# Patient Record
Sex: MALE | Race: WHITE | NOT HISPANIC OR LATINO | Employment: OTHER | URBAN - METROPOLITAN AREA
[De-identification: names, ages, dates, MRNs, and addresses within clinical notes are randomized per-mention and may not be internally consistent; named-entity substitution may affect disease eponyms.]

---

## 2017-07-12 ENCOUNTER — TELEPHONE (OUTPATIENT)
Dept: FAMILY MEDICINE CLINIC | Facility: CLINIC | Age: 67
End: 2017-07-12

## 2017-09-05 ENCOUNTER — OFFICE VISIT (OUTPATIENT)
Dept: FAMILY MEDICINE CLINIC | Facility: CLINIC | Age: 67
End: 2017-09-05

## 2017-09-05 VITALS
OXYGEN SATURATION: 96 % | HEART RATE: 54 BPM | WEIGHT: 189.8 LBS | TEMPERATURE: 97.2 F | DIASTOLIC BLOOD PRESSURE: 80 MMHG | SYSTOLIC BLOOD PRESSURE: 122 MMHG | BODY MASS INDEX: 28.11 KG/M2 | HEIGHT: 69 IN

## 2017-09-05 DIAGNOSIS — H40.9 GLAUCOMA, UNSPECIFIED GLAUCOMA, UNSPECIFIED LATERALITY: ICD-10-CM

## 2017-09-05 DIAGNOSIS — I25.10 CORONARY ARTERY DISEASE INVOLVING NATIVE HEART WITHOUT ANGINA PECTORIS, UNSPECIFIED VESSEL OR LESION TYPE: ICD-10-CM

## 2017-09-05 DIAGNOSIS — M15.9 OSTEOARTHRITIS OF MULTIPLE JOINTS, UNSPECIFIED OSTEOARTHRITIS TYPE: ICD-10-CM

## 2017-09-05 DIAGNOSIS — E78.5 HYPERLIPIDEMIA, UNSPECIFIED HYPERLIPIDEMIA TYPE: ICD-10-CM

## 2017-09-05 DIAGNOSIS — Z85.46 HISTORY OF PROSTATE CANCER: ICD-10-CM

## 2017-09-05 DIAGNOSIS — N52.9 ERECTILE DYSFUNCTION, UNSPECIFIED ERECTILE DYSFUNCTION TYPE: ICD-10-CM

## 2017-09-05 DIAGNOSIS — E11.9 TYPE 2 DIABETES MELLITUS WITHOUT COMPLICATION, WITHOUT LONG-TERM CURRENT USE OF INSULIN (HCC): Primary | ICD-10-CM

## 2017-09-05 DIAGNOSIS — K63.5 COLON POLYPS: ICD-10-CM

## 2017-09-05 DIAGNOSIS — I10 ESSENTIAL HYPERTENSION: ICD-10-CM

## 2017-09-05 LAB — HBA1C MFR BLD: 5.8 %

## 2017-09-05 PROCEDURE — 99213 OFFICE O/P EST LOW 20 MIN: CPT | Performed by: NURSE PRACTITIONER

## 2017-09-05 PROCEDURE — 83036 HEMOGLOBIN GLYCOSYLATED A1C: CPT | Performed by: NURSE PRACTITIONER

## 2017-09-05 NOTE — PATIENT INSTRUCTIONS
Diabetes and Exercise  Exercising regularly is important. It is not just about losing weight. It has many health benefits, such as:  · Improving your overall fitness, flexibility, and endurance.  · Increasing your bone density.  · Helping with weight control.  · Decreasing your body fat.  · Increasing your muscle strength.  · Reducing stress and tension.  · Improving your overall health.  People with diabetes who exercise gain additional benefits because exercise:  · Reduces appetite.  · Improves the body's use of blood sugar (glucose).  · Helps lower or control blood glucose.  · Decreases blood pressure.  · Helps control blood lipids (such as cholesterol and triglycerides).  · Improves the body's use of the hormone insulin by:    Increasing the body's insulin sensitivity.    Reducing the body's insulin needs.  · Decreases the risk for heart disease because exercising:    Lowers cholesterol and triglycerides levels.    Increases the levels of good cholesterol (such as high-density lipoproteins [HDL]) in the body.    Lowers blood glucose levels.  YOUR ACTIVITY PLAN   Choose an activity that you enjoy, and set realistic goals. To exercise safely, you should begin practicing any new physical activity slowly, and gradually increase the intensity of the exercise over time. Your health care provider or diabetes educator can help create an activity plan that works for you. General recommendations include:  · Encouraging children to engage in at least 60 minutes of physical activity each day.  · Stretching and performing strength training exercises, such as yoga or weight lifting, at least 2 times per week.  · Performing a total of at least 150 minutes of moderate-intensity exercise each week, such as brisk walking or water aerobics.  · Exercising at least 3 days per week, making sure you allow no more than 2 consecutive days to pass without exercising.  · Avoiding long periods of inactivity (90 minutes or more). When you  have to spend an extended period of time sitting down, take frequent breaks to walk or stretch.  RECOMMENDATIONS FOR EXERCISING WITH TYPE 1 OR TYPE 2 DIABETES   · Check your blood glucose before exercising. If blood glucose levels are greater than 240 mg/dL, check for urine ketones. Do not exercise if ketones are present.  · Avoid injecting insulin into areas of the body that are going to be exercised. For example, avoid injecting insulin into:    The arms when playing tennis.    The legs when jogging.  · Keep a record of:    Food intake before and after you exercise.    Expected peak times of insulin action.    Blood glucose levels before and after you exercise.    The type and amount of exercise you have done.  · Review your records with your health care provider. Your health care provider will help you to develop guidelines for adjusting food intake and insulin amounts before and after exercising.  · If you take insulin or oral hypoglycemic agents, watch for signs and symptoms of hypoglycemia. They include:    Dizziness.    Shaking.    Sweating.    Chills.    Confusion.  · Drink plenty of water while you exercise to prevent dehydration or heat stroke. Body water is lost during exercise and must be replaced.  · Talk to your health care provider before starting an exercise program to make sure it is safe for you. Remember, almost any type of activity is better than none.     This information is not intended to replace advice given to you by your health care provider. Make sure you discuss any questions you have with your health care provider.     Document Released: 03/09/2005 Document Revised: 04/10/2017 Document Reviewed: 05/27/2014  [employer] Patient Education ©2017 [employer]

## 2017-09-05 NOTE — PROGRESS NOTES
"Chief Complaint   Patient presents with   • Diabetes     A1C check       Subjective   Luis Elliott is a 67 y.o. male    Diabetes   He presents for his follow-up diabetic visit. He has type 2 diabetes mellitus. No MedicAlert identification noted. His disease course has been stable. There are no hypoglycemic associated symptoms. Pertinent negatives for hypoglycemia include no dizziness or nervousness/anxiousness. There are no diabetic associated symptoms. Pertinent negatives for diabetes include no chest pain, no fatigue, no polydipsia, no polyphagia, no polyuria and no weakness. There are no hypoglycemic complications. Symptoms are stable. There are no diabetic complications. Risk factors for coronary artery disease include diabetes mellitus, male sex and dyslipidemia. Current diabetic treatment includes diet. He is compliant with treatment all of the time. His weight is stable. He is following a diabetic diet. Meal planning includes avoidance of concentrated sweets. He has not had a previous visit with a dietitian. He participates in exercise daily (active). There is no change in his home blood glucose trend. An ACE inhibitor/angiotensin II receptor blocker is being taken. He does not see a podiatrist.Eye exam is current (every few months).         Allergies   Allergen Reactions   • Xarelto [Rivaroxaban] Other (See Comments)     MADE ME FEEL LIKE \" I WAS HAVING A HEART ATTACK.\"     Past Medical History:   Diagnosis Date   • Cancer     PROSTATE   • Coronary artery disease    • DDD (degenerative disc disease), cervical    • Depression    • Diabetes mellitus    • Erectile dysfunction    • Glaucoma    • Hyperlipidemia    • Hypertension    • Impingement syndrome of left shoulder    • Osteoarthritis    • Varicose veins of lower limb     RIGHT      Past Surgical History:   Procedure Laterality Date   • CARDIAC CATHETERIZATION N/A 12/20/2016    Procedure: Left Heart Cath;  Surgeon: Kan Blackwell MD;  Location: [facility]" [facility] CATH INVASIVE LOCATION;  Service:    • COLONOSCOPY W/ POLYPECTOMY     • CORONARY ANGIOPLASTY     • EYE SURGERY      BILATERAL CATARACTS REMOVED.   • HERNIA REPAIR      RIGHT   • KNEE ARTHROSCOPY      LEFT   • KNEE SURGERY      LEFT TOTAL KNEE REPLACEMENT 12/2012   • LUMBAR EPIDURAL INJECTION     • NV RT/LT HEART CATHETERS N/A 12/27/2016    Procedure: Percutaneous Coronary Intervention;  Surgeon: Kan Blackwell MD;  Location:  [facility] CATH INVASIVE LOCATION;  Service: Cardiovascular   • PROSTATECTOMY      2003   • TENDON TRANSFER ELBOW      TENDON REPAIR   • TONSILLECTOMY     • TRABECULECTOMY      FOR GLAUCOMA     Social History     Social History   • Marital status:      Spouse name: N/A   • Number of children: N/A   • Years of education: N/A     Occupational History   • Not on file.     Social History Main Topics   • Smoking status: Former Smoker     Types: Cigarettes     Quit date: 4/12/2011   • Smokeless tobacco: Former User      Comment: QUIT 5 YEARS AGO   • Alcohol use 1.2 oz/week     2 Cans of beer per week      Comment: A COUPLE OF BEERS PER DAY   • Drug use: No   • Sexual activity: Defer     Other Topics Concern   • Not on file     Social History Narrative        Current Outpatient Prescriptions:   •  aspirin 81 MG EC tablet, Take 81 mg by mouth Every Night., Disp: , Rfl:   •  atorvastatin (LIPITOR) 40 MG tablet, Take 40 mg by mouth Every Night., Disp: , Rfl:   •  clopidogrel (PLAVIX) 75 MG tablet, Take 75 mg by mouth Every Night., Disp: , Rfl:   •  losartan (COZAAR) 25 MG tablet, Take 25 mg by mouth Every Night., Disp: , Rfl:   •  nitroglycerin (NITROSTAT) 0.4 MG SL tablet, Place 0.4 mg under the tongue Every 5 (Five) Minutes As Needed for chest pain. Take no more than 3 doses in 15 minutes., Disp: , Rfl:   •  sertraline (ZOLOFT) 50 MG tablet, Take 50 mg by mouth Every Night., Disp: , Rfl:   •  sertraline (ZOLOFT) 50 MG tablet, Take 1 tablet by mouth Daily., Disp: 30 tablet, Rfl: 3     Review  of Systems   Constitutional: Negative for appetite change, diaphoresis, fatigue and unexpected weight change.   Eyes: Negative for visual disturbance.   Respiratory: Negative for chest tightness and shortness of breath.    Cardiovascular: Negative for chest pain, palpitations and leg swelling.   Gastrointestinal: Negative for diarrhea, nausea and vomiting.   Endocrine: Negative for polydipsia, polyphagia and polyuria.   Genitourinary: Negative for difficulty urinating and dysuria.   Skin: Negative for color change.   Neurological: Negative for dizziness, weakness, light-headedness and numbness.   Psychiatric/Behavioral: Negative for sleep disturbance. The patient is not nervous/anxious.        Objective     Vitals:    09/05/17 0825   BP: 122/80   Pulse: 54   Temp: 97.2 °F (36.2 °C)   SpO2: 96%       Results for orders placed or performed in visit on 09/05/17   POC Glycosylated Hemoglobin (Hb A1C)   Result Value Ref Range    Hemoglobin A1C 5.8 %     Health Maintenance Summary      PNEUMOCOCCAL VACCINES (65+ LOW/MEDIUM RISK)  Patient had both vaccinations Overdue 2/19/2016      MEDICARE ANNUAL WELLNESS Overdue 4/13/2017      INFLUENZA VACCINE Overdue 9/1/2017      HEMOGLOBIN A1C Next Due 3/5/2018      DIABETIC FOOT EXAM Next Due 6/9/2018      LIPID PANEL Next Due 6/9/2018      URINE MICROALBUMIN Next Due 6/9/2018      DIABETIC EYE EXAM Next Due 8/7/2018      TDAP/TD VACCINES Next Due 1/1/2023      COLONOSCOPY Next Due 1/1/2026        Physical Exam   Constitutional: He appears well-developed and well-nourished. No distress.   HENT:   Head: Normocephalic and atraumatic.   Eyes: Conjunctivae are normal.   Neck: No JVD present.   Cardiovascular: Normal rate, regular rhythm, normal heart sounds and intact distal pulses.    No murmur heard.  Pulses:       Dorsalis pedis pulses are 2+ on the right side, and 2+ on the left side.        Posterior tibial pulses are 2+ on the right side, and 2+ on the left side.    Pulmonary/Chest: Effort normal and breath sounds normal. No respiratory distress.   Abdominal: Soft. He exhibits no distension. There is no tenderness.   Musculoskeletal: He exhibits no edema.   Neurological: Coordination normal.   Skin: Skin is warm and dry. No rash noted. He is not diaphoretic. No erythema. No pallor.   Psychiatric: He has a normal mood and affect. His behavior is normal.   Nursing note and vitals reviewed.      Assessment/Plan   Problem List Items Addressed This Visit        Cardiovascular and Mediastinum    Hyperlipidemia    Essential hypertension    Coronary artery disease involving native heart without angina pectoris       Digestive    Colon polyps       Endocrine    Type 2 diabetes mellitus without complication - Primary    Relevant Orders    POC Glycosylated Hemoglobin (Hb A1C) (Completed)       Musculoskeletal and Integument    Osteoarthritis of multiple joints       Genitourinary    Erectile dysfunction       Other    History of prostate cancer    Glaucoma      [id number] follow up in 6 months with A1c.    Lipids-Cont current meds    HTN-cont Losartan    Prostate-Cont to follow with urology    CAD-cont to see Dr Blackwell for this.     Colon-Next colonoscopy needed in 2021    Counseling provided on diabetes.

## 2018-02-12 ENCOUNTER — TELEPHONE (OUTPATIENT)
Dept: FAMILY MEDICINE CLINIC | Facility: CLINIC | Age: 68
End: 2018-02-12

## 2018-02-12 NOTE — TELEPHONE ENCOUNTER
PT NEEDS SERTALINE HCL 50MG 1PO QD 90 DAY SUPPLY PATIENT AS PHYSICAL ON MARCH 2, 2018 DR MARCH.SEND TO [facility]

## 2018-03-12 ENCOUNTER — OFFICE VISIT (OUTPATIENT)
Dept: FAMILY MEDICINE CLINIC | Facility: CLINIC | Age: 68
End: 2018-03-12

## 2018-03-12 VITALS
WEIGHT: 198 LBS | SYSTOLIC BLOOD PRESSURE: 126 MMHG | DIASTOLIC BLOOD PRESSURE: 78 MMHG | HEIGHT: 68 IN | BODY MASS INDEX: 30.01 KG/M2

## 2018-03-12 DIAGNOSIS — I25.10 CORONARY ARTERY DISEASE INVOLVING NATIVE HEART WITHOUT ANGINA PECTORIS, UNSPECIFIED VESSEL OR LESION TYPE: ICD-10-CM

## 2018-03-12 DIAGNOSIS — E11.9 TYPE 2 DIABETES MELLITUS WITHOUT COMPLICATION, WITHOUT LONG-TERM CURRENT USE OF INSULIN (HCC): Primary | ICD-10-CM

## 2018-03-12 DIAGNOSIS — M54.5 MIDLINE LOW BACK PAIN, UNSPECIFIED CHRONICITY, WITH SCIATICA PRESENCE UNSPECIFIED: ICD-10-CM

## 2018-03-12 DIAGNOSIS — E78.5 HYPERLIPIDEMIA, UNSPECIFIED HYPERLIPIDEMIA TYPE: ICD-10-CM

## 2018-03-12 DIAGNOSIS — I10 ESSENTIAL HYPERTENSION: ICD-10-CM

## 2018-03-12 DIAGNOSIS — Z85.46 HISTORY OF PROSTATE CANCER: ICD-10-CM

## 2018-03-12 PROBLEM — M54.50 LOW BACK PAIN: Status: ACTIVE | Noted: 2018-03-12

## 2018-03-12 LAB — HBA1C MFR BLD: 6.3 %

## 2018-03-12 PROCEDURE — 83036 HEMOGLOBIN GLYCOSYLATED A1C: CPT | Performed by: INTERNAL MEDICINE

## 2018-03-12 PROCEDURE — 99214 OFFICE O/P EST MOD 30 MIN: CPT | Performed by: INTERNAL MEDICINE

## 2018-03-12 RX ORDER — MELATONIN
1000 DAILY
COMMUNITY

## 2018-03-12 RX ORDER — AMOXICILLIN 500 MG
1200 CAPSULE ORAL DAILY
COMMUNITY

## 2018-03-12 NOTE — PROGRESS NOTES
68M here to est care and f/u on chronic issues:    Current concerns: none    CAD s/p angioplasty in 1994, s/p stent x 3 in 12/16 - Dr. Blackwell.  Compliant with medications: asa, atorva, plavix, losartan  No sob, dizziness, cp, pnd, orthopnea, edema, claudication.  States having nuclear imaging stress test in April with Dr. Blackwell    DM2 - not sure when dx - never on med mgmt - was able to reverse it with lifestyle mgmt.    Denies polydipsia, polyphagia, polyuria.  bp well controlled  On statin, asa  Lab Results   Component Value Date    HGBA1C 6.3 03/12/2018       OA, lumbar spine degenerative disc disease - est with Dr. Hawthorne - recent ALEYDA with good effect on back pain and leg numbness    H/o prostate cancer s/p prostatectomy years ago / h/o ED - injection therapy - sees Dr. Uribe in [place].  No urinary urgency, incontinence.    Patient Active Problem List   Diagnosis   • Type 2 diabetes mellitus without complication   • Hyperlipidemia   • Essential hypertension   • Erectile dysfunction   • History of prostate cancer   • Osteoarthritis of multiple joints   • Coronary artery disease involving native heart without angina pectoris   • Colon polyps   • Glaucoma   • Low back pain     Past Surgical History:   Procedure Laterality Date   • CARDIAC CATHETERIZATION N/A 12/20/2016    Procedure: Left Heart Cath;  Surgeon: Kan Blackwell MD;  Location:  [facility] CATH INVASIVE LOCATION;  Service:    • COLONOSCOPY W/ POLYPECTOMY     • CORONARY ANGIOPLASTY     • EYE SURGERY      BILATERAL CATARACTS REMOVED.   • HERNIA REPAIR      RIGHT   • KNEE ARTHROSCOPY      LEFT   • KNEE SURGERY      LEFT TOTAL KNEE REPLACEMENT 12/2012   • LUMBAR EPIDURAL INJECTION     • [place] RT/LT HEART CATHETERS N/A 12/27/2016    Procedure: Percutaneous Coronary Intervention;  Surgeon: Kan Blackwell MD;  Location: [facility] INVASIVE LOCATION;  Service: Cardiovascular   • PROSTATECTOMY      2003   • TENDON TRANSFER ELBOW      TENDON REPAIR   •  "TONSILLECTOMY     • TRABECULECTOMY      FOR GLAUCOMA     Current Outpatient Prescriptions   Medication Sig Dispense Refill   • aspirin 81 MG EC tablet Take 81 mg by mouth Every Night.     • atorvastatin (LIPITOR) 40 MG tablet Take 40 mg by mouth Every Night.     • calcium carbonate-cholecalciferol 500-400 MG-UNIT tablet tablet Take 1 tablet by mouth Daily.     • cholecalciferol (VITAMIN D3) 1000 units tablet Take 1,000 Units by mouth Daily.     • clopidogrel (PLAVIX) 75 MG tablet Take 75 mg by mouth Every Night.     • losartan (COZAAR) 25 MG tablet Take 25 mg by mouth Every Night.     • nitroglycerin (NITROSTAT) 0.4 MG SL tablet Place 0.4 mg under the tongue Every 5 (Five) Minutes As Needed for chest pain. Take no more than 3 doses in 15 minutes.     • Omega-3 Fatty Acids (FISH OIL) 1200 MG capsule capsule Take 1,200 mg by mouth Daily.     • sertraline (ZOLOFT) 50 MG tablet Take 1 tablet by mouth Daily. 90 tablet 0     No current facility-administered medications for this visit.      Allergies   Allergen Reactions   • Xarelto [Rivaroxaban] Other (See Comments)     MADE ME FEEL LIKE \" I WAS HAVING A HEART ATTACK.\"     Social History     Social History   • Marital status:      Social History Main Topics   • Smoking status: Former Smoker     Types: Cigarettes     Quit date: 4/12/2011   • Smokeless tobacco: Former User      Comment: QUIT 5 YEARS AGO   • Alcohol use 1.2 oz/week     2 Cans of beer per week      Comment: A COUPLE OF BEERS PER DAY   • Drug use: No   • Sexual activity: Defer     Other Topics Concern   • Not on file     Family History   Problem Relation Age of Onset   • No Known Problems Mother    • Alcohol abuse Father    • Heart attack Father    • No Known Problems Sister    • No Known Problems Brother      Ros: complete review negative.    /78   Ht 172.7 cm (68\")   Wt 89.8 kg (198 lb)   BMI 30.11 kg/m²   Gen: well appearing in nad, no resp effort  Eyes: conjunctiva clear, perrl, eomi  ENT: " mmm, no thyromegaly, no lymphadenopathy  CV: s1, s2 reg 1/6 tj, no /r/g, no bruits, no jvd  No peripheral edema, pedal pulses intact  Resp:  clear b/l no w/r/r  GI:  soft nt/nd  Skin: no clubbing or cyanosis  Neuro: no focal deficits.      Results for orders placed or performed in visit on 03/12/18   POC Glycosylated Hemoglobin (Hb A1C)   Result Value Ref Range    Hemoglobin A1C 6.3 %       A/P      1. Type 2 diabetes mellitus without complication, without long-term current use of insulin   -pt is prediabetes for some time now - continiuing mgmt with lifestyle measures   2. Essential hypertension   -bp welll controlled, on losartan   3. Coronary artery disease involving native heart without angina pectoris, unspecified vessel or lesion type   -no anginal sx, cont on current med mgmt   4. Hyperlipidemia, unspecified hyperlipidemia type   No myalgias, cont on statin   5. Midline low back pain, unspecified chronicity, with sciatica presence unspecified   -improved with ALEYDA, will cont to follow with Dr. Hawthorne   6.       Prostate cancer hx - s/p resection - psa in 6/17 0.05, will rpt annually    Reviewed prior labs in epic.  Pt will f/u in 3mo for medicare wellness and updated labs

## 2018-05-09 ENCOUNTER — TELEPHONE (OUTPATIENT)
Dept: FAMILY MEDICINE CLINIC | Facility: CLINIC | Age: 68
End: 2018-05-09

## 2018-05-09 RX ORDER — LOSARTAN POTASSIUM 25 MG/1
25 TABLET ORAL NIGHTLY
Qty: 90 TABLET | Refills: 1 | Status: SHIPPED | OUTPATIENT
Start: 2018-05-09 | End: 2018-05-09 | Stop reason: SDUPTHER

## 2018-05-09 RX ORDER — LOSARTAN POTASSIUM 25 MG/1
25 TABLET ORAL NIGHTLY
Qty: 90 TABLET | Refills: 1 | Status: SHIPPED | OUTPATIENT
Start: 2018-05-09 | End: 2018-12-19

## 2018-05-09 NOTE — TELEPHONE ENCOUNTER
PT'S WIFE MARIO CALLED IN TO GET REFILLS ON PT'S LOSARTAN 50 MG 1/2 PER DAY 3 MTH SUPPLY; SERTRALINE HCL 50 MG 1/DAY 90 DAY SUPPLY  PLEASE SEND [facility] Ph# [phone]

## 2018-05-23 ENCOUNTER — TELEPHONE (OUTPATIENT)
Dept: FAMILY MEDICINE CLINIC | Facility: CLINIC | Age: 68
End: 2018-05-23

## 2018-05-23 NOTE — TELEPHONE ENCOUNTER
PATIENT CALLED IN THIS REQUEST 10 DAYS AGO WITH ANOTHER MED. THIS ONE WAS NOT CALLED IN.    SERTRALINE HCL 50 MG 1/DAY 90 DAY SUPPLY WITH REFILL    COMPLETELY OUT OF MED     [employer] [place]

## 2018-08-13 ENCOUNTER — TELEPHONE (OUTPATIENT)
Dept: FAMILY MEDICINE CLINIC | Facility: CLINIC | Age: 68
End: 2018-08-13

## 2018-09-12 ENCOUNTER — OFFICE VISIT (OUTPATIENT)
Dept: FAMILY MEDICINE CLINIC | Facility: CLINIC | Age: 68
End: 2018-09-12

## 2018-09-12 VITALS
HEART RATE: 78 BPM | BODY MASS INDEX: 28.79 KG/M2 | DIASTOLIC BLOOD PRESSURE: 82 MMHG | WEIGHT: 190 LBS | SYSTOLIC BLOOD PRESSURE: 138 MMHG | HEIGHT: 68 IN | OXYGEN SATURATION: 98 %

## 2018-09-12 DIAGNOSIS — Z87.891 PERSONAL HISTORY OF TOBACCO USE, PRESENTING HAZARDS TO HEALTH: ICD-10-CM

## 2018-09-12 DIAGNOSIS — E78.5 HYPERLIPIDEMIA, UNSPECIFIED HYPERLIPIDEMIA TYPE: ICD-10-CM

## 2018-09-12 DIAGNOSIS — Z85.46 HISTORY OF PROSTATE CANCER: ICD-10-CM

## 2018-09-12 DIAGNOSIS — I25.10 CORONARY ARTERY DISEASE INVOLVING NATIVE HEART WITHOUT ANGINA PECTORIS, UNSPECIFIED VESSEL OR LESION TYPE: ICD-10-CM

## 2018-09-12 DIAGNOSIS — Z12.5 SCREENING FOR MALIGNANT NEOPLASM OF PROSTATE: ICD-10-CM

## 2018-09-12 DIAGNOSIS — Z12.2 ENCOUNTER FOR SCREENING FOR LUNG CANCER: ICD-10-CM

## 2018-09-12 DIAGNOSIS — M54.5 MIDLINE LOW BACK PAIN, UNSPECIFIED CHRONICITY, WITH SCIATICA PRESENCE UNSPECIFIED: ICD-10-CM

## 2018-09-12 DIAGNOSIS — M75.81 ROTATOR CUFF TENDONITIS, RIGHT: ICD-10-CM

## 2018-09-12 DIAGNOSIS — Z00.00 MEDICARE ANNUAL WELLNESS VISIT, SUBSEQUENT: Primary | ICD-10-CM

## 2018-09-12 DIAGNOSIS — Z13.6 ENCOUNTER FOR ABDOMINAL AORTIC ANEURYSM (AAA) SCREENING: ICD-10-CM

## 2018-09-12 DIAGNOSIS — I10 ESSENTIAL HYPERTENSION: ICD-10-CM

## 2018-09-12 DIAGNOSIS — Z87.891 HISTORY OF NICOTINE DEPENDENCE: ICD-10-CM

## 2018-09-12 DIAGNOSIS — E11.9 TYPE 2 DIABETES MELLITUS WITHOUT COMPLICATION, WITHOUT LONG-TERM CURRENT USE OF INSULIN (HCC): ICD-10-CM

## 2018-09-12 LAB
ALBUMIN SERPL-MCNC: 5.07 G/DL (ref 3.2–4.8)
ALBUMIN/GLOB SERPL: 2.6 G/DL (ref 1.5–2.5)
ALP SERPL-CCNC: 78 U/L (ref 25–100)
ALT SERPL W P-5'-P-CCNC: 57 U/L (ref 7–40)
ANION GAP SERPL CALCULATED.3IONS-SCNC: 4 MMOL/L (ref 3–11)
ARTICHOKE IGE QN: 110 MG/DL (ref 0–130)
AST SERPL-CCNC: 33 U/L (ref 0–33)
BILIRUB SERPL-MCNC: 1.1 MG/DL (ref 0.3–1.2)
BUN BLD-MCNC: 22 MG/DL (ref 9–23)
BUN/CREAT SERPL: 20 (ref 7–25)
CALCIUM SPEC-SCNC: 9.6 MG/DL (ref 8.7–10.4)
CHLORIDE SERPL-SCNC: 108 MMOL/L (ref 99–109)
CHOLEST SERPL-MCNC: 176 MG/DL (ref 0–200)
CO2 SERPL-SCNC: 28 MMOL/L (ref 20–31)
CREAT BLD-MCNC: 1.1 MG/DL (ref 0.6–1.3)
GFR SERPL CREATININE-BSD FRML MDRD: 67 ML/MIN/1.73
GLOBULIN UR ELPH-MCNC: 1.9 GM/DL
GLUCOSE BLD-MCNC: 132 MG/DL (ref 70–100)
HBA1C MFR BLD: 6.3 % (ref 4.8–5.6)
HDLC SERPL-MCNC: 47 MG/DL (ref 40–60)
POTASSIUM BLD-SCNC: 4.3 MMOL/L (ref 3.5–5.5)
PROT SERPL-MCNC: 7 G/DL (ref 5.7–8.2)
PSA SERPL-MCNC: 0.03 NG/ML (ref 0–4)
SODIUM BLD-SCNC: 140 MMOL/L (ref 132–146)
TRIGL SERPL-MCNC: 190 MG/DL (ref 0–150)

## 2018-09-12 PROCEDURE — 80053 COMPREHEN METABOLIC PANEL: CPT | Performed by: INTERNAL MEDICINE

## 2018-09-12 PROCEDURE — G0103 PSA SCREENING: HCPCS | Performed by: INTERNAL MEDICINE

## 2018-09-12 PROCEDURE — 90662 IIV NO PRSV INCREASED AG IM: CPT | Performed by: INTERNAL MEDICINE

## 2018-09-12 PROCEDURE — 80061 LIPID PANEL: CPT | Performed by: INTERNAL MEDICINE

## 2018-09-12 PROCEDURE — G0439 PPPS, SUBSEQ VISIT: HCPCS | Performed by: INTERNAL MEDICINE

## 2018-09-12 PROCEDURE — G0008 ADMIN INFLUENZA VIRUS VAC: HCPCS | Performed by: INTERNAL MEDICINE

## 2018-09-12 PROCEDURE — 83036 HEMOGLOBIN GLYCOSYLATED A1C: CPT | Performed by: INTERNAL MEDICINE

## 2018-09-12 NOTE — PATIENT INSTRUCTIONS
Labs today  Abdominal ultrasound for AAA screening  CT chest for lung cancer screening  Physical therapy for your shoulder        Medicare Wellness  Personal Prevention Plan of Service     Date of Office Visit:  2018  Encounter Provider:  Gauri Chand DO  Place of Service:  [facility]  Patient Name: Luis Elliott  :  1950    As part of the Medicare Wellness portion of your visit today, we are providing you with this personalized preventive plan of services (PPPS). This plan is based upon recommendations of the [place] Preventive Services Task Force (USPSTF) and the Advisory Committee on Immunization Practices (ACIP).    This lists the preventive care services that should be considered, and provides dates of when you are due. Items listed as completed are up-to-date and do not require any further intervention.    Health Maintenance   Topic Date Due   • LUNG CANCER SCREENING  2006   • ZOSTER VACCINE (2 of 2) 2013   • PNEUMOCOCCAL VACCINES (65+ LOW/MEDIUM RISK) (2 of 2 - PPSV23) 2016   • AAA SCREEN (ONE-TIME)  2018   • DIABETIC FOOT EXAM  2018   • LIPID PANEL  2018   • URINE MICROALBUMIN  2018   • INFLUENZA VACCINE  2018   • HEMOGLOBIN A1C  2018   • DIABETIC EYE EXAM  2019   • MEDICARE ANNUAL WELLNESS  2019   • COLONOSCOPY  10/20/2021   • TDAP/TD VACCINES (2 - Td) 2023   • HEPATITIS C SCREENING  Completed       Orders Placed This Encounter   Procedures   • CT chest low dose wo     Standing Status:   Future     Standing Expiration Date:   2019     Order Specific Question:   The patient is age 55-77:     Answer:   68     Order Specific Question:   The patient is a current smoker?     Answer:   No     Order Specific Question:   The patient is a former smoker who has quit within the last 15 years?     Answer:   Yes     Order Specific Question:   The number of years since quitting smoking.      Answer:   11     Order Specific Question:   The patient has a smoking history of 30 pack-years or greater:     Answer:   Yes     Order Specific Question:   Actual pack - year smoking history (number):     Answer:   35     Order Specific Question:   Has the Patient had a Chest CT scan within the past 12 months?     Answer:   No     Order Specific Question:   Does the patient have any clinical signs/symptoms of lung cancer?     Answer:   No     Order Specific Question:   The patient was engaged in shared decision-making for this test:     Answer:   Yes   • PSA Screen     Standing Status:   Future     Standing Expiration Date:   9/12/2019   • Lipid panel     Standing Status:   Future     Standing Expiration Date:   9/12/2019   • Comprehensive metabolic panel     Standing Status:   Future     Standing Expiration Date:   9/12/2019   • Hemoglobin A1c     Standing Status:   Future     Standing Expiration Date:   9/12/2019   • Ambulatory Referral to Physical Therapy Evaluate and treat     Referral Priority:   Routine     Referral Type:   Therapy     Referral Reason:   Specialty Services Required     Requested Specialty:   Physical Therapy     Number of Visits Requested:   1       Return in about 6 months (around 3/12/2019) for follow up chronic issues.                Please review the decision aid used during our discussion regarding the Low dose lung cancer screening visit today.

## 2018-09-12 NOTE — PROGRESS NOTES
QUICK REFERENCE INFORMATION:  The ABCs of the Annual Wellness Visit    Subsequent Medicare Wellness Visit    HEALTH RISK ASSESSMENT    1950    Recent Hospitalizations:  No hospitalization(s) within the last year..        Current Medical Providers:  Patient Care Team:  Gauri Chand DO as PCP - General (Internal Medicine)  Kan Blackwell MD as PCP - Claims Attributed  Kan Blackwell MD as Consulting Physician (Cardiology)  Mark Camacho MD as Consulting Physician (Ophthalmology)  Asim Hawthorne Jr., MD as Consulting Physician (Orthopedic Surgery)        Smoking Status:  History   Smoking Status   • Former Smoker   • Packs/day: 1.50   • Years: 36.00   • Types: Cigarettes   • Quit date: 4/12/2011   Smokeless Tobacco   • Former User       Alcohol Consumption:  History   Alcohol Use   • 1.2 oz/week   • 2 Cans of beer per week     Comment: A COUPLE OF BEERS PER DAY       Depression Screen:   PHQ-2/PHQ-9 Depression Screening 9/12/2018   Little interest or pleasure in doing things 0   Feeling down, depressed, or hopeless 0   Total Score 0       Health Habits and Functional and Cognitive Screening:  Functional & Cognitive Status 9/12/2018   Do you have difficulty preparing food and eating? No   Do you have difficulty bathing yourself, getting dressed or grooming yourself? No   Do you have difficulty using the toilet? No   Do you have difficulty moving around from place to place? No   Do you have trouble with steps or getting out of a bed or a chair? No   In the past year have you fallen or experienced a near fall? No   Current Diet Well Balanced Diet   Dental Exam Up to date   Eye Exam Up to date   Exercise (times per week) 2 times per week   Current Exercise Activities Include Yard Work   Do you need help using the phone?  No   Are you deaf or do you have serious difficulty hearing?  Yes   Do you need help with transportation? No   Do you need help shopping? No   Do you need help preparing meals?  No    Do you need help with housework?  No   Do you need help with laundry? No   Do you need help taking your medications? No   Do you need help managing money? No   Do you ever drive or ride in a car without wearing a seat belt? No   Have you felt unusual stress, anger or loneliness in the last month? Yes   Who do you live with? Spouse   If you need help, do you have trouble finding someone available to you? No   Have you been bothered in the last four weeks by sexual problems? No   Do you have difficulty concentrating, remembering or making decisions? No           Does the patient have evidence of cognitive impairment? No    Aspirin use counseling: Taking ASA appropriately as indicated      Recent Lab Results:  CMP:  Lab Results   Component Value Date    BUN 19 12/27/2016    CREATININE 1.00 12/27/2016    EGFRIFNONA 75 12/27/2016    BCR 19.0 12/27/2016     12/27/2016    K 3.8 12/27/2016    CO2 27.0 12/27/2016    CALCIUM 10.2 12/27/2016     Lab Results   Component Value Date    HGBA1C 6.3 03/12/2018       Visual Acuity:  No exam data present   Eye exam utd    Age-appropriate Screening Schedule:  Refer to the list below for future screening recommendations based on patient's age, sex and/or medical conditions. Orders for these recommended tests are listed in the plan section. The patient has been provided with a written plan.    Health Maintenance   Topic Date Due   • ZOSTER VACCINE (2 of 2) 02/26/2013   • PNEUMOCOCCAL VACCINES (65+ LOW/MEDIUM RISK) (2 of 2 - PPSV23) 06/03/2016   • DIABETIC FOOT EXAM  06/09/2018   • LIPID PANEL  06/09/2018   • URINE MICROALBUMIN  06/09/2018   • INFLUENZA VACCINE  08/01/2018   • HEMOGLOBIN A1C  09/12/2018   • DIABETIC EYE EXAM  01/08/2019   • COLONOSCOPY  10/20/2021   • TDAP/TD VACCINES (2 - Td) 01/01/2023        Subjective   History of Present Illness    Luis Elliott is a 68 y.o. male who presents for an Subsequent Wellness Visit.    The following portions of the patient's  history were reviewed and updated as appropriate: allergies, current medications, past family history, past medical history, past social history, past surgical history and problem list.    -right shoulder progression of pain, some weakness - over a year or more    Outpatient Medications Prior to Visit   Medication Sig Dispense Refill   • aspirin 81 MG EC tablet Take 81 mg by mouth Every Night.     • atorvastatin (LIPITOR) 40 MG tablet Take 40 mg by mouth Every Night.     • calcium carbonate-cholecalciferol 500-400 MG-UNIT tablet tablet Take 1 tablet by mouth Daily.     • cholecalciferol (VITAMIN D3) 1000 units tablet Take 1,000 Units by mouth Daily.     • losartan (COZAAR) 25 MG tablet Take 1 tablet by mouth Every Night. 90 tablet 1   • nitroglycerin (NITROSTAT) 0.4 MG SL tablet Place 0.4 mg under the tongue Every 5 (Five) Minutes As Needed for chest pain. Take no more than 3 doses in 15 minutes.     • Omega-3 Fatty Acids (FISH OIL) 1200 MG capsule capsule Take 1,200 mg by mouth Daily.     • sertraline (ZOLOFT) 50 MG tablet Take 1 tablet by mouth Daily. 90 tablet 1   • clopidogrel (PLAVIX) 75 MG tablet Take 75 mg by mouth Every Night.       No facility-administered medications prior to visit.        Patient Active Problem List   Diagnosis   • Type 2 diabetes mellitus without complication (CMS/HCC)   • Hyperlipidemia   • Essential hypertension   • Erectile dysfunction   • History of prostate cancer   • Osteoarthritis of multiple joints   • Coronary artery disease involving native heart without angina pectoris   • Colon polyps   • Glaucoma   • Low back pain       Advance Care Planning:  has an advance directive - a copy HAS NOT been provided. Have asked the patient to send this to us to add to record.    Identification of Risk Factors:  Risk factors include: weight , cardiovascular risk and chronic pain.    Review of Systems     Review of Systems   General: no fatigue, fever/chills, unintentional wt loss, malaise,  "night sweats  Skin: no rash, no hives, no lesions,   Eyes: no visual disturbance   Heme: no brusing, no bleeding  ENT: no hearing loss, no dizziness, no nosebleed, no hoarseness  Endocrine: , no polyuria, polyphagia, polydipsia, no heat or cold intolerance  GI: no nausea, no vomiting, no diarrhea, no constipation, no bleeding, no pain  : no dysuria, no urinary frequency,, no hematuria, or incontinence  Extremities: no edema, , no claudication  Cardiac: no chest pain, no palpitations, no orthopnea, no PND  Respiratory: no cough, no sputum, no wheezing, no sob , no hemoptysis  Neuro: no headache, no seizure,, no paresthesias or weakness  Psych: no anxiety, no depression        Compared to one year ago, the patient feels his physical health is the same.  Compared to one year ago, the patient feels his mental health is the same.    Objective     Physical Exam    Finger Rub Hearing{Test (right ear):failed  Finger Rub Hearing{Test (left ear):failed        Vitals:    09/12/18 0825   BP: 138/82   Pulse: 78   SpO2: 98%   Weight: 86.2 kg (190 lb)   Height: 172.7 cm (68\")       Patient's Body mass index is 28.89 kg/m². BMI is above normal parameters. Recommendations include: nutrition counseling.    Gen: well appearing in nad, no resp effort  Eyes: conjunctiva clear, perrl, eomi  ENT: mmm, no thyromegaly, no lymphadenopathy  CV: s1, s2 reg no m/r/g, no bruits, no jvd  No peripheral edema, pedal pulses intact  Resp:  clear b/l no w/r/r  GI:  soft nt/nd  Rectal: prostate not palpable, no mass  Skin: no clubbing or cyanosis  Neuro: no focal deficits.      Assessment/Plan   Patient Self-Management and Personalized Health Advice  The patient has been provided with information about: diet, exercise, weight management and prevention of cardiac or vascular disease and preventive services including:   · Advance directive, Diabetes screening, see lab orders, Exercise counseling provided, Fall Risk assessment done, Influenza vaccine, " Prostate cancer screening discussed, Screening for AAA, referral for ultrasound placed, screen chest ct in previous smoker.    Visit Diagnoses:    ICD-10-CM ICD-9-CM   1. Medicare annual wellness visit, subsequent Z00.00 V70.0   2. Coronary artery disease involving native heart without angina pectoris, unspecified vessel or lesion type I25.10 414.01   3. Essential hypertension I10 401.9   4. Hyperlipidemia, unspecified hyperlipidemia type E78.5 272.4   5. History of prostate cancer Z85.46 V10.46   6. Type 2 diabetes mellitus without complication, without long-term current use of insulin (CMS/[facility]) E11.9 250.00   7. Midline low back pain, unspecified chronicity, with sciatica presence unspecified M54.5 724.2   8. Screening for malignant neoplasm of prostate Z12.5 V76.44   9. Encounter for screening for lung cancer Z12.2 V76.0   10. History of nicotine dependence Z87.891 V15.82   11. Encounter for abdominal aortic aneurysm (AAA) screening Z13.6 V81.2     Rotator cuff tendonitis on right , strength intact - PT referral, if not improving, consider orthopedics referral  PNeumonia vaccines have been given in recent past, entered to epic  Flu shot given      Orders Placed This Encounter   Procedures   • CT chest low dose wo     Standing Status:   Future     Standing Expiration Date:   9/12/2019     Order Specific Question:   The patient is age 55-77:     Answer:   68     Order Specific Question:   The patient is a current smoker?     Answer:   No     Order Specific Question:   The patient is a former smoker who has quit within the last 15 years?     Answer:   Yes     Order Specific Question:   The number of years since quitting smoking.     Answer:   11     Order Specific Question:   The patient has a smoking history of 30 pack-years or greater:     Answer:   Yes     Order Specific Question:   Actual pack - year smoking history (number):     Answer:   35     Order Specific Question:   Has the Patient had a Chest CT scan  within the past 12 months?     Answer:   No     Order Specific Question:   Does the patient have any clinical signs/symptoms of lung cancer?     Answer:   No     Order Specific Question:   The patient was engaged in shared decision-making for this test:     Answer:   Yes   • PSA Screen     Standing Status:   Future     Standing Expiration Date:   9/12/2019   • Lipid panel     Standing Status:   Future     Standing Expiration Date:   9/12/2019   • Comprehensive metabolic panel     Standing Status:   Future     Standing Expiration Date:   9/12/2019   • Hemoglobin A1c     Standing Status:   Future     Standing Expiration Date:   9/12/2019       Outpatient Encounter Prescriptions as of 9/12/2018   Medication Sig Dispense Refill   • aspirin 81 MG EC tablet Take 81 mg by mouth Every Night.     • atorvastatin (LIPITOR) 40 MG tablet Take 40 mg by mouth Every Night.     • calcium carbonate-cholecalciferol 500-400 MG-UNIT tablet tablet Take 1 tablet by mouth Daily.     • cholecalciferol (VITAMIN D3) 1000 units tablet Take 1,000 Units by mouth Daily.     • losartan (COZAAR) 25 MG tablet Take 1 tablet by mouth Every Night. 90 tablet 1   • nitroglycerin (NITROSTAT) 0.4 MG SL tablet Place 0.4 mg under the tongue Every 5 (Five) Minutes As Needed for chest pain. Take no more than 3 doses in 15 minutes.     • Omega-3 Fatty Acids (FISH OIL) 1200 MG capsule capsule Take 1,200 mg by mouth Daily.     • sertraline (ZOLOFT) 50 MG tablet Take 1 tablet by mouth Daily. 90 tablet 1   • [DISCONTINUED] clopidogrel (PLAVIX) 75 MG tablet Take 75 mg by mouth Every Night.       No facility-administered encounter medications on file as of 9/12/2018.        Reviewed use of high risk medication in the elderly: not applicable  Reviewed for potential of harmful drug interactions in the elderly: not applicable    Follow Up:  No Follow-up on file.     An After Visit Summary and PPPS with all of these plans were given to the patient.

## 2018-09-13 DIAGNOSIS — Z12.5 SCREENING FOR MALIGNANT NEOPLASM OF PROSTATE: Primary | ICD-10-CM

## 2018-09-17 ENCOUNTER — TELEPHONE (OUTPATIENT)
Dept: FAMILY MEDICINE CLINIC | Facility: CLINIC | Age: 68
End: 2018-09-17

## 2018-09-17 RX ORDER — METFORMIN HYDROCHLORIDE 500 MG/1
500 TABLET, EXTENDED RELEASE ORAL
Qty: 30 TABLET | Refills: 5 | Status: SHIPPED | OUTPATIENT
Start: 2018-09-17 | End: 2018-12-13

## 2018-09-17 NOTE — TELEPHONE ENCOUNTER
PT READ MY CHART WAS SUGGESTED IF WOULD TAKE METFORMIN PLEASE CALL OFFICE, PT SAID WOULD TAKE METFORMIN AND TO CALL PRESCRIPTION  TO    [facility]

## 2018-09-17 NOTE — TELEPHONE ENCOUNTER
A fasting glucose consistient with diabetes with an A1C of 6.3 which is at goal for a diabetic.  I recommend a medication, glucophage (metformin) to prevent progression of this disease.  Please call my office and let me know if you are willing to initiate this medication.     Will send metformin to pharmacy to initiate.

## 2018-09-20 ENCOUNTER — HOSPITAL ENCOUNTER (OUTPATIENT)
Dept: CT IMAGING | Facility: HOSPITAL | Age: 68
Discharge: HOME | End: 2018-09-20
Attending: INTERNAL MEDICINE | Admitting: INTERNAL MEDICINE

## 2018-09-20 DIAGNOSIS — Z12.2 ENCOUNTER FOR SCREENING FOR LUNG CANCER: ICD-10-CM

## 2018-09-20 DIAGNOSIS — Z87.891 HISTORY OF NICOTINE DEPENDENCE: ICD-10-CM

## 2018-09-20 PROCEDURE — G0297 LDCT FOR LUNG CA SCREEN: HCPCS

## 2018-10-02 ENCOUNTER — HOSPITAL ENCOUNTER (OUTPATIENT)
Dept: PHYSICAL THERAPY | Facility: HOSPITAL | Age: 68
Setting detail: THERAPIES SERIES
Discharge: HOME | End: 2018-10-02

## 2018-10-02 DIAGNOSIS — M75.81 RIGHT ROTATOR CUFF TENDINITIS: Primary | ICD-10-CM

## 2018-10-02 PROCEDURE — G8984 CARRY CURRENT STATUS: HCPCS | Performed by: PHYSICAL THERAPIST

## 2018-10-02 PROCEDURE — 97110 THERAPEUTIC EXERCISES: CPT | Performed by: PHYSICAL THERAPIST

## 2018-10-02 PROCEDURE — G8985 CARRY GOAL STATUS: HCPCS | Performed by: PHYSICAL THERAPIST

## 2018-10-02 PROCEDURE — 97161 PT EVAL LOW COMPLEX 20 MIN: CPT | Performed by: PHYSICAL THERAPIST

## 2018-10-02 NOTE — THERAPY EVALUATION
Outpatient Physical Therapy Ortho Initial Evaluation   Dyan     Patient Name: Luis Elliott  : 1950  MRN: [id number]  Today's Date: 10/2/2018      Visit Date: 10/02/2018    Patient Active Problem List   Diagnosis   • Type 2 diabetes mellitus without complication (CMS/HCC)   • Hyperlipidemia   • Essential hypertension   • Erectile dysfunction   • History of prostate cancer   • Osteoarthritis of multiple joints   • Coronary artery disease involving native heart without angina pectoris   • Colon polyps   • Glaucoma   • Low back pain        Past Medical History:   Diagnosis Date   • Cancer (CMS/HCC)     PROSTATE   • Coronary artery disease    • DDD (degenerative disc disease), cervical    • Depression    • Diabetes mellitus (CMS/HCC)    • Erectile dysfunction    • Glaucoma    • Hyperlipidemia    • Hypertension    • Impingement syndrome of left shoulder    • Osteoarthritis    • Varicose veins of lower limb     RIGHT        Past Surgical History:   Procedure Laterality Date   • CARDIAC CATHETERIZATION N/A 2016    Procedure: Left Heart Cath;  Surgeon: Kan Blackwell MD;  Location:  [facility] CATH INVASIVE LOCATION;  Service:    • COLONOSCOPY W/ POLYPECTOMY     • CORONARY ANGIOPLASTY     • EYE SURGERY      BILATERAL CATARACTS REMOVED.   • HERNIA REPAIR      RIGHT   • KNEE ARTHROSCOPY      LEFT   • KNEE SURGERY      LEFT TOTAL KNEE REPLACEMENT 2012   • LUMBAR EPIDURAL INJECTION     • [place] RT/LT HEART CATHETERS N/A 2016    Procedure: Percutaneous Coronary Intervention;  Surgeon: Kan Blackwell MD;  Location:  [facility] CATH INVASIVE LOCATION;  Service: Cardiovascular   • PROSTATECTOMY         • TENDON TRANSFER ELBOW      TENDON REPAIR   • TONSILLECTOMY     • TRABECULECTOMY      FOR GLAUCOMA       Visit Dx:     ICD-10-CM ICD-9-CM   1. Right rotator cuff tendinitis M75.81 726.10             Patient History     Row Name 10/02/18 1300             History    Chief Complaint Pain  -CP       Type of Pain Shoulder pain  -CP      Date Current Problem(s) Began 10/02/11  -CP      Brief Description of Current Complaint Pt reports insidious onset of right shoulder pain about 7+ years ago. He denies any falls or previous injuries to right shoulder. Pt states pain is intermittent, bothers him most when trying to lift UE overhead or out to side, lift any weight functionally, or when right sidelying position.   -CP      Patient/Caregiver Goals Relieve pain;Improve strength  -CP      Current Tobacco Use no  -CP      Smoking Status no  -CP      Patient's Rating of General Health Good  -CP      Hand Dominance right-handed  -CP      Occupation/sports/leisure activities Pt is retired, . 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   -CP      How has patient tried to help current problem? NSAIDs  -CP      History of Previous Related Injuries denies h/o shoulder injury.   -CP         Pain     Pain Location Shoulder  -CP      Pain at Present 0  -CP      Pain at Best 0  -CP      Pain at Worst 6  -CP      Pain Frequency Intermittent  -CP      Pain Description Dull;Aching  -CP      Is your sleep disturbed? Yes  -CP      Is medication used to assist with sleep? No  -CP      What position do you sleep in? Left sidelying  -CP      Difficulties at work? limited with OH reaching, lifting with home and outdoor tasks.   -CP         Fall Risk Assessment    Any falls in the past year: No  -CP         Daily Activities    Primary Language English  -CP      Pt Participated in POC and Goals Yes  -CP         Safety    Are you being hurt, hit, or frightened by anyone at home or in your life? No  -CP        User Key  (r) = Recorded By, (t) = Taken By, (c) = Cosigned By    Initials Name Provider Type    CP Perkins, Corinne E, PT Physical Therapist                PT Ortho     Row Name 10/02/18 1300       Subjective Comments    Subjective Comments Pt presents with c/o right  shoulder pain.   -CP       Subjective Pain    Able to rate subjective pain? yes  -CP    Pre-Treatment Pain Level 0  -CP    Post-Treatment Pain Level 0  -CP       Posture/Observations    Posture/Observations Comments Increased rounded shoulders, forward head, poor seated postural awareness.   -CP       Special Tests/Palpation    Special Tests/Palpation Shoulder  -CP       Shoulder Impingement/Rotator Cuff Special Tests    Macedo-Hadley Test (RC Lesion vs. Bursitis) Right:;Positive  -CP    Neer Impingement Test (RC Lesion vs. Bursitis) Right:;Positive  -CP    Full Can Test (RC Lesion) Right:;Positive  -CP    Empty Can Test (RC Lesion) Right:;Negative  -CP    Drop Arm Test (Full Thickness RC Lesion) Negative  -CP    Lift-Off Test (Subscapularis Lesion) Positive;Right:  -CP       Biceps/Labral Special Tests    Speed's Test (Biceps Tendinopathy vs. Labral Tear) Negative  -CP       General ROM    RT Upper Ext Rt Shoulder ABduction;Rt Shoulder Flexion;Rt Shoulder Extension;Rt Shoulder External Rotation;Rt Shoulder Internal Rotation  -CP    GENERAL ROM COMMENTS LEEE WFL IR, T5  -CP       Right Upper Ext    Rt Shoulder Abduction AROM 170   pain on lowering  -CP    Rt Shoulder Extension AROM 75  -CP    Rt Shoulder Flexion AROM 139  -CP    Rt Shoulder External Rotation AROM 88  -CP    Rt Shoulder Internal Rotation AROM T10  -CP       MMT (Manual Muscle Testing)    Rt Upper Ext Rt Shoulder Flexion;Rt Shoulder Extension;Rt Shoulder ABduction;Rt Shoulder Internal Rotation;Rt Shoulder External Rotation  -CP    Lt Upper Ext Lt Shoulder Flexion;Lt Shoulder ABduction;Lt Shoulder Extension;Lt Shoulder Internal Rotation;Lt Shoulder External Rotation  -CP       MMT Right Upper Ext    Rt Shoulder Flexion MMT, Gross Movement (3-/5) fair minus  -CP    Rt Shoulder Extension MMT, Gross Movement (4+/5) good plus  -CP    Rt Shoulder ABduction MMT, Gross Movement (4-/5) good minus  -CP    Rt Shoulder Internal Rotation MMT, Gross Movement  (4-/5) good minus   pain  -CP    Rt Shoulder External Rotation MMT, Gross Movement (4/5) good  -CP       MMT Left Upper Ext    Lt Shoulder Flexion MMT, Gross Movement (5/5) normal  -CP    Lt Shoulder Extension MMT, Gross Movement (5/5) normal  -CP    Lt Shoulder ABduction MMT, Gross Movement (5/5) normal  -CP    Lt Shoulder Internal Rotation MMT, Gross Movement (5/5) normal  -CP    Lt Shoulder External Rotation MMT, Gross Movement (5/5) normal  -CP       Hand  Strength     Strength Affected Side --   WFL  -CP      User Key  (r) = Recorded By, (t) = Taken By, (c) = Cosigned By    Initials Name Provider Type    CP Perkins, Corinne E, PT Physical Therapist                      Therapy Education  Education Details: Pt educated on initial HEP and POC. Written HEP provided supine AAROM flexion, abduction, ER/IR iso holds standing, scapular squeezes.   Given: HEP, Posture/body mechanics  Program: New  How Provided: Demonstration, Written, Verbal  Provided to: Patient  Level of Understanding: Verbalized, Demonstrated           PT OP Goals     Row Name 10/02/18 1345          PT Short Term Goals    STG Date to Achieve 10/16/18  -CP     STG 1 Patient will be independent and compliant with initial home exercise program.  -CP     STG 1 Progress New  -CP     STG 2 Pt will report resting pain in RUE 0-1/10, 2-3/10 with end range PROM.  -CP     STG 2 Progress New  -CP     STG 3 Pt will demonstrate 10-30° improvements in PFE and PIR.  -CP     STG 3 Progress New  -CP        Long Term Goals    LTG Date to Achieve 11/01/18  -CP     LTG 1 Pt. will be independent and compliant with advanced home exercise program to facilitate self-management of symptoms.  -CP     LTG 1 Progress New  -CP     LTG 2 Patient will improve Quick Dash score by 8 points to demonstrate improved function of daily tasks.  -CP     LTG 2 Progress New  -CP     LTG 3 Pt will perform sustained activity with shoulder above 90° for 90-second durations without  substitutions, no complaints of pain.  -CP     LTG 3 Progress New  -CP        Time Calculation    PT Goal Re-Cert Due Date 12/31/18  -CP       User Key  (r) = Recorded By, (t) = Taken By, (c) = Cosigned By    Initials Name Provider Type    CP Perkins, Corinne E, PT Physical Therapist                PT Assessment/Plan     Row Name 10/02/18 8665          PT Assessment    Functional Limitations Limitation in home management;Limitations in community activities;Performance in leisure activities  -CP     Impairments Range of motion;Posture;Poor body mechanics;Pain;Muscle strength;Joint mobility  -CP     Assessment Comments Pt is a 67 yo male referred to PT for right rotator cuff tenonitis who demonstrated signs and symptoms consistent with diagnosis. He is most limited with end range flexion, IR, and lowering abduction d/t pain isolated to supraspinatus. Pt would benefit from skilled PT to improve his overall generalized strength of RC muscles, decrease pain, and avoid mechanical impingement symptoms.   -CP     Please refer to paper survey for additional self-reported information Yes  -CP     Rehab Potential Good  -CP     Patient/caregiver participated in establishment of treatment plan and goals Yes  -CP     Patient would benefit from skilled therapy intervention Yes  -CP        PT Plan    PT Frequency 2x/week  -CP     Predicted Duration of Therapy Intervention (Therapy Eval) 8 visits  -CP     Planned CPT's? PT EVAL LOW COMPLEXITY: 83442;PT RE-EVAL: 08299;PT THER PROC EA 15 MIN: 20887;PT MANUAL THERAPY EA 15 MIN: 32851;PT ELECTRICAL STIM UNATTEND: ;PT ULTRASOUND EA 15 MIN: 48181;PT HOT OR COLD PACK TREAT MCARE;PT IONTOPHORESIS EA 15 MIN: 47435  -CP     PT Plan Comments Assess compliance with initial HEP. Progress with RC strengthening and scapular stabilization as tolerated. Manual and modalities as indicated.   -CP       User Key  (r) = Recorded By, (t) = Taken By, (c) = Cosigned By    Initials Name Provider Type     CP Perkins, Corinne E, PT Physical Therapist                  Exercises     Row Name 10/02/18 1300             Subjective Comments    Subjective Comments Pt presents with c/o right shoulder pain.   -CP         Subjective Pain    Able to rate subjective pain? yes  -CP      Pre-Treatment Pain Level 0  -CP      Post-Treatment Pain Level 0  -CP         Total Minutes    13245 - PT Therapeutic Exercise Minutes 10  -CP         Exercise 2    Exercise Name 2 Supine AAROM flexion, scaption  -CP      Cueing 2 Verbal;Demo  -CP      Reps 2 3x each direction  -CP         Exercise 3    Exercise Name 3 seated scap squeezes  -CP      Cueing 3 Verbal;Tactile;Demo  -CP      Reps 3 10x  -CP         Exercise 4    Exercise Name 4 IR/ER isometrics standing  -CP      Cueing 4 Verbal;Tactile;Demo  -CP      Reps 4 5x each  -CP        User Key  (r) = Recorded By, (t) = Taken By, (c) = Cosigned By    Initials Name Provider Type    CP Perkins, Corinne E, PT Physical Therapist                        Outcome Measure Options: Quick DASH  Quick DASH  Open a tight or new jar.: No Difficulty  Do heavy household chores (e.g., wash walls, wash floors): No Difficulty  Carry a shopping bag or briefcase: No Difficulty  Wash your back: No Difficulty  Use a knife to cut food: No Difficulty  Recreational activities in which you take some force or impact through your arm, should or hand (e.g. golf, hammering, tennis, etc.): Moderate Difficulty  During the past week, to what extent has your arm, shoulder, or hand problem interfered with your normal social activites with family, friends, neighbors or groups?: Not at all  During the past week, were you limited in your work or other regular daily activities as a result of your arm, shoulder or hand problem?: Not limited at all  Arm, Shoulder, or hand pain: Mild  Tingling (pins and needles) in your arm, shoulder, or hand: None  During the past week, how much difficulty have you had sleeping because of the pain  in your arm, shoulder or hand?: Moderate Difficiculty  Number of Questions Answered: 11  Quick DASH Score: 11.36         Time Calculation:     Therapy Suggested Charges     Code   Minutes Charges    98690 (CPT®) Hc Pt Neuromusc Re Education Ea 15 Min      77617 (CPT®) Hc Pt Ther Proc Ea 15 Min 10 1    56791 (CPT®) Hc Gait Training Ea 15 Min      80044 (CPT®) Hc Pt Therapeutic Act Ea 15 Min      54677 (CPT®) Hc Pt Manual Therapy Ea 15 Min      54844 (CPT®) Hc Pt Ther Massage- Per 15 Min      34319 (CPT®) Hc Pt Iontophoresis Ea 15 Min      01530 (CPT®) Hc Pt Elec Stim Ea-Per 15 Min      71842 (CPT®) Hc Pt Ultrasound Ea 15 Min      23419 (CPT®) Hc Pt Self Care/Mgmt/Train Ea 15 Min      63000 (CPT®) Hc Pt Prosthetic (S) Train Initial Encounter, Each 15 Min      38390 (CPT®) Hc Orthotic(S) Mgmt/Train Initial Encounter, Each 15min      59185 (CPT®) Hc Pt Aquatic Therapy Ea 15 Min      29751 (CPT®) Hc Pt Orthotic(S)/Prosthetic(S) Encounter, Each 15 Min       (CPT®) Hc Pt Electrical Stim Unattended      Total  10 1          Start Time: 1335  Total Timed Code Minutes- PT: 10 minute(s)     Therapy Charges for Today     Code Description Service Date Service Provider Modifiers Qty    67765466483 HC PT CARRY MOV HAND OBJ CURRENT 10/2/2018 Perkins, Corinne E, PT GP, CI 1    10883231755 HC PT CARRY MOV HAND OBJ PROJECTED 10/2/2018 Perkins, Corinne E, PT GP,  1    33183415647 HC PT THER PROC EA 15 MIN 10/2/2018 Perkins, Corinne E, PT GP 1    03614952098 HC PT EVAL LOW COMPLEXITY 3 10/2/2018 Perkins, Corinne E, PT GP 1          PT G-Codes  PT Professional Judgement Used?: Yes  Outcome Measure Options: Quick DASH  Quick DASH Score: 11.36  Functional Limitation: Carrying, moving and handling objects  Carrying, Moving and Handling Objects Current Status (): At least 1 percent but less than 20 percent impaired, limited or restricted  Carrying, Moving and Handling Objects Goal Status (): 0 percent impaired, limited or  restricted         Corinne E. Perkins, PT  10/2/2018

## 2018-10-08 ENCOUNTER — HOSPITAL ENCOUNTER (OUTPATIENT)
Dept: PHYSICAL THERAPY | Facility: HOSPITAL | Age: 68
Setting detail: THERAPIES SERIES
Discharge: HOME | End: 2018-10-08

## 2018-10-08 DIAGNOSIS — M75.81 RIGHT ROTATOR CUFF TENDINITIS: Primary | ICD-10-CM

## 2018-10-08 PROCEDURE — 97110 THERAPEUTIC EXERCISES: CPT | Performed by: PHYSICAL THERAPIST

## 2018-10-08 NOTE — THERAPY TREATMENT NOTE
Outpatient Physical Therapy Ortho Treatment Note   Dyan     Patient Name: Luis Elliott  : 1950  MRN: [id number]  Today's Date: 10/8/2018      Visit Date: 10/08/2018    Visit Dx:    ICD-10-CM ICD-9-CM   1. Right rotator cuff tendinitis M75.81 726.10       Patient Active Problem List   Diagnosis   • Type 2 diabetes mellitus without complication (CMS/HCC)   • Hyperlipidemia   • Essential hypertension   • Erectile dysfunction   • History of prostate cancer   • Osteoarthritis of multiple joints   • Coronary artery disease involving native heart without angina pectoris   • Colon polyps   • Glaucoma   • Low back pain        Past Medical History:   Diagnosis Date   • Cancer (CMS/HCC)     PROSTATE   • Coronary artery disease    • DDD (degenerative disc disease), cervical    • Depression    • Diabetes mellitus (CMS/HCC)    • Erectile dysfunction    • Glaucoma    • Hyperlipidemia    • Hypertension    • Impingement syndrome of left shoulder    • Osteoarthritis    • Varicose veins of lower limb     RIGHT        Past Surgical History:   Procedure Laterality Date   • CARDIAC CATHETERIZATION N/A 2016    Procedure: Left Heart Cath;  Surgeon: Kan Blackwell MD;  Location:  [facility] INVASIVE LOCATION;  Service:    • COLONOSCOPY W/ POLYPECTOMY     • CORONARY ANGIOPLASTY     • EYE SURGERY      BILATERAL CATARACTS REMOVED.   • HERNIA REPAIR      RIGHT   • KNEE ARTHROSCOPY      LEFT   • KNEE SURGERY      LEFT TOTAL KNEE REPLACEMENT 2012   • LUMBAR EPIDURAL INJECTION     • CT RT/LT HEART CATHETERS N/A 2016    Procedure: Percutaneous Coronary Intervention;  Surgeon: Kan Blackwell MD;  Location:  [facility] INVASIVE LOCATION;  Service: Cardiovascular   • PROSTATECTOMY         • TENDON TRANSFER ELBOW      TENDON REPAIR   • TONSILLECTOMY     • TRABECULECTOMY      FOR GLAUCOMA                             PT Assessment/Plan     Row Name 10/08/18 1300          PT Assessment    Assessment  Comments Pt demonstrated improved right shoulder flexion and IR without pain. He tolerated progression of ther exercises in clinic without reproduction of shoulder pain. Pt educated on progressed HEP to include resisted ther exercises for RC and scapular strengthening.   -CP        PT Plan    PT Plan Comments Assess response from progressed HEP and compliance. Progress to include diagonals next visit.   -CP       User Key  (r) = Recorded By, (t) = Taken By, (c) = Cosigned By    Initials Name Provider Type    CP Perkins, Corinne E, PT Physical Therapist                    Exercises     Row Name 10/08/18 1300             Subjective Comments    Subjective Comments Pt states he has been doing his stretches and exercises, reports he has noticed improved motion and less pain at night.   -CP         Subjective Pain    Able to rate subjective pain? yes  -CP      Pre-Treatment Pain Level 0  -CP      Post-Treatment Pain Level 0  -CP         Total Minutes    82760 - PT Therapeutic Exercise Minutes 34  -CP         Exercise 1    Exercise Name 1 Pulley flexion, abduction each AAROm  -CP      Cueing 1 Verbal;Demo  -CP      Time 1 1 min each  -CP         Exercise 2    Exercise Name 2 Mid rows  -CP      Cueing 2 Verbal;Tactile;Demo  -CP      Reps 2 15x  -CP      Additional Comments GTB  -CP         Exercise 3    Exercise Name 3 low rows  -CP      Cueing 3 Verbal;Tactile;Demo  -CP      Reps 3 12  -CP      Additional Comments GTB  -CP         Exercise 4    Exercise Name 4 IR/ER ,neutral position  -CP      Cueing 4 Verbal;Tactile;Demo  -CP      Reps 4 12x each  -CP      Additional Comments ER with GTB, IR with RTB  -CP         Exercise 5    Exercise Name 5 towel stretch  -CP      Cueing 5 Verbal;Demo  -CP      Reps 5 4  -CP         Exercise 6    Exercise Name 6 doorway stretch  -CP      Cueing 6 Verbal;Demo  -CP      Reps 6 3  -CP         Exercise 7    Exercise Name 7 wall push up plus  -CP      Cueing 7 Demo  -CP      Reps 7 15x  -CP          Exercise 8    Exercise Name 8 Sidelying ER weighted with 2lb DB  -CP      Cueing 8 Verbal  -CP      Reps 8 12  -CP      Additional Comments 2lb DB  -CP         Exercise 9    Exercise Name 9 Supine serratus punches  -CP      Cueing 9 Verbal;Demo  -CP      Reps 9 12  -CP      Additional Comments using 6lb DB each;   -CP         Exercise 10    Exercise Name 10 Weighted IR sidelying  -CP      Cueing 10 Verbal;Tactile  -CP      Reps 10 10  -CP      Additional Comments 2lb DB  -CP        User Key  (r) = Recorded By, (t) = Taken By, (c) = Cosigned By    Initials Name Provider Type    CP Perkins, Corinne E, PT Physical Therapist                             Therapy Education  Education Details: HEP progressed to include mid rows, low rows, ER with GTB, IR with RTB, wall push up plus, and weighted serratus punch.   Given: HEP, Posture/body mechanics  Program: Progressed  How Provided: Verbal, Demonstration, Written  Provided to: Patient  Level of Understanding: Verbalized, Demonstrated              Time Calculation:   Start Time: 1300  Total Timed Code Minutes- PT: 34 minute(s)  Therapy Suggested Charges     Code   Minutes Charges    03566 (CPT®) Hc Pt Neuromusc Re Education Ea 15 Min      04651 (CPT®) Hc Pt Ther Proc Ea 15 Min 34 2    04242 (CPT®) Hc Gait Training Ea 15 Min      44561 (CPT®) Hc Pt Therapeutic Act Ea 15 Min      01766 (CPT®) Hc Pt Manual Therapy Ea 15 Min      09363 (CPT®) Hc Pt Ther Massage- Per 15 Min      51239 (CPT®) Hc Pt Iontophoresis Ea 15 Min      34922 (CPT®) Hc Pt Elec Stim Ea-Per 15 Min      99380 (CPT®) Hc Pt Ultrasound Ea 15 Min      11870 (CPT®) Hc Pt Self Care/Mgmt/Train Ea 15 Min      98311 (CPT®) Hc Pt Prosthetic (S) Train Initial Encounter, Each 15 Min      27779 (CPT®) Hc Orthotic(S) Mgmt/Train Initial Encounter, Each 15min      65256 (CPT®) Hc Pt Aquatic Therapy Ea 15 Min      49470 (CPT®) Hc Pt Orthotic(S)/Prosthetic(S) Encounter, Each 15 Min       (CPT®) Hc Pt Electrical Stim  Unattended      Total  34 2        Therapy Charges for Today     Code Description Service Date Service Provider Modifiers Qty    16061690027 HC PT THER PROC EA 15 MIN 10/8/2018 Perkins, Corinne E, PT GP 2                    Corinne E. Perkins, PT  10/8/2018

## 2018-10-10 ENCOUNTER — HOSPITAL ENCOUNTER (OUTPATIENT)
Dept: PHYSICAL THERAPY | Facility: HOSPITAL | Age: 68
Setting detail: THERAPIES SERIES
Discharge: HOME | End: 2018-10-10

## 2018-10-10 DIAGNOSIS — M75.81 RIGHT ROTATOR CUFF TENDINITIS: Primary | ICD-10-CM

## 2018-10-10 PROCEDURE — 97110 THERAPEUTIC EXERCISES: CPT | Performed by: PHYSICAL THERAPIST

## 2018-10-10 NOTE — THERAPY TREATMENT NOTE
Outpatient Physical Therapy Ortho Treatment Note   Dyan     Patient Name: Luis Elliott  : 1950  MRN: [id number]  Today's Date: 10/10/2018      Visit Date: 10/10/2018    Visit Dx:    ICD-10-CM ICD-9-CM   1. Right rotator cuff tendinitis M75.81 726.10       Patient Active Problem List   Diagnosis   • Type 2 diabetes mellitus without complication (CMS/HCC)   • Hyperlipidemia   • Essential hypertension   • Erectile dysfunction   • History of prostate cancer   • Osteoarthritis of multiple joints   • Coronary artery disease involving native heart without angina pectoris   • Colon polyps   • Glaucoma   • Low back pain        Past Medical History:   Diagnosis Date   • Cancer (CMS/HCC)     PROSTATE   • Coronary artery disease    • DDD (degenerative disc disease), cervical    • Depression    • Diabetes mellitus (CMS/HCC)    • Erectile dysfunction    • Glaucoma    • Hyperlipidemia    • Hypertension    • Impingement syndrome of left shoulder    • Osteoarthritis    • Varicose veins of lower limb     RIGHT        Past Surgical History:   Procedure Laterality Date   • CARDIAC CATHETERIZATION N/A 2016    Procedure: Left Heart Cath;  Surgeon: Kan Blackwell MD;  Location:  [facility];  Service:    • COLONOSCOPY W/ POLYPECTOMY     • CORONARY ANGIOPLASTY     • EYE SURGERY      BILATERAL CATARACTS REMOVED.   • HERNIA REPAIR      RIGHT   • KNEE ARTHROSCOPY      LEFT   • KNEE SURGERY      LEFT TOTAL KNEE REPLACEMENT 2012   • LUMBAR EPIDURAL INJECTION     • NV RT/LT HEART CATHETERS N/A 2016    Procedure: Percutaneous Coronary Intervention;  Surgeon: Kan Blackwell MD;  Location:  [facility];  Service: Cardiovascular   • PROSTATECTOMY         • TENDON TRANSFER ELBOW      TENDON REPAIR   • TONSILLECTOMY     • TRABECULECTOMY      FOR GLAUCOMA                             PT Assessment/Plan     Row Name 10/10/18 0900          PT Assessment    Assessment  Comments Pt tolerated increased resistance with UE ther exercises. Improved scapular retraction noted with ther ex, less cues for decreased UT compensation. Pt denies pain during session.   -CP        PT Plan    PT Plan Comments Continue to progress HEP as tolerated to include BTB next visit along with diagonals.   -CP       User Key  (r) = Recorded By, (t) = Taken By, (c) = Cosigned By    Initials Name Provider Type    CP Perkins, Corinne E, PT Physical Therapist                    Exercises     Row Name 10/10/18 0800             Subjective Comments    Subjective Comments Pt reports he has been doing his exercises, denies pain just reports stiffness on arrival this morning.   -CP         Subjective Pain    Able to rate subjective pain? yes  -CP      Pre-Treatment Pain Level 0  -CP      Post-Treatment Pain Level 0  -CP         Total Minutes    35058 - PT Therapeutic Exercise Minutes 32  -CP         Exercise 1    Exercise Name 1 SciFit UBE Level 2.5  -CP      Time 1 4 minutes  -CP      Additional Comments 2 min forward, 2 min backward  -CP         Exercise 2    Exercise Name 2 Mid rows  -CP      Cueing 2 Verbal  -CP      Sets 2 2  -CP      Reps 2 15  -CP      Additional Comments GTB, BTB  -CP         Exercise 3    Exercise Name 3 low rows  -CP      Sets 3 2  -CP      Reps 3 15  -CP      Additional Comments GTB, BTB  -CP         Exercise 4    Exercise Name 4 IR/ER ,neutral position  -CP      Sets 4 2  -CP      Reps 4 12  -CP      Additional Comments GTB  -CP         Exercise 5    Exercise Name 5 towel stretch  -CP      Reps 5 4  -CP      Additional Comments Increased AROM noted after stretch  -CP         Exercise 6    Exercise Name 6 doorway stretch  -CP      Reps 6 3  -CP         Exercise 7    Exercise Name 7 wall push up plus  -CP      Reps 7 15x  -CP         Exercise 8    Exercise Name 8 Sidelying ER weighted with 2lb DB  -CP      Sets 8 2  -CP      Reps 8 15  -CP         Exercise 9    Exercise Name 9 Supine  serratus punches  -CP      Cueing 9 Verbal;Demo  -CP      Sets 9 2  -CP      Reps 9 12  -CP      Additional Comments 8lb DB  -CP         Exercise 10    Exercise Name 10 SL arm for scap retraction  -CP      Cueing 10 Verbal;Demo  -CP      Sets 10 2  -CP      Reps 10 10  -CP      Additional Comments 10lb each UE  -CP        User Key  (r) = Recorded By, (t) = Taken By, (c) = Cosigned By    Initials Name Provider Type    CP Perkins, Corinne E, PT Physical Therapist                                            Time Calculation:   Start Time: 0839  Total Timed Code Minutes- PT: 32 minute(s)  Therapy Suggested Charges     Code   Minutes Charges    65665 (CPT®) Hc Pt Neuromusc Re Education Ea 15 Min      54167 (CPT®) Hc Pt Ther Proc Ea 15 Min 32 2    47080 (CPT®) Hc Gait Training Ea 15 Min      26315 (CPT®) Hc Pt Therapeutic Act Ea 15 Min      17675 (CPT®) Hc Pt Manual Therapy Ea 15 Min      76783 (CPT®) Hc Pt Ther Massage- Per 15 Min      85962 (CPT®) Hc Pt Iontophoresis Ea 15 Min      45242 (CPT®) Hc Pt Elec Stim Ea-Per 15 Min      56227 (CPT®) Hc Pt Ultrasound Ea 15 Min      85081 (CPT®) Hc Pt Self Care/Mgmt/Train Ea 15 Min      91068 (CPT®) Hc Pt Prosthetic (S) Train Initial Encounter, Each 15 Min      43146 (CPT®) Hc Orthotic(S) Mgmt/Train Initial Encounter, Each 15min      59334 (CPT®) Hc Pt Aquatic Therapy Ea 15 Min      20528 (CPT®) Hc Pt Orthotic(S)/Prosthetic(S) Encounter, Each 15 Min       (CPT®) Hc Pt Electrical Stim Unattended      Total  32 2        Therapy Charges for Today     Code Description Service Date Service Provider Modifiers Qty    80359893501 HC PT THER PROC EA 15 MIN 10/10/2018 Perkins, Corinne E, PT GP 2                    Corinne E. Perkins, PT  10/10/2018

## 2018-10-16 ENCOUNTER — HOSPITAL ENCOUNTER (OUTPATIENT)
Dept: PHYSICAL THERAPY | Facility: HOSPITAL | Age: 68
Setting detail: THERAPIES SERIES
Discharge: HOME | End: 2018-10-16

## 2018-10-16 DIAGNOSIS — M75.81 RIGHT ROTATOR CUFF TENDINITIS: Primary | ICD-10-CM

## 2018-10-16 PROCEDURE — 97110 THERAPEUTIC EXERCISES: CPT | Performed by: PHYSICAL THERAPIST

## 2018-10-16 NOTE — THERAPY TREATMENT NOTE
Outpatient Physical Therapy Ortho Treatment Note   [facility]yan     Patient Name: Luis Elliott  : 1950  MRN: [id number]  Today's Date: 10/16/2018      Visit Date: 10/16/2018    Visit Dx:    ICD-10-CM ICD-9-CM   1. Right rotator cuff tendinitis M75.81 726.10       Patient Active Problem List   Diagnosis   • Type 2 diabetes mellitus without complication (CMS/HCC)   • Hyperlipidemia   • Essential hypertension   • Erectile dysfunction   • History of prostate cancer   • Osteoarthritis of multiple joints   • Coronary artery disease involving native heart without angina pectoris   • Colon polyps   • Glaucoma   • Low back pain        Past Medical History:   Diagnosis Date   • Cancer (CMS/HCC)     PROSTATE   • Coronary artery disease    • DDD (degenerative disc disease), cervical    • Depression    • Diabetes mellitus (CMS/HCC)    • Erectile dysfunction    • Glaucoma    • Hyperlipidemia    • Hypertension    • Impingement syndrome of left shoulder    • Osteoarthritis    • Varicose veins of lower limb     RIGHT        Past Surgical History:   Procedure Laterality Date   • CARDIAC CATHETERIZATION N/A 2016    Procedure: Left Heart Cath;  Surgeon: Kan Blackwell MD;  Location:  [facility];  Service:    • COLONOSCOPY W/ POLYPECTOMY     • CORONARY ANGIOPLASTY     • EYE SURGERY      BILATERAL CATARACTS REMOVED.   • HERNIA REPAIR      RIGHT   • KNEE ARTHROSCOPY      LEFT   • KNEE SURGERY      LEFT TOTAL KNEE REPLACEMENT 2012   • LUMBAR EPIDURAL INJECTION     • IN RT/LT HEART CATHETERS N/A 2016    Procedure: Percutaneous Coronary Intervention;  Surgeon: Kan Blackwell MD;  Location:  [facility];  Service: Cardiovascular   • PROSTATECTOMY         • TENDON TRANSFER ELBOW      TENDON REPAIR   • TONSILLECTOMY     • TRABECULECTOMY      FOR GLAUCOMA                             PT Assessment/Plan     Row Name 10/16/18 0800          PT Assessment    Assessment  Comments Pt demonstrated full functional AROM supine without pain or compensations. He is progressing with improved strengthening, tolerated increased resistance and reps of ther ex without pain.   -CP        PT Plan    PT Plan Comments Assess compliance with additions to HEP. Anticipate decrease freq to1x/week next visit, pending symptoms.   -CP       User Key  (r) = Recorded By, (t) = Taken By, (c) = Cosigned By    Initials Name Provider Type    CP Perkins, Corinne E, PT Physical Therapist                    Exercises     Row Name 10/16/18 0700             Subjective Comments    Subjective Comments Pt states his shoulder is doing well, states he has been doing his HEP.   -CP         Subjective Pain    Able to rate subjective pain? yes  -CP      Pre-Treatment Pain Level 0  -CP      Post-Treatment Pain Level 0  -CP         Total Minutes    30612 - PT Therapeutic Exercise Minutes 33  -CP         Exercise 1    Exercise Name 1 SciFit UBE Level 3.0  -CP      Time 1 4 minutes  -CP      Additional Comments 2 min forward, 2 min backward  -CP         Exercise 2    Exercise Name 2 Mid rows  -CP      Sets 2 3  -CP      Reps 2 15  -CP      Additional Comments BTB first set, 2 sets Cybex with 6 plates  -CP         Exercise 3    Exercise Name 3 low rows  -CP      Sets 3 2  -CP      Reps 3 15  -CP      Additional Comments BTB  -CP         Exercise 4    Exercise Name 4 IR/ER ,neutral position  -CP      Sets 4 2  -CP      Reps 4 15  -CP      Additional Comments BTB  -CP         Exercise 5    Exercise Name 5 towel stretch  -CP      Reps 5 5x  -CP         Exercise 6    Exercise Name 6 Single arm row standing with 10lb DB  -CP      Reps 6 15x each  -CP      Additional Comments 10 lb  -CP         Exercise 7    Exercise Name 7 wall push up plus  -CP      Sets 7 2  -CP      Reps 7 15x  -CP      Additional Comments at counter  -CP         Exercise 8    Exercise Name 8 Scap stabilization at wall C/CC with blue therapy ball  -CP      Sets 8  2  -CP      Reps 8 --  -CP      Time 8 30 seconds each  -CP         Exercise 9    Exercise Name 9 Supine serratus punches  -CP      Sets 9 2  -CP      Reps 9 12  -CP      Additional Comments 10 lb DB  -CP         Exercise 10    Exercise Name 10 bicep curl into OH press with 10lb DB  -CP      Sets 10 2  -CP      Reps 10 10  -CP      Additional Comments 10 lb DB  -CP         Exercise 11    Exercise Name 11 D1, D2 at Cybex with 2 plates  -CP      Sets 11 2  -CP      Reps 11 12x each  -CP      Additional Comments 2 plates for first set, BTB supine for second set.   -CP        User Key  (r) = Recorded By, (t) = Taken By, (c) = Cosigned By    Initials Name Provider Type    CP Perkins, Corinne E, PT Physical Therapist                             Therapy Education  Education Details: Progressed HEP to include BTB with scap retraction and IR/ER ther ex.   Given: HEP  Program: Progressed  How Provided: Verbal, Demonstration  Provided to: Patient  Level of Understanding: Verbalized, Demonstrated              Time Calculation:   Start Time: 0755  Total Timed Code Minutes- PT: 33 minute(s)  Therapy Suggested Charges     Code   Minutes Charges    72849 (CPT®) Hc Pt Neuromusc Re Education Ea 15 Min      20116 (CPT®) Hc Pt Ther Proc Ea 15 Min 33 2    85335 (CPT®) Hc Gait Training Ea 15 Min      97881 (CPT®) Hc Pt Therapeutic Act Ea 15 Min      29824 (CPT®) Hc Pt Manual Therapy Ea 15 Min      22903 (CPT®) Hc Pt Ther Massage- Per 15 Min      36131 (CPT®) Hc Pt Iontophoresis Ea 15 Min      08768 (CPT®) Hc Pt Elec Stim Ea-Per 15 Min      45622 (CPT®) Hc Pt Ultrasound Ea 15 Min      60161 (CPT®) Hc Pt Self Care/Mgmt/Train Ea 15 Min      17917 (CPT®) Hc Pt Prosthetic (S) Train Initial Encounter, Each 15 Min      13226 (CPT®) Hc Orthotic(S) Mgmt/Train Initial Encounter, Each 15min      58461 (CPT®) Hc Pt Aquatic Therapy Ea 15 Min      14112 (CPT®) Hc Pt Orthotic(S)/Prosthetic(S) Encounter, Each 15 Min       (CPT®) Hc Pt Electrical  Stim Unattended      Total  33 2        Therapy Charges for Today     Code Description Service Date Service Provider Modifiers Qty    55065062836 HC PT THER PROC EA 15 MIN 10/16/2018 Perkins, Corinne E, PT GP 2                    Corinne E. Perkins, PT  10/16/2018

## 2018-10-19 ENCOUNTER — HOSPITAL ENCOUNTER (OUTPATIENT)
Dept: PHYSICAL THERAPY | Facility: HOSPITAL | Age: 68
Setting detail: THERAPIES SERIES
Discharge: HOME | End: 2018-10-19

## 2018-10-19 DIAGNOSIS — M75.81 RIGHT ROTATOR CUFF TENDINITIS: Primary | ICD-10-CM

## 2018-10-19 PROCEDURE — 97110 THERAPEUTIC EXERCISES: CPT | Performed by: PHYSICAL THERAPIST

## 2018-10-19 NOTE — THERAPY TREATMENT NOTE
Outpatient Physical Therapy Ortho Treatment Note   Dyan     Patient Name: Luis Elliott  : 1950  MRN: [id number]  Today's Date: 10/19/2018      Visit Date: 10/19/2018    Visit Dx:    ICD-10-CM ICD-9-CM   1. Right rotator cuff tendinitis M75.81 726.10       Patient Active Problem List   Diagnosis   • Type 2 diabetes mellitus without complication (CMS/HCC)   • Hyperlipidemia   • Essential hypertension   • Erectile dysfunction   • History of prostate cancer   • Osteoarthritis of multiple joints   • Coronary artery disease involving native heart without angina pectoris   • Colon polyps   • Glaucoma   • Low back pain        Past Medical History:   Diagnosis Date   • Cancer (CMS/HCC)     PROSTATE   • Coronary artery disease    • DDD (degenerative disc disease), cervical    • Depression    • Diabetes mellitus (CMS/HCC)    • Erectile dysfunction    • Glaucoma    • Hyperlipidemia    • Hypertension    • Impingement syndrome of left shoulder    • Osteoarthritis    • Varicose veins of lower limb     RIGHT        Past Surgical History:   Procedure Laterality Date   • CARDIAC CATHETERIZATION N/A 2016    Procedure: Left Heart Cath;  Surgeon: Kan Blackwell MD;  Location:  [facility];  Service:    • COLONOSCOPY W/ POLYPECTOMY     • CORONARY ANGIOPLASTY     • EYE SURGERY      BILATERAL CATARACTS REMOVED.   • HERNIA REPAIR      RIGHT   • KNEE ARTHROSCOPY      LEFT   • KNEE SURGERY      LEFT TOTAL KNEE REPLACEMENT 2012   • LUMBAR EPIDURAL INJECTION     • ME RT/LT HEART CATHETERS N/A 2016    Procedure: Percutaneous Coronary Intervention;  Surgeon: Kan Blackwell MD;  Location:  [facility];  Service: Cardiovascular   • PROSTATECTOMY         • TENDON TRANSFER ELBOW      TENDON REPAIR   • TONSILLECTOMY     • TRABECULECTOMY      FOR GLAUCOMA                             PT Assessment/Plan     Row Name 10/19/18 0800          PT Assessment    Assessment  "Comments Pt verbalizes compliance with HEP, able to progress to increased resistance with RC strengthening and scapular stabilization. Few verbal cues required for slow, controlled motion with ER/IR abducted to 90 deg. Pt verbalized desire to increase emphasis on HEP.   -CP        PT Plan    PT Plan Comments Decreased PT freq, increased emphasis on indep with HEP. Will f/u with pt Oct 30, anticipate d/c to home with advanced HEP that date.   -CP       User Key  (r) = Recorded By, (t) = Taken By, (c) = Cosigned By    Initials Name Provider Type    CP Perkins, Corinne E, PT Physical Therapist                    Exercises     Row Name 10/19/18 0800             Subjective Comments    Subjective Comments Pt states \"I think I have this shoulder thing whooped.\"   -CP         Subjective Pain    Able to rate subjective pain? yes  -CP      Pre-Treatment Pain Level 0  -CP      Post-Treatment Pain Level 0  -CP         Total Minutes    43716 - PT Therapeutic Exercise Minutes 23  -CP         Exercise 1    Exercise Name 1 SciFit UBE Level 4.0  -CP      Time 1 4 minutes  -CP      Additional Comments 2 min forward, 2 min backward  -CP         Exercise 2    Exercise Name 2 Mid rows  -CP      Sets 2 2  -CP      Reps 2 15  -CP      Additional Comments Cybex 7 plates  -CP         Exercise 3    Exercise Name 3 low rows  -CP      Sets 3 2  -CP      Reps 3 15  -CP      Additional Comments Black  -CP         Exercise 4    Exercise Name 4 IR/ER ,neutral position  -CP      Sets 4 2  -CP      Reps 4 15  -CP      Additional Comments black  -CP         Exercise 5    Exercise Name 5 towel stretch  -CP      Reps 5 10x  -CP         Exercise 6    Exercise Name 6 Single arm row standing with 10lb DB  -CP      Reps 6 20x  -CP      Additional Comments 15lb  -CP         Exercise 7    Exercise Name 7 mat push up  -CP      Cueing 7 Verbal;Demo  -CP      Reps 7 15  -CP      Additional Comments low mat  -CP         Exercise 9    Exercise Name 9 IR/ER with " GTB, arm abd to 90 deg  -CP      Cueing 9 Verbal  -CP      Sets 9 2  -CP      Reps 9 15  -CP      Additional Comments GTB  -CP         Exercise 10    Exercise Name 10 Facepulls scap retraction  -CP      Cueing 10 Verbal;Demo  -CP      Sets 10 2  -CP      Reps 10 15  -CP      Additional Comments 4 plates  -CP         Exercise 11    Exercise Name 11 D1, D2 with RTB  -CP      Reps 11 15x each  -CP      Additional Comments RTB  -CP        User Key  (r) = Recorded By, (t) = Taken By, (c) = Cosigned By    Initials Name Provider Type    CP Perkins, Corinne E, PT Physical Therapist                                            Time Calculation:   Start Time: 0758  Total Timed Code Minutes- PT: 23 minute(s)  Therapy Suggested Charges     Code   Minutes Charges    25746 (CPT®) Hc Pt Neuromusc Re Education Ea 15 Min      68098 (CPT®) Hc Pt Ther Proc Ea 15 Min 23 2    06569 (CPT®) Hc Gait Training Ea 15 Min      94548 (CPT®) Hc Pt Therapeutic Act Ea 15 Min      29250 (CPT®) Hc Pt Manual Therapy Ea 15 Min      77530 (CPT®) Hc Pt Ther Massage- Per 15 Min      48248 (CPT®) Hc Pt Iontophoresis Ea 15 Min      98654 (CPT®) Hc Pt Elec Stim Ea-Per 15 Min      91769 (CPT®) Hc Pt Ultrasound Ea 15 Min      86984 (CPT®) Hc Pt Self Care/Mgmt/Train Ea 15 Min      00460 (CPT®) Hc Pt Prosthetic (S) Train Initial Encounter, Each 15 Min      77525 (CPT®) Hc Orthotic(S) Mgmt/Train Initial Encounter, Each 15min      40588 (CPT®) Hc Pt Aquatic Therapy Ea 15 Min      69140 (CPT®) Hc Pt Orthotic(S)/Prosthetic(S) Encounter, Each 15 Min       (CPT®) Hc Pt Electrical Stim Unattended      Total  23 2        Therapy Charges for Today     Code Description Service Date Service Provider Modifiers Qty    01092325532 HC PT THER PROC EA 15 MIN 10/19/2018 Perkins, Corinne E, PT GP 2                    Corinne E. Perkins, PT  10/19/2018

## 2018-10-30 ENCOUNTER — HOSPITAL ENCOUNTER (OUTPATIENT)
Dept: PHYSICAL THERAPY | Facility: HOSPITAL | Age: 68
Setting detail: THERAPIES SERIES
Discharge: HOME | End: 2018-10-30

## 2018-10-30 DIAGNOSIS — M75.81 RIGHT ROTATOR CUFF TENDINITIS: Primary | ICD-10-CM

## 2018-10-30 PROCEDURE — 97110 THERAPEUTIC EXERCISES: CPT | Performed by: PHYSICAL THERAPIST

## 2018-10-30 PROCEDURE — G8986 CARRY D/C STATUS: HCPCS | Performed by: PHYSICAL THERAPIST

## 2018-10-30 PROCEDURE — G8985 CARRY GOAL STATUS: HCPCS | Performed by: PHYSICAL THERAPIST

## 2018-10-30 NOTE — THERAPY DISCHARGE NOTE
Outpatient Physical Therapy Ortho Progress Note/Discharge Summary   [place]     Patient Name: Luis Elliott  : 1950  MRN: [id number]  Today's Date: 10/30/2018      Visit Date: 10/30/2018    Visit Dx:    ICD-10-CM ICD-9-CM   1. Right rotator cuff tendinitis M75.81 726.10       Patient Active Problem List   Diagnosis   • Type 2 diabetes mellitus without complication (CMS/HCC)   • Hyperlipidemia   • Essential hypertension   • Erectile dysfunction   • History of prostate cancer   • Osteoarthritis of multiple joints   • Coronary artery disease involving native heart without angina pectoris   • Colon polyps   • Glaucoma   • Low back pain        Past Medical History:   Diagnosis Date   • Cancer (CMS/HCC)     PROSTATE   • Coronary artery disease    • DDD (degenerative disc disease), cervical    • Depression    • Diabetes mellitus (CMS/HCC)    • Erectile dysfunction    • Glaucoma    • Hyperlipidemia    • Hypertension    • Impingement syndrome of left shoulder    • Osteoarthritis    • Varicose veins of lower limb     RIGHT        Past Surgical History:   Procedure Laterality Date   • CARDIAC CATHETERIZATION N/A 2016    Procedure: Left Heart Cath;  Surgeon: Kan Blackwell MD;  Location:  [facility]AS[facility];  Service:    • COLONOSCOPY W/ POLYPECTOMY     • CORONARY ANGIOPLASTY     • EYE SURGERY      BILATERAL CATARACTS REMOVED.   • HERNIA REPAIR      RIGHT   • KNEE ARTHROSCOPY      LEFT   • KNEE SURGERY      LEFT TOTAL KNEE REPLACEMENT 2012   • LUMBAR EPIDURAL INJECTION     • NH RT/LT HEART CATHETERS N/A 2016    Procedure: Percutaneous Coronary Intervention;  Surgeon: Kan Blackwell MD;  Location:  [facility]AS[facility] LOCATION;  Service: Cardiovascular   • PROSTATECTOMY         • TENDON TRANSFER ELBOW      TENDON REPAIR   • TONSILLECTOMY     • TRABECULECTOMY      FOR GLAUCOMA             PT Ortho     Row Name 10/30/18 0800       Shoulder Impingement/Rotator Cuff Special  Tests    Macedo-Hadley Test (RC Lesion vs. Bursitis) Negative  -CP    Neer Impingement Test (RC Lesion vs. Bursitis) Negative  -CP    Full Can Test (RC Lesion) Negative  -CP    Empty Can Test (RC Lesion) Negative  -CP    Drop Arm Test (Full Thickness RC Lesion) Negative  -CP    Lift-Off Test (Subscapularis Lesion) Negative  -CP       Biceps/Labral Special Tests    Speed's Test (Biceps Tendinopathy vs. Labral Tear) Negative  -CP       Right Upper Ext    Rt Shoulder Abduction AROM 180  -CP    Rt Shoulder Extension AROM 75  -CP    Rt Shoulder Flexion AROM 180  -CP    Rt Shoulder External Rotation AROM 89  -CP    Rt Shoulder Internal Rotation AROM T8  -CP       MMT Right Upper Ext    Rt Shoulder Flexion MMT, Gross Movement (5/5) normal  -CP    Rt Shoulder Extension MMT, Gross Movement (5/5) normal  -CP    Rt Shoulder ABduction MMT, Gross Movement (5/5) normal  -CP    Rt Shoulder Internal Rotation MMT, Gross Movement (4+/5) good plus  -CP    Rt Shoulder External Rotation MMT, Gross Movement (4+/5) good plus  -CP       MMT Left Upper Ext    Lt Shoulder Flexion MMT, Gross Movement (5/5) normal  -CP    Lt Shoulder Extension MMT, Gross Movement (5/5) normal  -CP    Lt Shoulder ABduction MMT, Gross Movement (5/5) normal  -CP    Lt Shoulder Internal Rotation MMT, Gross Movement (5/5) normal  -CP    Lt Shoulder External Rotation MMT, Gross Movement (5/5) normal  -CP       Sensation    Sensation WNL? WNL  -CP    Light Touch No apparent deficits  -CP      User Key  (r) = Recorded By, (t) = Taken By, (c) = Cosigned By    Initials Name Provider Type    CP Perkins, Corinne E, PT Physical Therapist                            PT Assessment/Plan     Row Name 10/30/18 0800          PT Assessment    Assessment Comments Pt has met his functional goals, denies right shoulder pain, reports compliance with progressed HEP and demonstrated functional R shoulder AROM and strength. Pt indep with progression of care, appropriate to d/c to home  with HEP. Emphasis of HEP on strengthening ER, IR and continued scapular stabilization work. Pt verbalized understanding and self discharge.   -CP        PT Plan    PT Plan Comments Pt will be discharged to home with HEP, met functional goals.   -CP       User Key  (r) = Recorded By, (t) = Taken By, (c) = Cosigned By    Initials Name Provider Type    CP Perkins, Corinne E, PT Physical Therapist                    Exercises     Row Name 10/30/18 0800             Subjective Comments    Subjective Comments Pt reports he is doing well, consistent with HEP daily, denies pain.   -CP         Subjective Pain    Able to rate subjective pain? yes  -CP      Pre-Treatment Pain Level 0  -CP      Post-Treatment Pain Level 0  -CP         Total Minutes    28528 - PT Therapeutic Exercise Minutes 23  -CP         Exercise 1    Exercise Name 1 Reassessment completed this date in clinic.   -CP        User Key  (r) = Recorded By, (t) = Taken By, (c) = Cosigned By    Initials Name Provider Type    CP Perkins, Corinne E, PT Physical Therapist                               PT OP Goals     Row Name 10/30/18 0800          PT Short Term Goals    STG Date to Achieve 10/16/18  -CP     STG 1 Patient will be independent and compliant with initial home exercise program.  -CP     STG 1 Progress Met  -CP     STG 2 Pt will report resting pain in RUE 0-1/10, 2-3/10 with end range PROM.  -CP     STG 2 Progress Met  -CP     STG 3 Pt will demonstrate 10-30° improvements in PFE and PIR.  -CP     STG 3 Progress Met  -CP        Long Term Goals    LTG Date to Achieve 11/01/18  -CP     LTG 1 Pt. will be independent and compliant with advanced home exercise program to facilitate self-management of symptoms.  -CP     LTG 1 Progress Met  -CP     LTG 2 Patient will improve Quick Dash score by 8 points to demonstrate improved function of daily tasks.  -CP     LTG 2 Progress Met  -CP     LTG 3 Pt will perform sustained activity with shoulder above 90° for 90-second  durations without substitutions, no complaints of pain.  -CP     LTG 3 Progress Met  -CP        Time Calculation    PT Goal Re-Cert Due Date 12/31/18  -CP       User Key  (r) = Recorded By, (t) = Taken By, (c) = Cosigned By    Initials Name Provider Type    CP Perkins, Corinne E, PT Physical Therapist               Outcome Measure Options: Quick DASH  Quick DASH  Open a tight or new jar.: No Difficulty  Do heavy household chores (e.g., wash walls, wash floors): No Difficulty  Carry a shopping bag or briefcase: No Difficulty  Wash your back: No Difficulty  Use a knife to cut food: No Difficulty  Recreational activities in which you take some force or impact through your arm, should or hand (e.g. golf, hammering, tennis, etc.): No Difficulty  During the past week, to what extent has your arm, shoulder, or hand problem interfered with your normal social activites with family, friends, neighbors or groups?: Not at all  During the past week, were you limited in your work or other regular daily activities as a result of your arm, shoulder or hand problem?: Not limited at all  Arm, Shoulder, or hand pain: Mild  Tingling (pins and needles) in your arm, shoulder, or hand: None  During the past week, how much difficulty have you had sleeping because of the pain in your arm, shoulder or hand?: No difficulty  Number of Questions Answered: 11  Quick DASH Score: 2.27         Time Calculation:   Start Time: 0800  Total Timed Code Minutes- PT: 23 minute(s)  Therapy Suggested Charges     Code   Minutes Charges    44316 (CPT®) Hc Pt Neuromusc Re Education Ea 15 Min      74234 (CPT®) Hc Pt Ther Proc Ea 15 Min 23 2    88863 (CPT®) Hc Gait Training Ea 15 Min      79994 (CPT®) Hc Pt Therapeutic Act Ea 15 Min      71796 (CPT®) Hc Pt Manual Therapy Ea 15 Min      58436 (CPT®) Hc Pt Ther Massage- Per 15 Min      55393 (CPT®) Hc Pt Iontophoresis Ea 15 Min      45622 (CPT®) Hc Pt Elec Stim Ea-Per 15 Min      47194 (CPT®) Hc Pt Ultrasound Ea  15 Min      68162 (CPT®) Hc Pt Self Care/Mgmt/Train Ea 15 Min      52562 (CPT®) Hc Pt Prosthetic (S) Train Initial Encounter, Each 15 Min      85071 (CPT®) Hc Orthotic(S) Mgmt/Train Initial Encounter, Each 15min      04880 (CPT®) Hc Pt Aquatic Therapy Ea 15 Min      08180 (CPT®) Hc Pt Orthotic(S)/Prosthetic(S) Encounter, Each 15 Min       (CPT®) Hc Pt Electrical Stim Unattended      Total  23 2        Therapy Charges for Today     Code Description Service Date Service Provider Modifiers Qty    39457668322 HC PT CARRY MOV HAND OBJ PROJECTED 10/30/2018 Perkins, Corinne E, PT GP, CH 1    53577157188 HC PT CARRY MOV HAND OBJ DISCHARGE 10/30/2018 Perkins, Corinne E, PT GP,  1    13915912149 HC PT THER PROC EA 15 MIN 10/30/2018 Perkins, Corinne E, PT GP 2          PT G-Codes  PT Professional Judgement Used?: Yes  Outcome Measure Options: Quick DASH  Quick DASH Score: 2.27  Functional Limitation: Carrying, moving and handling objects  Carrying, Moving and Handling Objects Goal Status (): 0 percent impaired, limited or restricted  Carrying, Moving and Handling Objects Discharge Status (): 0 percent impaired, limited or restricted     OP PT Discharge Summary  Date of Discharge: 10/30/18  Reason for Discharge: All goals achieved  Outcomes Achieved: Able to achieve all goals within established timeline  Discharge Destination: Home with home program  Discharge Instructions/Additional Comments: Pt was seen for PT IE and 5 treatments, has met all goals, appropriate to d/c to home with HEP for continued strengthening and stabilization of R shoulder.       Corinne E. Perkins, PT  10/30/2018

## 2018-11-05 ENCOUNTER — TELEPHONE (OUTPATIENT)
Dept: FAMILY MEDICINE CLINIC | Facility: CLINIC | Age: 68
End: 2018-11-05

## 2018-11-05 NOTE — TELEPHONE ENCOUNTER
Called and informed pt of not needing order for vaccines. Pt verbalized understanding and had no further questions.

## 2018-11-05 NOTE — TELEPHONE ENCOUNTER
Correct, no script or order needed.  He can go to [facility] and inquire about both of these.  Have him ask [facility] to send/ fax receipt of this vaccine to us for record keeping.    Gauri Piper

## 2018-11-05 NOTE — TELEPHONE ENCOUNTER
PATIENT WANTS A SHINGLE SHOT ORDERED FOR HIM. WILL GO TO [facility] IN [place]. PLEASE CALL HIM WHEN THIS IS ORDERED. [phone]    ALSO WANTS A HEP A SHOT AS WELL AND CAN GET IT AT THE SAME PHARM.

## 2018-11-15 ENCOUNTER — TELEPHONE (OUTPATIENT)
Dept: FAMILY MEDICINE CLINIC | Facility: CLINIC | Age: 68
End: 2018-11-15

## 2018-11-15 NOTE — TELEPHONE ENCOUNTER
Called pt and explained that Dr. Chand has advised that he stop the metformin ER and monitor for sx improvement. Had no questions, explained to him that if he had any further questions he can call back. Gave Office #.

## 2018-11-15 NOTE — TELEPHONE ENCOUNTER
PT CALLED AND SAID THAT SINCE HE STARTED TAKING METFORMIN ER HE HAS BEEN ACHING ALL OVER AND FEELING BAD ALL THE TIME. PT WOULD LIKE TO KNOW IF HE CAN STOP TAKING IT    PLEASE CALL BACK -[phone]

## 2018-11-30 ENCOUNTER — HOSPITAL ENCOUNTER (OUTPATIENT)
Dept: ULTRASOUND IMAGING | Facility: HOSPITAL | Age: 68
Discharge: HOME | End: 2018-11-30
Attending: INTERNAL MEDICINE | Admitting: INTERNAL MEDICINE

## 2018-11-30 DIAGNOSIS — Z87.891 PERSONAL HISTORY OF TOBACCO USE, PRESENTING HAZARDS TO HEALTH: ICD-10-CM

## 2018-11-30 DIAGNOSIS — Z00.00 MEDICARE ANNUAL WELLNESS VISIT, SUBSEQUENT: ICD-10-CM

## 2018-11-30 PROCEDURE — 76706 US ABDL AORTA SCREEN AAA: CPT

## 2018-12-13 ENCOUNTER — HOSPITAL ENCOUNTER (OUTPATIENT)
Dept: GENERAL RADIOLOGY | Facility: HOSPITAL | Age: 68
Discharge: HOME | End: 2018-12-13
Attending: INTERNAL MEDICINE | Admitting: INTERNAL MEDICINE

## 2018-12-13 ENCOUNTER — OFFICE VISIT (OUTPATIENT)
Dept: FAMILY MEDICINE CLINIC | Facility: CLINIC | Age: 68
End: 2018-12-13

## 2018-12-13 VITALS
HEIGHT: 68 IN | SYSTOLIC BLOOD PRESSURE: 116 MMHG | OXYGEN SATURATION: 98 % | DIASTOLIC BLOOD PRESSURE: 66 MMHG | WEIGHT: 184 LBS | BODY MASS INDEX: 27.89 KG/M2 | HEART RATE: 69 BPM

## 2018-12-13 DIAGNOSIS — Z12.5 SCREENING FOR PROSTATE CANCER: ICD-10-CM

## 2018-12-13 DIAGNOSIS — Z85.46 HISTORY OF PROSTATE CANCER: ICD-10-CM

## 2018-12-13 DIAGNOSIS — R42 DIZZINESS: ICD-10-CM

## 2018-12-13 DIAGNOSIS — R61 NIGHT SWEATS: Primary | ICD-10-CM

## 2018-12-13 DIAGNOSIS — E11.9 TYPE 2 DIABETES MELLITUS WITHOUT COMPLICATION, WITHOUT LONG-TERM CURRENT USE OF INSULIN (HCC): ICD-10-CM

## 2018-12-13 DIAGNOSIS — R51.9 NONINTRACTABLE HEADACHE, UNSPECIFIED CHRONICITY PATTERN, UNSPECIFIED HEADACHE TYPE: ICD-10-CM

## 2018-12-13 DIAGNOSIS — R61 NIGHT SWEATS: ICD-10-CM

## 2018-12-13 LAB
ALBUMIN SERPL-MCNC: 4.36 G/DL (ref 3.2–4.8)
ALBUMIN/GLOB SERPL: 1.5 G/DL (ref 1.5–2.5)
ALP SERPL-CCNC: 117 U/L (ref 25–100)
ALT SERPL W P-5'-P-CCNC: 38 U/L (ref 7–40)
ANION GAP SERPL CALCULATED.3IONS-SCNC: 8 MMOL/L (ref 3–11)
AST SERPL-CCNC: 19 U/L (ref 0–33)
BILIRUB SERPL-MCNC: 0.7 MG/DL (ref 0.3–1.2)
BUN BLD-MCNC: 15 MG/DL (ref 9–23)
BUN/CREAT SERPL: 12.9 (ref 7–25)
CALCIUM SPEC-SCNC: 9.4 MG/DL (ref 8.7–10.4)
CHLORIDE SERPL-SCNC: 105 MMOL/L (ref 99–109)
CO2 SERPL-SCNC: 26 MMOL/L (ref 20–31)
CREAT BLD-MCNC: 1.16 MG/DL (ref 0.6–1.3)
GFR SERPL CREATININE-BSD FRML MDRD: 63 ML/MIN/1.73
GLOBULIN UR ELPH-MCNC: 2.8 GM/DL
GLUCOSE BLD-MCNC: 185 MG/DL (ref 70–100)
HBA1C MFR BLD: 6.2 % (ref 4.8–5.6)
LDH SERPL-CCNC: 207 U/L (ref 120–246)
POTASSIUM BLD-SCNC: 4.8 MMOL/L (ref 3.5–5.5)
PROT SERPL-MCNC: 7.2 G/DL (ref 5.7–8.2)
PSA SERPL-MCNC: 0.04 NG/ML (ref 0–4)
SODIUM BLD-SCNC: 139 MMOL/L (ref 132–146)
TSH SERPL DL<=0.05 MIU/L-ACNC: 1.21 MIU/ML (ref 0.35–5.35)

## 2018-12-13 PROCEDURE — 83615 LACTATE (LD) (LDH) ENZYME: CPT | Performed by: INTERNAL MEDICINE

## 2018-12-13 PROCEDURE — G0103 PSA SCREENING: HCPCS | Performed by: INTERNAL MEDICINE

## 2018-12-13 PROCEDURE — 99214 OFFICE O/P EST MOD 30 MIN: CPT | Performed by: INTERNAL MEDICINE

## 2018-12-13 PROCEDURE — 83036 HEMOGLOBIN GLYCOSYLATED A1C: CPT | Performed by: INTERNAL MEDICINE

## 2018-12-13 PROCEDURE — 36415 COLL VENOUS BLD VENIPUNCTURE: CPT | Performed by: INTERNAL MEDICINE

## 2018-12-13 PROCEDURE — 71046 X-RAY EXAM CHEST 2 VIEWS: CPT

## 2018-12-13 PROCEDURE — 86480 TB TEST CELL IMMUN MEASURE: CPT | Performed by: INTERNAL MEDICINE

## 2018-12-13 PROCEDURE — 93000 ELECTROCARDIOGRAM COMPLETE: CPT | Performed by: INTERNAL MEDICINE

## 2018-12-13 PROCEDURE — 84443 ASSAY THYROID STIM HORMONE: CPT | Performed by: INTERNAL MEDICINE

## 2018-12-13 PROCEDURE — 80053 COMPREHEN METABOLIC PANEL: CPT | Performed by: INTERNAL MEDICINE

## 2018-12-13 NOTE — PROGRESS NOTES
68M w/ h/o CAD, prostate cancer here for headaches and dizziness along with night sweats waking up drenched -   Sweats past 3-4wk, h/a, dizziness x 2wks.    Had trialed metformin back in October and has felt unwell since then - stopped med 4wks ago, muscle aches improved.      Chills daytime, no temp taken.  H/a is left sided lasting hours daily, throbs, worse with cough 5-6/10 but worse with cough.  Throbbing.  Not waking him, some relief with ibuprofen.  Dizziness - lightheadedness coming and going x 2wks.  No paresthesias or weakness.  No vision changes.  +nocturia.  No sleeping well at night, sweats waking him, so tired daytimes.    +wt loss, no appetite, a lot of things do not taste good to him.    States there was a time in past when his blood pressure was very low and 2 meds were stopped by Dr. Bush due to this.  Had similar dizziness at that time.  No glucose readings checked during time of sweats.  No known lows.  This am 193.    Ros: denies malaise, +fatigue  Neuro: as above  Denies cp, sob, cough, rash, joint pain, n/v/abdominal pain or diarrhea, no urinary sx.  No sinus pain, nasal congestion.  No polyuria, polyphagia, polydypsia.      Patient Active Problem List   Diagnosis   • Type 2 diabetes mellitus without complication (CMS/HCC)   • Hyperlipidemia   • Essential hypertension   • Erectile dysfunction   • History of prostate cancer   • Osteoarthritis of multiple joints   • Coronary artery disease involving native heart without angina pectoris   • Colon polyps   • Glaucoma   • Low back pain      Past Surgical History:   Procedure Laterality Date   • COLONOSCOPY W/ POLYPECTOMY     • CORONARY ANGIOPLASTY     • EYE SURGERY      BILATERAL CATARACTS REMOVED.   • HERNIA REPAIR      RIGHT   • KNEE ARTHROSCOPY      LEFT   • KNEE SURGERY      LEFT TOTAL KNEE REPLACEMENT 12/2012   • LUMBAR EPIDURAL INJECTION     • PROSTATECTOMY      2003   • TENDON TRANSFER ELBOW      TENDON REPAIR   • TONSILLECTOMY     •  "TRABECULECTOMY      FOR GLAUCOMA        Current Outpatient Medications:   •  aspirin 81 MG EC tablet, Take 81 mg by mouth Every Night., Disp: , Rfl:   •  atorvastatin (LIPITOR) 40 MG tablet, Take 40 mg by mouth Every Night., Disp: , Rfl:   •  calcium carbonate-cholecalciferol 500-400 MG-UNIT tablet tablet, Take 1 tablet by mouth Daily., Disp: , Rfl:   •  cholecalciferol (VITAMIN D3) 1000 units tablet, Take 1,000 Units by mouth Daily., Disp: , Rfl:   •  losartan (COZAAR) 25 MG tablet, Take 1 tablet by mouth Every Night., Disp: 90 tablet, Rfl: 1  •  nitroglycerin (NITROSTAT) 0.4 MG SL tablet, Place 0.4 mg under the tongue Every 5 (Five) Minutes As Needed for chest pain. Take no more than 3 doses in 15 minutes., Disp: , Rfl:   •  Omega-3 Fatty Acids (FISH OIL) 1200 MG capsule capsule, Take 1,200 mg by mouth Daily., Disp: , Rfl:   •  sertraline (ZOLOFT) 50 MG tablet, Take 1 tablet by mouth Daily., Disp: 90 tablet, Rfl: 1   Allergies   Allergen Reactions   • Xarelto [Rivaroxaban] Other (See Comments)     MADE ME FEEL LIKE \" I WAS HAVING A HEART ATTACK.\"     Social History     Socioeconomic History   • Marital status:      Spouse name: Sabina   • Number of children: 2   • Years of education: Not on file   • Highest education level: Not on file   Social Needs   • Financial resource strain: Not on file   • Food insecurity - worry: Not on file   • Food insecurity - inability: Not on file   • Transportation needs - medical: Not on file   • Transportation needs - non-medical: Not on file   Occupational History   • Occupation: Retired   Tobacco Use   • Smoking status: Former Smoker     Packs/day: 1.50     Years: 36.00     Pack years: 54.00     Types: Cigarettes     Last attempt to quit: 2011     Years since quittin.6   • Smokeless tobacco: Former User   Substance and Sexual Activity   • Alcohol use: Yes     Alcohol/week: 1.2 oz     Types: 2 Cans of beer per week     Comment: A COUPLE OF BEERS PER DAY   • Drug " "use: No   • Sexual activity: Defer   Other Topics Concern   • Not on file   Social History Narrative    9/18:     to Sabina x 35yrs    2 children from previous marriage    2 gk.    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    Dental: utd    Eye: utd      Family History   Problem Relation Age of Onset   • No Known Problems Mother    • Alcohol abuse Father    • Heart attack Father    • No Known Problems Sister    • No Known Problems Brother       /[birth year]   Pulse 69   Ht 172.7 cm (68\")   Wt 83.5 kg (184 lb)   SpO2 98%   BMI 27.98 kg/m²    +orthostaiss with drop in systolic bp from 118 lying to 82 standing - pt dizzy  Gen: well appearing in nad, no resp effort, nontoxic  Eyes: conjunctiva clear, perrl, eomi, fundi hard to visualize b/l  ENT: mmm, no thyromegaly, no lymphadenopathy  CV: s1, s2 reg no m/r/g, no bruits, no jvd  No peripheral edema, pedal pulses intact  Resp:  clear b/l no w/r/r  GI:  soft nt/nd  Skin: no clubbing or cyanosis  Neuro: no focal deficits.  Alert ox3  Cn 2-12 intact  Motor and sensory intact  Cerebellar exam intact finger to nose and rapid alt movements        ECG 12 Lead  Date/Time: 12/13/2018 3:04 PM  Performed by: Gauri Chand DO  Authorized by: Gauri Chand DO   Rhythm: sinus rhythm and sinus bradycardia  Rate: bradycardic  QRS axis: normal  Q waves: III and aVF  Comments: No changes when compared with 2016 ekg.              A/P     Diagnosis Plan   1. Night sweats  CBC w AUTO Differential    Comprehensive metabolic panel    TSH    QuantiFERON TB Gold    Lactate Dehydrogenase    PSA Screen    XR Chest 2 View    Comprehensive metabolic panel    TSH    QuantiFERON TB Gold    Lactate Dehydrogenase    PSA Screen   2. Dizziness  XR Chest 2 View   3. Nonintractable headache, unspecified chronicity pattern, unspecified headache type  XR Chest 2 View   4. History of prostate cancer  PSA Screen    XR Chest 2 View    PSA Screen   5. Screening for " prostate cancer  PSA Screen    XR Chest 2 View    PSA Screen   6. Type 2 diabetes mellitus without complication, without long-term current use of insulin (CMS/[facility])  Hemoglobin A1c    Hemoglobin A1c     Sweats, dizziness, and headache along with wt loss x 2-8wks.  Pt ortostatic on exam - stop losartan and increase water intake to 8 glasses/day  Will initiate lab workup and cxr to check for underlying infection, inflammation, malignancy all in differential  Will f/u in 1wk to reassess, review labs and consider further workup if needed

## 2018-12-18 LAB
QUANTIFERON CRITERIA: NORMAL
QUANTIFERON MITOGEN VALUE: 7.7 IU/ML
QUANTIFERON NIL VALUE: 0.04 IU/ML
QUANTIFERON TB1 AG VALUE: 0.04 IU/ML
QUANTIFERON TB2 AG VALUE: 0.04 IU/ML
QUANTIFERON-TB GOLD PLUS: NEGATIVE

## 2018-12-19 ENCOUNTER — OFFICE VISIT (OUTPATIENT)
Dept: FAMILY MEDICINE CLINIC | Facility: CLINIC | Age: 68
End: 2018-12-19

## 2018-12-19 VITALS
BODY MASS INDEX: 26.98 KG/M2 | SYSTOLIC BLOOD PRESSURE: 120 MMHG | OXYGEN SATURATION: 98 % | DIASTOLIC BLOOD PRESSURE: 68 MMHG | WEIGHT: 178 LBS | HEIGHT: 68 IN

## 2018-12-19 DIAGNOSIS — Z87.891 FORMER TOBACCO USE: ICD-10-CM

## 2018-12-19 DIAGNOSIS — Z85.46 HISTORY OF PROSTATE CANCER: ICD-10-CM

## 2018-12-19 DIAGNOSIS — Z12.5 SCREENING FOR MALIGNANT NEOPLASM OF PROSTATE: ICD-10-CM

## 2018-12-19 DIAGNOSIS — R61 NIGHT SWEATS: ICD-10-CM

## 2018-12-19 DIAGNOSIS — R35.0 URINARY FREQUENCY: ICD-10-CM

## 2018-12-19 DIAGNOSIS — R42 DIZZINESS: Primary | ICD-10-CM

## 2018-12-19 DIAGNOSIS — R51.9 NONINTRACTABLE EPISODIC HEADACHE, UNSPECIFIED HEADACHE TYPE: ICD-10-CM

## 2018-12-19 DIAGNOSIS — R63.4 WEIGHT LOSS: ICD-10-CM

## 2018-12-19 LAB
BACTERIA UR QL AUTO: NORMAL /HPF
BASOPHILS # BLD AUTO: 0.02 10*3/MM3 (ref 0–0.2)
BASOPHILS NFR BLD AUTO: 0.2 % (ref 0–1)
BILIRUB UR QL STRIP: NEGATIVE
CLARITY UR: CLEAR
COLOR UR: YELLOW
CRP SERPL-MCNC: 4.84 MG/DL (ref 0–1)
DEPRECATED RDW RBC AUTO: 45.4 FL (ref 37–54)
EOSINOPHIL # BLD AUTO: 0.02 10*3/MM3 (ref 0–0.3)
EOSINOPHIL NFR BLD AUTO: 0.2 % (ref 0–3)
ERYTHROCYTE [DISTWIDTH] IN BLOOD BY AUTOMATED COUNT: 13.7 % (ref 11.3–14.5)
ERYTHROCYTE [SEDIMENTATION RATE] IN BLOOD: 42 MM/HR (ref 0–20)
GLUCOSE UR STRIP-MCNC: NEGATIVE MG/DL
HCT VFR BLD AUTO: 43.1 % (ref 38.9–50.9)
HGB BLD-MCNC: 13.9 G/DL (ref 13.1–17.5)
HGB UR QL STRIP.AUTO: ABNORMAL
HYALINE CASTS UR QL AUTO: NORMAL /LPF
IMM GRANULOCYTES # BLD: 0.04 10*3/MM3 (ref 0–0.03)
IMM GRANULOCYTES NFR BLD: 0.3 % (ref 0–0.6)
KETONES UR QL STRIP: ABNORMAL
LEUKOCYTE ESTERASE UR QL STRIP.AUTO: NEGATIVE
LYMPHOCYTES # BLD AUTO: 0.93 10*3/MM3 (ref 0.6–4.8)
LYMPHOCYTES NFR BLD AUTO: 7.7 % (ref 24–44)
MCH RBC QN AUTO: 29.3 PG (ref 27–31)
MCHC RBC AUTO-ENTMCNC: 32.3 G/DL (ref 32–36)
MCV RBC AUTO: 90.7 FL (ref 80–99)
MONOCYTES # BLD AUTO: 0.53 10*3/MM3 (ref 0–1)
MONOCYTES NFR BLD AUTO: 4.4 % (ref 0–12)
NEUTROPHILS # BLD AUTO: 10.49 10*3/MM3 (ref 1.5–8.3)
NEUTROPHILS NFR BLD AUTO: 87.2 % (ref 41–71)
NITRITE UR QL STRIP: NEGATIVE
PH UR STRIP.AUTO: <=5 [PH] (ref 5–8)
PLATELET # BLD AUTO: 249 10*3/MM3 (ref 150–450)
PMV BLD AUTO: 10.7 FL (ref 6–12)
PROT UR QL STRIP: ABNORMAL
PSA SERPL-MCNC: 0.05 NG/ML (ref 0–4)
RBC # BLD AUTO: 4.75 10*6/MM3 (ref 4.2–5.76)
RBC # UR: NORMAL /HPF
REF LAB TEST METHOD: NORMAL
SP GR UR STRIP: 1.02 (ref 1–1.03)
SQUAMOUS #/AREA URNS HPF: NORMAL /HPF
UROBILINOGEN UR QL STRIP: ABNORMAL
WBC NRBC COR # BLD: 12.03 10*3/MM3 (ref 3.5–10.8)
WBC UR QL AUTO: NORMAL /HPF

## 2018-12-19 PROCEDURE — G0103 PSA SCREENING: HCPCS | Performed by: INTERNAL MEDICINE

## 2018-12-19 PROCEDURE — 36415 COLL VENOUS BLD VENIPUNCTURE: CPT | Performed by: PHYSICIAN ASSISTANT

## 2018-12-19 PROCEDURE — 87150 DNA/RNA AMPLIFIED PROBE: CPT | Performed by: PHYSICIAN ASSISTANT

## 2018-12-19 PROCEDURE — 81001 URINALYSIS AUTO W/SCOPE: CPT | Performed by: PHYSICIAN ASSISTANT

## 2018-12-19 PROCEDURE — 99214 OFFICE O/P EST MOD 30 MIN: CPT | Performed by: PHYSICIAN ASSISTANT

## 2018-12-19 PROCEDURE — 86140 C-REACTIVE PROTEIN: CPT | Performed by: PHYSICIAN ASSISTANT

## 2018-12-19 PROCEDURE — 87077 CULTURE AEROBIC IDENTIFY: CPT

## 2018-12-19 PROCEDURE — 87040 BLOOD CULTURE FOR BACTERIA: CPT | Performed by: PHYSICIAN ASSISTANT

## 2018-12-19 PROCEDURE — 85652 RBC SED RATE AUTOMATED: CPT | Performed by: PHYSICIAN ASSISTANT

## 2018-12-19 PROCEDURE — 87086 URINE CULTURE/COLONY COUNT: CPT | Performed by: PHYSICIAN ASSISTANT

## 2018-12-19 PROCEDURE — 87186 SC STD MICRODIL/AGAR DIL: CPT | Performed by: PHYSICIAN ASSISTANT

## 2018-12-19 PROCEDURE — 85025 COMPLETE CBC W/AUTO DIFF WBC: CPT | Performed by: PHYSICIAN ASSISTANT

## 2018-12-19 NOTE — PROGRESS NOTES
"Chief Complaint   Patient presents with   • Follow-up   • Night Sweats   • Dizziness       HPI     Luis Elliott is a pleasant 68 y.o. male with PMH DM II, CAD, former tobacco use, and prostate cancer who is here for 1-week follow-up of \"chief complaint.\" He was evaluated here by Dr. Chand on 12/13 when labs and a chest x-ray were performed. Patient reports he had drenching sweats again last night. Does not have thermometer to check temp. No significant improvement in dizziness holding losartan and increasing fluid intake (at least 48 oz water daily). Describes dizziness as feeling off-balance and a little lightheaded, worse with standing. Left parietal headaches have lessened but not resolved. Felt symptoms started after beginning metformin 3 months ago but stopped med last month without improvement. Glucose log he brings in shows no hypoglycemia. New nocturia x2 and slight urinary frequency last 2 months. He denies pain, GI complaints, shortness of breath, or chronic cough.      Had similar symptoms a few years ago when atenolol and benicar were stopped with improvement. He got down to 160 pounds at that time.     Past Medical History:   Diagnosis Date   • Cancer (CMS/HCC)     PROSTATE   • Coronary artery disease    • DDD (degenerative disc disease), cervical    • Depression    • Diabetes mellitus (CMS/HCC)    • Erectile dysfunction    • Glaucoma    • Hyperlipidemia    • Hypertension    • Impingement syndrome of left shoulder    • Osteoarthritis    • Varicose veins of lower limb     RIGHT       Past Surgical History:   Procedure Laterality Date   • CARDIAC CATHETERIZATION N/A 12/20/2016    Procedure: Left Heart Cath;  Surgeon: Kan Blackwell MD;  Location: [facility] INVASIVE LOCATION;  Service:    • COLONOSCOPY W/ POLYPECTOMY     • CORONARY ANGIOPLASTY     • EYE SURGERY      BILATERAL CATARACTS REMOVED.   • HERNIA REPAIR      RIGHT   • KNEE ARTHROSCOPY      LEFT   • KNEE SURGERY      LEFT TOTAL KNEE " "REPLACEMENT 2012   • LUMBAR EPIDURAL INJECTION     • WA RT/LT HEART CATHETERS N/A 2016    Procedure: Percutaneous Coronary Intervention;  Surgeon: Kan Blackwell MD;  Location: [facility] INVASIVE LOCATION;  Service: Cardiovascular   • PROSTATECTOMY         • TENDON TRANSFER ELBOW      TENDON REPAIR   • TONSILLECTOMY     • TRABECULECTOMY      FOR GLAUCOMA       Family History   Problem Relation Age of Onset   • No Known Problems Mother    • Alcohol abuse Father    • Heart attack Father    • No Known Problems Sister    • No Known Problems Brother        Social History     Socioeconomic History   • Marital status:      Spouse name: Sabina   • Number of children: 2   • Years of education: Not on file   • Highest education level: Not on file   Social Needs   • Financial resource strain: Not on file   • Food insecurity - worry: Not on file   • Food insecurity - inability: Not on file   • Transportation needs - medical: Not on file   • Transportation needs - non-medical: Not on file   Occupational History   • Occupation: Retired   Tobacco Use   • Smoking status: Former Smoker     Packs/day: 1.50     Years: 36.00     Pack years: 54.00     Types: Cigarettes     Last attempt to quit: 2011     Years since quittin.6   • Smokeless tobacco: Former User   Substance and Sexual Activity   • Alcohol use: Yes     Alcohol/week: 1.2 oz     Types: 2 Cans of beer per week     Comment: A COUPLE OF BEERS PER DAY   • Drug use: No   • Sexual activity: Defer   Other Topics Concern   • Not on file   Social History Narrative    :     to Sabina x 35yrs    2 children from previous marriage    2 gk.    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    Dental: utd    Eye: utd       Allergies   Allergen Reactions   • Xarelto [Rivaroxaban] Other (See Comments)     MADE ME FEEL LIKE \" I WAS HAVING A HEART ATTACK.\"       ROS    Review of Systems   Constitutional: Positive " for chills, diaphoresis, fatigue, fever and unexpected weight loss.   Respiratory: Negative for cough, shortness of breath and wheezing.    Cardiovascular: Negative for chest pain.   Genitourinary: Positive for frequency and nocturia (x2). Negative for dysuria and hematuria.   Musculoskeletal: Negative for arthralgias.   Skin: Negative for rash.   Neurological: Positive for dizziness.   Hematological: Negative for adenopathy.       Vitals:    12/19/18 0920   BP: 120/68   SpO2: 98%     /72 sitting; 104/70 standing; oral temp 98.5 F (MDB)       Current Outpatient Medications:   •  aspirin 81 MG EC tablet, Take 81 mg by mouth Every Night., Disp: , Rfl:   •  atorvastatin (LIPITOR) 40 MG tablet, Take 40 mg by mouth Every Night., Disp: , Rfl:   •  calcium carbonate-cholecalciferol 500-400 MG-UNIT tablet tablet, Take 1 tablet by mouth Daily., Disp: , Rfl:   •  cholecalciferol (VITAMIN D3) 1000 units tablet, Take 1,000 Units by mouth Daily., Disp: , Rfl:   •  nitroglycerin (NITROSTAT) 0.4 MG SL tablet, Place 0.4 mg under the tongue Every 5 (Five) Minutes As Needed for chest pain. Take no more than 3 doses in 15 minutes., Disp: , Rfl:   •  Omega-3 Fatty Acids (FISH OIL) 1200 MG capsule capsule, Take 1,200 mg by mouth Daily., Disp: , Rfl:   •  sertraline (ZOLOFT) 50 MG tablet, Take 1 tablet by mouth Daily., Disp: 90 tablet, Rfl: 1    PE    Physical Exam   Constitutional: He appears well-developed and well-nourished. No distress.   Mild facial pallor   HENT:   Head: Normocephalic.   Right Ear: Tympanic membrane normal.   Left Ear: Tympanic membrane normal.   Mouth/Throat: No posterior oropharyngeal erythema.   Eyes: Conjunctivae and EOM are normal. Pupils are equal, round, and reactive to light.   Neck: Neck supple. No neck rigidity. Normal range of motion present.   Cardiovascular: Normal rate, regular rhythm and normal heart sounds.   No murmur heard.  Pulmonary/Chest: Effort normal and breath sounds normal. He has no  wheezes. He has no rales.   Abdominal: Soft. Bowel sounds are normal. He exhibits no distension and no ascites. There is no hepatosplenomegaly. There is no tenderness.   Lymphadenopathy:     He has no cervical adenopathy.     He has no axillary adenopathy.        Right: No inguinal adenopathy present.        Left: No inguinal adenopathy present.   Neurological: He is alert.   CN II-XII grossly intact/symmetric   Psychiatric: He has a normal mood and affect. His behavior is normal.   Vitals reviewed.      Results    A/P    Problem List Items Addressed This Visit        Other    History of prostate cancer    Overview     2003 - prostatectomy Dr. Wei, now Dr. Camargo       Relevant Orders    CT Abdomen Pelvis With & Without Contrast    CT Chest With Contrast      Other Visit Diagnoses     Dizziness    -  Primary  -Still somewhat orthostatic. Continue to hold losartan and push fluids.     Relevant Orders    MRI Brain With & Without Contrast    Night sweats      -Labs and chest x-ray dated 12/13 unremarkable aside from mild ALP elevation  -Associated 12 lb weight loss over 3 months. He is down 20 lbs since March.   -Check UA, CBC, inflammatory markers, blood cultures, and imaging to eval for infectious source vs malignancy. Discussed pt's case with Dr. Chand who agreed with plan.     Relevant Orders    CBC w AUTO Differential (Completed)    Blood Culture - Blood,    Blood Culture - Blood,    C-reactive Protein (Completed)    Sedimentation Rate (Completed)    CT Abdomen Pelvis With & Without Contrast    CT Chest With Contrast      Nonintractable episodic headache, unspecified headache type      -Intermittent. Left parietal h/a    Relevant Orders    MRI Brain With & Without Contrast    Weight loss        Relevant Orders    MRI Brain With & Without Contrast    CT Abdomen Pelvis With & Without Contrast    CT Chest With Contrast    Urinary frequency        Relevant Orders    Urinalysis With Microscopic If Indicated (No  Culture) - Urine, Clean Catch (Completed)    Urine Culture - Urine, Urine, Clean Catch    Former tobacco use        Screening for malignant neoplasm of prostate              Plan of care reviewed with patient at the conclusion of today's visit. Education was provided regarding diagnosis, management and any prescribed or recommended OTC medications.  Patient verbalizes understanding of and agreement with management plan.        TERESA Cornejo

## 2018-12-20 ENCOUNTER — HOSPITAL ENCOUNTER (INPATIENT)
Facility: HOSPITAL | Age: 68
LOS: 4 days | Discharge: HOME | End: 2018-12-24
Attending: EMERGENCY MEDICINE | Admitting: INTERNAL MEDICINE

## 2018-12-20 ENCOUNTER — TELEPHONE (OUTPATIENT)
Dept: FAMILY MEDICINE CLINIC | Facility: CLINIC | Age: 68
End: 2018-12-20

## 2018-12-20 DIAGNOSIS — B95.5 STREPTOCOCCAL BACTEREMIA: Primary | ICD-10-CM

## 2018-12-20 DIAGNOSIS — R78.81 STREPTOCOCCAL BACTEREMIA: Primary | ICD-10-CM

## 2018-12-20 LAB
ALBUMIN SERPL-MCNC: 4.12 G/DL (ref 3.2–4.8)
ALBUMIN/GLOB SERPL: 1.4 G/DL (ref 1.5–2.5)
ALP SERPL-CCNC: 98 U/L (ref 25–100)
ALT SERPL W P-5'-P-CCNC: 37 U/L (ref 7–40)
ANION GAP SERPL CALCULATED.3IONS-SCNC: 8 MMOL/L (ref 3–11)
AST SERPL-CCNC: 22 U/L (ref 0–33)
BACTERIA BLD CULT: ABNORMAL
BACTERIA UR QL AUTO: ABNORMAL /HPF
BASOPHILS # BLD AUTO: 0.02 10*3/MM3 (ref 0–0.2)
BASOPHILS NFR BLD AUTO: 0.2 % (ref 0–1)
BILIRUB SERPL-MCNC: 0.6 MG/DL (ref 0.3–1.2)
BILIRUB UR QL STRIP: NEGATIVE
BUN BLD-MCNC: 18 MG/DL (ref 9–23)
BUN/CREAT SERPL: 18.6 (ref 7–25)
CALCIUM SPEC-SCNC: 8.7 MG/DL (ref 8.7–10.4)
CHLORIDE SERPL-SCNC: 103 MMOL/L (ref 99–109)
CLARITY UR: CLEAR
CO2 SERPL-SCNC: 23 MMOL/L (ref 20–31)
COLOR UR: YELLOW
CREAT BLD-MCNC: 0.97 MG/DL (ref 0.6–1.3)
D-LACTATE SERPL-SCNC: 0.7 MMOL/L (ref 0.5–2)
DEPRECATED RDW RBC AUTO: 45.6 FL (ref 37–54)
EOSINOPHIL # BLD AUTO: 0.03 10*3/MM3 (ref 0–0.3)
EOSINOPHIL NFR BLD AUTO: 0.3 % (ref 0–3)
ERYTHROCYTE [DISTWIDTH] IN BLOOD BY AUTOMATED COUNT: 13.7 % (ref 11.3–14.5)
GFR SERPL CREATININE-BSD FRML MDRD: 77 ML/MIN/1.73
GLOBULIN UR ELPH-MCNC: 2.9 GM/DL
GLUCOSE BLD-MCNC: 148 MG/DL (ref 70–100)
GLUCOSE UR STRIP-MCNC: NEGATIVE MG/DL
HCT VFR BLD AUTO: 35.5 % (ref 38.9–50.9)
HGB BLD-MCNC: 11.5 G/DL (ref 13.1–17.5)
HGB UR QL STRIP.AUTO: ABNORMAL
HYALINE CASTS UR QL AUTO: ABNORMAL /LPF
IMM GRANULOCYTES # BLD AUTO: 0.05 10*3/MM3 (ref 0–0.03)
IMM GRANULOCYTES NFR BLD AUTO: 0.5 % (ref 0–0.6)
KETONES UR QL STRIP: NEGATIVE
LEUKOCYTE ESTERASE UR QL STRIP.AUTO: NEGATIVE
LYMPHOCYTES # BLD AUTO: 1.21 10*3/MM3 (ref 0.6–4.8)
LYMPHOCYTES NFR BLD AUTO: 11.4 % (ref 24–44)
MCH RBC QN AUTO: 29.3 PG (ref 27–31)
MCHC RBC AUTO-ENTMCNC: 32.4 G/DL (ref 32–36)
MCV RBC AUTO: 90.6 FL (ref 80–99)
MONOCYTES # BLD AUTO: 0.84 10*3/MM3 (ref 0–1)
MONOCYTES NFR BLD AUTO: 7.9 % (ref 0–12)
NEUTROPHILS # BLD AUTO: 8.53 10*3/MM3 (ref 1.5–8.3)
NEUTROPHILS NFR BLD AUTO: 80.2 % (ref 41–71)
NITRITE UR QL STRIP: NEGATIVE
PH UR STRIP.AUTO: <=5 [PH] (ref 5–8)
PLATELET # BLD AUTO: 235 10*3/MM3 (ref 150–450)
PMV BLD AUTO: 10.6 FL (ref 6–12)
POTASSIUM BLD-SCNC: 3.9 MMOL/L (ref 3.5–5.5)
PROCALCITONIN SERPL-MCNC: 0.09 NG/ML
PROT SERPL-MCNC: 7 G/DL (ref 5.7–8.2)
PROT UR QL STRIP: NEGATIVE
RBC # BLD AUTO: 3.92 10*6/MM3 (ref 4.2–5.76)
RBC # UR: ABNORMAL /HPF
REF LAB TEST METHOD: ABNORMAL
SODIUM BLD-SCNC: 134 MMOL/L (ref 132–146)
SP GR UR STRIP: 1.02 (ref 1–1.03)
SQUAMOUS #/AREA URNS HPF: ABNORMAL /HPF
UROBILINOGEN UR QL STRIP: ABNORMAL
WBC NRBC COR # BLD: 10.63 10*3/MM3 (ref 3.5–10.8)
WBC UR QL AUTO: ABNORMAL /HPF

## 2018-12-20 PROCEDURE — 80053 COMPREHEN METABOLIC PANEL: CPT | Performed by: EMERGENCY MEDICINE

## 2018-12-20 PROCEDURE — 87040 BLOOD CULTURE FOR BACTERIA: CPT | Performed by: EMERGENCY MEDICINE

## 2018-12-20 PROCEDURE — 83605 ASSAY OF LACTIC ACID: CPT | Performed by: EMERGENCY MEDICINE

## 2018-12-20 PROCEDURE — 99285 EMERGENCY DEPT VISIT HI MDM: CPT

## 2018-12-20 PROCEDURE — 81001 URINALYSIS AUTO W/SCOPE: CPT | Performed by: EMERGENCY MEDICINE

## 2018-12-20 PROCEDURE — 85025 COMPLETE CBC W/AUTO DIFF WBC: CPT | Performed by: EMERGENCY MEDICINE

## 2018-12-20 PROCEDURE — 84145 PROCALCITONIN (PCT): CPT | Performed by: EMERGENCY MEDICINE

## 2018-12-20 PROCEDURE — 25010000002 PIPERACILLIN SOD-TAZOBACTAM PER 1 G: Performed by: EMERGENCY MEDICINE

## 2018-12-20 RX ORDER — SODIUM CHLORIDE 0.9 % (FLUSH) 0.9 %
10 SYRINGE (ML) INJECTION AS NEEDED
Status: DISCONTINUED | OUTPATIENT
Start: 2018-12-20 | End: 2018-12-24 | Stop reason: HOSPADM

## 2018-12-20 RX ADMIN — TAZOBACTAM SODIUM AND PIPERACILLIN SODIUM 4.5 G: 500; 4 INJECTION, SOLUTION INTRAVENOUS at 22:59

## 2018-12-20 NOTE — PROGRESS NOTES
I have reviewed the notes, assessments, and/or procedures performed by TERESA Medellin, I concur with her/his documentation of Luis Elliott.

## 2018-12-21 ENCOUNTER — APPOINTMENT (OUTPATIENT)
Dept: CT IMAGING | Facility: HOSPITAL | Age: 68
End: 2018-12-21

## 2018-12-21 LAB
ANION GAP SERPL CALCULATED.3IONS-SCNC: 6 MMOL/L (ref 3–11)
BACTERIA SPEC AEROBE CULT: NORMAL
BASOPHILS # BLD AUTO: 0.02 10*3/MM3 (ref 0–0.2)
BASOPHILS NFR BLD AUTO: 0.2 % (ref 0–1)
BUN BLD-MCNC: 13 MG/DL (ref 9–23)
BUN/CREAT SERPL: 14.1 (ref 7–25)
CALCIUM SPEC-SCNC: 8.6 MG/DL (ref 8.7–10.4)
CHLORIDE SERPL-SCNC: 105 MMOL/L (ref 99–109)
CO2 SERPL-SCNC: 25 MMOL/L (ref 20–31)
CREAT BLD-MCNC: 0.92 MG/DL (ref 0.6–1.3)
DEPRECATED RDW RBC AUTO: 45 FL (ref 37–54)
EOSINOPHIL # BLD AUTO: 0.05 10*3/MM3 (ref 0–0.3)
EOSINOPHIL NFR BLD AUTO: 0.5 % (ref 0–3)
ERYTHROCYTE [DISTWIDTH] IN BLOOD BY AUTOMATED COUNT: 13.6 % (ref 11.3–14.5)
GFR SERPL CREATININE-BSD FRML MDRD: 82 ML/MIN/1.73
GLUCOSE BLD-MCNC: 160 MG/DL (ref 70–100)
GLUCOSE BLDC GLUCOMTR-MCNC: 141 MG/DL (ref 70–130)
GLUCOSE BLDC GLUCOMTR-MCNC: 189 MG/DL (ref 70–130)
GLUCOSE BLDC GLUCOMTR-MCNC: 247 MG/DL (ref 70–130)
GLUCOSE BLDC GLUCOMTR-MCNC: 305 MG/DL (ref 70–130)
HCT VFR BLD AUTO: 35.6 % (ref 38.9–50.9)
HGB BLD-MCNC: 11.6 G/DL (ref 13.1–17.5)
IMM GRANULOCYTES # BLD AUTO: 0.04 10*3/MM3 (ref 0–0.03)
IMM GRANULOCYTES NFR BLD AUTO: 0.4 % (ref 0–0.6)
LYMPHOCYTES # BLD AUTO: 1.55 10*3/MM3 (ref 0.6–4.8)
LYMPHOCYTES NFR BLD AUTO: 15 % (ref 24–44)
MCH RBC QN AUTO: 29.2 PG (ref 27–31)
MCHC RBC AUTO-ENTMCNC: 32.6 G/DL (ref 32–36)
MCV RBC AUTO: 89.7 FL (ref 80–99)
MONOCYTES # BLD AUTO: 0.68 10*3/MM3 (ref 0–1)
MONOCYTES NFR BLD AUTO: 6.6 % (ref 0–12)
NEUTROPHILS # BLD AUTO: 7.98 10*3/MM3 (ref 1.5–8.3)
NEUTROPHILS NFR BLD AUTO: 77.3 % (ref 41–71)
PLATELET # BLD AUTO: 212 10*3/MM3 (ref 150–450)
PMV BLD AUTO: 10.2 FL (ref 6–12)
POTASSIUM BLD-SCNC: 4 MMOL/L (ref 3.5–5.5)
RBC # BLD AUTO: 3.97 10*6/MM3 (ref 4.2–5.76)
SODIUM BLD-SCNC: 136 MMOL/L (ref 132–146)
WBC NRBC COR # BLD: 10.32 10*3/MM3 (ref 3.5–10.8)

## 2018-12-21 PROCEDURE — 25010000003 CEFTRIAXONE PER 250 MG: Performed by: INTERNAL MEDICINE

## 2018-12-21 PROCEDURE — 25010000002 ENOXAPARIN PER 10 MG: Performed by: NURSE PRACTITIONER

## 2018-12-21 PROCEDURE — 80048 BASIC METABOLIC PNL TOTAL CA: CPT | Performed by: NURSE PRACTITIONER

## 2018-12-21 PROCEDURE — 25010000002 PIPERACILLIN SOD-TAZOBACTAM PER 1 G: Performed by: INTERNAL MEDICINE

## 2018-12-21 PROCEDURE — 63710000001 INSULIN LISPRO (HUMAN) PER 5 UNITS: Performed by: NURSE PRACTITIONER

## 2018-12-21 PROCEDURE — 82962 GLUCOSE BLOOD TEST: CPT

## 2018-12-21 PROCEDURE — 99223 1ST HOSP IP/OBS HIGH 75: CPT | Performed by: INTERNAL MEDICINE

## 2018-12-21 PROCEDURE — 85025 COMPLETE CBC W/AUTO DIFF WBC: CPT | Performed by: NURSE PRACTITIONER

## 2018-12-21 PROCEDURE — 25010000002 VANCOMYCIN 10 G RECONSTITUTED SOLUTION

## 2018-12-21 PROCEDURE — 70450 CT HEAD/BRAIN W/O DYE: CPT

## 2018-12-21 RX ORDER — SODIUM CHLORIDE 0.9 % (FLUSH) 0.9 %
3-10 SYRINGE (ML) INJECTION AS NEEDED
Status: DISCONTINUED | OUTPATIENT
Start: 2018-12-21 | End: 2018-12-24 | Stop reason: HOSPADM

## 2018-12-21 RX ORDER — ATORVASTATIN CALCIUM 40 MG/1
40 TABLET, FILM COATED ORAL NIGHTLY
Status: DISCONTINUED | OUTPATIENT
Start: 2018-12-21 | End: 2018-12-24 | Stop reason: HOSPADM

## 2018-12-21 RX ORDER — SODIUM CHLORIDE 0.9 % (FLUSH) 0.9 %
3 SYRINGE (ML) INJECTION EVERY 12 HOURS SCHEDULED
Status: DISCONTINUED | OUTPATIENT
Start: 2018-12-21 | End: 2018-12-24 | Stop reason: HOSPADM

## 2018-12-21 RX ORDER — CEFTRIAXONE SODIUM 2 G/50ML
2 INJECTION, SOLUTION INTRAVENOUS EVERY 12 HOURS
Status: DISCONTINUED | OUTPATIENT
Start: 2018-12-21 | End: 2018-12-23

## 2018-12-21 RX ORDER — ACETAMINOPHEN 325 MG/1
650 TABLET ORAL EVERY 4 HOURS PRN
Status: DISCONTINUED | OUTPATIENT
Start: 2018-12-21 | End: 2018-12-24 | Stop reason: HOSPADM

## 2018-12-21 RX ORDER — ASPIRIN 81 MG/1
81 TABLET ORAL NIGHTLY
Status: DISCONTINUED | OUTPATIENT
Start: 2018-12-21 | End: 2018-12-24 | Stop reason: HOSPADM

## 2018-12-21 RX ORDER — DEXTROSE MONOHYDRATE 25 G/50ML
25 INJECTION, SOLUTION INTRAVENOUS
Status: DISCONTINUED | OUTPATIENT
Start: 2018-12-21 | End: 2018-12-24 | Stop reason: HOSPADM

## 2018-12-21 RX ORDER — HYDROCODONE BITARTRATE AND ACETAMINOPHEN 5; 325 MG/1; MG/1
1 TABLET ORAL EVERY 6 HOURS PRN
Status: DISCONTINUED | OUTPATIENT
Start: 2018-12-21 | End: 2018-12-24 | Stop reason: HOSPADM

## 2018-12-21 RX ORDER — NICOTINE POLACRILEX 4 MG
15 LOZENGE BUCCAL
Status: DISCONTINUED | OUTPATIENT
Start: 2018-12-21 | End: 2018-12-24 | Stop reason: HOSPADM

## 2018-12-21 RX ORDER — MELATONIN
1000 DAILY
Status: DISCONTINUED | OUTPATIENT
Start: 2018-12-21 | End: 2018-12-24 | Stop reason: HOSPADM

## 2018-12-21 RX ADMIN — Medication 1 TABLET: at 08:56

## 2018-12-21 RX ADMIN — ASPIRIN 81 MG: 81 TABLET, COATED ORAL at 19:56

## 2018-12-21 RX ADMIN — CEFTRIAXONE SODIUM 2 G: 2 INJECTION, SOLUTION INTRAVENOUS at 13:48

## 2018-12-21 RX ADMIN — ENOXAPARIN SODIUM 40 MG: 40 INJECTION SUBCUTANEOUS at 08:55

## 2018-12-21 RX ADMIN — VANCOMYCIN HYDROCHLORIDE 1750 MG: 10 INJECTION, POWDER, LYOPHILIZED, FOR SOLUTION INTRAVENOUS at 11:32

## 2018-12-21 RX ADMIN — VITAMIN D, TAB 1000IU (100/BT) 1000 UNITS: 25 TAB at 08:56

## 2018-12-21 RX ADMIN — SERTRALINE HYDROCHLORIDE 50 MG: 50 TABLET ORAL at 08:56

## 2018-12-21 RX ADMIN — ATORVASTATIN CALCIUM 40 MG: 40 TABLET, FILM COATED ORAL at 19:56

## 2018-12-21 RX ADMIN — INSULIN LISPRO 2 UNITS: 100 INJECTION, SOLUTION INTRAVENOUS; SUBCUTANEOUS at 08:55

## 2018-12-21 RX ADMIN — INSULIN LISPRO 5 UNITS: 100 INJECTION, SOLUTION INTRAVENOUS; SUBCUTANEOUS at 11:32

## 2018-12-21 RX ADMIN — TAZOBACTAM SODIUM AND PIPERACILLIN SODIUM 3.38 G: 375; 3 INJECTION, SOLUTION INTRAVENOUS at 08:54

## 2018-12-21 RX ADMIN — HYDROCODONE BITARTRATE AND ACETAMINOPHEN 1 TABLET: 5; 325 TABLET ORAL at 19:56

## 2018-12-21 RX ADMIN — INSULIN LISPRO 2 UNITS: 100 INJECTION, SOLUTION INTRAVENOUS; SUBCUTANEOUS at 22:17

## 2018-12-21 NOTE — TELEPHONE ENCOUNTER
Spoke with patient re: positive blood culture results - 2/3 bottles + strep.  His crp and esr also elevated.    He continues with sweats, chills, malaise, h/a, dizziness, and has had wt loss.    Will send to ED for admission and workup, echocardiogram,  IV abx , ID consult.    Pt agrees with plan, wife will take him to [facility] within the hour.

## 2018-12-21 NOTE — TELEPHONE ENCOUNTER
Received call from [facility].    Blood cultures 2 out of 3 bottles with Gm pos cocci in pairs, STREP. Per lab.    I called Dr Chand and informed her of results. She stated she would take care of it.     Srinath Carl, APRN

## 2018-12-22 ENCOUNTER — APPOINTMENT (OUTPATIENT)
Dept: CT IMAGING | Facility: HOSPITAL | Age: 68
End: 2018-12-22

## 2018-12-22 ENCOUNTER — APPOINTMENT (OUTPATIENT)
Dept: GENERAL RADIOLOGY | Facility: HOSPITAL | Age: 68
End: 2018-12-22

## 2018-12-22 ENCOUNTER — APPOINTMENT (OUTPATIENT)
Dept: CARDIOLOGY | Facility: HOSPITAL | Age: 68
End: 2018-12-22
Attending: INTERNAL MEDICINE

## 2018-12-22 LAB
ANION GAP SERPL CALCULATED.3IONS-SCNC: 7 MMOL/L (ref 3–11)
BASOPHILS # BLD AUTO: 0.03 10*3/MM3 (ref 0–0.2)
BASOPHILS NFR BLD AUTO: 0.3 % (ref 0–1)
BH CV ECHO MEAS - AO MAX PG (FULL): 3.4 MMHG
BH CV ECHO MEAS - AO MAX PG: 6.9 MMHG
BH CV ECHO MEAS - AO MEAN PG (FULL): 2 MMHG
BH CV ECHO MEAS - AO MEAN PG: 3.7 MMHG
BH CV ECHO MEAS - AO ROOT AREA (BSA CORRECTED): 1.8
BH CV ECHO MEAS - AO ROOT AREA: 8.9 CM^2
BH CV ECHO MEAS - AO ROOT DIAM: 3.4 CM
BH CV ECHO MEAS - AO V2 MAX: 131.4 CM/SEC
BH CV ECHO MEAS - AO V2 MEAN: 88.3 CM/SEC
BH CV ECHO MEAS - AO V2 VTI: 25.8 CM
BH CV ECHO MEAS - ASC AORTA: 2.8 CM
BH CV ECHO MEAS - AVA(I,A): 2.2 CM^2
BH CV ECHO MEAS - AVA(I,D): 2.2 CM^2
BH CV ECHO MEAS - AVA(V,A): 2.2 CM^2
BH CV ECHO MEAS - AVA(V,D): 2.2 CM^2
BH CV ECHO MEAS - BSA(HAYCOCK): 2 M^2
BH CV ECHO MEAS - BSA: 1.9 M^2
BH CV ECHO MEAS - BZI_BMI: 27.4 KILOGRAMS/M^2
BH CV ECHO MEAS - BZI_METRIC_HEIGHT: 170.2 CM
BH CV ECHO MEAS - BZI_METRIC_WEIGHT: 79.4 KG
BH CV ECHO MEAS - EDV(CUBED): 205.4 ML
BH CV ECHO MEAS - EDV(MOD-SP2): 87 ML
BH CV ECHO MEAS - EDV(MOD-SP4): 120 ML
BH CV ECHO MEAS - EDV(TEICH): 173.2 ML
BH CV ECHO MEAS - EF(CUBED): 64.8 %
BH CV ECHO MEAS - EF(MOD-BP): 63 %
BH CV ECHO MEAS - EF(MOD-SP2): 59.8 %
BH CV ECHO MEAS - EF(MOD-SP4): 65.8 %
BH CV ECHO MEAS - EF(TEICH): 55.5 %
BH CV ECHO MEAS - ESV(CUBED): 72.2 ML
BH CV ECHO MEAS - ESV(MOD-SP2): 35 ML
BH CV ECHO MEAS - ESV(MOD-SP4): 41 ML
BH CV ECHO MEAS - ESV(TEICH): 77 ML
BH CV ECHO MEAS - FS: 29.4 %
BH CV ECHO MEAS - IVS/LVPW: 1
BH CV ECHO MEAS - IVSD: 1 CM
BH CV ECHO MEAS - LAD MAJOR: 5.8 CM
BH CV ECHO MEAS - LAT PEAK E' VEL: 9.5 CM/SEC
BH CV ECHO MEAS - LATERAL E/E' RATIO: 7.5
BH CV ECHO MEAS - LV DIASTOLIC VOL/BSA (35-75): 62.8 ML/M^2
BH CV ECHO MEAS - LV MASS(C)D: 242.2 GRAMS
BH CV ECHO MEAS - LV MASS(C)DI: 126.8 GRAMS/M^2
BH CV ECHO MEAS - LV MAX PG: 3.6 MMHG
BH CV ECHO MEAS - LV MEAN PG: 1.7 MMHG
BH CV ECHO MEAS - LV SYSTOLIC VOL/BSA (12-30): 21.5 ML/M^2
BH CV ECHO MEAS - LV V1 MAX: 94.3 CM/SEC
BH CV ECHO MEAS - LV V1 MEAN: 59.2 CM/SEC
BH CV ECHO MEAS - LV V1 VTI: 18.8 CM
BH CV ECHO MEAS - LVIDD: 5.9 CM
BH CV ECHO MEAS - LVIDS: 4.2 CM
BH CV ECHO MEAS - LVLD AP2: 7.6 CM
BH CV ECHO MEAS - LVLD AP4: 8.1 CM
BH CV ECHO MEAS - LVLS AP2: 6 CM
BH CV ECHO MEAS - LVLS AP4: 6.2 CM
BH CV ECHO MEAS - LVOT AREA (M): 3.1 CM^2
BH CV ECHO MEAS - LVOT AREA: 3 CM^2
BH CV ECHO MEAS - LVOT DIAM: 2 CM
BH CV ECHO MEAS - LVPWD: 1 CM
BH CV ECHO MEAS - MED PEAK E' VEL: 6 CM/SEC
BH CV ECHO MEAS - MEDIAL E/E' RATIO: 11.8
BH CV ECHO MEAS - MV A MAX VEL: 83.4 CM/SEC
BH CV ECHO MEAS - MV DEC TIME: 0.19 SEC
BH CV ECHO MEAS - MV E MAX VEL: 73.1 CM/SEC
BH CV ECHO MEAS - MV E/A: 0.88
BH CV ECHO MEAS - MV MAX PG: 2.6 MMHG
BH CV ECHO MEAS - MV MEAN PG: 1 MMHG
BH CV ECHO MEAS - MV V2 MAX: 80 CM/SEC
BH CV ECHO MEAS - MV V2 MEAN: 46.6 CM/SEC
BH CV ECHO MEAS - MV V2 VTI: 24.1 CM
BH CV ECHO MEAS - MVA(VTI): 2.4 CM^2
BH CV ECHO MEAS - PA ACC SLOPE: 673.8 CM/SEC^2
BH CV ECHO MEAS - PA ACC TIME: 0.11 SEC
BH CV ECHO MEAS - PA MAX PG: 6.5 MMHG
BH CV ECHO MEAS - PA PR(ACCEL): 29.9 MMHG
BH CV ECHO MEAS - PA V2 MAX: 127.6 CM/SEC
BH CV ECHO MEAS - SI(AO): 119.6 ML/M^2
BH CV ECHO MEAS - SI(CUBED): 69.7 ML/M^2
BH CV ECHO MEAS - SI(LVOT): 29.8 ML/M^2
BH CV ECHO MEAS - SI(MOD-SP2): 27.2 ML/M^2
BH CV ECHO MEAS - SI(MOD-SP4): 41.3 ML/M^2
BH CV ECHO MEAS - SI(TEICH): 50.4 ML/M^2
BH CV ECHO MEAS - SV(AO): 228.6 ML
BH CV ECHO MEAS - SV(CUBED): 133.2 ML
BH CV ECHO MEAS - SV(LVOT): 56.9 ML
BH CV ECHO MEAS - SV(MOD-SP2): 52 ML
BH CV ECHO MEAS - SV(MOD-SP4): 79 ML
BH CV ECHO MEAS - SV(TEICH): 96.2 ML
BH CV ECHO MEAS - TAPSE (>1.6): 2.17 CM2
BH CV ECHO MEASUREMENTS AVERAGE E/E' RATIO: 9.43
BH CV XLRA - RV BASE: 4.3 CM
BH CV XLRA - RV LENGTH: 6.6 CM
BH CV XLRA - RV MID: 3.2 CM
BH CV XLRA - TDI S': 21.7 CM/SEC
BUN BLD-MCNC: 14 MG/DL (ref 9–23)
BUN/CREAT SERPL: 15.7 (ref 7–25)
CALCIUM SPEC-SCNC: 9.3 MG/DL (ref 8.7–10.4)
CHLORIDE SERPL-SCNC: 108 MMOL/L (ref 99–109)
CO2 SERPL-SCNC: 26 MMOL/L (ref 20–31)
CREAT BLD-MCNC: 0.89 MG/DL (ref 0.6–1.3)
DEPRECATED RDW RBC AUTO: 44.9 FL (ref 37–54)
EOSINOPHIL # BLD AUTO: 0.09 10*3/MM3 (ref 0–0.3)
EOSINOPHIL NFR BLD AUTO: 1 % (ref 0–3)
ERYTHROCYTE [DISTWIDTH] IN BLOOD BY AUTOMATED COUNT: 13.6 % (ref 11.3–14.5)
GFR SERPL CREATININE-BSD FRML MDRD: 85 ML/MIN/1.73
GLUCOSE BLD-MCNC: 166 MG/DL (ref 70–100)
GLUCOSE BLDC GLUCOMTR-MCNC: 166 MG/DL (ref 70–130)
GLUCOSE BLDC GLUCOMTR-MCNC: 179 MG/DL (ref 70–130)
GLUCOSE BLDC GLUCOMTR-MCNC: 263 MG/DL (ref 70–130)
GLUCOSE BLDC GLUCOMTR-MCNC: 298 MG/DL (ref 70–130)
HCT VFR BLD AUTO: 37.7 % (ref 38.9–50.9)
HGB BLD-MCNC: 12.4 G/DL (ref 13.1–17.5)
IMM GRANULOCYTES # BLD AUTO: 0.06 10*3/MM3 (ref 0–0.03)
IMM GRANULOCYTES NFR BLD AUTO: 0.7 % (ref 0–0.6)
LEFT ATRIUM VOLUME INDEX: 35.1 ML/M^2
LEFT ATRIUM VOLUME: 67 ML
LYMPHOCYTES # BLD AUTO: 1.45 10*3/MM3 (ref 0.6–4.8)
LYMPHOCYTES NFR BLD AUTO: 15.9 % (ref 24–44)
MAXIMAL PREDICTED HEART RATE: 153 BPM
MCH RBC QN AUTO: 29.6 PG (ref 27–31)
MCHC RBC AUTO-ENTMCNC: 32.9 G/DL (ref 32–36)
MCV RBC AUTO: 90 FL (ref 80–99)
MONOCYTES # BLD AUTO: 0.67 10*3/MM3 (ref 0–1)
MONOCYTES NFR BLD AUTO: 7.3 % (ref 0–12)
NEUTROPHILS # BLD AUTO: 6.82 10*3/MM3 (ref 1.5–8.3)
NEUTROPHILS NFR BLD AUTO: 74.8 % (ref 41–71)
PLATELET # BLD AUTO: 224 10*3/MM3 (ref 150–450)
PMV BLD AUTO: 10.2 FL (ref 6–12)
POTASSIUM BLD-SCNC: 4.2 MMOL/L (ref 3.5–5.5)
RBC # BLD AUTO: 4.19 10*6/MM3 (ref 4.2–5.76)
SODIUM BLD-SCNC: 141 MMOL/L (ref 132–146)
STRESS TARGET HR: 130 BPM
WBC NRBC COR # BLD: 9.12 10*3/MM3 (ref 3.5–10.8)

## 2018-12-22 PROCEDURE — 25010000002 ENOXAPARIN PER 10 MG: Performed by: NURSE PRACTITIONER

## 2018-12-22 PROCEDURE — 25010000003 CEFTRIAXONE PER 250 MG: Performed by: INTERNAL MEDICINE

## 2018-12-22 PROCEDURE — 63710000001 INSULIN LISPRO (HUMAN) PER 5 UNITS: Performed by: NURSE PRACTITIONER

## 2018-12-22 PROCEDURE — 73706 CT ANGIO LWR EXTR W/O&W/DYE: CPT

## 2018-12-22 PROCEDURE — 73560 X-RAY EXAM OF KNEE 1 OR 2: CPT

## 2018-12-22 PROCEDURE — 93306 TTE W/DOPPLER COMPLETE: CPT | Performed by: INTERNAL MEDICINE

## 2018-12-22 PROCEDURE — 93010 ELECTROCARDIOGRAM REPORT: CPT | Performed by: INTERNAL MEDICINE

## 2018-12-22 PROCEDURE — 85025 COMPLETE CBC W/AUTO DIFF WBC: CPT | Performed by: INTERNAL MEDICINE

## 2018-12-22 PROCEDURE — 99233 SBSQ HOSP IP/OBS HIGH 50: CPT | Performed by: INTERNAL MEDICINE

## 2018-12-22 PROCEDURE — 0 IOPAMIDOL PER 1 ML: Performed by: INTERNAL MEDICINE

## 2018-12-22 PROCEDURE — 82962 GLUCOSE BLOOD TEST: CPT

## 2018-12-22 PROCEDURE — 93306 TTE W/DOPPLER COMPLETE: CPT

## 2018-12-22 PROCEDURE — 80048 BASIC METABOLIC PNL TOTAL CA: CPT | Performed by: INTERNAL MEDICINE

## 2018-12-22 PROCEDURE — 93005 ELECTROCARDIOGRAM TRACING: CPT | Performed by: INTERNAL MEDICINE

## 2018-12-22 PROCEDURE — 87040 BLOOD CULTURE FOR BACTERIA: CPT | Performed by: INTERNAL MEDICINE

## 2018-12-22 RX ADMIN — INSULIN LISPRO 4 UNITS: 100 INJECTION, SOLUTION INTRAVENOUS; SUBCUTANEOUS at 11:25

## 2018-12-22 RX ADMIN — VITAMIN D, TAB 1000IU (100/BT) 1000 UNITS: 25 TAB at 08:21

## 2018-12-22 RX ADMIN — ASPIRIN 81 MG: 81 TABLET, COATED ORAL at 20:27

## 2018-12-22 RX ADMIN — CEFTRIAXONE SODIUM 2 G: 2 INJECTION, SOLUTION INTRAVENOUS at 14:50

## 2018-12-22 RX ADMIN — ATORVASTATIN CALCIUM 40 MG: 40 TABLET, FILM COATED ORAL at 20:27

## 2018-12-22 RX ADMIN — HYDROCODONE BITARTRATE AND ACETAMINOPHEN 1 TABLET: 5; 325 TABLET ORAL at 09:42

## 2018-12-22 RX ADMIN — SODIUM CHLORIDE, PRESERVATIVE FREE 3 ML: 5 INJECTION INTRAVENOUS at 08:21

## 2018-12-22 RX ADMIN — SERTRALINE HYDROCHLORIDE 50 MG: 50 TABLET ORAL at 08:21

## 2018-12-22 RX ADMIN — HYDROCODONE BITARTRATE AND ACETAMINOPHEN 1 TABLET: 5; 325 TABLET ORAL at 17:12

## 2018-12-22 RX ADMIN — HYDROCODONE BITARTRATE AND ACETAMINOPHEN 1 TABLET: 5; 325 TABLET ORAL at 23:51

## 2018-12-22 RX ADMIN — SODIUM CHLORIDE, PRESERVATIVE FREE 3 ML: 5 INJECTION INTRAVENOUS at 20:27

## 2018-12-22 RX ADMIN — IOPAMIDOL 95 ML: 755 INJECTION, SOLUTION INTRAVENOUS at 12:50

## 2018-12-22 RX ADMIN — CEFTRIAXONE SODIUM 2 G: 2 INJECTION, SOLUTION INTRAVENOUS at 02:22

## 2018-12-22 RX ADMIN — INSULIN LISPRO 4 UNITS: 100 INJECTION, SOLUTION INTRAVENOUS; SUBCUTANEOUS at 20:27

## 2018-12-22 RX ADMIN — INSULIN LISPRO 2 UNITS: 100 INJECTION, SOLUTION INTRAVENOUS; SUBCUTANEOUS at 17:09

## 2018-12-22 RX ADMIN — INSULIN LISPRO 2 UNITS: 100 INJECTION, SOLUTION INTRAVENOUS; SUBCUTANEOUS at 08:26

## 2018-12-22 RX ADMIN — ENOXAPARIN SODIUM 40 MG: 40 INJECTION SUBCUTANEOUS at 08:21

## 2018-12-22 RX ADMIN — Medication 1 TABLET: at 08:21

## 2018-12-23 LAB
ANION GAP SERPL CALCULATED.3IONS-SCNC: 7 MMOL/L (ref 3–11)
BASOPHILS # BLD AUTO: 0.02 10*3/MM3 (ref 0–0.2)
BASOPHILS NFR BLD AUTO: 0.2 % (ref 0–1)
BUN BLD-MCNC: 15 MG/DL (ref 9–23)
BUN/CREAT SERPL: 17 (ref 7–25)
CALCIUM SPEC-SCNC: 9.2 MG/DL (ref 8.7–10.4)
CHLORIDE SERPL-SCNC: 105 MMOL/L (ref 99–109)
CO2 SERPL-SCNC: 26 MMOL/L (ref 20–31)
CREAT BLD-MCNC: 0.88 MG/DL (ref 0.6–1.3)
DEPRECATED RDW RBC AUTO: 45.9 FL (ref 37–54)
EOSINOPHIL # BLD AUTO: 0.11 10*3/MM3 (ref 0–0.3)
EOSINOPHIL NFR BLD AUTO: 1.2 % (ref 0–3)
ERYTHROCYTE [DISTWIDTH] IN BLOOD BY AUTOMATED COUNT: 13.7 % (ref 11.3–14.5)
GFR SERPL CREATININE-BSD FRML MDRD: 86 ML/MIN/1.73
GLUCOSE BLD-MCNC: 144 MG/DL (ref 70–100)
GLUCOSE BLDC GLUCOMTR-MCNC: 160 MG/DL (ref 70–130)
GLUCOSE BLDC GLUCOMTR-MCNC: 181 MG/DL (ref 70–130)
GLUCOSE BLDC GLUCOMTR-MCNC: 229 MG/DL (ref 70–130)
GLUCOSE BLDC GLUCOMTR-MCNC: 296 MG/DL (ref 70–130)
HCT VFR BLD AUTO: 37.4 % (ref 38.9–50.9)
HGB BLD-MCNC: 11.9 G/DL (ref 13.1–17.5)
IMM GRANULOCYTES # BLD AUTO: 0.03 10*3/MM3 (ref 0–0.03)
IMM GRANULOCYTES NFR BLD AUTO: 0.3 % (ref 0–0.6)
LYMPHOCYTES # BLD AUTO: 1.54 10*3/MM3 (ref 0.6–4.8)
LYMPHOCYTES NFR BLD AUTO: 17.4 % (ref 24–44)
MCH RBC QN AUTO: 29.1 PG (ref 27–31)
MCHC RBC AUTO-ENTMCNC: 31.8 G/DL (ref 32–36)
MCV RBC AUTO: 91.4 FL (ref 80–99)
MONOCYTES # BLD AUTO: 0.64 10*3/MM3 (ref 0–1)
MONOCYTES NFR BLD AUTO: 7.2 % (ref 0–12)
NEUTROPHILS # BLD AUTO: 6.53 10*3/MM3 (ref 1.5–8.3)
NEUTROPHILS NFR BLD AUTO: 74 % (ref 41–71)
PLATELET # BLD AUTO: 225 10*3/MM3 (ref 150–450)
PMV BLD AUTO: 10.3 FL (ref 6–12)
POTASSIUM BLD-SCNC: 4.4 MMOL/L (ref 3.5–5.5)
RBC # BLD AUTO: 4.09 10*6/MM3 (ref 4.2–5.76)
SODIUM BLD-SCNC: 138 MMOL/L (ref 132–146)
WBC NRBC COR # BLD: 8.84 10*3/MM3 (ref 3.5–10.8)

## 2018-12-23 PROCEDURE — 25010000003 CEFTRIAXONE PER 250 MG: Performed by: INTERNAL MEDICINE

## 2018-12-23 PROCEDURE — 25010000002 ENOXAPARIN PER 10 MG: Performed by: NURSE PRACTITIONER

## 2018-12-23 PROCEDURE — 82962 GLUCOSE BLOOD TEST: CPT

## 2018-12-23 PROCEDURE — 80048 BASIC METABOLIC PNL TOTAL CA: CPT

## 2018-12-23 PROCEDURE — 99232 SBSQ HOSP IP/OBS MODERATE 35: CPT | Performed by: INTERNAL MEDICINE

## 2018-12-23 PROCEDURE — 85025 COMPLETE CBC W/AUTO DIFF WBC: CPT | Performed by: INTERNAL MEDICINE

## 2018-12-23 PROCEDURE — 99221 1ST HOSP IP/OBS SF/LOW 40: CPT | Performed by: ORTHOPAEDIC SURGERY

## 2018-12-23 RX ORDER — CEFTRIAXONE SODIUM 2 G/50ML
2 INJECTION, SOLUTION INTRAVENOUS EVERY 24 HOURS
Status: DISCONTINUED | OUTPATIENT
Start: 2018-12-24 | End: 2018-12-23

## 2018-12-23 RX ORDER — CEFTRIAXONE SODIUM 2 G/50ML
2 INJECTION, SOLUTION INTRAVENOUS EVERY 24 HOURS
Status: DISCONTINUED | OUTPATIENT
Start: 2018-12-23 | End: 2018-12-24 | Stop reason: HOSPADM

## 2018-12-23 RX ADMIN — ENOXAPARIN SODIUM 40 MG: 40 INJECTION SUBCUTANEOUS at 08:06

## 2018-12-23 RX ADMIN — ASPIRIN 81 MG: 81 TABLET, COATED ORAL at 20:38

## 2018-12-23 RX ADMIN — HYDROCODONE BITARTRATE AND ACETAMINOPHEN 1 TABLET: 5; 325 TABLET ORAL at 19:09

## 2018-12-23 RX ADMIN — INSULIN LISPRO 2 UNITS: 100 INJECTION, SOLUTION INTRAVENOUS; SUBCUTANEOUS at 08:06

## 2018-12-23 RX ADMIN — CEFTRIAXONE SODIUM 2 G: 2 INJECTION, SOLUTION INTRAVENOUS at 11:40

## 2018-12-23 RX ADMIN — INSULIN LISPRO 3 UNITS: 100 INJECTION, SOLUTION INTRAVENOUS; SUBCUTANEOUS at 20:39

## 2018-12-23 RX ADMIN — Medication 1 TABLET: at 08:06

## 2018-12-23 RX ADMIN — INSULIN LISPRO 4 UNITS: 100 INJECTION, SOLUTION INTRAVENOUS; SUBCUTANEOUS at 11:36

## 2018-12-23 RX ADMIN — SERTRALINE HYDROCHLORIDE 50 MG: 50 TABLET ORAL at 08:06

## 2018-12-23 RX ADMIN — ATORVASTATIN CALCIUM 40 MG: 40 TABLET, FILM COATED ORAL at 20:39

## 2018-12-23 RX ADMIN — VITAMIN D, TAB 1000IU (100/BT) 1000 UNITS: 25 TAB at 08:06

## 2018-12-23 RX ADMIN — CEFTRIAXONE SODIUM 2 G: 2 INJECTION, SOLUTION INTRAVENOUS at 01:39

## 2018-12-23 RX ADMIN — INSULIN LISPRO 2 UNITS: 100 INJECTION, SOLUTION INTRAVENOUS; SUBCUTANEOUS at 16:53

## 2018-12-24 ENCOUNTER — HOSPITAL ENCOUNTER (OUTPATIENT)
Dept: MRI IMAGING | Facility: HOSPITAL | Age: 68
End: 2018-12-24

## 2018-12-24 ENCOUNTER — APPOINTMENT (OUTPATIENT)
Dept: CT IMAGING | Facility: HOSPITAL | Age: 68
End: 2018-12-24

## 2018-12-24 ENCOUNTER — TELEPHONE (OUTPATIENT)
Dept: FAMILY MEDICINE CLINIC | Facility: CLINIC | Age: 68
End: 2018-12-24

## 2018-12-24 ENCOUNTER — APPOINTMENT (OUTPATIENT)
Dept: CARDIOLOGY | Facility: HOSPITAL | Age: 68
End: 2018-12-24
Attending: INTERNAL MEDICINE

## 2018-12-24 VITALS
WEIGHT: 175 LBS | DIASTOLIC BLOOD PRESSURE: 74 MMHG | SYSTOLIC BLOOD PRESSURE: 110 MMHG | HEIGHT: 67 IN | RESPIRATION RATE: 16 BRPM | HEART RATE: 56 BPM | OXYGEN SATURATION: 97 % | BODY MASS INDEX: 27.47 KG/M2 | TEMPERATURE: 98.2 F

## 2018-12-24 LAB
ASCENDING AORTA: 2.7 CM
BACTERIA SPEC AEROBE CULT: ABNORMAL
BH CV ECHO MEAS - AO ROOT DIAM: 3.1 CM
BH CV ECHO MEAS - BSA(HAYCOCK): 2 M^2
BH CV ECHO MEAS - BSA: 1.9 M^2
BH CV ECHO MEAS - BZI_BMI: 27.4 KILOGRAMS/M^2
BH CV ECHO MEAS - BZI_METRIC_HEIGHT: 170.2 CM
BH CV ECHO MEAS - BZI_METRIC_WEIGHT: 79.4 KG
BH CV ECHO MEAS - EF(MOD-BP): 69 %
BH CV ECHO MEAS - IVSD: 1.2 CM
BH CV ECHO MEAS - LA DIMENSION: 5 CM
BH CV ECHO MEAS - LVIDD: 4.8 CM
BH CV ECHO MEAS - LVIDS: 3.2 CM
BH CV ECHO MEAS - LVPWD: 1.2 CM
BH CV VAS BP RIGHT ARM: NORMAL MMHG
GLUCOSE BLDC GLUCOMTR-MCNC: 150 MG/DL (ref 70–130)
GRAM STN SPEC: ABNORMAL
GRAM STN SPEC: ABNORMAL
ISOLATED FROM: ABNORMAL
LV EF 2D ECHO EST: 65 %
STJ: 2.8 CM

## 2018-12-24 PROCEDURE — 25010000003 CEFTRIAXONE PER 250 MG: Performed by: INTERNAL MEDICINE

## 2018-12-24 PROCEDURE — 93325 DOPPLER ECHO COLOR FLOW MAPG: CPT | Performed by: INTERNAL MEDICINE

## 2018-12-24 PROCEDURE — 25010000002 MIDAZOLAM PER 1 MG: Performed by: INTERNAL MEDICINE

## 2018-12-24 PROCEDURE — 93325 DOPPLER ECHO COLOR FLOW MAPG: CPT

## 2018-12-24 PROCEDURE — 93312 ECHO TRANSESOPHAGEAL: CPT | Performed by: INTERNAL MEDICINE

## 2018-12-24 PROCEDURE — B24BZZ4 ULTRASONOGRAPHY OF HEART WITH AORTA, TRANSESOPHAGEAL: ICD-10-PCS | Performed by: INTERNAL MEDICINE

## 2018-12-24 PROCEDURE — 25010000002 FENTANYL CITRATE (PF) 100 MCG/2ML SOLUTION: Performed by: INTERNAL MEDICINE

## 2018-12-24 PROCEDURE — 93320 DOPPLER ECHO COMPLETE: CPT | Performed by: INTERNAL MEDICINE

## 2018-12-24 PROCEDURE — 82962 GLUCOSE BLOOD TEST: CPT

## 2018-12-24 PROCEDURE — 99239 HOSP IP/OBS DSCHRG MGMT >30: CPT | Performed by: INTERNAL MEDICINE

## 2018-12-24 PROCEDURE — 93320 DOPPLER ECHO COMPLETE: CPT

## 2018-12-24 PROCEDURE — 93312 ECHO TRANSESOPHAGEAL: CPT

## 2018-12-24 RX ORDER — NALOXONE HYDROCHLORIDE 0.4 MG/ML
INJECTION, SOLUTION INTRAMUSCULAR; INTRAVENOUS; SUBCUTANEOUS
Status: DISCONTINUED
Start: 2018-12-24 | End: 2018-12-24 | Stop reason: WASHOUT

## 2018-12-24 RX ORDER — HYDROCODONE BITARTRATE AND ACETAMINOPHEN 5; 325 MG/1; MG/1
1 TABLET ORAL EVERY 6 HOURS PRN
Qty: 12 TABLET | Refills: 0 | Status: SHIPPED | OUTPATIENT
Start: 2018-12-24 | End: 2018-12-27

## 2018-12-24 RX ORDER — FLUMAZENIL 0.1 MG/ML
INJECTION INTRAVENOUS
Status: DISCONTINUED
Start: 2018-12-24 | End: 2018-12-24 | Stop reason: WASHOUT

## 2018-12-24 RX ORDER — FENTANYL CITRATE 50 UG/ML
INJECTION, SOLUTION INTRAMUSCULAR; INTRAVENOUS
Status: COMPLETED | OUTPATIENT
Start: 2018-12-24 | End: 2018-12-24

## 2018-12-24 RX ORDER — FENTANYL CITRATE 50 UG/ML
INJECTION, SOLUTION INTRAMUSCULAR; INTRAVENOUS
Status: DISCONTINUED
Start: 2018-12-24 | End: 2018-12-24 | Stop reason: HOSPADM

## 2018-12-24 RX ORDER — CEFTRIAXONE SODIUM 2 G/50ML
2 INJECTION, SOLUTION INTRAVENOUS EVERY 24 HOURS
Qty: 1350 ML | Refills: 0
Start: 2018-12-24 | End: 2019-01-20

## 2018-12-24 RX ORDER — MIDAZOLAM HYDROCHLORIDE 1 MG/ML
INJECTION INTRAMUSCULAR; INTRAVENOUS
Status: DISCONTINUED
Start: 2018-12-24 | End: 2018-12-24 | Stop reason: HOSPADM

## 2018-12-24 RX ORDER — MIDAZOLAM HYDROCHLORIDE 1 MG/ML
INJECTION INTRAMUSCULAR; INTRAVENOUS
Status: COMPLETED | OUTPATIENT
Start: 2018-12-24 | End: 2018-12-24

## 2018-12-24 RX ADMIN — FENTANYL CITRATE 50 MCG: 50 INJECTION, SOLUTION INTRAMUSCULAR; INTRAVENOUS at 09:02

## 2018-12-24 RX ADMIN — CEFTRIAXONE SODIUM 2 G: 2 INJECTION, SOLUTION INTRAVENOUS at 11:23

## 2018-12-24 RX ADMIN — MIDAZOLAM HYDROCHLORIDE 1 MG: 1 INJECTION, SOLUTION INTRAMUSCULAR; INTRAVENOUS at 09:04

## 2018-12-24 RX ADMIN — MIDAZOLAM HYDROCHLORIDE 2 MG: 1 INJECTION, SOLUTION INTRAMUSCULAR; INTRAVENOUS at 09:00

## 2018-12-24 RX ADMIN — FENTANYL CITRATE 50 MCG: 50 INJECTION, SOLUTION INTRAMUSCULAR; INTRAVENOUS at 09:00

## 2018-12-24 RX ADMIN — MIDAZOLAM HYDROCHLORIDE 1 MG: 1 INJECTION, SOLUTION INTRAMUSCULAR; INTRAVENOUS at 09:05

## 2018-12-24 NOTE — TELEPHONE ENCOUNTER
Pt was released from hospital today , pt stated that he would not make it to the MRI scheduled at the diagnostic center but the hospital did not an MRI

## 2018-12-24 NOTE — TELEPHONE ENCOUNTER
Spoke with patients wife who stated that they are at [facility] and will be getting the MRI done inpatient.

## 2018-12-25 ENCOUNTER — READMISSION MANAGEMENT (OUTPATIENT)
Dept: CALL CENTER | Facility: HOSPITAL | Age: 68
End: 2018-12-25

## 2018-12-26 ENCOUNTER — TRANSITIONAL CARE MANAGEMENT TELEPHONE ENCOUNTER (OUTPATIENT)
Dept: FAMILY MEDICINE CLINIC | Facility: CLINIC | Age: 68
End: 2018-12-26

## 2018-12-26 LAB
BACTERIA SPEC AEROBE CULT: ABNORMAL
GRAM STN SPEC: ABNORMAL
ISOLATED FROM: ABNORMAL

## 2018-12-26 NOTE — OUTREACH NOTE
[facility] call completed. Please see flow sheet for additional details.  Pt says he's doing better, receiving IV ATBs w/ [facility].  Denies HAs, fever, chills, night sweats.  Energy better, eating/drinking well.  Left knee pain is managable, but still bothersome - has taken 2 Norco since D/C.  He confirms 1/3/19 PCP [facility], but chooses not to cxl his 1/9/19 PCP f/up in case he's assigned conflicting IV infusion on 1/3/19.  Pt will call Dr Blackwell re f/up.

## 2018-12-26 NOTE — OUTREACH NOTE
Prep Survey      Responses   Facility patient discharged from?  [place]   Is patient eligible?  Yes   Discharge diagnosis  strep bacteremia r/t ? subacute endocarditis, DM2, HTN, CAD   Does the patient have one of the following disease processes/diagnoses(primary or secondary)?  Sepsis   Does the patient have Home health ordered?  No   What is the Home health agency?   [facility] for IV infusion   Prep survey completed?  Yes          Ilene Washington RN

## 2018-12-27 ENCOUNTER — TRANSCRIBE ORDERS (OUTPATIENT)
Dept: ADMINISTRATIVE | Facility: HOSPITAL | Age: 68
End: 2018-12-27

## 2018-12-27 ENCOUNTER — TRANSCRIBE ORDERS (OUTPATIENT)
Dept: LAB | Facility: HOSPITAL | Age: 68
End: 2018-12-27

## 2018-12-27 ENCOUNTER — LAB (OUTPATIENT)
Dept: LAB | Facility: HOSPITAL | Age: 68
End: 2018-12-27

## 2018-12-27 ENCOUNTER — READMISSION MANAGEMENT (OUTPATIENT)
Dept: CALL CENTER | Facility: HOSPITAL | Age: 68
End: 2018-12-27

## 2018-12-27 DIAGNOSIS — R78.81 BACTEREMIA: ICD-10-CM

## 2018-12-27 DIAGNOSIS — I30.1 ACUTE STREPTOCOCCAL PERICARDITIS: ICD-10-CM

## 2018-12-27 DIAGNOSIS — R78.81 BACTEREMIA: Primary | ICD-10-CM

## 2018-12-27 DIAGNOSIS — B95.5 ACUTE STREPTOCOCCAL PERICARDITIS: ICD-10-CM

## 2018-12-27 DIAGNOSIS — I33.0 ACUTE AND SUBACUTE BACTERIAL ENDOCARDITIS: Primary | ICD-10-CM

## 2018-12-27 LAB
ALBUMIN SERPL-MCNC: 4.23 G/DL (ref 3.2–4.8)
ALBUMIN/GLOB SERPL: 1.4 G/DL (ref 1.5–2.5)
ALP SERPL-CCNC: 112 U/L (ref 25–100)
ALT SERPL W P-5'-P-CCNC: 84 U/L (ref 7–40)
ANION GAP SERPL CALCULATED.3IONS-SCNC: 10 MMOL/L (ref 3–11)
AST SERPL-CCNC: 41 U/L (ref 0–33)
BACTERIA SPEC AEROBE CULT: NORMAL
BACTERIA SPEC AEROBE CULT: NORMAL
BASOPHILS # BLD AUTO: 0.03 10*3/MM3 (ref 0–0.2)
BASOPHILS NFR BLD AUTO: 0.3 % (ref 0–1)
BILIRUB SERPL-MCNC: 0.6 MG/DL (ref 0.3–1.2)
BUN BLD-MCNC: 25 MG/DL (ref 9–23)
BUN/CREAT SERPL: 21.4 (ref 7–25)
CALCIUM SPEC-SCNC: 9.3 MG/DL (ref 8.7–10.4)
CHLORIDE SERPL-SCNC: 104 MMOL/L (ref 99–109)
CO2 SERPL-SCNC: 25 MMOL/L (ref 20–31)
CREAT BLD-MCNC: 1.17 MG/DL (ref 0.6–1.3)
CRP SERPL-MCNC: 1.29 MG/DL (ref 0–1)
DEPRECATED RDW RBC AUTO: 46.4 FL (ref 37–54)
EOSINOPHIL # BLD AUTO: 0.11 10*3/MM3 (ref 0–0.3)
EOSINOPHIL NFR BLD AUTO: 1 % (ref 0–3)
ERYTHROCYTE [DISTWIDTH] IN BLOOD BY AUTOMATED COUNT: 13.9 % (ref 11.3–14.5)
ERYTHROCYTE [SEDIMENTATION RATE] IN BLOOD: 44 MM/HR (ref 0–20)
GFR SERPL CREATININE-BSD FRML MDRD: 62 ML/MIN/1.73
GLOBULIN UR ELPH-MCNC: 3 GM/DL
GLUCOSE BLD-MCNC: 180 MG/DL (ref 70–100)
HCT VFR BLD AUTO: 40.1 % (ref 38.9–50.9)
HGB BLD-MCNC: 12.6 G/DL (ref 13.1–17.5)
IMM GRANULOCYTES # BLD AUTO: 0.06 10*3/MM3 (ref 0–0.03)
IMM GRANULOCYTES NFR BLD AUTO: 0.5 % (ref 0–0.6)
LYMPHOCYTES # BLD AUTO: 1.53 10*3/MM3 (ref 0.6–4.8)
LYMPHOCYTES NFR BLD AUTO: 13.7 % (ref 24–44)
MCH RBC QN AUTO: 29 PG (ref 27–31)
MCHC RBC AUTO-ENTMCNC: 31.4 G/DL (ref 32–36)
MCV RBC AUTO: 92.2 FL (ref 80–99)
MONOCYTES # BLD AUTO: 0.79 10*3/MM3 (ref 0–1)
MONOCYTES NFR BLD AUTO: 7.1 % (ref 0–12)
NEUTROPHILS # BLD AUTO: 8.73 10*3/MM3 (ref 1.5–8.3)
NEUTROPHILS NFR BLD AUTO: 77.9 % (ref 41–71)
PLATELET # BLD AUTO: 253 10*3/MM3 (ref 150–450)
PMV BLD AUTO: 10.5 FL (ref 6–12)
POTASSIUM BLD-SCNC: 4.3 MMOL/L (ref 3.5–5.5)
PROT SERPL-MCNC: 7.2 G/DL (ref 5.7–8.2)
RBC # BLD AUTO: 4.35 10*6/MM3 (ref 4.2–5.76)
SODIUM BLD-SCNC: 139 MMOL/L (ref 132–146)
WBC NRBC COR # BLD: 11.19 10*3/MM3 (ref 3.5–10.8)

## 2018-12-27 PROCEDURE — 86140 C-REACTIVE PROTEIN: CPT

## 2018-12-27 PROCEDURE — 85025 COMPLETE CBC W/AUTO DIFF WBC: CPT

## 2018-12-27 PROCEDURE — 85652 RBC SED RATE AUTOMATED: CPT

## 2018-12-27 PROCEDURE — 36415 COLL VENOUS BLD VENIPUNCTURE: CPT

## 2018-12-27 PROCEDURE — 80053 COMPREHEN METABOLIC PANEL: CPT

## 2018-12-27 NOTE — OUTREACH NOTE
Sepsis Week 1 Survey      Responses   Facility patient discharged from?  [place]   Does the patient have one of the following disease processes/diagnoses(primary or secondary)?  Sepsis   Is there a successful TCM telephone encounter documented?  Yes          Cheryl Hawthorne RN

## 2018-12-28 ENCOUNTER — HOSPITAL ENCOUNTER (OUTPATIENT)
Dept: INFUSION THERAPY | Facility: HOSPITAL | Age: 68
Discharge: HOME | End: 2018-12-28
Attending: INTERNAL MEDICINE | Admitting: INTERNAL MEDICINE

## 2018-12-28 VITALS
OXYGEN SATURATION: 98 % | WEIGHT: 181.4 LBS | TEMPERATURE: 97.7 F | BODY MASS INDEX: 28.47 KG/M2 | HEART RATE: 55 BPM | RESPIRATION RATE: 18 BRPM | HEIGHT: 67 IN | DIASTOLIC BLOOD PRESSURE: 80 MMHG | SYSTOLIC BLOOD PRESSURE: 125 MMHG

## 2018-12-28 PROCEDURE — C1751 CATH, INF, PER/CENT/MIDLINE: HCPCS

## 2018-12-28 PROCEDURE — C1894 INTRO/SHEATH, NON-LASER: HCPCS

## 2018-12-28 RX ORDER — SODIUM CHLORIDE 0.9 % (FLUSH) 0.9 %
10 SYRINGE (ML) INJECTION AS NEEDED
Status: DISCONTINUED | OUTPATIENT
Start: 2018-12-28 | End: 2018-12-30 | Stop reason: HOSPADM

## 2018-12-31 ENCOUNTER — LAB (OUTPATIENT)
Dept: LAB | Facility: HOSPITAL | Age: 68
End: 2018-12-31

## 2018-12-31 ENCOUNTER — TRANSCRIBE ORDERS (OUTPATIENT)
Dept: LAB | Facility: HOSPITAL | Age: 68
End: 2018-12-31

## 2018-12-31 DIAGNOSIS — I30.1 ACUTE STREPTOCOCCAL PERICARDITIS: Primary | ICD-10-CM

## 2018-12-31 DIAGNOSIS — B95.5 ACUTE STREPTOCOCCAL PERICARDITIS: ICD-10-CM

## 2018-12-31 DIAGNOSIS — R78.81 BACTEREMIA: ICD-10-CM

## 2018-12-31 DIAGNOSIS — I30.1 ACUTE STREPTOCOCCAL PERICARDITIS: ICD-10-CM

## 2018-12-31 DIAGNOSIS — B95.5 ACUTE STREPTOCOCCAL PERICARDITIS: Primary | ICD-10-CM

## 2018-12-31 LAB
ALBUMIN SERPL-MCNC: 4.27 G/DL (ref 3.2–4.8)
ALBUMIN/GLOB SERPL: 1.5 G/DL (ref 1.5–2.5)
ALP SERPL-CCNC: 130 U/L (ref 25–100)
ALT SERPL W P-5'-P-CCNC: 62 U/L (ref 7–40)
ANION GAP SERPL CALCULATED.3IONS-SCNC: 8 MMOL/L (ref 3–11)
AST SERPL-CCNC: 28 U/L (ref 0–33)
BASOPHILS # BLD AUTO: 0.03 10*3/MM3 (ref 0–0.2)
BASOPHILS NFR BLD AUTO: 0.3 % (ref 0–1)
BILIRUB SERPL-MCNC: 0.7 MG/DL (ref 0.3–1.2)
BUN BLD-MCNC: 27 MG/DL (ref 9–23)
BUN/CREAT SERPL: 22.3 (ref 7–25)
CALCIUM SPEC-SCNC: 9.4 MG/DL (ref 8.7–10.4)
CHLORIDE SERPL-SCNC: 102 MMOL/L (ref 99–109)
CO2 SERPL-SCNC: 26 MMOL/L (ref 20–31)
CREAT BLD-MCNC: 1.21 MG/DL (ref 0.6–1.3)
CRP SERPL-MCNC: 1.1 MG/DL (ref 0–1)
DEPRECATED RDW RBC AUTO: 46.8 FL (ref 37–54)
EOSINOPHIL # BLD AUTO: 0.14 10*3/MM3 (ref 0–0.3)
EOSINOPHIL NFR BLD AUTO: 1.3 % (ref 0–3)
ERYTHROCYTE [DISTWIDTH] IN BLOOD BY AUTOMATED COUNT: 14 % (ref 11.3–14.5)
ERYTHROCYTE [SEDIMENTATION RATE] IN BLOOD: 37 MM/HR (ref 0–20)
GFR SERPL CREATININE-BSD FRML MDRD: 60 ML/MIN/1.73
GLOBULIN UR ELPH-MCNC: 2.9 GM/DL
GLUCOSE BLD-MCNC: 276 MG/DL (ref 70–100)
HCT VFR BLD AUTO: 40.3 % (ref 38.9–50.9)
HGB BLD-MCNC: 12.7 G/DL (ref 13.1–17.5)
IMM GRANULOCYTES # BLD AUTO: 0.04 10*3/MM3 (ref 0–0.03)
IMM GRANULOCYTES NFR BLD AUTO: 0.4 % (ref 0–0.6)
LYMPHOCYTES # BLD AUTO: 1.77 10*3/MM3 (ref 0.6–4.8)
LYMPHOCYTES NFR BLD AUTO: 16.8 % (ref 24–44)
MCH RBC QN AUTO: 29.1 PG (ref 27–31)
MCHC RBC AUTO-ENTMCNC: 31.5 G/DL (ref 32–36)
MCV RBC AUTO: 92.4 FL (ref 80–99)
MONOCYTES # BLD AUTO: 0.46 10*3/MM3 (ref 0–1)
MONOCYTES NFR BLD AUTO: 4.4 % (ref 0–12)
NEUTROPHILS # BLD AUTO: 8.14 10*3/MM3 (ref 1.5–8.3)
NEUTROPHILS NFR BLD AUTO: 77.2 % (ref 41–71)
PLATELET # BLD AUTO: 240 10*3/MM3 (ref 150–450)
PMV BLD AUTO: 10.8 FL (ref 6–12)
POTASSIUM BLD-SCNC: 4.6 MMOL/L (ref 3.5–5.5)
PROT SERPL-MCNC: 7.2 G/DL (ref 5.7–8.2)
RBC # BLD AUTO: 4.36 10*6/MM3 (ref 4.2–5.76)
SODIUM BLD-SCNC: 136 MMOL/L (ref 132–146)
WBC NRBC COR # BLD: 10.54 10*3/MM3 (ref 3.5–10.8)

## 2018-12-31 PROCEDURE — 36415 COLL VENOUS BLD VENIPUNCTURE: CPT

## 2018-12-31 PROCEDURE — 80053 COMPREHEN METABOLIC PANEL: CPT

## 2018-12-31 PROCEDURE — 85025 COMPLETE CBC W/AUTO DIFF WBC: CPT

## 2018-12-31 PROCEDURE — 85652 RBC SED RATE AUTOMATED: CPT

## 2018-12-31 PROCEDURE — 86140 C-REACTIVE PROTEIN: CPT

## 2019-01-03 ENCOUNTER — READMISSION MANAGEMENT (OUTPATIENT)
Dept: CALL CENTER | Facility: HOSPITAL | Age: 69
End: 2019-01-03

## 2019-01-03 ENCOUNTER — OFFICE VISIT (OUTPATIENT)
Dept: FAMILY MEDICINE CLINIC | Facility: CLINIC | Age: 69
End: 2019-01-03

## 2019-01-03 VITALS
WEIGHT: 173 LBS | HEART RATE: 79 BPM | BODY MASS INDEX: 27.15 KG/M2 | OXYGEN SATURATION: 98 % | DIASTOLIC BLOOD PRESSURE: 82 MMHG | HEIGHT: 67 IN | SYSTOLIC BLOOD PRESSURE: 124 MMHG

## 2019-01-03 DIAGNOSIS — I34.1 MITRAL VALVE PROLAPSE: ICD-10-CM

## 2019-01-03 DIAGNOSIS — B95.5 STREPTOCOCCAL BACTEREMIA: Primary | ICD-10-CM

## 2019-01-03 DIAGNOSIS — E11.9 TYPE 2 DIABETES MELLITUS WITHOUT COMPLICATION, WITHOUT LONG-TERM CURRENT USE OF INSULIN (HCC): ICD-10-CM

## 2019-01-03 DIAGNOSIS — R78.81 STREPTOCOCCAL BACTEREMIA: Primary | ICD-10-CM

## 2019-01-03 DIAGNOSIS — I34.0 MITRAL VALVE INSUFFICIENCY, UNSPECIFIED ETIOLOGY: ICD-10-CM

## 2019-01-03 PROCEDURE — 99495 TRANSJ CARE MGMT MOD F2F 14D: CPT | Performed by: INTERNAL MEDICINE

## 2019-01-03 NOTE — OUTREACH NOTE
Sepsis Week 2 Survey      Responses   Facility patient discharged from?  [place]   Does the patient have one of the following disease processes/diagnoses(primary or secondary)?  Sepsis   Week 2 attempt successful?  No   Unsuccessful attempts  Attempt 1          Mark Koenig RN

## 2019-01-03 NOTE — PROGRESS NOTES
Transitional Care Follow Up Visit  Subjective     Luis Elliott is a 68 y.o. male who presents for a transitional care management visit.    Within 48 business hours after discharge our office contacted him via telephone to coordinate his care and needs.      I reviewed and discussed the details of that call along with the discharge summary, hospital problems, inpatient lab results, inpatient diagnostic studies, and consultation reports with Luis.     Current outpatient and discharge medications have been reconciled for the patient.    Date of TCM Phone Call 12/26/2018   [facility]   Date of Admission 12/20/2018   Date of Discharge 12/24/2018   Discharge Disposition Home or Self Care     Risk for Readmission (LACE) Score: 10 (12/24/2018  6:00 AM)      History of Present Illness   Course During Hospital Stay:  Luis Elliott is a 68 year old male presents to the ED with a six week history of fatigue, fever, chills, headaches and night sweats. PCP called and advised to come to the ED due to positive blood cultures.      -Streptococcal bacteremia- source unclear but felt likely to be subacute endocarditis. Blood cultures from 12/19 and 12/20 +. Repeat blood cultures sent and NGTD. ID following. Patient's presentation is concerning for subacute endocarditis, though TTE negative do plan on obtaining SATHISH, this was done 12/24 and was also negative. Given patient has prosthetic knee with knee pain, have asked ortho to evaluate, though low suspicion for septic joint at this time with negative imaging, nothing further to do per orthopedics. CT H done and negative. UA/CXR clear. Per Dr. Lopez of ID, ok for patient to be d/c home with plan to present to [facility] office 12/25 for IV abx through peripheral IV. He will continue rocephin per their recommendations.  -SATHISH showed moderate to severe mitral valve regurgitation    Since home pt feeling improved - no longer having f/c, sweats.  Is still having  dizziness with walking that come and goes, relieved with rest.  No presyncope or syncope.  No chest pain or sob.  No palpitations.      Is seen daily in ID clinic for IV rocephin - though February per pt  -schedule to see Dr Flores for evaluation of his MR     The following portions of the patient's history were reviewed and updated as appropriate: allergies, current medications, past family history, past medical history, past social history, past surgical history and problem list.    Review of Systems   As above    Objective   Physical Exam   Gen: well appearing in nad, no resp effort  Eyes: conjunctiva clear, perrl, eomi  ENT: mmm, no thyromegaly, no lymphadenopathy  CV: s1, s2 reg , distolic murmur heard at apex,  no bruits, no jvd  No peripheral edema, pedal pulses intact  Resp:  clear b/l no w/r/r  GI:  soft nt/nd  Skin: no clubbing or cyanosis  Neuro: no focal deficits.      Lab Results   Component Value Date    GLUCOSE 276 (H) 12/31/2018    BUN 27 (H) 12/31/2018    CREATININE 1.21 12/31/2018    EGFRIFNONA 60 (L) 12/31/2018    BCR 22.3 12/31/2018    K 4.6 12/31/2018    CO2 26.0 12/31/2018    CALCIUM 9.4 12/31/2018    ALBUMIN 4.27 12/31/2018    AST 28 12/31/2018    ALT 62 (H) 12/31/2018     Lab Results   Component Value Date    WBC 10.54 12/31/2018    HGB 12.7 (L) 12/31/2018    HCT 40.3 12/31/2018    MCV 92.4 12/31/2018     12/31/2018         Assessment/Plan   Luis was seen today for follow-up.    Diagnoses and all orders for this visit:    Streptococcal bacteremia - cont IV abx therapy , per ID    Mitral valve prolapse - f/u cardiology    Mitral valve insufficiency, unspecified etiology    Type 2 diabetes mellitus without complication, without long-term current use of insulin (CMS/[facility]) - reinitiate metformin XR 500mg daily with bkft    F/u as scheduled in March

## 2019-01-07 ENCOUNTER — LAB (OUTPATIENT)
Dept: LAB | Facility: HOSPITAL | Age: 69
End: 2019-01-07

## 2019-01-07 ENCOUNTER — TRANSCRIBE ORDERS (OUTPATIENT)
Dept: LAB | Facility: HOSPITAL | Age: 69
End: 2019-01-07

## 2019-01-07 ENCOUNTER — READMISSION MANAGEMENT (OUTPATIENT)
Dept: CALL CENTER | Facility: HOSPITAL | Age: 69
End: 2019-01-07

## 2019-01-07 DIAGNOSIS — R78.81 BACTEREMIA: ICD-10-CM

## 2019-01-07 DIAGNOSIS — I30.1 ACUTE STREPTOCOCCAL PERICARDITIS: ICD-10-CM

## 2019-01-07 DIAGNOSIS — I30.1 ACUTE STREPTOCOCCAL PERICARDITIS: Primary | ICD-10-CM

## 2019-01-07 DIAGNOSIS — B95.5 ACUTE STREPTOCOCCAL PERICARDITIS: Primary | ICD-10-CM

## 2019-01-07 DIAGNOSIS — B95.5 ACUTE STREPTOCOCCAL PERICARDITIS: ICD-10-CM

## 2019-01-07 LAB
ALBUMIN SERPL-MCNC: 4.42 G/DL (ref 3.2–4.8)
ALBUMIN/GLOB SERPL: 1.5 G/DL (ref 1.5–2.5)
ALP SERPL-CCNC: 144 U/L (ref 25–100)
ALT SERPL W P-5'-P-CCNC: 56 U/L (ref 7–40)
ANION GAP SERPL CALCULATED.3IONS-SCNC: 7 MMOL/L (ref 3–11)
AST SERPL-CCNC: 29 U/L (ref 0–33)
BASOPHILS # BLD AUTO: 0.04 10*3/MM3 (ref 0–0.2)
BASOPHILS NFR BLD AUTO: 0.5 % (ref 0–1)
BILIRUB SERPL-MCNC: 0.9 MG/DL (ref 0.3–1.2)
BUN BLD-MCNC: 25 MG/DL (ref 9–23)
BUN/CREAT SERPL: 21.2 (ref 7–25)
CALCIUM SPEC-SCNC: 9.8 MG/DL (ref 8.7–10.4)
CHLORIDE SERPL-SCNC: 103 MMOL/L (ref 99–109)
CO2 SERPL-SCNC: 26 MMOL/L (ref 20–31)
CREAT BLD-MCNC: 1.18 MG/DL (ref 0.6–1.3)
CRP SERPL-MCNC: 0.67 MG/DL (ref 0–1)
DEPRECATED RDW RBC AUTO: 45.4 FL (ref 37–54)
EOSINOPHIL # BLD AUTO: 0.12 10*3/MM3 (ref 0–0.3)
EOSINOPHIL NFR BLD AUTO: 1.5 % (ref 0–3)
ERYTHROCYTE [DISTWIDTH] IN BLOOD BY AUTOMATED COUNT: 13.9 % (ref 11.3–14.5)
ERYTHROCYTE [SEDIMENTATION RATE] IN BLOOD: 35 MM/HR (ref 0–20)
GFR SERPL CREATININE-BSD FRML MDRD: 62 ML/MIN/1.73
GLOBULIN UR ELPH-MCNC: 3 GM/DL
GLUCOSE BLD-MCNC: 208 MG/DL (ref 70–100)
HCT VFR BLD AUTO: 41.8 % (ref 38.9–50.9)
HGB BLD-MCNC: 13.3 G/DL (ref 13.1–17.5)
IMM GRANULOCYTES # BLD AUTO: 0.03 10*3/MM3 (ref 0–0.03)
IMM GRANULOCYTES NFR BLD AUTO: 0.4 % (ref 0–0.6)
LYMPHOCYTES # BLD AUTO: 2.04 10*3/MM3 (ref 0.6–4.8)
LYMPHOCYTES NFR BLD AUTO: 24.9 % (ref 24–44)
MCH RBC QN AUTO: 28.5 PG (ref 27–31)
MCHC RBC AUTO-ENTMCNC: 31.8 G/DL (ref 32–36)
MCV RBC AUTO: 89.7 FL (ref 80–99)
MONOCYTES # BLD AUTO: 0.52 10*3/MM3 (ref 0–1)
MONOCYTES NFR BLD AUTO: 6.3 % (ref 0–12)
NEUTROPHILS # BLD AUTO: 5.45 10*3/MM3 (ref 1.5–8.3)
NEUTROPHILS NFR BLD AUTO: 66.4 % (ref 41–71)
PLATELET # BLD AUTO: 201 10*3/MM3 (ref 150–450)
PMV BLD AUTO: 10.6 FL (ref 6–12)
POTASSIUM BLD-SCNC: 4.5 MMOL/L (ref 3.5–5.5)
PROT SERPL-MCNC: 7.4 G/DL (ref 5.7–8.2)
RBC # BLD AUTO: 4.66 10*6/MM3 (ref 4.2–5.76)
SODIUM BLD-SCNC: 136 MMOL/L (ref 132–146)
WBC NRBC COR # BLD: 8.2 10*3/MM3 (ref 3.5–10.8)

## 2019-01-07 PROCEDURE — 85652 RBC SED RATE AUTOMATED: CPT

## 2019-01-07 PROCEDURE — 85025 COMPLETE CBC W/AUTO DIFF WBC: CPT

## 2019-01-07 PROCEDURE — 86140 C-REACTIVE PROTEIN: CPT

## 2019-01-07 PROCEDURE — 80053 COMPREHEN METABOLIC PANEL: CPT

## 2019-01-07 PROCEDURE — 36415 COLL VENOUS BLD VENIPUNCTURE: CPT

## 2019-01-07 NOTE — OUTREACH NOTE
Sepsis Week 2 Survey      Responses   Facility patient discharged from?  [place]   Does the patient have one of the following disease processes/diagnoses(primary or secondary)?  Sepsis   Week 2 attempt successful?  Yes   Call start time  1116   Revoke  Decline to participate [He does not want to be bothered. ]   Call end time  1120   Discharge diagnosis  strep bacteremia r/t ? subacute endocarditis, DM2, HTN, CAD          Latoya St RN

## 2019-01-14 ENCOUNTER — LAB (OUTPATIENT)
Dept: LAB | Facility: HOSPITAL | Age: 69
End: 2019-01-14

## 2019-01-14 ENCOUNTER — TRANSCRIBE ORDERS (OUTPATIENT)
Dept: LAB | Facility: HOSPITAL | Age: 69
End: 2019-01-14

## 2019-01-14 DIAGNOSIS — R78.81 BACTEREMIA: ICD-10-CM

## 2019-01-14 DIAGNOSIS — I30.1 ACUTE STREPTOCOCCAL PERICARDITIS: ICD-10-CM

## 2019-01-14 DIAGNOSIS — B95.5 ACUTE STREPTOCOCCAL PERICARDITIS: Primary | ICD-10-CM

## 2019-01-14 DIAGNOSIS — I30.1 ACUTE STREPTOCOCCAL PERICARDITIS: Primary | ICD-10-CM

## 2019-01-14 DIAGNOSIS — B95.5 ACUTE STREPTOCOCCAL PERICARDITIS: ICD-10-CM

## 2019-01-14 LAB
ALBUMIN SERPL-MCNC: 4.34 G/DL (ref 3.2–4.8)
ALBUMIN/GLOB SERPL: 1.5 G/DL (ref 1.5–2.5)
ALP SERPL-CCNC: 133 U/L (ref 25–100)
ALT SERPL W P-5'-P-CCNC: 40 U/L (ref 7–40)
ANION GAP SERPL CALCULATED.3IONS-SCNC: 4 MMOL/L (ref 3–11)
AST SERPL-CCNC: 26 U/L (ref 0–33)
BASOPHILS # BLD AUTO: 0.03 10*3/MM3 (ref 0–0.2)
BASOPHILS NFR BLD AUTO: 0.4 % (ref 0–1)
BILIRUB SERPL-MCNC: 0.7 MG/DL (ref 0.3–1.2)
BUN BLD-MCNC: 21 MG/DL (ref 9–23)
BUN/CREAT SERPL: 18.4 (ref 7–25)
CALCIUM SPEC-SCNC: 9.4 MG/DL (ref 8.7–10.4)
CHLORIDE SERPL-SCNC: 103 MMOL/L (ref 99–109)
CO2 SERPL-SCNC: 29 MMOL/L (ref 20–31)
CREAT BLD-MCNC: 1.14 MG/DL (ref 0.6–1.3)
CRP SERPL-MCNC: 0.27 MG/DL (ref 0–1)
DEPRECATED RDW RBC AUTO: 46.4 FL (ref 37–54)
EOSINOPHIL # BLD AUTO: 0.16 10*3/MM3 (ref 0–0.3)
EOSINOPHIL NFR BLD AUTO: 2.2 % (ref 0–3)
ERYTHROCYTE [DISTWIDTH] IN BLOOD BY AUTOMATED COUNT: 14.2 % (ref 11.3–14.5)
ERYTHROCYTE [SEDIMENTATION RATE] IN BLOOD: 27 MM/HR (ref 0–20)
GFR SERPL CREATININE-BSD FRML MDRD: 64 ML/MIN/1.73
GLOBULIN UR ELPH-MCNC: 2.9 GM/DL
GLUCOSE BLD-MCNC: 198 MG/DL (ref 70–100)
HCT VFR BLD AUTO: 42.2 % (ref 38.9–50.9)
HGB BLD-MCNC: 13.3 G/DL (ref 13.1–17.5)
IMM GRANULOCYTES # BLD AUTO: 0.01 10*3/MM3 (ref 0–0.03)
IMM GRANULOCYTES NFR BLD AUTO: 0.1 % (ref 0–0.6)
LYMPHOCYTES # BLD AUTO: 1.52 10*3/MM3 (ref 0.6–4.8)
LYMPHOCYTES NFR BLD AUTO: 20.5 % (ref 24–44)
MCH RBC QN AUTO: 28.7 PG (ref 27–31)
MCHC RBC AUTO-ENTMCNC: 31.5 G/DL (ref 32–36)
MCV RBC AUTO: 90.9 FL (ref 80–99)
MONOCYTES # BLD AUTO: 0.5 10*3/MM3 (ref 0–1)
MONOCYTES NFR BLD AUTO: 6.8 % (ref 0–12)
NEUTROPHILS # BLD AUTO: 5.19 10*3/MM3 (ref 1.5–8.3)
NEUTROPHILS NFR BLD AUTO: 70.1 % (ref 41–71)
PLATELET # BLD AUTO: 180 10*3/MM3 (ref 150–450)
PMV BLD AUTO: 11.3 FL (ref 6–12)
POTASSIUM BLD-SCNC: 4.3 MMOL/L (ref 3.5–5.5)
PROT SERPL-MCNC: 7.2 G/DL (ref 5.7–8.2)
RBC # BLD AUTO: 4.64 10*6/MM3 (ref 4.2–5.76)
SODIUM BLD-SCNC: 136 MMOL/L (ref 132–146)
WBC NRBC COR # BLD: 7.4 10*3/MM3 (ref 3.5–10.8)

## 2019-01-14 PROCEDURE — 85652 RBC SED RATE AUTOMATED: CPT

## 2019-01-14 PROCEDURE — 86140 C-REACTIVE PROTEIN: CPT

## 2019-01-14 PROCEDURE — 36415 COLL VENOUS BLD VENIPUNCTURE: CPT

## 2019-01-14 PROCEDURE — 80053 COMPREHEN METABOLIC PANEL: CPT

## 2019-01-14 PROCEDURE — 85025 COMPLETE CBC W/AUTO DIFF WBC: CPT

## 2019-01-21 ENCOUNTER — TRANSCRIBE ORDERS (OUTPATIENT)
Dept: LAB | Facility: HOSPITAL | Age: 69
End: 2019-01-21

## 2019-01-21 ENCOUNTER — LAB (OUTPATIENT)
Dept: LAB | Facility: HOSPITAL | Age: 69
End: 2019-01-21

## 2019-01-21 DIAGNOSIS — R78.81 BACTEREMIA: ICD-10-CM

## 2019-01-21 DIAGNOSIS — B95.5 ACUTE STREPTOCOCCAL PERICARDITIS: Primary | ICD-10-CM

## 2019-01-21 DIAGNOSIS — I30.1 ACUTE STREPTOCOCCAL PERICARDITIS: ICD-10-CM

## 2019-01-21 DIAGNOSIS — B95.5 ACUTE STREPTOCOCCAL PERICARDITIS: ICD-10-CM

## 2019-01-21 DIAGNOSIS — I30.1 ACUTE STREPTOCOCCAL PERICARDITIS: Primary | ICD-10-CM

## 2019-01-21 LAB
ALBUMIN SERPL-MCNC: 4.73 G/DL (ref 3.2–4.8)
ALBUMIN/GLOB SERPL: 1.7 G/DL (ref 1.5–2.5)
ALP SERPL-CCNC: 129 U/L (ref 25–100)
ALT SERPL W P-5'-P-CCNC: 43 U/L (ref 7–40)
ANION GAP SERPL CALCULATED.3IONS-SCNC: 6 MMOL/L (ref 3–11)
AST SERPL-CCNC: 29 U/L (ref 0–33)
BASOPHILS # BLD AUTO: 0.03 10*3/MM3 (ref 0–0.2)
BASOPHILS NFR BLD AUTO: 0.4 % (ref 0–1)
BILIRUB SERPL-MCNC: 0.9 MG/DL (ref 0.3–1.2)
BUN BLD-MCNC: 21 MG/DL (ref 9–23)
BUN/CREAT SERPL: 17.6 (ref 7–25)
CALCIUM SPEC-SCNC: 9.5 MG/DL (ref 8.7–10.4)
CHLORIDE SERPL-SCNC: 107 MMOL/L (ref 99–109)
CO2 SERPL-SCNC: 27 MMOL/L (ref 20–31)
CREAT BLD-MCNC: 1.19 MG/DL (ref 0.6–1.3)
CRP SERPL-MCNC: 0.08 MG/DL (ref 0–1)
DEPRECATED RDW RBC AUTO: 46.7 FL (ref 37–54)
EOSINOPHIL # BLD AUTO: 0.13 10*3/MM3 (ref 0–0.3)
EOSINOPHIL NFR BLD AUTO: 1.6 % (ref 0–3)
ERYTHROCYTE [DISTWIDTH] IN BLOOD BY AUTOMATED COUNT: 14.1 % (ref 11.3–14.5)
ERYTHROCYTE [SEDIMENTATION RATE] IN BLOOD: 30 MM/HR (ref 0–20)
GFR SERPL CREATININE-BSD FRML MDRD: 61 ML/MIN/1.73
GLOBULIN UR ELPH-MCNC: 2.8 GM/DL
GLUCOSE BLD-MCNC: 144 MG/DL (ref 70–100)
HCT VFR BLD AUTO: 43.4 % (ref 38.9–50.9)
HGB BLD-MCNC: 13.8 G/DL (ref 13.1–17.5)
IMM GRANULOCYTES # BLD AUTO: 0.01 10*3/MM3 (ref 0–0.03)
IMM GRANULOCYTES NFR BLD AUTO: 0.1 % (ref 0–0.6)
LYMPHOCYTES # BLD AUTO: 2.04 10*3/MM3 (ref 0.6–4.8)
LYMPHOCYTES NFR BLD AUTO: 25.7 % (ref 24–44)
MCH RBC QN AUTO: 28.7 PG (ref 27–31)
MCHC RBC AUTO-ENTMCNC: 31.8 G/DL (ref 32–36)
MCV RBC AUTO: 90.2 FL (ref 80–99)
MONOCYTES # BLD AUTO: 0.64 10*3/MM3 (ref 0–1)
MONOCYTES NFR BLD AUTO: 8.1 % (ref 0–12)
NEUTROPHILS # BLD AUTO: 5.11 10*3/MM3 (ref 1.5–8.3)
NEUTROPHILS NFR BLD AUTO: 64.2 % (ref 41–71)
PLATELET # BLD AUTO: 175 10*3/MM3 (ref 150–450)
PMV BLD AUTO: 10.7 FL (ref 6–12)
POTASSIUM BLD-SCNC: 4.2 MMOL/L (ref 3.5–5.5)
PROT SERPL-MCNC: 7.5 G/DL (ref 5.7–8.2)
RBC # BLD AUTO: 4.81 10*6/MM3 (ref 4.2–5.76)
SODIUM BLD-SCNC: 140 MMOL/L (ref 132–146)
WBC NRBC COR # BLD: 7.95 10*3/MM3 (ref 3.5–10.8)

## 2019-01-21 PROCEDURE — 85652 RBC SED RATE AUTOMATED: CPT

## 2019-01-21 PROCEDURE — 80053 COMPREHEN METABOLIC PANEL: CPT

## 2019-01-21 PROCEDURE — 36415 COLL VENOUS BLD VENIPUNCTURE: CPT

## 2019-01-21 PROCEDURE — 86140 C-REACTIVE PROTEIN: CPT

## 2019-01-21 PROCEDURE — 85025 COMPLETE CBC W/AUTO DIFF WBC: CPT

## 2019-01-24 ENCOUNTER — TRANSCRIBE ORDERS (OUTPATIENT)
Dept: ADMINISTRATIVE | Facility: HOSPITAL | Age: 69
End: 2019-01-24

## 2019-01-24 DIAGNOSIS — I38 ENDOCARDITIS, UNSPECIFIED CHRONICITY, UNSPECIFIED ENDOCARDITIS TYPE: Primary | ICD-10-CM

## 2019-01-28 ENCOUNTER — TRANSCRIBE ORDERS (OUTPATIENT)
Dept: LAB | Facility: HOSPITAL | Age: 69
End: 2019-01-28

## 2019-01-28 ENCOUNTER — LAB (OUTPATIENT)
Dept: LAB | Facility: HOSPITAL | Age: 69
End: 2019-01-28

## 2019-01-28 DIAGNOSIS — R78.81 BACTEREMIA: ICD-10-CM

## 2019-01-28 DIAGNOSIS — B95.5 ACUTE STREPTOCOCCAL PERICARDITIS: ICD-10-CM

## 2019-01-28 DIAGNOSIS — B95.5 ACUTE STREPTOCOCCAL PERICARDITIS: Primary | ICD-10-CM

## 2019-01-28 DIAGNOSIS — I30.1 ACUTE STREPTOCOCCAL PERICARDITIS: Primary | ICD-10-CM

## 2019-01-28 DIAGNOSIS — I30.1 ACUTE STREPTOCOCCAL PERICARDITIS: ICD-10-CM

## 2019-01-28 LAB
ALBUMIN SERPL-MCNC: 4.44 G/DL (ref 3.2–4.8)
ALBUMIN/GLOB SERPL: 1.7 G/DL (ref 1.5–2.5)
ALP SERPL-CCNC: 135 U/L (ref 25–100)
ALT SERPL W P-5'-P-CCNC: 37 U/L (ref 7–40)
ANION GAP SERPL CALCULATED.3IONS-SCNC: 6 MMOL/L (ref 3–11)
AST SERPL-CCNC: 23 U/L (ref 0–33)
BASOPHILS # BLD AUTO: 0.03 10*3/MM3 (ref 0–0.2)
BASOPHILS NFR BLD AUTO: 0.4 % (ref 0–1)
BILIRUB SERPL-MCNC: 0.8 MG/DL (ref 0.3–1.2)
BUN BLD-MCNC: 21 MG/DL (ref 9–23)
BUN/CREAT SERPL: 17.6 (ref 7–25)
CALCIUM SPEC-SCNC: 9.5 MG/DL (ref 8.7–10.4)
CHLORIDE SERPL-SCNC: 104 MMOL/L (ref 99–109)
CO2 SERPL-SCNC: 28 MMOL/L (ref 20–31)
CREAT BLD-MCNC: 1.19 MG/DL (ref 0.6–1.3)
CRP SERPL-MCNC: 0.05 MG/DL (ref 0–1)
DEPRECATED RDW RBC AUTO: 46.3 FL (ref 37–54)
EOSINOPHIL # BLD AUTO: 0.14 10*3/MM3 (ref 0–0.3)
EOSINOPHIL NFR BLD AUTO: 2 % (ref 0–3)
ERYTHROCYTE [DISTWIDTH] IN BLOOD BY AUTOMATED COUNT: 14 % (ref 11.3–14.5)
ERYTHROCYTE [SEDIMENTATION RATE] IN BLOOD: 11 MM/HR (ref 0–20)
GFR SERPL CREATININE-BSD FRML MDRD: 61 ML/MIN/1.73
GLOBULIN UR ELPH-MCNC: 2.6 GM/DL
GLUCOSE BLD-MCNC: 210 MG/DL (ref 70–100)
HCT VFR BLD AUTO: 42.9 % (ref 38.9–50.9)
HGB BLD-MCNC: 13.5 G/DL (ref 13.1–17.5)
IMM GRANULOCYTES # BLD AUTO: 0.01 10*3/MM3 (ref 0–0.03)
IMM GRANULOCYTES NFR BLD AUTO: 0.1 % (ref 0–0.6)
LYMPHOCYTES # BLD AUTO: 1.75 10*3/MM3 (ref 0.6–4.8)
LYMPHOCYTES NFR BLD AUTO: 25 % (ref 24–44)
MCH RBC QN AUTO: 28.3 PG (ref 27–31)
MCHC RBC AUTO-ENTMCNC: 31.5 G/DL (ref 32–36)
MCV RBC AUTO: 89.9 FL (ref 80–99)
MONOCYTES # BLD AUTO: 0.41 10*3/MM3 (ref 0–1)
MONOCYTES NFR BLD AUTO: 5.8 % (ref 0–12)
NEUTROPHILS # BLD AUTO: 4.68 10*3/MM3 (ref 1.5–8.3)
NEUTROPHILS NFR BLD AUTO: 66.8 % (ref 41–71)
PLATELET # BLD AUTO: 164 10*3/MM3 (ref 150–450)
PMV BLD AUTO: 11.7 FL (ref 6–12)
POTASSIUM BLD-SCNC: 4.2 MMOL/L (ref 3.5–5.5)
PROT SERPL-MCNC: 7 G/DL (ref 5.7–8.2)
RBC # BLD AUTO: 4.77 10*6/MM3 (ref 4.2–5.76)
SODIUM BLD-SCNC: 138 MMOL/L (ref 132–146)
WBC NRBC COR # BLD: 7.01 10*3/MM3 (ref 3.5–10.8)

## 2019-01-28 PROCEDURE — 86140 C-REACTIVE PROTEIN: CPT

## 2019-01-28 PROCEDURE — 80053 COMPREHEN METABOLIC PANEL: CPT

## 2019-01-28 PROCEDURE — 85025 COMPLETE CBC W/AUTO DIFF WBC: CPT

## 2019-01-28 PROCEDURE — 36415 COLL VENOUS BLD VENIPUNCTURE: CPT

## 2019-02-07 ENCOUNTER — HOSPITAL ENCOUNTER (OUTPATIENT)
Dept: CARDIOLOGY | Facility: HOSPITAL | Age: 69
Discharge: HOME | End: 2019-02-07
Attending: INTERNAL MEDICINE | Admitting: INTERNAL MEDICINE

## 2019-02-07 VITALS
SYSTOLIC BLOOD PRESSURE: 135 MMHG | OXYGEN SATURATION: 95 % | RESPIRATION RATE: 16 BRPM | DIASTOLIC BLOOD PRESSURE: 89 MMHG | HEART RATE: 52 BPM

## 2019-02-07 DIAGNOSIS — I38 ENDOCARDITIS, UNSPECIFIED CHRONICITY, UNSPECIFIED ENDOCARDITIS TYPE: ICD-10-CM

## 2019-02-07 PROCEDURE — 93325 DOPPLER ECHO COLOR FLOW MAPG: CPT

## 2019-02-07 PROCEDURE — 25010000002 MIDAZOLAM PER 1 MG: Performed by: INTERNAL MEDICINE

## 2019-02-07 PROCEDURE — 25010000002 FENTANYL CITRATE (PF) 100 MCG/2ML SOLUTION: Performed by: INTERNAL MEDICINE

## 2019-02-07 PROCEDURE — 93312 ECHO TRANSESOPHAGEAL: CPT

## 2019-02-07 PROCEDURE — 93321 DOPPLER ECHO F-UP/LMTD STD: CPT

## 2019-02-07 RX ORDER — MIDAZOLAM HYDROCHLORIDE 1 MG/ML
INJECTION INTRAMUSCULAR; INTRAVENOUS
Status: COMPLETED | OUTPATIENT
Start: 2019-02-07 | End: 2019-02-07

## 2019-02-07 RX ORDER — FENTANYL CITRATE 50 UG/ML
INJECTION, SOLUTION INTRAMUSCULAR; INTRAVENOUS
Status: COMPLETED | OUTPATIENT
Start: 2019-02-07 | End: 2019-02-07

## 2019-02-07 RX ADMIN — FENTANYL CITRATE 50 MCG: 50 INJECTION, SOLUTION INTRAMUSCULAR; INTRAVENOUS at 14:03

## 2019-02-07 RX ADMIN — MIDAZOLAM HYDROCHLORIDE 2 MG: 1 INJECTION, SOLUTION INTRAMUSCULAR; INTRAVENOUS at 14:03

## 2019-03-12 ENCOUNTER — OFFICE VISIT (OUTPATIENT)
Dept: FAMILY MEDICINE CLINIC | Facility: CLINIC | Age: 69
End: 2019-03-12

## 2019-03-12 VITALS
HEIGHT: 67 IN | WEIGHT: 184 LBS | SYSTOLIC BLOOD PRESSURE: 130 MMHG | DIASTOLIC BLOOD PRESSURE: 80 MMHG | OXYGEN SATURATION: 98 % | BODY MASS INDEX: 28.88 KG/M2 | HEART RATE: 54 BPM

## 2019-03-12 DIAGNOSIS — E78.5 HYPERLIPIDEMIA, UNSPECIFIED HYPERLIPIDEMIA TYPE: ICD-10-CM

## 2019-03-12 DIAGNOSIS — E11.9 TYPE 2 DIABETES MELLITUS WITHOUT COMPLICATION, WITHOUT LONG-TERM CURRENT USE OF INSULIN (HCC): Primary | ICD-10-CM

## 2019-03-12 DIAGNOSIS — I10 ESSENTIAL HYPERTENSION: ICD-10-CM

## 2019-03-12 DIAGNOSIS — Z86.79: ICD-10-CM

## 2019-03-12 PROBLEM — R91.1 PULMONARY NODULE: Status: ACTIVE | Noted: 2019-03-12

## 2019-03-12 LAB — GLUCOSE BLDC GLUCOMTR-MCNC: 0 MG/DL (ref 70–130)

## 2019-03-12 PROCEDURE — 82962 GLUCOSE BLOOD TEST: CPT | Performed by: INTERNAL MEDICINE

## 2019-03-12 PROCEDURE — 99214 OFFICE O/P EST MOD 30 MIN: CPT | Performed by: INTERNAL MEDICINE

## 2019-03-12 RX ORDER — AMOXICILLIN 500 MG/1
CAPSULE ORAL
Qty: 4 CAPSULE | Refills: 2 | Status: SHIPPED | OUTPATIENT
Start: 2019-03-12 | End: 2019-06-11

## 2019-03-12 NOTE — PROGRESS NOTES
69M here for f/u chronic issues      -strep bacteremia, presumed subacute endocarditis, hosp 12/18 , finished tx end of January with rocephin.  Feeling well now.    -mitral valve prolapse, est with Dr. Blackwell  Asymptomatic    -DM2 - controlled  On metformin  Eye exam utd    -htn - controlled, no med    -hyperlipidemia/ h/o cad - on asa statin    Review of Systems   General: no fatigue, fever/chills, unintentional wt loss, malaise, night sweats  Skin: no rash, no hives, no lesions,   Eyes: no visual disturbance   Heme: no brusing, no bleeding  ENT: no hearing loss, no dizziness, no nosebleed, no hoarseness  Endocrine: , no polyuria, polyphagia, polydipsia, no heat or cold intolerance  GI: no nausea, no vomiting, no diarrhea, no constipation, no bleeding, no pain  : no dysuria, no urinary frequency,, no hematuria, or incontinence  Extremities: no edema, , no claudication  Cardiac: no chest pain, no palpitations, no orthopnea, no PND  Respiratory: no cough, no sputum, no wheezing, no sob , no hemoptysis  Neuro: no headache, no seizure,, no paresthesias or weakness  Psych: no anxiety, no depression      Patient Active Problem List   Diagnosis   • Type 2 diabetes mellitus without complication (CMS/HCC)   • Hyperlipidemia   • Essential hypertension   • Erectile dysfunction   • History of prostate cancer   • Osteoarthritis of multiple joints   • Coronary artery disease involving native heart without angina pectoris   • Colon polyps   • Glaucoma   • Low back pain   • Streptococcal bacteremia   • H/O subacute bacterial endocarditis      Past Surgical History:   Procedure Laterality Date   • CARDIAC CATHETERIZATION N/A 12/20/2016    Procedure: Left Heart Cath;  Surgeon: Kan Blackwell MD;  Location: [facility] INVASIVE LOCATION;  Service:    • COLONOSCOPY W/ POLYPECTOMY     • CORONARY ANGIOPLASTY     • EYE SURGERY      BILATERAL CATARACTS REMOVED.   • HERNIA REPAIR      RIGHT   • KNEE ARTHROSCOPY      LEFT   • KNEE  "SURGERY      LEFT TOTAL KNEE REPLACEMENT 12/2012   • LUMBAR EPIDURAL INJECTION     • NE RT/LT HEART CATHETERS N/A 12/27/2016    Procedure: Percutaneous Coronary Intervention;  Surgeon: Kan Blackwell MD;  Location: [facility] INVASIVE LOCATION;  Service: Cardiovascular   • PROSTATECTOMY      2003   • TENDON TRANSFER ELBOW      TENDON REPAIR   • TONSILLECTOMY     • TRABECULECTOMY      FOR GLAUCOMA        Current Outpatient Medications:   •  aspirin 81 MG EC tablet, Take 81 mg by mouth Every Night., Disp: , Rfl:   •  atorvastatin (LIPITOR) 40 MG tablet, Take 40 mg by mouth Every Night., Disp: , Rfl:   •  calcium carbonate-cholecalciferol 500-400 MG-UNIT tablet tablet, Take 1 tablet by mouth Daily., Disp: , Rfl:   •  cholecalciferol (VITAMIN D3) 1000 units tablet, Take 1,000 Units by mouth Daily., Disp: , Rfl:   •  metFORMIN (GLUCOPHAGE) 500 MG tablet, Take 500 mg by mouth Daily With Breakfast., Disp: , Rfl:   •  nitroglycerin (NITROSTAT) 0.4 MG SL tablet, Place 0.4 mg under the tongue Every 5 (Five) Minutes As Needed for chest pain. Take no more than 3 doses in 15 minutes., Disp: , Rfl:   •  Omega-3 Fatty Acids (FISH OIL) 1200 MG capsule capsule, Take 1,200 mg by mouth Daily., Disp: , Rfl:   •  sertraline (ZOLOFT) 50 MG tablet, Take 1 tablet by mouth Daily., Disp: 90 tablet, Rfl: 1   Allergies   Allergen Reactions   • Xarelto [Rivaroxaban] Other (See Comments)     MADE ME FEEL LIKE \" I WAS HAVING A HEART ATTACK.\"     Social History     Socioeconomic History   • Marital status:      Spouse name: Sabina   • Number of children: 2   • Years of education: Not on file   • Highest education level: Not on file   Social Needs   • Financial resource strain: Not on file   • Food insecurity - worry: Not on file   • Food insecurity - inability: Not on file   • Transportation needs - medical: Not on file   • Transportation needs - non-medical: Not on file   Occupational History   • Occupation: Retired   Tobacco Use " "  • Smoking status: Former Smoker     Packs/day: 1.50     Years: 36.00     Pack years: 54.00     Types: Cigarettes     Last attempt to quit: 2011     Years since quittin.9   • Smokeless tobacco: Former User   Substance and Sexual Activity   • Alcohol use: Yes     Alcohol/week: 1.2 oz     Types: 2 Cans of beer per week     Comment:  quit a couple weeks ago   • Drug use: Yes     Types: Marijuana   • Sexual activity: Defer   Other Topics Concern   • Not on file   Social History Narrative    :     to Sabina x 35yrs    2 children from previous marriage    2 gk.    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    Dental: utd    Eye: utd      Family History   Problem Relation Age of Onset   • No Known Problems Mother    • Alcohol abuse Father    • Heart attack Father    • No Known Problems Sister    • No Known Problems Brother       /80   Pulse 54   Ht 170.2 cm (67\")   Wt 83.5 kg (184 lb)   SpO2 98%   BMI 28.82 kg/m²   Gen: well appearing in nad, no resp effort  Eyes: conjunctiva clear, perrl, eomi  ENT: mmm, no thyromegaly, no lymphadenopathy  CV: s1, s2 reg no m/r/g, no bruits, no jvd  No peripheral edema, pedal pulses intact  Resp:  clear b/l no w/r/r  GI:  soft nt/nd  Skin: no clubbing or cyanosis  Neuro: no focal deficits.    Lab Results   Component Value Date    GLUCOSE 210 (H) 2019    BUN 21 2019    CREATININE 1.19 2019    EGFRIFNONA 61 2019    BCR 17.6 2019    K 4.2 2019    CO2 28.0 2019    CALCIUM 9.5 2019    ALBUMIN 4.44 2019    AST 23 2019    ALT 37 2019     Lab Results   Component Value Date    WBC 7.01 2019    HGB 13.5 2019    HCT 42.9 2019    MCV 89.9 2019     2019         A/P  1. Type 2 diabetes mellitus without complication, without long-term current use of insulin (CMS/[facility])   -well controlled, cont metformin   2. H/O subacute bacterial " endocarditis - resolved, finished tx 8wks prior, stable   3. Essential hypertension - controlled   4. Hyperlipidemia, unspecified hyperlipidemia type - cont statin       Amoxicillin for proph tx for dental procedure prescribed  Est care Dr. Coleman in 3mo

## 2019-06-11 ENCOUNTER — OFFICE VISIT (OUTPATIENT)
Dept: FAMILY MEDICINE CLINIC | Facility: CLINIC | Age: 69
End: 2019-06-11

## 2019-06-11 VITALS
BODY MASS INDEX: 28 KG/M2 | RESPIRATION RATE: 16 BRPM | SYSTOLIC BLOOD PRESSURE: 130 MMHG | HEART RATE: 61 BPM | OXYGEN SATURATION: 98 % | WEIGHT: 178.4 LBS | DIASTOLIC BLOOD PRESSURE: 82 MMHG | HEIGHT: 67 IN | TEMPERATURE: 96.9 F

## 2019-06-11 DIAGNOSIS — I25.10 CORONARY ARTERY DISEASE INVOLVING NATIVE HEART WITHOUT ANGINA PECTORIS, UNSPECIFIED VESSEL OR LESION TYPE: ICD-10-CM

## 2019-06-11 DIAGNOSIS — E78.5 HYPERLIPIDEMIA, UNSPECIFIED HYPERLIPIDEMIA TYPE: ICD-10-CM

## 2019-06-11 DIAGNOSIS — E11.9 TYPE 2 DIABETES MELLITUS WITHOUT COMPLICATION, WITHOUT LONG-TERM CURRENT USE OF INSULIN (HCC): Primary | ICD-10-CM

## 2019-06-11 LAB — HBA1C MFR BLD: 6.1 %

## 2019-06-11 PROCEDURE — 99214 OFFICE O/P EST MOD 30 MIN: CPT | Performed by: INTERNAL MEDICINE

## 2019-06-11 PROCEDURE — 83036 HEMOGLOBIN GLYCOSYLATED A1C: CPT | Performed by: INTERNAL MEDICINE

## 2019-06-11 NOTE — PROGRESS NOTES
Chief Complaint:  dm2    HPI:  Luis Elliott is a 69 y.o. male who presents today for follow-up multiple chronic medical conditions.  Patient has no acute complaints or concerns today.  Type 2 diabetes-does not check blood sugar today.  He is available taking metformin 500 mg once daily.  Coronary disease-follow with cardiology.  Taking statin and aspirin.  Denies any chest pain or shortness of breath.  Hyperlipidemia-taking statin without myalgias.  Anxiety-well-controlled on Zoloft.  Denies any panic attacks.  Symptoms improved on medication.    ROS:  Constitutional: no fevers, night sweats or unexplained weight loss  Eyes: no vision changes  ENT: no runny nose, ear pain, sore throat  Cardio: no chest pain, palpitations  Pulm: no shortness of breath, wheezing, or cough  GI: no abdominal pain or changes in bowel movements  : no difficulty urinating  MSK: no difficulty ambulating, no joint pain  Neuro: no weakness, dizziness or headache  Psych: no trouble sleeping  Endo: no change in appetite      Past Medical History:   Diagnosis Date   • Cancer (CMS/[facility])     PROSTATE   • Coronary artery disease    • DDD (degenerative disc disease), cervical    • Depression    • Diabetes mellitus (CMS/HCC)    • Erectile dysfunction    • Glaucoma    • Hyperlipidemia    • Hypertension    • Impingement syndrome of left shoulder    • Osteoarthritis    • Varicose veins of lower limb     RIGHT      Family History   Problem Relation Age of Onset   • No Known Problems Mother    • Alcohol abuse Father    • Heart attack Father    • No Known Problems Sister    • No Known Problems Brother       Social History     Socioeconomic History   • Marital status:      Spouse name: Sabina   • Number of children: 2   • Years of education: Not on file   • Highest education level: Not on file   Occupational History   • Occupation: Retired   Tobacco Use   • Smoking status: Former Smoker     Packs/day: 1.50     Years: 36.00     Pack years: 54.00  "    Types: Cigarettes     Last attempt to quit: 2011     Years since quittin.1   • Smokeless tobacco: Former User   Substance and Sexual Activity   • Alcohol use: Yes     Alcohol/week: 1.2 oz     Types: 2 Cans of beer per week     Comment:  quit a couple weeks ago   • Drug use: Yes     Types: Marijuana   • Sexual activity: Defer   Social History Narrative    :     to Sabina x 35yrs    2 children from previous marriage    2 gk.    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    Dental: utd    Eye: utd      Allergies   Allergen Reactions   • Xarelto [Rivaroxaban] Other (See Comments)     MADE ME FEEL LIKE \" I WAS HAVING A HEART ATTACK.\"      Immunization History   Administered Date(s) Administered   • Flu Vaccine High Dose PF 65YR+ 2017, 2018   • Flu Vaccine Quad PF >36MO 2015, 2016, 10/24/2017   • Pneumococcal Conjugate 13-Valent (PCV13) 2015   • Pneumococcal Polysaccharide (PPSV23) 2013        PE:  Vitals:    19 0950   BP: 130/82   Pulse: 61   Resp: 16   Temp: 96.9 °F (36.1 °C)   SpO2: 98%        Gen Appearance: NAD  HEENT: Normocephalic, PERRLA, no thyromegaly, trache midline  Heart: RRR, normal S1 and S2, no murmur  Lungs: CTA b/l, no wheezing, no crackles  Abdomen: Soft, non-tender, non-distended, no guarding and BSx4  MSK: Moves all extremities well, normal gait, no peripheral edema  Pulses: Palpable and equal b/l  Lymph nodes: No palpable lymphadenopathy   Neuro: No focal deficits      Current Outpatient Medications   Medication Sig Dispense Refill   • aspirin 81 MG EC tablet Take 81 mg by mouth Every Night.     • atorvastatin (LIPITOR) 40 MG tablet Take 40 mg by mouth Every Night.     • calcium carbonate-cholecalciferol 500-400 MG-UNIT tablet tablet Take 1 tablet by mouth Daily.     • cholecalciferol (VITAMIN D3) 1000 units tablet Take 1,000 Units by mouth Daily.     • metFORMIN (GLUCOPHAGE) 500 MG tablet Take 1 " tablet by mouth Daily With Breakfast. 90 tablet 2   • nitroglycerin (NITROSTAT) 0.4 MG SL tablet Place 0.4 mg under the tongue Every 5 (Five) Minutes As Needed for chest pain. Take no more than 3 doses in 15 minutes.     • Omega-3 Fatty Acids (FISH OIL) 1200 MG capsule capsule Take 1,200 mg by mouth Daily.     • sertraline (ZOLOFT) 50 MG tablet Take 1 tablet by mouth Daily. 90 tablet 1     No current facility-administered medications for this visit.         Luis was seen today for type 2 diabetes.    Diagnoses and all orders for this visit:    Type 2 diabetes mellitus without complication, without long-term current use of insulin (CMS/[facility])  A1c 6.1 today.  Continue metformin.  Well-controlled.  Follow-up 3 months for Medicare wellness visit and lab work.  Recently evaluated by ophthalmology.  Coronary artery disease involving native heart without angina pectoris, unspecified vessel or lesion type  Continue to follow with cardiology.  No chest pains.  Continue aspirin statin nitro as needed.  Hyperlipidemia, unspecified hyperlipidemia type  Well-controlled on statin without myalgias.  Checking lipid panel in 3 months for follow-up.       Return in about 3 months (around 9/11/2019) for Medicare Wellness.

## 2019-08-14 ENCOUNTER — TELEPHONE (OUTPATIENT)
Dept: FAMILY MEDICINE CLINIC | Facility: CLINIC | Age: 69
End: 2019-08-14

## 2019-08-14 NOTE — TELEPHONE ENCOUNTER
REFILL ON SERTRALINE 50 MG  PT HAS 10 LEFT, BUT DOESN'T HAVE ENOUGH TO LAST UNTIL NEXT VISIT    NYLA KIM

## 2019-09-16 ENCOUNTER — OFFICE VISIT (OUTPATIENT)
Dept: FAMILY MEDICINE CLINIC | Facility: CLINIC | Age: 69
End: 2019-09-16

## 2019-09-16 VITALS
OXYGEN SATURATION: 98 % | TEMPERATURE: 97.5 F | HEIGHT: 68 IN | SYSTOLIC BLOOD PRESSURE: 120 MMHG | BODY MASS INDEX: 27.89 KG/M2 | HEART RATE: 62 BPM | DIASTOLIC BLOOD PRESSURE: 80 MMHG | WEIGHT: 184 LBS

## 2019-09-16 DIAGNOSIS — I10 ESSENTIAL HYPERTENSION: ICD-10-CM

## 2019-09-16 DIAGNOSIS — I25.10 CORONARY ARTERY DISEASE INVOLVING NATIVE HEART WITHOUT ANGINA PECTORIS, UNSPECIFIED VESSEL OR LESION TYPE: ICD-10-CM

## 2019-09-16 DIAGNOSIS — E55.9 VITAMIN D DEFICIENCY: ICD-10-CM

## 2019-09-16 DIAGNOSIS — F32.A DEPRESSION, UNSPECIFIED DEPRESSION TYPE: ICD-10-CM

## 2019-09-16 DIAGNOSIS — E11.9 TYPE 2 DIABETES MELLITUS WITHOUT COMPLICATION, WITHOUT LONG-TERM CURRENT USE OF INSULIN (HCC): Primary | ICD-10-CM

## 2019-09-16 DIAGNOSIS — Z23 IMMUNIZATION DUE: ICD-10-CM

## 2019-09-16 DIAGNOSIS — E78.5 HYPERLIPIDEMIA, UNSPECIFIED HYPERLIPIDEMIA TYPE: ICD-10-CM

## 2019-09-16 LAB — HBA1C MFR BLD: 5.9 %

## 2019-09-16 PROCEDURE — G0008 ADMIN INFLUENZA VIRUS VAC: HCPCS | Performed by: INTERNAL MEDICINE

## 2019-09-16 PROCEDURE — 90653 IIV ADJUVANT VACCINE IM: CPT | Performed by: INTERNAL MEDICINE

## 2019-09-16 PROCEDURE — G0439 PPPS, SUBSEQ VISIT: HCPCS | Performed by: INTERNAL MEDICINE

## 2019-09-16 PROCEDURE — 83036 HEMOGLOBIN GLYCOSYLATED A1C: CPT | Performed by: INTERNAL MEDICINE

## 2019-09-16 NOTE — PROGRESS NOTES
QUICK REFERENCE INFORMATION:  The ABCs of the Annual Wellness Visit  Luis Elliott is a 69 y.o. male presenting forMedicare Subsequent Wellness visit and  Medicare Wellness-subsequent  .     Medicare Subsequent Wellness Visit    Chief Complaint   Patient presents with   • Medicare Wellness-subsequent        HPI   Patient here for Medicare wellness visit.  No acute complaints or concerns today.  Chronic medical conditions stable.  Taking medication as prescribed.  Does not check blood sugar at home.  ROS:  Constitutional: no fevers, night sweats or unexplained weight loss  Eyes: no vision changes  ENT: no runny nose, ear pain, sore throat  Cardio: no chest pain, palpitations  Pulm: no shortness of breath, wheezing, or cough  GI: no abdominal pain or changes in bowel movements  : no difficulty urinating  MSK: no difficulty ambulating, no joint pain  Neuro: no weakness, dizziness or headache  Psych: no trouble sleeping  Endo: no change in appetite     Past Medical History:   Diagnosis Date   • Cancer (CMS/HCC)     PROSTATE   • Coronary artery disease    • DDD (degenerative disc disease), cervical    • Depression    • Diabetes mellitus (CMS/[facility])    • Erectile dysfunction    • Glaucoma    • Hyperlipidemia    • Hypertension    • Impingement syndrome of left shoulder    • Osteoarthritis    • Varicose veins of lower limb     RIGHT        Past Surgical History:   Procedure Laterality Date   • CARDIAC CATHETERIZATION N/A 12/20/2016    Procedure: Left Heart Cath;  Surgeon: Kan Blackwell MD;  Location: [facility] INVASIVE LOC[facility];  Service:    • COLONOSCOPY W/ POLYPECTOMY     • CORONARY ANGIOPLASTY     • EYE SURGERY      BILATERAL CATARACTS REMOVED.   • HERNIA REPAIR      RIGHT   • KNEE ARTHROSCOPY      LEFT   • KNEE SURGERY      LEFT TOTAL KNEE REPLACEMENT 12/2012   • LUMBAR EPIDURAL INJECTION     • CT RT/LT HEART CATHETERS N/A 12/27/2016    Procedure: Percutaneous Coronary Intervention;  Surgeon: Kan Solorio  MD Rosalva;  [facility]:  [facility] INVAS[facility];  Service: Cardiovascular   • PROSTATECTOMY         • TENDON TRANSFER ELBOW      TENDON REPAIR   • TONSILLECTOMY     • TRABECULECTOMY      FOR GLAUCOMA       Family History   Problem Relation Age of Onset   • No Known Problems Mother    • Alcohol abuse Father    • Heart attack Father    • No Known Problems Sister    • No Known Problems Brother         Social History     Socioeconomic History   • Marital status:      Spouse name: Sabina   • Number of children: 2   • Years of education: Not on file   • Highest education level: Not on file   Occupational History   • Occupation: Retired   Tobacco Use   • Smoking status: Former Smoker     Packs/day: 1.50     Years: 36.00     Pack years: 54.00     Types: Cigarettes     Last attempt to quit: 2011     Years since quittin.4   • Smokeless tobacco: Former User   Substance and Sexual Activity   • Alcohol use: Yes     Alcohol/week: 1.2 oz     Types: 2 Cans of beer per week     Comment:  quit a couple weeks ago   • Drug use: Yes     Types: Marijuana   • Sexual activity: Defer   Social History Narrative    :     to Sabina x 35yrs    2 children from previous marriage    2 gk.    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    Dental: utd    Eye: utd        Current Outpatient Medications   Medication Sig Dispense Refill   • aspirin 81 MG EC tablet Take 81 mg by mouth Every Night.     • atorvastatin (LIPITOR) 40 MG tablet Take 40 mg by mouth Every Night.     • calcium carbonate-cholecalciferol 500-400 MG-UNIT tablet tablet Take 1 tablet by mouth Daily.     • cholecalciferol (VITAMIN D3) 1000 units tablet Take 1,000 Units by mouth Daily.     • metFORMIN (GLUCOPHAGE) 500 MG tablet Take 1 tablet by mouth Daily With Breakfast. 90 tablet 2   • nitroglycerin (NITROSTAT) 0.4 MG SL tablet Place 0.4 mg under the tongue Every 5 (Five) Minutes As Needed for chest pain. Take no  "more than 3 doses in 15 minutes.     • Omega-3 Fatty Acids (FISH OIL) 1200 MG capsule capsule Take 1,200 mg by mouth Daily.     • sertraline (ZOLOFT) 50 MG tablet Take 1 tablet by mouth Daily. 90 tablet 0     No current facility-administered medications for this visit.         Allergies   Allergen Reactions   • Xarelto [Rivaroxaban] Other (See Comments)     MADE ME FEEL LIKE \" I WAS HAVING A HEART ATTACK.\"        Immunization History   Administered Date(s) Administered   • Flu Vaccine High Dose PF 65YR+ 11/01/2017, 09/12/2018   • Flu Vaccine Quad PF >36MO 11/25/2015, 12/12/2016, 10/24/2017   • Pneumococcal Conjugate 13-Valent (PCV13) 06/03/2015   • Pneumococcal Polysaccharide (PPSV23) 06/03/2013        The following portions of the patient's history were reviewed and updated as appropriate: allergies, current medications, past family history, past medical history, past social history, past surgical history and problem list.      Objective    Visit Vitals  /80   Pulse 62   Temp 97.5 °F (36.4 °C)   Ht 172.3 cm (67.84\")   Wt 83.5 kg (184 lb)   SpO2 98%   BMI 28.11 kg/m²        Physical Exam  Gen Appearance: NAD  HEENT: Normocephalic, PERRLA, no thyromegaly, trache midline  Heart: RRR, normal S1 and S2, no murmur  Lungs: CTA b/l, no wheezing, no crackles  Abdomen: Soft, non-tender, non-distended, no guarding and BSx4  MSK: Moves all extremities well, normal gait, no peripheral edema  Pulses: Palpable and equal b/l  Lymph nodes: No palpable lymphadenopathy   Neuro: No focal deficits       HEALTH RISK ASSESSMENT    1950    Recent Hospitalizations:  No hospitalization(s) within the last year..      Current Medical Providers:  Patient Care Team:  Silvestre Coleman DO as PCP - General (Internal Medicine)  Gauri Chand DO as PCP - Kan Borrero MD as Consulting Physician (Cardiology)  Mark Camacho MD as Consulting Physician (Ophthalmology)  Asim Hawthorne Jr., MD as Consulting " Physician (Orthopedic Surgery)      Smoking Status:  Social History     Tobacco Use   Smoking Status Former Smoker   • Packs/day: 1.50   • Years: 36.00   • Pack years: 54.00   • Types: Cigarettes   • Last attempt to quit: 2011   • Years since quittin.4   Smokeless Tobacco Former User       Alcohol Consumption:  Social History     Substance and Sexual Activity   Alcohol Use Yes   • Alcohol/week: 1.2 oz   • Types: 2 Cans of beer per week    Comment:  quit a couple weeks ago       Depression Screen:   PHQ-2/PHQ-9 Depression Screening 2019   Little interest or pleasure in doing things 0   Feeling down, depressed, or hopeless 0   Total Score 0       Health Habits and Functional and Cognitive Screening:  Functional & Cognitive Status 2019   Do you have difficulty preparing food and eating? No   Do you have difficulty bathing yourself, getting dressed or grooming yourself? No   Do you have difficulty using the toilet? No   Do you have difficulty moving around from place to place? No   Do you have trouble with steps or getting out of a bed or a chair? No   Current Diet Well Balanced Diet   Dental Exam Up to date   Eye Exam Up to date   Exercise (times per week) 7 times per week   Current Exercise Activities Include No Regular Exercise   Do you need help using the phone?  No   Are you deaf or do you have serious difficulty hearing?  Yes   Do you need help with transportation? No   Do you need help shopping? No   Do you need help preparing meals?  No   Do you need help with housework?  No   Do you need help with laundry? No   Do you need help taking your medications? No   Do you need help managing money? No   Do you ever drive or ride in a car without wearing a seat belt? No   Have you felt unusual stress, anger or loneliness in the last month? No   Who do you live with? Spouse   If you need help, do you have trouble finding someone available to you? No   Have you been bothered in the last four weeks by  sexual problems? No   Do you have difficulty concentrating, remembering or making decisions? No         Does the patient have evidence of cognitive impairment? No    Aspirin use counseling? Taking ASA appropriately as indicated      Recent Lab Results:  CMP:  Lab Results   Component Value Date    BUN 21 01/28/2019    CREATININE 1.19 01/28/2019    EGFRIFNONA 61 01/28/2019    BCR 17.6 01/28/2019     01/28/2019    K 4.2 01/28/2019    CO2 28.0 01/28/2019    CALCIUM 9.5 01/28/2019    ALBUMIN 4.44 01/28/2019    BILITOT 0.8 01/28/2019    ALKPHOS 135 (H) 01/28/2019    AST 23 01/28/2019    ALT 37 01/28/2019     Lipid Panel:  Lab Results   Component Value Date    CHOL 176 09/12/2018    TRIG 190 (H) 09/12/2018    HDL 47 09/12/2018     HbA1c:  Lab Results   Component Value Date    HGBA1C 6.1 06/11/2019       Visual Acuity:  No exam data present    Age-appropriate Screening Schedule:  Refer to the list below for future screening recommendations based on patient's age, sex and/or medical conditions. Orders for these recommended tests are listed in the plan section. The patient has been provided with a written plan.    Health Maintenance   Topic Date Due   • ZOSTER VACCINE (2 of 2) 02/26/2013   • PNEUMOCOCCAL VACCINES (65+ LOW/MEDIUM RISK) (2 of 2 - PPSV23) 06/03/2018   • DIABETIC FOOT EXAM  06/09/2018   • URINE MICROALBUMIN  06/09/2018   • INFLUENZA VACCINE  08/01/2019   • LIPID PANEL  09/12/2019   • HEMOGLOBIN A1C  12/11/2019   • DIABETIC EYE EXAM  06/10/2020   • COLONOSCOPY  10/20/2021   • TDAP/TD VACCINES (2 - Td) 01/01/2023          Advance Care Planning:  Patient has an advance directive - a copy has not been provided. Have asked the patient to send this to us to add to record    Identification of Risk Factors:  Risk factors include: Cardiovascular risk  Immunizations Discussed/Encouraged (specific immunizations; Influenza ).    Compared to one year ago, the patient feels his physical health is the same.  Compared to  one year ago, the patient feels his mental health is the same.  Reviewed use of high risk medication in the elderly: yes  Reviewed for potential of harmful drug interactions in the elderly: yes    Luis was seen today for medicare wellness-subsequent.    Diagnoses and all orders for this visit:    Type 2 diabetes mellitus without complication, without long-term current use of insulin (CMS/[facility])  -     POC Glycosylated Hemoglobin (Hb A1C)  A1c well controlled today 5.9.  Continue metformin.  Recheck A1c in 6 months.  Has eye exam coming up.  Checking screening blood work today.  Depression, unspecified depression type  Table on Zoloft.  Continue.  Hyperlipidemia, unspecified hyperlipidemia type  Stable on statin without myalgias.  Continue.  Essential hypertension  Blood pressure well controlled 120/80.  Coronary artery disease involving native heart without angina pectoris, unspecified vessel or lesion type  Stable.  Follows with cardiology.  No chest pains or shortness of breath.  He does not remember the last time he had to take a nitroglycerin.        Patient Self-Management and Personalized Health Advice  The patient has been provided with information about: diet, exercise and prevention of cardiac or vascular disease and preventive services including:   · Annual Wellness Visit (AWV)  · Cardiovascular Disease Screening Tests (may do this order every 5 years in beneficiaries without signs or symptoms of cardiovascular disease).      Follow Up:  No Follow-up on file.     An After Visit Summary and PPPS with all of these plans were given to the patient.           Please note that portions of this document were completed with a voice recognition program. Efforts were made to edit the dictations, but occasionally words are mis-transcribed.

## 2019-09-23 ENCOUNTER — APPOINTMENT (OUTPATIENT)
Dept: LAB | Facility: HOSPITAL | Age: 69
End: 2019-09-23

## 2019-09-23 LAB
25(OH)D3 SERPL-MCNC: 47.3 NG/ML (ref 30–100)
ALBUMIN SERPL-MCNC: 4.4 G/DL (ref 3.5–5.2)
ALBUMIN UR-MCNC: 1.2 MG/DL
ALBUMIN/GLOB SERPL: 1.6 G/DL
ALP SERPL-CCNC: 84 U/L (ref 39–117)
ALT SERPL W P-5'-P-CCNC: 31 U/L (ref 1–41)
ANION GAP SERPL CALCULATED.3IONS-SCNC: 9.8 MMOL/L (ref 5–15)
AST SERPL-CCNC: 23 U/L (ref 1–40)
BACTERIA UR QL AUTO: NORMAL /HPF
BASOPHILS # BLD AUTO: 0.03 10*3/MM3 (ref 0–0.2)
BASOPHILS NFR BLD AUTO: 0.4 % (ref 0–1.5)
BILIRUB SERPL-MCNC: 0.7 MG/DL (ref 0.2–1.2)
BILIRUB UR QL STRIP: NEGATIVE
BUN BLD-MCNC: 22 MG/DL (ref 8–23)
BUN/CREAT SERPL: 20.8 (ref 7–25)
CALCIUM SPEC-SCNC: 9.4 MG/DL (ref 8.6–10.5)
CHLORIDE SERPL-SCNC: 104 MMOL/L (ref 98–107)
CHOLEST SERPL-MCNC: 117 MG/DL (ref 0–200)
CLARITY UR: CLEAR
CO2 SERPL-SCNC: 26.2 MMOL/L (ref 22–29)
COLOR UR: YELLOW
CREAT BLD-MCNC: 1.06 MG/DL (ref 0.76–1.27)
CREAT UR-MCNC: 131.2 MG/DL
DEPRECATED RDW RBC AUTO: 41.3 FL (ref 37–54)
EOSINOPHIL # BLD AUTO: 0.15 10*3/MM3 (ref 0–0.4)
EOSINOPHIL NFR BLD AUTO: 2 % (ref 0.3–6.2)
ERYTHROCYTE [DISTWIDTH] IN BLOOD BY AUTOMATED COUNT: 12.5 % (ref 12.3–15.4)
GFR SERPL CREATININE-BSD FRML MDRD: 69 ML/MIN/1.73
GLOBULIN UR ELPH-MCNC: 2.8 GM/DL
GLUCOSE BLD-MCNC: 142 MG/DL (ref 65–99)
GLUCOSE UR STRIP-MCNC: NEGATIVE MG/DL
HCT VFR BLD AUTO: 43.3 % (ref 37.5–51)
HDLC SERPL-MCNC: 42 MG/DL (ref 40–60)
HGB BLD-MCNC: 14.6 G/DL (ref 13–17.7)
HGB UR QL STRIP.AUTO: NEGATIVE
HYALINE CASTS UR QL AUTO: NORMAL /LPF
IMM GRANULOCYTES # BLD AUTO: 0.02 10*3/MM3 (ref 0–0.05)
IMM GRANULOCYTES NFR BLD AUTO: 0.3 % (ref 0–0.5)
KETONES UR QL STRIP: NEGATIVE
LDLC SERPL CALC-MCNC: 51 MG/DL (ref 0–100)
LDLC/HDLC SERPL: 1.21 {RATIO}
LEUKOCYTE ESTERASE UR QL STRIP.AUTO: ABNORMAL
LYMPHOCYTES # BLD AUTO: 2.22 10*3/MM3 (ref 0.7–3.1)
LYMPHOCYTES NFR BLD AUTO: 30 % (ref 19.6–45.3)
MCH RBC QN AUTO: 30.7 PG (ref 26.6–33)
MCHC RBC AUTO-ENTMCNC: 33.7 G/DL (ref 31.5–35.7)
MCV RBC AUTO: 91.2 FL (ref 79–97)
MICROALBUMIN/CREAT UR: 9.1 MG/G
MONOCYTES # BLD AUTO: 0.55 10*3/MM3 (ref 0.1–0.9)
MONOCYTES NFR BLD AUTO: 7.4 % (ref 5–12)
NEUTROPHILS # BLD AUTO: 4.44 10*3/MM3 (ref 1.7–7)
NEUTROPHILS NFR BLD AUTO: 59.9 % (ref 42.7–76)
NITRITE UR QL STRIP: NEGATIVE
NRBC BLD AUTO-RTO: 0 /100 WBC (ref 0–0.2)
PH UR STRIP.AUTO: 5.5 [PH] (ref 5–8)
PLATELET # BLD AUTO: 139 10*3/MM3 (ref 140–450)
PMV BLD AUTO: 13 FL (ref 6–12)
POTASSIUM BLD-SCNC: 4.5 MMOL/L (ref 3.5–5.2)
PROT SERPL-MCNC: 7.2 G/DL (ref 6–8.5)
PROT UR QL STRIP: NEGATIVE
RBC # BLD AUTO: 4.75 10*6/MM3 (ref 4.14–5.8)
RBC # UR: NORMAL /HPF
REF LAB TEST METHOD: NORMAL
SODIUM BLD-SCNC: 140 MMOL/L (ref 136–145)
SP GR UR STRIP: 1.02 (ref 1–1.03)
SQUAMOUS #/AREA URNS HPF: NORMAL /HPF
TRIGL SERPL-MCNC: 121 MG/DL (ref 0–150)
UROBILINOGEN UR QL STRIP: ABNORMAL
VLDLC SERPL-MCNC: 24.2 MG/DL (ref 5–40)
WBC NRBC COR # BLD: 7.41 10*3/MM3 (ref 3.4–10.8)
WBC UR QL AUTO: NORMAL /HPF

## 2019-09-23 PROCEDURE — 81001 URINALYSIS AUTO W/SCOPE: CPT | Performed by: INTERNAL MEDICINE

## 2019-09-23 PROCEDURE — 80053 COMPREHEN METABOLIC PANEL: CPT | Performed by: INTERNAL MEDICINE

## 2019-09-23 PROCEDURE — 82306 VITAMIN D 25 HYDROXY: CPT | Performed by: INTERNAL MEDICINE

## 2019-09-23 PROCEDURE — 80061 LIPID PANEL: CPT | Performed by: INTERNAL MEDICINE

## 2019-09-23 PROCEDURE — 85025 COMPLETE CBC W/AUTO DIFF WBC: CPT | Performed by: INTERNAL MEDICINE

## 2019-09-23 PROCEDURE — 82043 UR ALBUMIN QUANTITATIVE: CPT | Performed by: INTERNAL MEDICINE

## 2019-09-23 PROCEDURE — 82570 ASSAY OF URINE CREATININE: CPT | Performed by: INTERNAL MEDICINE

## 2019-10-21 ENCOUNTER — TELEPHONE (OUTPATIENT)
Dept: FAMILY MEDICINE CLINIC | Facility: CLINIC | Age: 69
End: 2019-10-21

## 2019-10-21 DIAGNOSIS — Z86.79 HISTORY OF ENDOCARDITIS: Primary | ICD-10-CM

## 2019-10-21 RX ORDER — AMOXICILLIN 500 MG/1
CAPSULE ORAL
Qty: 4 CAPSULE | Refills: 0 | Status: SHIPPED | OUTPATIENT
Start: 2019-10-21 | End: 2020-02-04

## 2019-10-21 NOTE — TELEPHONE ENCOUNTER
PER PT CALLED, STATED HE HAS A DENTAL PROCEDURE SCHEDULED. PT WOULD LIKE TO KNOW IF  CAN PRESCRIBE SOME ANTIBIOTICS? PLEASE ADVISE.

## 2019-10-21 NOTE — TELEPHONE ENCOUNTER
Script sent to pharmacy. Recommend amoxil 2000mg total or 4 tabs, 30-60 min before dental procedure.

## 2019-10-22 NOTE — TELEPHONE ENCOUNTER
Patient notified that his abx has been sent to the pharmacy and explained to take it 30-60 min before dental procedure. Pt verbalized understanding and did not have any further questions.

## 2020-01-24 ENCOUNTER — TELEPHONE (OUTPATIENT)
Dept: FAMILY MEDICINE CLINIC | Facility: CLINIC | Age: 70
End: 2020-01-24

## 2020-01-24 NOTE — TELEPHONE ENCOUNTER
Metformin denied. Refill is too soon. He was sent in for 90 days with 2 refills back in December.  zoloft sent in

## 2020-01-27 ENCOUNTER — TELEPHONE (OUTPATIENT)
Dept: FAMILY MEDICINE CLINIC | Facility: CLINIC | Age: 70
End: 2020-01-27

## 2020-01-27 NOTE — TELEPHONE ENCOUNTER
Patient's wife, Kandy called to request a refill on Rx, metFORMIN (GLUCOPHAGE) 500 MG tablet to be sent to [facility] on [address] [place].    Patient Callback # [phone]

## 2020-02-03 ENCOUNTER — OFFICE VISIT (OUTPATIENT)
Dept: FAMILY MEDICINE CLINIC | Facility: CLINIC | Age: 70
End: 2020-02-03

## 2020-02-03 ENCOUNTER — LAB (OUTPATIENT)
Dept: LAB | Facility: HOSPITAL | Age: 70
End: 2020-02-03

## 2020-02-03 VITALS
HEIGHT: 68 IN | SYSTOLIC BLOOD PRESSURE: 124 MMHG | HEART RATE: 61 BPM | WEIGHT: 184 LBS | BODY MASS INDEX: 27.89 KG/M2 | OXYGEN SATURATION: 99 % | DIASTOLIC BLOOD PRESSURE: 70 MMHG | TEMPERATURE: 98.5 F

## 2020-02-03 DIAGNOSIS — R68.89 FLU-LIKE SYMPTOMS: ICD-10-CM

## 2020-02-03 DIAGNOSIS — R42 DIZZINESS: ICD-10-CM

## 2020-02-03 DIAGNOSIS — J01.00 ACUTE NON-RECURRENT MAXILLARY SINUSITIS: Primary | ICD-10-CM

## 2020-02-03 DIAGNOSIS — J10.1 INFLUENZA A: ICD-10-CM

## 2020-02-03 DIAGNOSIS — R50.9 FEVER, UNSPECIFIED FEVER CAUSE: ICD-10-CM

## 2020-02-03 DIAGNOSIS — J01.00 ACUTE NON-RECURRENT MAXILLARY SINUSITIS: ICD-10-CM

## 2020-02-03 LAB
ALBUMIN SERPL-MCNC: 4.6 G/DL (ref 3.5–5.2)
ALBUMIN/GLOB SERPL: 1.6 G/DL
ALP SERPL-CCNC: 80 U/L (ref 39–117)
ALT SERPL W P-5'-P-CCNC: 44 U/L (ref 1–41)
ANION GAP SERPL CALCULATED.3IONS-SCNC: 14.4 MMOL/L (ref 5–15)
AST SERPL-CCNC: 33 U/L (ref 1–40)
BASOPHILS # BLD AUTO: 0.02 10*3/MM3 (ref 0–0.2)
BASOPHILS NFR BLD AUTO: 0.3 % (ref 0–1.5)
BILIRUB SERPL-MCNC: 1 MG/DL (ref 0.2–1.2)
BUN BLD-MCNC: 16 MG/DL (ref 8–23)
BUN/CREAT SERPL: 13.7 (ref 7–25)
CALCIUM SPEC-SCNC: 9.7 MG/DL (ref 8.6–10.5)
CHLORIDE SERPL-SCNC: 100 MMOL/L (ref 98–107)
CO2 SERPL-SCNC: 25.6 MMOL/L (ref 22–29)
CREAT BLD-MCNC: 1.17 MG/DL (ref 0.76–1.27)
DEPRECATED RDW RBC AUTO: 40.7 FL (ref 37–54)
EOSINOPHIL # BLD AUTO: 0.12 10*3/MM3 (ref 0–0.4)
EOSINOPHIL NFR BLD AUTO: 2 % (ref 0.3–6.2)
ERYTHROCYTE [DISTWIDTH] IN BLOOD BY AUTOMATED COUNT: 12.5 % (ref 12.3–15.4)
EXPIRATION DATE: ABNORMAL
FLUAV AG NPH QL: POSITIVE
FLUBV AG NPH QL: NEGATIVE
GFR SERPL CREATININE-BSD FRML MDRD: 62 ML/MIN/1.73
GLOBULIN UR ELPH-MCNC: 2.9 GM/DL
GLUCOSE BLD-MCNC: 106 MG/DL (ref 65–99)
HCT VFR BLD AUTO: 41.5 % (ref 37.5–51)
HGB BLD-MCNC: 14 G/DL (ref 13–17.7)
IMM GRANULOCYTES # BLD AUTO: 0.01 10*3/MM3 (ref 0–0.05)
IMM GRANULOCYTES NFR BLD AUTO: 0.2 % (ref 0–0.5)
INTERNAL CONTROL: ABNORMAL
LYMPHOCYTES # BLD AUTO: 1.57 10*3/MM3 (ref 0.7–3.1)
LYMPHOCYTES NFR BLD AUTO: 25.8 % (ref 19.6–45.3)
Lab: ABNORMAL
MCH RBC QN AUTO: 30.3 PG (ref 26.6–33)
MCHC RBC AUTO-ENTMCNC: 33.7 G/DL (ref 31.5–35.7)
MCV RBC AUTO: 89.8 FL (ref 79–97)
MONOCYTES # BLD AUTO: 0.81 10*3/MM3 (ref 0.1–0.9)
MONOCYTES NFR BLD AUTO: 13.3 % (ref 5–12)
NEUTROPHILS # BLD AUTO: 3.56 10*3/MM3 (ref 1.7–7)
NEUTROPHILS NFR BLD AUTO: 58.4 % (ref 42.7–76)
NRBC BLD AUTO-RTO: 0 /100 WBC (ref 0–0.2)
PLATELET # BLD AUTO: 126 10*3/MM3 (ref 140–450)
PMV BLD AUTO: 12.9 FL (ref 6–12)
POTASSIUM BLD-SCNC: 4.8 MMOL/L (ref 3.5–5.2)
PROT SERPL-MCNC: 7.5 G/DL (ref 6–8.5)
RBC # BLD AUTO: 4.62 10*6/MM3 (ref 4.14–5.8)
SODIUM BLD-SCNC: 140 MMOL/L (ref 136–145)
WBC NRBC COR # BLD: 6.09 10*3/MM3 (ref 3.4–10.8)

## 2020-02-03 PROCEDURE — 87804 INFLUENZA ASSAY W/OPTIC: CPT | Performed by: INTERNAL MEDICINE

## 2020-02-03 PROCEDURE — 99214 OFFICE O/P EST MOD 30 MIN: CPT | Performed by: INTERNAL MEDICINE

## 2020-02-03 PROCEDURE — 80053 COMPREHEN METABOLIC PANEL: CPT

## 2020-02-03 PROCEDURE — 81003 URINALYSIS AUTO W/O SCOPE: CPT

## 2020-02-03 PROCEDURE — 85025 COMPLETE CBC W/AUTO DIFF WBC: CPT

## 2020-02-03 RX ORDER — OSELTAMIVIR PHOSPHATE 75 MG/1
75 CAPSULE ORAL 2 TIMES DAILY
Qty: 10 CAPSULE | Refills: 0 | Status: SHIPPED | OUTPATIENT
Start: 2020-02-03 | End: 2020-02-08

## 2020-02-04 LAB
BILIRUB UR QL STRIP: NEGATIVE
CLARITY UR: CLEAR
COLOR UR: YELLOW
GLUCOSE UR STRIP-MCNC: NEGATIVE MG/DL
HGB UR QL STRIP.AUTO: NEGATIVE
KETONES UR QL STRIP: NEGATIVE
LEUKOCYTE ESTERASE UR QL STRIP.AUTO: NEGATIVE
NITRITE UR QL STRIP: NEGATIVE
PH UR STRIP.AUTO: 6.5 [PH] (ref 5–8)
PROT UR QL STRIP: NEGATIVE
SP GR UR STRIP: 1.01 (ref 1–1.03)
UROBILINOGEN UR QL STRIP: NORMAL

## 2020-02-04 NOTE — PROGRESS NOTES
Chief Complaint   Patient presents with   • Dizziness     sx started friday   • Sinusitis     Hx of sepsis - requesting lab results        HPI:  Luis Elliott is a 69 y.o. male who presents today for fevers chills, nonproductive cough, sinus congestion, body aches and ear pain for the last 3 days.  Reports he did have his flu shot this year.  No recent travel or sick contacts.  Reports dizziness worsening over the weekend.  No falls.    ROS:  Constitutional: no fevers, night sweats or unexplained weight loss  Eyes: no vision changes  ENT: +runny nose, +ear pain, +sore throat  Cardio: no chest pain, palpitations  Pulm: no shortness of breath, wheezing,  +cough  GI: no abdominal pain or changes in bowel movements  : no difficulty urinating  MSK: no difficulty ambulating, no joint pain  Neuro: no weakness, dizziness or headache  Psych: no trouble sleeping  Endo: no change in appetite      Past Medical History:   Diagnosis Date   • Cancer (CMS/[facility])     PROSTATE   • Coronary artery disease    • DDD (degenerative disc disease), cervical    • Depression    • Diabetes mellitus (CMS/HCC)    • Erectile dysfunction    • Glaucoma    • Hyperlipidemia    • Hypertension    • Impingement syndrome of left shoulder    • Osteoarthritis    • Varicose veins of lower limb     RIGHT      Family History   Problem Relation Age of Onset   • No Known Problems Mother    • Alcohol abuse Father    • Heart attack Father    • No Known Problems Sister    • No Known Problems Brother       Social History     Socioeconomic History   • Marital status:      Spouse name: Sabina   • Number of children: 2   • Years of education: Not on file   • Highest education level: Not on file   Occupational History   • Occupation: Retired   Tobacco Use   • Smoking status: Former Smoker     Packs/day: 1.50     Years: 36.00     Pack years: 54.00     Types: Cigarettes     Last attempt to quit: 2011     Years since quittin.8   • Smokeless tobacco:  "Former User   Substance and Sexual Activity   • Alcohol use: Yes     Alcohol/week: 2.0 standard drinks     Types: 2 Cans of beer per week     Comment:  quit a couple weeks ago   • Drug use: Yes     Types: Marijuana   • Sexual activity: Defer   Social History Narrative    9/18:     to Sabina x 35yrs    2 children from previous marriage    2 gk.    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    Dental: utd    Eye: utd      Allergies   Allergen Reactions   • Xarelto [Rivaroxaban] Other (See Comments)     MADE ME FEEL LIKE \" I WAS HAVING A HEART ATTACK.\"      Immunization History   Administered Date(s) Administered   • FLUAD TRI 65YR+ 09/16/2019   • Flu Vaccine Quad PF >36MO 11/25/2015, 12/12/2016, 10/24/2017   • Fluzone High Dose =>65 Years ([facility] ONLY) 11/01/2017, 09/12/2018   • Pneumococcal Conjugate 13-Valent (PCV13) 06/03/2015   • Pneumococcal Polysaccharide (PPSV23) 06/03/2013        PE:  Vitals:    02/03/20 1218   BP: 124/70   Pulse: 61   Temp: 98.5 °F (36.9 °C)   SpO2: 99%      Body mass index is 28.11 kg/m².    Gen Appearance: NAD  HEENT: Normocephalic, PERRLA, no thyromegaly, trache midline  Heart: RRR, normal S1 and S2, no murmur  Lungs: CTA b/l, no wheezing, no crackles  Abdomen: Soft, non-tender, non-distended, no guarding and BSx4  MSK: Moves all extremities well, normal gait, no peripheral edema  Pulses: Palpable and equal b/l  Lymph nodes: No palpable lymphadenopathy   Neuro: No focal deficits      Current Outpatient Medications   Medication Sig Dispense Refill   • aspirin 81 MG EC tablet Take 81 mg by mouth Every Night.     • atorvastatin (LIPITOR) 40 MG tablet Take 40 mg by mouth Every Night.     • calcium carbonate-cholecalciferol 500-400 MG-UNIT tablet tablet Take 1 tablet by mouth Daily.     • cholecalciferol (VITAMIN D3) 1000 units tablet Take 1,000 Units by mouth Daily.     • metFORMIN (GLUCOPHAGE) 500 MG tablet Take 1 tablet by mouth Daily With " Breakfast. 90 tablet 0   • nitroglycerin (NITROSTAT) 0.4 MG SL tablet Place 0.4 mg under the tongue Every 5 (Five) Minutes As Needed for chest pain. Take no more than 3 doses in 15 minutes.     • Omega-3 Fatty Acids (FISH OIL) 1200 MG capsule capsule Take 1,200 mg by mouth Daily.     • sertraline (ZOLOFT) 50 MG tablet Take 1 tablet by mouth Daily. 90 tablet 0   • amoxicillin (AMOXIL) 500 MG capsule Take 2000mg 30-60 minutes before dental procedure. 4 capsule 0   • oseltamivir (TAMIFLU) 75 MG capsule Take 1 capsule by mouth 2 (Two) Times a Day for 5 days. 10 capsule 0     No current facility-administered medications for this visit.       Counseling was given to patient and family for the following topics: diagnostic results, impressions and risks and benefits of treatment options for flu A. Total time of the encounter was 25 minutes and 15 minutes was spent face to face counseling.    Luis was seen today for dizziness and sinusitis.  Lungs clear on exam.  Positive flu A.  Recommend plenty of rest and hydration.  Tamiflu for the next 5 days.  Call/return to clinic in for any worsening symptoms or no improvement.    Diagnoses and all orders for this visit:    Acute non-recurrent maxillary sinusitis  -     CBC & Differential; Future  -     Comprehensive Metabolic Panel; Future  -     Urinalysis With Culture If Indicated - Urine, Clean Catch; Future    Fever, unspecified fever cause  -     CBC & Differential; Future  -     Comprehensive Metabolic Panel; Future  -     Urinalysis With Culture If Indicated - Urine, Clean Catch; Future    Flu-like symptoms  -     POCT Influenza A/B    Influenza A  -     oseltamivir (TAMIFLU) 75 MG capsule; Take 1 capsule by mouth 2 (Two) Times a Day for 5 days.         No follow-ups on file.     Please note that portions of this document were completed with a voice recognition program. Efforts were made to edit the dictations, but occasionally words are mis-transcribed.

## 2020-03-16 ENCOUNTER — OFFICE VISIT (OUTPATIENT)
Dept: FAMILY MEDICINE CLINIC | Facility: CLINIC | Age: 70
End: 2020-03-16

## 2020-03-16 VITALS
HEIGHT: 68 IN | OXYGEN SATURATION: 98 % | HEART RATE: 70 BPM | WEIGHT: 192 LBS | BODY MASS INDEX: 29.1 KG/M2 | DIASTOLIC BLOOD PRESSURE: 72 MMHG | SYSTOLIC BLOOD PRESSURE: 130 MMHG

## 2020-03-16 DIAGNOSIS — E11.9 TYPE 2 DIABETES MELLITUS WITHOUT COMPLICATION, WITHOUT LONG-TERM CURRENT USE OF INSULIN (HCC): Primary | ICD-10-CM

## 2020-03-16 DIAGNOSIS — F41.9 ANXIETY: ICD-10-CM

## 2020-03-16 DIAGNOSIS — I25.10 CORONARY ARTERY DISEASE INVOLVING NATIVE HEART WITHOUT ANGINA PECTORIS, UNSPECIFIED VESSEL OR LESION TYPE: ICD-10-CM

## 2020-03-16 DIAGNOSIS — E78.5 HYPERLIPIDEMIA, UNSPECIFIED HYPERLIPIDEMIA TYPE: ICD-10-CM

## 2020-03-16 LAB — HBA1C MFR BLD: 6.5 %

## 2020-03-16 PROCEDURE — 83036 HEMOGLOBIN GLYCOSYLATED A1C: CPT | Performed by: INTERNAL MEDICINE

## 2020-03-16 PROCEDURE — 99214 OFFICE O/P EST MOD 30 MIN: CPT | Performed by: INTERNAL MEDICINE

## 2020-03-16 NOTE — PROGRESS NOTES
Chief Complaint   Patient presents with   • Follow-up     6 mth follow up       HPI:  Luis Elliott is a 70 y.o. male who presents today for follow-up.  He has no acute points or concerns today.  Diabetes-taking metformin daily, no blood sugar checks at home.  Denies any signs or symptoms of hypoglycemia.  Anxiety-stable on Zoloft, he has been taking this medication for 2 to 3 years now.  No side effects and no change in symptoms.  Hyperlipidemia-taking statin without myalgias.  Coronary artery disease-follows with Dr. Blackwell.  Denies any chest pain or shortness of breath.  No recent use of nitroglycerin.  ROS:  Constitutional: no fevers, night sweats or unexplained weight loss  Eyes: no vision changes  ENT: no runny nose, ear pain, sore throat  Cardio: no chest pain, palpitations  Pulm: no shortness of breath, wheezing, or cough  GI: no abdominal pain or changes in bowel movements  : no difficulty urinating  MSK: no difficulty ambulating, no joint pain  Neuro: no weakness, dizziness or headache  Psych: no trouble sleeping  Endo: no change in appetite      Past Medical History:   Diagnosis Date   • Cancer (CMS/HCC)     PROSTATE   • Coronary artery disease    • DDD (degenerative disc disease), cervical    • Depression    • Diabetes mellitus (CMS/HCC)    • Erectile dysfunction    • Glaucoma    • Hyperlipidemia    • Hypertension    • Impingement syndrome of left shoulder    • Osteoarthritis    • Varicose veins of lower limb     RIGHT      Family History   Problem Relation Age of Onset   • No Known Problems Mother    • Alcohol abuse Father    • Heart attack Father    • No Known Problems Sister    • No Known Problems Brother       Social History     Socioeconomic History   • Marital status:      Spouse name: Sabina   • Number of children: 2   • Years of education: Not on file   • Highest education level: Not on file   Occupational History   • Occupation: Retired   Tobacco Use   • Smoking status: Former Smoker  "    Packs/day: 1.50     Years: 36.00     Pack years: 54.00     Types: Cigarettes     Last attempt to quit: 2011     Years since quittin.9   • Smokeless tobacco: Former User   Substance and Sexual Activity   • Alcohol use: Yes     Alcohol/week: 2.0 standard drinks     Types: 2 Cans of beer per week     Comment:  quit a couple weeks ago   • Drug use: Yes     Types: Marijuana   • Sexual activity: Defer   Social History Narrative    :     to Sabina x 35yrs    2 children from previous marriage    2 gk.    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    Dental: utd    Eye: utd      Allergies   Allergen Reactions   • Xarelto [Rivaroxaban] Other (See Comments)     MADE ME FEEL LIKE \" I WAS HAVING A HEART ATTACK.\"      Immunization History   Administered Date(s) Administered   • FLUAD TRI 65YR+ 2019   • Flu Vaccine Quad PF >36MO 2015, 2016, 10/24/2017   • Fluzone High Dose =>65 Years ([employer] ONLY) 2017, 2018   • Pneumococcal Conjugate 13-Valent (PCV13) 2015   • Pneumococcal Polysaccharide (PPSV23) 2013        PE:  Vitals:    20 0748   BP: 130/72   Pulse: 70   SpO2: 98%      Body mass index is 29.33 kg/m².    Gen Appearance: NAD  HEENT: Normocephalic, PERRLA, no thyromegaly, trache midline  Heart: RRR, normal S1 and S2, no murmur  Lungs: CTA b/l, no wheezing, no crackles  Abdomen: Soft, non-tender, non-distended, no guarding and BSx4  MSK: Moves all extremities well, normal gait, no peripheral edema  Pulses: Palpable and equal b/l  Lymph nodes: No palpable lymphadenopathy   Neuro: No focal deficits      Current Outpatient Medications   Medication Sig Dispense Refill   • aspirin 81 MG EC tablet Take 81 mg by mouth Every Night.     • atorvastatin (LIPITOR) 40 MG tablet Take 40 mg by mouth Every Night.     • calcium carbonate-cholecalciferol 500-400 MG-UNIT tablet tablet Take 1 tablet by mouth Daily.     • cholecalciferol " (VITAMIN D3) 1000 units tablet Take 1,000 Units by mouth Daily.     • metFORMIN (GLUCOPHAGE) 500 MG tablet Take 1 tablet by mouth Daily With Breakfast. 90 tablet 0   • nitroglycerin (NITROSTAT) 0.4 MG SL tablet Place 0.4 mg under the tongue Every 5 (Five) Minutes As Needed for chest pain. Take no more than 3 doses in 15 minutes.     • Omega-3 Fatty Acids (FISH OIL) 1200 MG capsule capsule Take 1,200 mg by mouth Daily.     • sertraline (ZOLOFT) 50 MG tablet Take 1 tablet by mouth Daily. 90 tablet 0     No current facility-administered medications for this visit.     Advance Care Planning   ACP discussion was held with the patient during this visit. Patient does not have an advance directive, declines further assistance.      Luis was seen today for follow-up.    Diagnoses and all orders for this visit:    Type 2 diabetes mellitus without complication, without long-term current use of insulin (CMS/[facility])  -     POC Glycosylated Hemoglobin (Hb A1C)  A1c 6.5, goal is less than 7 so he is technically well controlled although this is worse than previous checked A1c.  He will continue to work on diet over the next 6 months.  Hyperlipidemia, unspecified hyperlipidemia type  Stable on atorvastatin, continue.  Anxiety  Stable on Zoloft.  Continue.  Coronary artery disease involving native heart without angina pectoris, unspecified vessel or lesion type  Follow-up with cardiology as scheduled.  Denies any chest pain.  Taking guideline medication daily.       Return in about 6 months (around 9/16/2020) for Medicare Wellness.     Please note that portions of this document were completed with a voice recognition program. Efforts were made to edit the dictations, but occasionally words are mis-transcribed.

## 2020-04-23 ENCOUNTER — TELEPHONE (OUTPATIENT)
Dept: FAMILY MEDICINE CLINIC | Facility: CLINIC | Age: 70
End: 2020-04-23

## 2020-04-23 NOTE — TELEPHONE ENCOUNTER
Patient called and stated that refills are needed for the following prescription(s):    metFORMIN (GLUCOPHAGE) 500 MG tablet  sertraline (ZOLOFT) 50 MG tablet    Ph[facility]acy: [facility] [place]  PH: [phone]  FX: [phone]    Patient callback: [phone]    Pt asked why the prescriptions have no refills.    Please advise.

## 2020-04-30 ENCOUNTER — TELEPHONE (OUTPATIENT)
Dept: FAMILY MEDICINE CLINIC | Facility: CLINIC | Age: 70
End: 2020-04-30

## 2020-05-01 RX ORDER — AMOXICILLIN 500 MG/1
CAPSULE ORAL
Qty: 4 CAPSULE | Refills: 0 | Status: SHIPPED | OUTPATIENT
Start: 2020-05-01 | End: 2020-09-17

## 2020-09-17 ENCOUNTER — LAB (OUTPATIENT)
Dept: LAB | Facility: HOSPITAL | Age: 70
End: 2020-09-17

## 2020-09-17 ENCOUNTER — OFFICE VISIT (OUTPATIENT)
Dept: FAMILY MEDICINE CLINIC | Facility: CLINIC | Age: 70
End: 2020-09-17

## 2020-09-17 VITALS
BODY MASS INDEX: 29.34 KG/M2 | WEIGHT: 186.9 LBS | SYSTOLIC BLOOD PRESSURE: 118 MMHG | OXYGEN SATURATION: 98 % | HEART RATE: 68 BPM | DIASTOLIC BLOOD PRESSURE: 76 MMHG | HEIGHT: 67 IN

## 2020-09-17 DIAGNOSIS — E11.9 TYPE 2 DIABETES MELLITUS WITHOUT COMPLICATION, WITHOUT LONG-TERM CURRENT USE OF INSULIN (HCC): ICD-10-CM

## 2020-09-17 DIAGNOSIS — H40.9 GLAUCOMA OF BOTH EYES, UNSPECIFIED GLAUCOMA TYPE: ICD-10-CM

## 2020-09-17 DIAGNOSIS — E78.5 HYPERLIPIDEMIA, UNSPECIFIED HYPERLIPIDEMIA TYPE: ICD-10-CM

## 2020-09-17 DIAGNOSIS — Z85.46 HISTORY OF PROSTATE CANCER: ICD-10-CM

## 2020-09-17 DIAGNOSIS — I10 ESSENTIAL HYPERTENSION: ICD-10-CM

## 2020-09-17 DIAGNOSIS — E55.9 VITAMIN D DEFICIENCY: ICD-10-CM

## 2020-09-17 DIAGNOSIS — F41.9 ANXIETY: ICD-10-CM

## 2020-09-17 DIAGNOSIS — Z00.00 MEDICARE ANNUAL WELLNESS VISIT, SUBSEQUENT: Primary | ICD-10-CM

## 2020-09-17 DIAGNOSIS — I25.119 CORONARY ARTERY DISEASE INVOLVING NATIVE HEART WITH ANGINA PECTORIS, UNSPECIFIED VESSEL OR LESION TYPE (HCC): ICD-10-CM

## 2020-09-17 DIAGNOSIS — Z23 NEEDS FLU SHOT: ICD-10-CM

## 2020-09-17 DIAGNOSIS — R91.1 LUNG NODULE: ICD-10-CM

## 2020-09-17 LAB
25(OH)D3 SERPL-MCNC: 50.4 NG/ML (ref 30–100)
ALBUMIN SERPL-MCNC: 4.9 G/DL (ref 3.5–5.2)
ALBUMIN/GLOB SERPL: 1.8 G/DL
ALP SERPL-CCNC: 101 U/L (ref 39–117)
ALT SERPL W P-5'-P-CCNC: 45 U/L (ref 1–41)
ANION GAP SERPL CALCULATED.3IONS-SCNC: 9.7 MMOL/L (ref 5–15)
AST SERPL-CCNC: 32 U/L (ref 1–40)
BASOPHILS # BLD AUTO: 0.04 10*3/MM3 (ref 0–0.2)
BASOPHILS NFR BLD AUTO: 0.5 % (ref 0–1.5)
BILIRUB SERPL-MCNC: 1.3 MG/DL (ref 0–1.2)
BILIRUB UR QL STRIP: NEGATIVE
BUN SERPL-MCNC: 19 MG/DL (ref 8–23)
BUN/CREAT SERPL: 18.1 (ref 7–25)
CALCIUM SPEC-SCNC: 9.5 MG/DL (ref 8.6–10.5)
CHLORIDE SERPL-SCNC: 104 MMOL/L (ref 98–107)
CHOLEST SERPL-MCNC: 105 MG/DL (ref 0–200)
CLARITY UR: CLEAR
CO2 SERPL-SCNC: 26.3 MMOL/L (ref 22–29)
COLOR UR: YELLOW
CREAT SERPL-MCNC: 1.05 MG/DL (ref 0.76–1.27)
DEPRECATED RDW RBC AUTO: 41.1 FL (ref 37–54)
EOSINOPHIL # BLD AUTO: 0.14 10*3/MM3 (ref 0–0.4)
EOSINOPHIL NFR BLD AUTO: 1.6 % (ref 0.3–6.2)
ERYTHROCYTE [DISTWIDTH] IN BLOOD BY AUTOMATED COUNT: 12.3 % (ref 12.3–15.4)
GFR SERPL CREATININE-BSD FRML MDRD: 70 ML/MIN/1.73
GLOBULIN UR ELPH-MCNC: 2.7 GM/DL
GLUCOSE SERPL-MCNC: 134 MG/DL (ref 65–99)
GLUCOSE UR STRIP-MCNC: NEGATIVE MG/DL
HBA1C MFR BLD: 6.65 % (ref 4.8–5.6)
HCT VFR BLD AUTO: 42.6 % (ref 37.5–51)
HDLC SERPL-MCNC: 41 MG/DL (ref 40–60)
HGB BLD-MCNC: 14.5 G/DL (ref 13–17.7)
HGB UR QL STRIP.AUTO: NEGATIVE
IMM GRANULOCYTES # BLD AUTO: 0.03 10*3/MM3 (ref 0–0.05)
IMM GRANULOCYTES NFR BLD AUTO: 0.3 % (ref 0–0.5)
KETONES UR QL STRIP: NEGATIVE
LDLC SERPL CALC-MCNC: 39 MG/DL (ref 0–100)
LDLC/HDLC SERPL: 0.96 {RATIO}
LEUKOCYTE ESTERASE UR QL STRIP.AUTO: NEGATIVE
LYMPHOCYTES # BLD AUTO: 1.99 10*3/MM3 (ref 0.7–3.1)
LYMPHOCYTES NFR BLD AUTO: 22.5 % (ref 19.6–45.3)
MCH RBC QN AUTO: 30.9 PG (ref 26.6–33)
MCHC RBC AUTO-ENTMCNC: 34 G/DL (ref 31.5–35.7)
MCV RBC AUTO: 90.6 FL (ref 79–97)
MONOCYTES # BLD AUTO: 0.67 10*3/MM3 (ref 0.1–0.9)
MONOCYTES NFR BLD AUTO: 7.6 % (ref 5–12)
NEUTROPHILS NFR BLD AUTO: 5.96 10*3/MM3 (ref 1.7–7)
NEUTROPHILS NFR BLD AUTO: 67.5 % (ref 42.7–76)
NITRITE UR QL STRIP: NEGATIVE
NRBC BLD AUTO-RTO: 0 /100 WBC (ref 0–0.2)
PH UR STRIP.AUTO: 5.5 [PH] (ref 5–8)
PLATELET # BLD AUTO: 143 10*3/MM3 (ref 140–450)
PMV BLD AUTO: 12.8 FL (ref 6–12)
POTASSIUM SERPL-SCNC: 4.4 MMOL/L (ref 3.5–5.2)
PROT SERPL-MCNC: 7.6 G/DL (ref 6–8.5)
PROT UR QL STRIP: NEGATIVE
RBC # BLD AUTO: 4.7 10*6/MM3 (ref 4.14–5.8)
SODIUM SERPL-SCNC: 140 MMOL/L (ref 136–145)
SP GR UR STRIP: 1.02 (ref 1–1.03)
T4 FREE SERPL-MCNC: 0.93 NG/DL (ref 0.93–1.7)
TRIGL SERPL-MCNC: 124 MG/DL (ref 0–150)
TSH SERPL DL<=0.05 MIU/L-ACNC: 1.09 UIU/ML (ref 0.27–4.2)
UROBILINOGEN UR QL STRIP: NORMAL
VLDLC SERPL-MCNC: 24.8 MG/DL (ref 5–40)
WBC # BLD AUTO: 8.83 10*3/MM3 (ref 3.4–10.8)

## 2020-09-17 PROCEDURE — 84439 ASSAY OF FREE THYROXINE: CPT

## 2020-09-17 PROCEDURE — 80061 LIPID PANEL: CPT

## 2020-09-17 PROCEDURE — 83036 HEMOGLOBIN GLYCOSYLATED A1C: CPT

## 2020-09-17 PROCEDURE — 85025 COMPLETE CBC W/AUTO DIFF WBC: CPT

## 2020-09-17 PROCEDURE — 80053 COMPREHEN METABOLIC PANEL: CPT

## 2020-09-17 PROCEDURE — 84443 ASSAY THYROID STIM HORMONE: CPT

## 2020-09-17 PROCEDURE — 82306 VITAMIN D 25 HYDROXY: CPT

## 2020-09-17 PROCEDURE — G0439 PPPS, SUBSEQ VISIT: HCPCS | Performed by: INTERNAL MEDICINE

## 2020-09-17 PROCEDURE — 81003 URINALYSIS AUTO W/O SCOPE: CPT

## 2020-09-17 PROCEDURE — 90694 VACC AIIV4 NO PRSRV 0.5ML IM: CPT | Performed by: INTERNAL MEDICINE

## 2020-09-17 PROCEDURE — G0008 ADMIN INFLUENZA VIRUS VAC: HCPCS | Performed by: INTERNAL MEDICINE

## 2020-09-17 RX ORDER — EZETIMIBE 10 MG/1
10 TABLET ORAL DAILY
COMMUNITY

## 2020-10-01 ENCOUNTER — HOSPITAL ENCOUNTER (OUTPATIENT)
Dept: CT IMAGING | Facility: HOSPITAL | Age: 70
Discharge: HOME | End: 2020-10-01
Admitting: INTERNAL MEDICINE

## 2020-10-01 DIAGNOSIS — R91.1 LUNG NODULE: ICD-10-CM

## 2020-10-01 PROCEDURE — 25010000002 IOPAMIDOL 61 % SOLUTION: Performed by: INTERNAL MEDICINE

## 2020-10-01 PROCEDURE — 71260 CT THORAX DX C+: CPT

## 2020-10-01 RX ADMIN — IOPAMIDOL 95 ML: 612 INJECTION, SOLUTION INTRAVENOUS at 13:29

## 2020-10-02 DIAGNOSIS — R91.1 PULMONARY NODULE 1 CM OR GREATER IN DIAMETER: Primary | ICD-10-CM

## 2020-10-13 ENCOUNTER — NURSE NAVIGATOR (OUTPATIENT)
Dept: ONCOLOGY | Facility: CLINIC | Age: 70
End: 2020-10-13

## 2020-10-13 ENCOUNTER — OFFICE VISIT (OUTPATIENT)
Dept: PULMONOLOGY | Facility: CLINIC | Age: 70
End: 2020-10-13

## 2020-10-13 VITALS
SYSTOLIC BLOOD PRESSURE: 136 MMHG | OXYGEN SATURATION: 97 % | WEIGHT: 186.2 LBS | TEMPERATURE: 97.7 F | BODY MASS INDEX: 29.22 KG/M2 | DIASTOLIC BLOOD PRESSURE: 86 MMHG | HEART RATE: 60 BPM | HEIGHT: 67 IN

## 2020-10-13 DIAGNOSIS — J44.9 COPD MIXED TYPE (HCC): ICD-10-CM

## 2020-10-13 DIAGNOSIS — R91.1 LUNG NODULE: Primary | ICD-10-CM

## 2020-10-13 DIAGNOSIS — R91.1 PULMONARY NODULE: Primary | ICD-10-CM

## 2020-10-13 DIAGNOSIS — I34.0 NONRHEUMATIC MITRAL VALVE REGURGITATION: ICD-10-CM

## 2020-10-13 DIAGNOSIS — Z87.891 FORMER SMOKER: ICD-10-CM

## 2020-10-13 PROCEDURE — 99204 OFFICE O/P NEW MOD 45 MIN: CPT | Performed by: INTERNAL MEDICINE

## 2020-10-13 NOTE — PROGRESS NOTES
"  PULMONARY  NOTE    Chief Complaint     New right lower lobe lung lesion, tobacco abuse, probable COPD, mitral valve disease    History of Present Illness     70-year-old white male referred for evaluation of an abnormal CT scan of the chest.    He underwent a screening CT scan of the chest due to his history of tobacco abuse.  This revealed a new right lower lobe density in comparison to a prior CT scan of the chest that he had in 2018    He has a history of tobacco abuse that consisted of up to 2 packs of cigarettes per day.  He started smoking at age 16 and stopped smoking at age age 62 (2012),    In 2018 he was admitted with strep bacteremia.  Transesophageal echocardiogram at that time revealed no evidence of endocarditis but he did have severe mitral valve prolapse and mitral regurgitation.  He was treated with a prolonged course of antibiotics and has followed up with Dr. Blackwell.    He has an occasional cough, typically on a daily basis.  He only occasionally produces sputum and has never had hemoptysis.    He does not feel limited by dyspnea.  He is quite active mowing his lawn, hunting, and working around the home.  He does not feel limited by shortness of breath    He has had no weight change.    Patient Active Problem List   Diagnosis   • Type 2 diabetes mellitus without complication (CMS/[facility])   • Hyperlipidemia   • Essential hypertension   • Erectile dysfunction   • History of prostate cancer   • Osteoarthritis of multiple joints   • Coronary artery disease involving native heart without angina pectoris   • Colon polyps   • Glaucoma   • Low back pain   • Streptococcal bacteremia   • H/O subacute bacterial endocarditis   • Pulmonary nodule (2.6cm RLL)   • Severe mitral regurgitation   • Former smoker (Stopped 2012)   • COPD     Allergies   Allergen Reactions   • Xarelto [Rivaroxaban] Other (See Comments)     MADE ME FEEL LIKE \" I WAS HAVING A HEART ATTACK.\"       Current Outpatient Medications:   •  " aspirin 81 MG EC tablet, Take 81 mg by mouth Every Night., Disp: , Rfl:   •  atorvastatin (LIPITOR) 40 MG tablet, Take 40 mg by mouth Every Night., Disp: , Rfl:   •  calcium carbonate-cholecalciferol 500-400 MG-UNIT tablet tablet, Take 1 tablet by mouth Daily., Disp: , Rfl:   •  cholecalciferol (VITAMIN D3) 1000 units tablet, Take 1,000 Units by mouth Daily., Disp: , Rfl:   •  ezetimibe (ZETIA) 10 MG tablet, Take 10 mg by mouth Daily., Disp: , Rfl:   •  metFORMIN (GLUCOPHAGE) 500 MG tablet, Take 1 tablet by mouth Daily With Breakfast., Disp: 90 tablet, Rfl: 1  •  nitroglycerin (NITROSTAT) 0.4 MG SL tablet, Place 0.4 mg under the tongue Every 5 (Five) Minutes As Needed for chest pain. Take no more than 3 doses in 15 minutes., Disp: , Rfl:   •  Omega-3 Fatty Acids (FISH OIL) 1200 MG capsule capsule, Take 1,200 mg by mouth Daily., Disp: , Rfl:   •  sertraline (ZOLOFT) 50 MG tablet, Take 1 tablet by mouth Daily., Disp: 90 tablet, Rfl: 1  MEDICATION LIST AND ALLERGIES REVIEWED.    Family History   Problem Relation Age of Onset   • No Known Problems Mother    • Alcohol abuse Father    • Heart attack Father    • No Known Problems Sister    • No Known Problems Brother      Social History     Tobacco Use   • Smoking status: Former Smoker     Packs/day: 1.50     Years: 36.00     Pack years: 54.00     Types: Cigarettes     Quit date: 2011     Years since quittin.5   • Smokeless tobacco: Former User   Substance Use Topics   • Alcohol use: Yes     Alcohol/week: 2.0 standard drinks     Types: 2 Cans of beer per week     Comment:  quit a couple weeks ago   • Drug use: Yes     Types: Marijuana     Social History     Social History Narrative    :     to Sabina x 35yrs    2 children from previous marriage    2 gk.    Retired - / [employer] -fib[employer] exposure    Exercise:10acre farm - vegetables.    Dental: utd    Eye: utd    Previously smoked up to 2 packs of cigarettes per day, starting at age  "16.    Stop smoking for good in April 2012    No history of IVDU     FAMILY AND SOCIAL HISTORY REVIEWED.    Review of Systems  ALSO REFER TO SCANNED ROS SHEET FROM SAME DATE.    /86   Pulse 60   Temp 97.7 °F (36.5 °C)   Ht 170.2 cm (67\")   Wt 84.5 kg (186 lb 3.2 oz)   SpO2 97% Comment: resting, room air  BMI 29.16 kg/m²   Physical Exam  Vitals signs and nursing note reviewed.   Constitutional:       General: He is not in acute distress.     Appearance: He is well-developed. He is not diaphoretic.   HENT:      Head: Normocephalic and atraumatic.   Neck:      Thyroid: No thyromegaly.   Cardiovascular:      Rate and Rhythm: Normal rate and regular rhythm.      Comments: Faint grade 1/6 systolic murmur  Pulmonary:      Effort: Pulmonary effort is normal.      Breath sounds: Normal breath sounds. No stridor.   Abdominal:      General: Bowel sounds are normal.      Palpations: Abdomen is soft.   Musculoskeletal: Normal range of motion.      Right lower leg: No edema.      Left lower leg: No edema.   Lymphadenopathy:      Cervical: No cervical adenopathy.      Upper Body:      Right upper body: No supraclavicular or epitrochlear adenopathy.      Left upper body: No supraclavicular or epitrochlear adenopathy.   Skin:     General: Skin is warm and dry.   Neurological:      Mental Status: He is alert and oriented to person, place, and time.   Psychiatric:         Behavior: Behavior normal.         Results     CT scan of the chest reviewed on PACS.  There is an ellipsoid right lower lobe soft tissue density, new in comparison to 2018  Emphysematous changes noted throughout    Problem List       ICD-10-CM ICD-9-CM   1. Pulmonary nodule (2.6cm RLL)  R91.1 793.11   2. Former smoker (Stopped 2012)  Z87.891 V15.82   3. COPD  J44.9 496   4. Severe mitral regurgitation  I34.0 424.0       Discussion     We reviewed his CT scan findings.  He has a new soft tissue density in the right lower lobe which is highly suspicious " for malignancy.  He does not have evidence of mediastinal lymphadenopathy by CT scan.    The likelihood is that this represents a primary bronchogenic carcinoma.  We will get a PET CT scan and if this lesion is hypermetabolic without evidence of other abnormal hypermetabolic activity, then will refer to CT surgery.  Both a transbronchial biopsy and CT FNA carry a high risk of false negative.    If on PET scan he has hypermetabolic mediastinal lymphadenopathy, then we will pursue an EBUS.    At some point, he will need preoperative PFTs which we will arrange    Although he does not have evidence of heart failure, he probably needs a preoperative cardiac reevaluation with Dr. Blackwell, as well.    Clint Thompson MD  Note electronically signed    CC: Silvestre Coleman,

## 2020-10-13 NOTE — PROGRESS NOTES
Met patient and wife in lung nodule clinic with Dr. DEYANIRA Thompson. Patient smoked up to 2ppd since age 16 until quitting x8yrs ago. He does smoke marijuana occasionally. He has occasional productive cough, no hemoptysis. Denies weight loss or SOA. Had LDCT due to smoking history and found to have 26 x 14mm RLL nodule. Scans reviewed. Per  PET and PFT's now. If indicated will refer to CTS for surgical evaluation. He v/u and agreeable to plan. Introduced lung navigator role and provided contact information. He knows to call with questions or concerns.

## 2020-10-14 DIAGNOSIS — R91.1 PULMONARY NODULE: Primary | ICD-10-CM

## 2020-10-16 DIAGNOSIS — Z01.812 BLOOD TESTS PRIOR TO TREATMENT OR PROCEDURE: Primary | ICD-10-CM

## 2020-10-19 ENCOUNTER — CLINICAL SUPPORT (OUTPATIENT)
Dept: PULMONOLOGY | Facility: CLINIC | Age: 70
End: 2020-10-19

## 2020-10-19 DIAGNOSIS — Z01.812 BLOOD TESTS PRIOR TO TREATMENT OR PROCEDURE: ICD-10-CM

## 2020-10-19 PROCEDURE — 99000 SPECIMEN HANDLING OFFICE-LAB: CPT | Performed by: INTERNAL MEDICINE

## 2020-10-19 PROCEDURE — U0004 COV-19 TEST NON-CDC HGH THRU: HCPCS | Performed by: INTERNAL MEDICINE

## 2020-10-20 LAB — SARS-COV-2 RNA RESP QL NAA+PROBE: NOT DETECTED

## 2020-10-22 ENCOUNTER — OFFICE VISIT (OUTPATIENT)
Dept: PULMONOLOGY | Facility: CLINIC | Age: 70
End: 2020-10-22

## 2020-10-22 DIAGNOSIS — J44.9 COPD MIXED TYPE (HCC): Primary | ICD-10-CM

## 2020-10-22 PROCEDURE — 94375 RESPIRATORY FLOW VOLUME LOOP: CPT | Performed by: INTERNAL MEDICINE

## 2020-10-22 PROCEDURE — 94729 DIFFUSING CAPACITY: CPT | Performed by: INTERNAL MEDICINE

## 2020-10-22 PROCEDURE — 94726 PLETHYSMOGRAPHY LUNG VOLUMES: CPT | Performed by: INTERNAL MEDICINE

## 2020-10-26 RX ORDER — AMOXICILLIN 500 MG/1
CAPSULE ORAL
Qty: 4 CAPSULE | Refills: 0 | OUTPATIENT
Start: 2020-10-26

## 2020-10-26 NOTE — TELEPHONE ENCOUNTER
"Spoke with patient's wife she stated that the patient is scheduled to have an upcoming dental procedure and that he needs amoxicillin.     I offered to schedule an upcoming appt and the patient and his wife both declined. Patient states that \"he will figure it out\"   "

## 2020-11-09 ENCOUNTER — HOSPITAL ENCOUNTER (OUTPATIENT)
Dept: PET IMAGING | Facility: HOSPITAL | Age: 70
Discharge: HOME | End: 2020-11-09

## 2020-11-09 DIAGNOSIS — R91.1 PULMONARY NODULE: ICD-10-CM

## 2020-11-09 LAB — GLUCOSE BLDC GLUCOMTR-MCNC: 138 MG/DL (ref 70–130)

## 2020-11-09 PROCEDURE — 0 FLUDEOXYGLUCOSE F18 SOLUTION: Performed by: INTERNAL MEDICINE

## 2020-11-09 PROCEDURE — A9552 F18 FDG: HCPCS | Performed by: INTERNAL MEDICINE

## 2020-11-09 PROCEDURE — 82962 GLUCOSE BLOOD TEST: CPT

## 2020-11-09 PROCEDURE — 78815 PET IMAGE W/CT SKULL-THIGH: CPT

## 2020-11-09 RX ADMIN — FLUDEOXYGLUCOSE F18 1 DOSE: 300 INJECTION INTRAVENOUS at 08:41

## 2020-11-10 ENCOUNTER — PREP FOR SURGERY (OUTPATIENT)
Dept: OTHER | Facility: HOSPITAL | Age: 70
End: 2020-11-10

## 2020-11-10 DIAGNOSIS — R91.1 PULMONARY NODULE: Primary | ICD-10-CM

## 2020-11-10 RX ORDER — SODIUM CHLORIDE 0.9 % (FLUSH) 0.9 %
3 SYRINGE (ML) INJECTION EVERY 12 HOURS SCHEDULED
Status: CANCELLED | OUTPATIENT
Start: 2020-11-10

## 2020-11-10 RX ORDER — LIDOCAINE HYDROCHLORIDE 40 MG/ML
4 INJECTION, SOLUTION RETROBULBAR; TOPICAL ONCE
Status: CANCELLED | OUTPATIENT
Start: 2020-11-10 | End: 2020-11-10

## 2020-11-10 RX ORDER — SODIUM CHLORIDE 9 MG/ML
125 INJECTION, SOLUTION INTRAVENOUS CONTINUOUS
Status: CANCELLED | OUTPATIENT
Start: 2020-11-10

## 2020-11-10 RX ORDER — SODIUM CHLORIDE 0.9 % (FLUSH) 0.9 %
10 SYRINGE (ML) INJECTION AS NEEDED
Status: CANCELLED | OUTPATIENT
Start: 2020-11-10

## 2020-11-11 ENCOUNTER — TRANSCRIBE ORDERS (OUTPATIENT)
Dept: PULMONOLOGY | Facility: CLINIC | Age: 70
End: 2020-11-11

## 2020-11-17 ENCOUNTER — APPOINTMENT (OUTPATIENT)
Dept: PREADMISSION TESTING | Facility: HOSPITAL | Age: 70
End: 2020-11-17

## 2020-11-17 LAB
ALBUMIN SERPL-MCNC: 4.7 G/DL (ref 3.5–5.2)
ALBUMIN/GLOB SERPL: 2.4 G/DL
ALP SERPL-CCNC: 98 U/L (ref 39–117)
ALT SERPL W P-5'-P-CCNC: 40 U/L (ref 1–41)
ANION GAP SERPL CALCULATED.3IONS-SCNC: 12 MMOL/L (ref 5–15)
APTT PPP: 27.6 SECONDS (ref 24–37)
AST SERPL-CCNC: 29 U/L (ref 1–40)
BILIRUB SERPL-MCNC: 0.9 MG/DL (ref 0–1.2)
BUN SERPL-MCNC: 19 MG/DL (ref 8–23)
BUN/CREAT SERPL: 18.4 (ref 7–25)
CALCIUM SPEC-SCNC: 9.8 MG/DL (ref 8.6–10.5)
CHLORIDE SERPL-SCNC: 106 MMOL/L (ref 98–107)
CO2 SERPL-SCNC: 24 MMOL/L (ref 22–29)
CREAT SERPL-MCNC: 1.03 MG/DL (ref 0.76–1.27)
DEPRECATED RDW RBC AUTO: 44 FL (ref 37–54)
ERYTHROCYTE [DISTWIDTH] IN BLOOD BY AUTOMATED COUNT: 12.7 % (ref 12.3–15.4)
GFR SERPL CREATININE-BSD FRML MDRD: 72 ML/MIN/1.73
GLOBULIN UR ELPH-MCNC: 2 GM/DL
GLUCOSE SERPL-MCNC: 134 MG/DL (ref 65–99)
HCT VFR BLD AUTO: 42.7 % (ref 37.5–51)
HGB BLD-MCNC: 14.1 G/DL (ref 13–17.7)
INR PPP: 1.02 (ref 0.85–1.16)
MCH RBC QN AUTO: 31.3 PG (ref 26.6–33)
MCHC RBC AUTO-ENTMCNC: 33 G/DL (ref 31.5–35.7)
MCV RBC AUTO: 94.7 FL (ref 79–97)
PLATELET # BLD AUTO: 141 10*3/MM3 (ref 140–450)
PMV BLD AUTO: 11.8 FL (ref 6–12)
POTASSIUM SERPL-SCNC: 4.5 MMOL/L (ref 3.5–5.2)
PROT SERPL-MCNC: 6.7 G/DL (ref 6–8.5)
PROTHROMBIN TIME: 13.1 SECONDS (ref 11.5–14)
RBC # BLD AUTO: 4.51 10*6/MM3 (ref 4.14–5.8)
SODIUM SERPL-SCNC: 142 MMOL/L (ref 136–145)
WBC # BLD AUTO: 6.89 10*3/MM3 (ref 3.4–10.8)

## 2020-11-17 PROCEDURE — 85610 PROTHROMBIN TIME: CPT

## 2020-11-17 PROCEDURE — 85027 COMPLETE CBC AUTOMATED: CPT

## 2020-11-17 PROCEDURE — 85730 THROMBOPLASTIN TIME PARTIAL: CPT

## 2020-11-17 PROCEDURE — U0004 COV-19 TEST NON-CDC HGH THRU: HCPCS

## 2020-11-17 PROCEDURE — 36415 COLL VENOUS BLD VENIPUNCTURE: CPT

## 2020-11-17 PROCEDURE — 80053 COMPREHEN METABOLIC PANEL: CPT

## 2020-11-17 PROCEDURE — C9803 HOPD COVID-19 SPEC COLLECT: HCPCS

## 2020-11-18 ENCOUNTER — ANESTHESIA EVENT (OUTPATIENT)
Dept: GASTROENTEROLOGY | Facility: HOSPITAL | Age: 70
End: 2020-11-18

## 2020-11-18 LAB — SARS-COV-2 RNA RESP QL NAA+PROBE: NOT DETECTED

## 2020-11-19 ENCOUNTER — APPOINTMENT (OUTPATIENT)
Dept: GENERAL RADIOLOGY | Facility: HOSPITAL | Age: 70
End: 2020-11-19

## 2020-11-19 ENCOUNTER — ANESTHESIA (OUTPATIENT)
Dept: GASTROENTEROLOGY | Facility: HOSPITAL | Age: 70
End: 2020-11-19

## 2020-11-19 ENCOUNTER — HOSPITAL ENCOUNTER (OUTPATIENT)
Facility: HOSPITAL | Age: 70
Setting detail: HOSPITAL OUTPATIENT SURGERY
Discharge: HOME | End: 2020-11-19
Attending: INTERNAL MEDICINE | Admitting: INTERNAL MEDICINE

## 2020-11-19 VITALS
HEART RATE: 58 BPM | SYSTOLIC BLOOD PRESSURE: 129 MMHG | TEMPERATURE: 96.7 F | DIASTOLIC BLOOD PRESSURE: 68 MMHG | OXYGEN SATURATION: 96 % | RESPIRATION RATE: 18 BRPM

## 2020-11-19 DIAGNOSIS — R91.1 PULMONARY NODULE: ICD-10-CM

## 2020-11-19 LAB
GLUCOSE BLDC GLUCOMTR-MCNC: 142 MG/DL (ref 70–130)
POTASSIUM SERPL-SCNC: 4.5 MMOL/L (ref 3.5–5.2)
QT INTERVAL: 442 MS
QTC INTERVAL: 402 MS

## 2020-11-19 PROCEDURE — 82962 GLUCOSE BLOOD TEST: CPT

## 2020-11-19 PROCEDURE — 31653 BRONCH EBUS SAMPLNG 3/> NODE: CPT | Performed by: INTERNAL MEDICINE

## 2020-11-19 PROCEDURE — 87252 VIRUS INOCULATION TISSUE: CPT | Performed by: INTERNAL MEDICINE

## 2020-11-19 PROCEDURE — 87116 MYCOBACTERIA CULTURE: CPT | Performed by: INTERNAL MEDICINE

## 2020-11-19 PROCEDURE — 84132 ASSAY OF SERUM POTASSIUM: CPT | Performed by: ANESTHESIOLOGY

## 2020-11-19 PROCEDURE — 31623 DX BRONCHOSCOPE/BRUSH: CPT | Performed by: INTERNAL MEDICINE

## 2020-11-19 PROCEDURE — 88342 IMHCHEM/IMCYTCHM 1ST ANTB: CPT | Performed by: INTERNAL MEDICINE

## 2020-11-19 PROCEDURE — 87070 CULTURE OTHR SPECIMN AEROBIC: CPT | Performed by: INTERNAL MEDICINE

## 2020-11-19 PROCEDURE — 87206 SMEAR FLUORESCENT/ACID STAI: CPT | Performed by: INTERNAL MEDICINE

## 2020-11-19 PROCEDURE — 25010000002 DEXAMETHASONE PER 1 MG: Performed by: NURSE ANESTHETIST, CERTIFIED REGISTERED

## 2020-11-19 PROCEDURE — 88331 PATH CONSLTJ SURG 1 BLK 1SPC: CPT | Performed by: INTERNAL MEDICINE

## 2020-11-19 PROCEDURE — 31628 BRONCHOSCOPY/LUNG BX EACH: CPT | Performed by: INTERNAL MEDICINE

## 2020-11-19 PROCEDURE — 87205 SMEAR GRAM STAIN: CPT | Performed by: INTERNAL MEDICINE

## 2020-11-19 PROCEDURE — 71045 X-RAY EXAM CHEST 1 VIEW: CPT

## 2020-11-19 PROCEDURE — 88360 TUMOR IMMUNOHISTOCHEM/MANUAL: CPT | Performed by: INTERNAL MEDICINE

## 2020-11-19 PROCEDURE — 76000 FLUOROSCOPY <1 HR PHYS/QHP: CPT

## 2020-11-19 PROCEDURE — C1726 CATH, BAL DIL, NON-VASCULAR: HCPCS | Performed by: INTERNAL MEDICINE

## 2020-11-19 PROCEDURE — 25010000002 FENTANYL CITRATE (PF) 100 MCG/2ML SOLUTION: Performed by: NURSE ANESTHETIST, CERTIFIED REGISTERED

## 2020-11-19 PROCEDURE — 25010000002 ONDANSETRON PER 1 MG: Performed by: NURSE ANESTHETIST, CERTIFIED REGISTERED

## 2020-11-19 PROCEDURE — 93010 ELECTROCARDIOGRAM REPORT: CPT | Performed by: INTERNAL MEDICINE

## 2020-11-19 PROCEDURE — 31624 DX BRONCHOSCOPE/LAVAGE: CPT | Performed by: INTERNAL MEDICINE

## 2020-11-19 PROCEDURE — 25010000002 NEOSTIGMINE 10 MG/10ML SOLUTION: Performed by: NURSE ANESTHETIST, CERTIFIED REGISTERED

## 2020-11-19 PROCEDURE — 88341 IMHCHEM/IMCYTCHM EA ADD ANTB: CPT | Performed by: INTERNAL MEDICINE

## 2020-11-19 PROCEDURE — 88112 CYTOPATH CELL ENHANCE TECH: CPT | Performed by: INTERNAL MEDICINE

## 2020-11-19 PROCEDURE — 25010000002 PROPOFOL 10 MG/ML EMULSION: Performed by: NURSE ANESTHETIST, CERTIFIED REGISTERED

## 2020-11-19 PROCEDURE — 87102 FUNGUS ISOLATION CULTURE: CPT | Performed by: INTERNAL MEDICINE

## 2020-11-19 PROCEDURE — S0260 H&P FOR SURGERY: HCPCS | Performed by: INTERNAL MEDICINE

## 2020-11-19 PROCEDURE — 88305 TISSUE EXAM BY PATHOLOGIST: CPT | Performed by: INTERNAL MEDICINE

## 2020-11-19 PROCEDURE — 93005 ELECTROCARDIOGRAM TRACING: CPT | Performed by: ANESTHESIOLOGY

## 2020-11-19 RX ORDER — FAMOTIDINE 10 MG/ML
20 INJECTION, SOLUTION INTRAVENOUS ONCE
Status: COMPLETED | OUTPATIENT
Start: 2020-11-19 | End: 2020-11-19

## 2020-11-19 RX ORDER — SODIUM CHLORIDE 0.9 % (FLUSH) 0.9 %
3 SYRINGE (ML) INJECTION EVERY 12 HOURS SCHEDULED
Status: DISCONTINUED | OUTPATIENT
Start: 2020-11-19 | End: 2020-11-19 | Stop reason: HOSPADM

## 2020-11-19 RX ORDER — LIDOCAINE HYDROCHLORIDE 10 MG/ML
INJECTION, SOLUTION EPIDURAL; INFILTRATION; INTRACAUDAL; PERINEURAL AS NEEDED
Status: DISCONTINUED | OUTPATIENT
Start: 2020-11-19 | End: 2020-11-19 | Stop reason: SURG

## 2020-11-19 RX ORDER — SODIUM CHLORIDE 0.9 % (FLUSH) 0.9 %
10 SYRINGE (ML) INJECTION EVERY 12 HOURS SCHEDULED
Status: DISCONTINUED | OUTPATIENT
Start: 2020-11-19 | End: 2020-11-19 | Stop reason: HOSPADM

## 2020-11-19 RX ORDER — LIDOCAINE HYDROCHLORIDE 10 MG/ML
0.5 INJECTION, SOLUTION EPIDURAL; INFILTRATION; INTRACAUDAL; PERINEURAL ONCE AS NEEDED
Status: DISCONTINUED | OUTPATIENT
Start: 2020-11-19 | End: 2020-11-19 | Stop reason: HOSPADM

## 2020-11-19 RX ORDER — ROCURONIUM BROMIDE 10 MG/ML
INJECTION, SOLUTION INTRAVENOUS AS NEEDED
Status: DISCONTINUED | OUTPATIENT
Start: 2020-11-19 | End: 2020-11-19 | Stop reason: SURG

## 2020-11-19 RX ORDER — GLYCOPYRROLATE 0.2 MG/ML
INJECTION INTRAMUSCULAR; INTRAVENOUS AS NEEDED
Status: DISCONTINUED | OUTPATIENT
Start: 2020-11-19 | End: 2020-11-19 | Stop reason: SURG

## 2020-11-19 RX ORDER — SODIUM CHLORIDE 0.9 % (FLUSH) 0.9 %
10 SYRINGE (ML) INJECTION AS NEEDED
Status: DISCONTINUED | OUTPATIENT
Start: 2020-11-19 | End: 2020-11-19 | Stop reason: HOSPADM

## 2020-11-19 RX ORDER — PROPOFOL 10 MG/ML
VIAL (ML) INTRAVENOUS AS NEEDED
Status: DISCONTINUED | OUTPATIENT
Start: 2020-11-19 | End: 2020-11-19 | Stop reason: SURG

## 2020-11-19 RX ORDER — FAMOTIDINE 20 MG/1
20 TABLET, FILM COATED ORAL ONCE
Status: DISCONTINUED | OUTPATIENT
Start: 2020-11-19 | End: 2020-11-19 | Stop reason: HOSPADM

## 2020-11-19 RX ORDER — FENTANYL CITRATE 50 UG/ML
50 INJECTION, SOLUTION INTRAMUSCULAR; INTRAVENOUS
Status: DISCONTINUED | OUTPATIENT
Start: 2020-11-19 | End: 2020-11-19 | Stop reason: HOSPADM

## 2020-11-19 RX ORDER — NEOSTIGMINE METHYLSULFATE 1 MG/ML
INJECTION, SOLUTION INTRAVENOUS AS NEEDED
Status: DISCONTINUED | OUTPATIENT
Start: 2020-11-19 | End: 2020-11-19 | Stop reason: SURG

## 2020-11-19 RX ORDER — SODIUM CHLORIDE 9 MG/ML
125 INJECTION, SOLUTION INTRAVENOUS CONTINUOUS
Status: DISCONTINUED | OUTPATIENT
Start: 2020-11-19 | End: 2020-11-19 | Stop reason: HOSPADM

## 2020-11-19 RX ORDER — HYDROMORPHONE HYDROCHLORIDE 1 MG/ML
0.5 INJECTION, SOLUTION INTRAMUSCULAR; INTRAVENOUS; SUBCUTANEOUS
Status: DISCONTINUED | OUTPATIENT
Start: 2020-11-19 | End: 2020-11-19 | Stop reason: HOSPADM

## 2020-11-19 RX ORDER — SODIUM CHLORIDE, SODIUM LACTATE, POTASSIUM CHLORIDE, CALCIUM CHLORIDE 600; 310; 30; 20 MG/100ML; MG/100ML; MG/100ML; MG/100ML
9 INJECTION, SOLUTION INTRAVENOUS CONTINUOUS
Status: DISCONTINUED | OUTPATIENT
Start: 2020-11-19 | End: 2020-11-19 | Stop reason: HOSPADM

## 2020-11-19 RX ORDER — DEXAMETHASONE SODIUM PHOSPHATE 4 MG/ML
INJECTION, SOLUTION INTRA-ARTICULAR; INTRALESIONAL; INTRAMUSCULAR; INTRAVENOUS; SOFT TISSUE AS NEEDED
Status: DISCONTINUED | OUTPATIENT
Start: 2020-11-19 | End: 2020-11-19 | Stop reason: SURG

## 2020-11-19 RX ORDER — LIDOCAINE HYDROCHLORIDE 40 MG/ML
4 INJECTION, SOLUTION RETROBULBAR; TOPICAL ONCE
Status: DISCONTINUED | OUTPATIENT
Start: 2020-11-19 | End: 2020-11-19 | Stop reason: HOSPADM

## 2020-11-19 RX ORDER — ONDANSETRON 2 MG/ML
INJECTION INTRAMUSCULAR; INTRAVENOUS AS NEEDED
Status: DISCONTINUED | OUTPATIENT
Start: 2020-11-19 | End: 2020-11-19 | Stop reason: SURG

## 2020-11-19 RX ADMIN — FENTANYL CITRATE 100 MCG: 50 INJECTION, SOLUTION INTRAMUSCULAR; INTRAVENOUS at 07:44

## 2020-11-19 RX ADMIN — SODIUM CHLORIDE, POTASSIUM CHLORIDE, SODIUM LACTATE AND CALCIUM CHLORIDE: 600; 310; 30; 20 INJECTION, SOLUTION INTRAVENOUS at 08:06

## 2020-11-19 RX ADMIN — FAMOTIDINE 20 MG: 10 INJECTION INTRAVENOUS at 07:01

## 2020-11-19 RX ADMIN — DEXAMETHASONE SODIUM PHOSPHATE 8 MG: 4 INJECTION, SOLUTION INTRAMUSCULAR; INTRAVENOUS at 07:45

## 2020-11-19 RX ADMIN — ROCURONIUM BROMIDE 30 MG: 10 INJECTION INTRAVENOUS at 07:41

## 2020-11-19 RX ADMIN — PROPOFOL 100 MG: 10 INJECTION, EMULSION INTRAVENOUS at 07:41

## 2020-11-19 RX ADMIN — ONDANSETRON 4 MG: 2 INJECTION INTRAMUSCULAR; INTRAVENOUS at 07:45

## 2020-11-19 RX ADMIN — LIDOCAINE HYDROCHLORIDE 50 MG: 10 INJECTION, SOLUTION EPIDURAL; INFILTRATION; INTRACAUDAL; PERINEURAL at 07:41

## 2020-11-19 RX ADMIN — EPHEDRINE SULFATE 10 MG: 50 INJECTION INTRAMUSCULAR; INTRAVENOUS; SUBCUTANEOUS at 08:00

## 2020-11-19 RX ADMIN — NEOSTIGMINE 3 MG: 1 INJECTION INTRAVENOUS at 08:24

## 2020-11-19 RX ADMIN — GLYCOPYRROLATE 0.4 MG: 0.2 INJECTION INTRAMUSCULAR; INTRAVENOUS at 08:24

## 2020-11-19 NOTE — ANESTHESIA PROCEDURE NOTES
Airway  Urgency: elective    Date/Time: 11/19/2020 7:45 AM  Airway not difficult    General Information and Staff    Patient location during procedure: OR  CRNA: Ramin Srinivasan CRNA    Indications and Patient Condition  Indications for airway management: airway protection    Preoxygenated: yes  MILS not maintained throughout  Mask difficulty assessment: 1 - vent by mask    Final Airway Details  Final airway type: endotracheal airway      Successful airway: ETT  Cuffed: yes   Successful intubation technique: direct laryngoscopy  Facilitating devices/methods: intubating stylet  Endotracheal tube insertion site: oral  Blade: Christen  Blade size: 3  ETT size (mm): 9.0  Cormack-Lehane Classification: grade IIa - partial view of glottis  Placement verified by: chest auscultation and capnometry   Measured from: lips  ETT/EBT  to lips (cm): 20  Number of attempts at approach: 1  Assessment: lips, teeth, and gum same as pre-op and atraumatic intubation    Additional Comments  Negative epigastric sounds, Breath sound equal bilaterally with symmetric chest rise and fall

## 2020-11-19 NOTE — ANESTHESIA PREPROCEDURE EVALUATION
Anesthesia Evaluation     Patient summary reviewed and Nursing notes reviewed   no history of anesthetic complications:  NPO Solid Status: > 8 hours  NPO Liquid Status: > 8 hours           Airway   Mallampati: I  TM distance: >3 FB  Neck ROM: full  No difficulty expected  Dental - normal exam     Pulmonary - normal exam   (+) a smoker Former, COPD, shortness of breath,     ROS comment: Pulmonary nodule  Cardiovascular - normal exam    ECG reviewed    (+) hypertension, valvular problems/murmurs (h/o strep endocarditis) MR, past MI , CAD, cardiac stents Drug eluting stent more than 12 months ago hyperlipidemia,   (-) dysrhythmias, angina    ROS comment: Echo: 2/7/19  Interpretation Summary    · Aortic valve sclerosis without stenosis and no signs of vegetation  · Myxomatous mitral valve with prolapse and severe eccentric mitral valve regurgitant jet with no signs of valvular vegetation  · Normal appearing tricuspid valve leaflets with no signs of valvular vegetation  · Normal left ventricular systolic function with ejection fraction greater than 50%           Neuro/Psych  (+) headaches, psychiatric history Depression,     (-) seizures, CVA  GI/Hepatic/Renal/Endo    (+)   diabetes mellitus type 2,   (-) GERD, liver disease, no renal disease, no thyroid disorder    Musculoskeletal     Abdominal    Substance History      OB/GYN          Other   arthritis,    history of cancer (prostate)    ROS/Med Hx Other: glaucoma                Anesthesia Plan    ASA 3     general     intravenous induction     Anesthetic plan, all risks, benefits, and alternatives have been provided, discussed and informed consent has been obtained with: patient.    Plan discussed with CRNA.

## 2020-11-19 NOTE — OP NOTE
Bronchoscopy Procedure Note    Pre-op Diagnosis: Lung mass with mediastinal adenopathy    Post-op Diagnosis: Same    Surgeon: Clint Thompson MD    Anesthesia: General anesthesia    Operation: Flexible fiberoptic bronchoscopy    Findings: No endobronchial lesions    Specimen(s): Brush, BAL, and transbronchial biopsy from superior segment of the right lower lobe, transbronchial needle aspiration station 7, station 10 R, station 11 R    Estimated Blood Loss: Minimal/None    Complications: No immediate    Indications and History:  Luis Elliott is a 70 y.o. male  with history of tobacco abuse and a lung mass with mediastinal lymphadenopathy hypermetabolic on PET scan.  The risks, benefits, complications, treatment options and expected outcomes were discussed with the patient.  The possibilities of reaction to medication, pulmonary aspiration, perforation of a viscus, bleeding, failure to diagnose a condition and creating a complication requiring transfusion or operation were discussed with the patient who freely signed the consent.      Description of Procedure:  The patient was seen in the Holding Room and an immediate patient assessment was done prior to the administration of moderate and/or deep conscious sedation.  The patient was taken to the Endoscopy Suite, identified as Luis Elliott  and the procedure verified as Flexible Fiberoptic Bronchoscopy.  A Time Out was held and the above information confirmed.    After the induction of general anesthesia the patient was intubated with an 8.5 endotracheal tube.    The bronchoscope was introduced via the endotracheal tube which confirmed good position of the distal one third of the trachea.    The scope was advanced into the left mainstem bronchus.  The left upper lobe, lingula, left lower lobe, and superior segment of the left lower lobe revealed no endobronchial lesions.  Minimal clear secretions.    Scope was advanced into the right mainstem bronchus.  The  right upper lobe, bronchus intermedius, right middle lobe, and right lower lobe as well as the superior segment the right lower lobe revealed no discrete endobronchial lesions and clear secretions.    The scope was wedged into the superior segment of the right lower lobe where a BAL was obtained.  60 mL instilled and 30 mL returned.    Then, under fluoroscopic guidance under my direct supervision without a radiologist present transbronchial biopsies were obtained from the superior segment of the right lower lobe.  The lesion was not well visualized, however.  A 7 mm cytology brush was obtained as well.    The scope was removed and the endobronchial scope was introduced.  It was advanced to station 7.  Multiple passes were taken with a 21-gauge needle from station 7.    The scope was advanced to station 10 R and station 11 R where additional passes were taken with a 21-gauge needle.    The airways were suctioned clear and there is no evidence of bleeding or significant blood in the airway at the end of the procedure.  Scope was withdrawn without difficulty.  There are no immediate complications.    The Patient was taken to the Endoscopy Recovery area in satisfactory condition.    Attestation: I performed the procedure    Fluoroscopy    Fluoroscopy was performed under my direct supervision without a radiologist present for obtaining transbronchial biopsy and brushing specimens.  Fluoroscopy was used at the end of the procedure to exclude a pneumothorax.  None was identified.  Less than 3 minutes of fluoroscopy time was used in total    Clint Thompson MD, [facility]

## 2020-11-19 NOTE — ANESTHESIA POSTPROCEDURE EVALUATION
Patient: Luis Elliott    Procedure Summary     Date: 11/19/20 Room / [facility]:  [facility]    Anesthesia Start: 0741 Anesthesia Stop:     Procedure: BRONCHOSCOPY WITH ENDOBRONCHIAL ULTRASOUND WITH FLUOROSCOPY (N/A Bronchus) Diagnosis:       Pulmonary nodule      (Pulmonary nodule [R91.1])    Surgeon: Clint Thompson MD Provider: Peggy Gutierrez MD    Anesthesia Type: general ASA Status: 3          Anesthesia Type: general    Vitals  No vitals data found for the desired time range.          Post Anesthesia Care and Evaluation    Patient location during evaluation: PACU  Patient participation: complete - patient participated  Level of consciousness: awake and responsive to verbal stimuli  Pain score: 2  Pain management: adequate  Airway patency: patent  Anesthetic complications: No anesthetic complications    Cardiovascular status: acceptable  Respiratory status: acceptable  Hydration status: acceptable    Comments: Pt awake and responsive. SV. VSS. Report to RN. Patient Vitals in the past 24 hrs:  11/19/20 0653, BP:151/83, Temp:97 °F (36.1 °C), Temp src:Temporal, Pulse:52, Resp:16, SpO2:96 %  133/78. p 72. r 16. t 98.1

## 2020-11-19 NOTE — H&P
"New right lower lobe lung lesion, tobacco abuse, probable COPD, mitral valve disease     History of Present Illness      70-year-old white male referred for evaluation of an abnormal CT scan of the chest.     He underwent a screening CT scan of the chest due to his history of tobacco abuse.  This revealed a new right lower lobe density in comparison to a prior CT scan of the chest that he had in 2018     He has a history of tobacco abuse that consisted of up to 2 packs of cigarettes per day.  He started smoking at age 16 and stopped smoking at age age 62 (2012),     In 2018 he was admitted with strep bacteremia.  Transesophageal echocardiogram at that time revealed no evidence of endocarditis but he did have severe mitral valve prolapse and mitral regurgitation.  He was treated with a prolonged course of antibiotics and has followed up with Dr. Blackwell.     He has an occasional cough, typically on a daily basis.  He only occasionally produces sputum and has never had hemoptysis.     He does not feel limited by dyspnea.  He is quite active mowing his lawn, hunting, and working around the home.  He does not feel limited by shortness of breath     He has had no weight change.         Patient Active Problem List   Diagnosis   • Type 2 diabetes mellitus without complication (CMS/HCC)   • Hyperlipidemia   • Essential hypertension   • Erectile dysfunction   • History of prostate cancer   • Osteoarthritis of multiple joints   • Coronary artery disease involving native heart without angina pectoris   • Colon polyps   • Glaucoma   • Low back pain   • Streptococcal bacteremia   • H/O subacute bacterial endocarditis   • Pulmonary nodule (2.6cm RLL)   • Severe mitral regurgitation   • Former smoker (Stopped 2012)   • COPD            Allergies   Allergen Reactions   • Xarelto [Rivaroxaban] Other (See Comments)       MADE ME FEEL LIKE \" I WAS HAVING A HEART ATTACK.\"         Current Outpatient Medications:   •  aspirin 81 MG EC " tablet, Take 81 mg by mouth Every Night., Disp: , Rfl:   •  atorvastatin (LIPITOR) 40 MG tablet, Take 40 mg by mouth Every Night., Disp: , Rfl:   •  calcium carbonate-cholecalciferol 500-400 MG-UNIT tablet tablet, Take 1 tablet by mouth Daily., Disp: , Rfl:   •  cholecalciferol (VITAMIN D3) 1000 units tablet, Take 1,000 Units by mouth Daily., Disp: , Rfl:   •  ezetimibe (ZETIA) 10 MG tablet, Take 10 mg by mouth Daily., Disp: , Rfl:   •  metFORMIN (GLUCOPHAGE) 500 MG tablet, Take 1 tablet by mouth Daily With Breakfast., Disp: 90 tablet, Rfl: 1  •  nitroglycerin (NITROSTAT) 0.4 MG SL tablet, Place 0.4 mg under the tongue Every 5 (Five) Minutes As Needed for chest pain. Take no more than 3 doses in 15 minutes., Disp: , Rfl:   •  Omega-3 Fatty Acids (FISH OIL) 1200 MG capsule capsule, Take 1,200 mg by mouth Daily., Disp: , Rfl:   •  sertraline (ZOLOFT) 50 MG tablet, Take 1 tablet by mouth Daily., Disp: 90 tablet, Rfl: 1  MEDICATION LIST AND ALLERGIES REVIEWED.           Family History   Problem Relation Age of Onset   • No Known Problems Mother     • Alcohol abuse Father     • Heart attack Father     • No Known Problems Sister     • No Known Problems Brother        Social History            Tobacco Use   • Smoking status: Former Smoker       Packs/day: 1.50       Years: 36.00       Pack years: 54.00       Types: Cigarettes       Quit date: 2011       Years since quittin.5   • Smokeless tobacco: Former User   Substance Use Topics   • Alcohol use: Yes       Alcohol/week: 2.0 standard drinks       Types: 2 Cans of beer per week       Comment:  quit a couple weeks ago   • Drug use: Yes       Types: Marijuana      Social History          Social History Narrative     :      to Sabina x 35yrs     2 children from previous marriage     2 gk.     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     Dental: utd     Eye: utd     Previously smoked up to 2 packs  "of cigarettes per day, starting at age 16.     Stop smoking for good in April 2012     No history of IVDU      FAMILY AND SOCIAL HISTORY REVIEWED.     Review of Systems  ALSO REFER TO SCANNED ROS SHEET FROM SAME DATE.     /86   Pulse 60   Temp 97.7 °F (36.5 °C)   Ht 170.2 cm (67\")   Wt 84.5 kg (186 lb 3.2 oz)   SpO2 97% Comment: resting, room air  BMI 29.16 kg/m²   Physical Exam  Vitals signs and nursing note reviewed.   Constitutional:       General: He is not in acute distress.     Appearance: He is well-developed. He is not diaphoretic.   HENT:      Head: Normocephalic and atraumatic.   Neck:      Thyroid: No thyromegaly.   Cardiovascular:      Rate and Rhythm: Normal rate and regular rhythm.      Comments: Faint grade 1/6 systolic murmur  Pulmonary:      Effort: Pulmonary effort is normal.      Breath sounds: Normal breath sounds. No stridor.   Abdominal:      General: Bowel sounds are normal.      Palpations: Abdomen is soft.   Musculoskeletal: Normal range of motion.      Right lower leg: No edema.      Left lower leg: No edema.   Lymphadenopathy:      Cervical: No cervical adenopathy.      Upper Body:      Right upper body: No supraclavicular or epitrochlear adenopathy.      Left upper body: No supraclavicular or epitrochlear adenopathy.   Skin:     General: Skin is warm and dry.   Neurological:      Mental Status: He is alert and oriented to person, place, and time.   Psychiatric:         Behavior: Behavior normal.            Results      CT scan of the chest reviewed on PACS.  There is an ellipsoid right lower lobe soft tissue density, new in comparison to 2018  Emphysematous changes noted throughout     Problem List          ICD-10-CM ICD-9-CM   1. Pulmonary nodule (2.6cm RLL)  R91.1 793.11   2. Former smoker (Stopped 2012)  Z87.891 V15.82   3. COPD  J44.9 496   4. Severe mitral regurgitation  I34.0 424.0         Discussion      We reviewed his CT scan findings.  He has a new soft tissue " density in the right lower lobe which is highly suspicious for malignancy.  He does not have evidence of mediastinal lymphadenopathy by CT scan.     The likelihood is that this represents a primary bronchogenic carcinoma.  We will get a PET CT scan and if this lesion is hypermetabolic without evidence of other abnormal hypermetabolic activity, then will refer to CT surgery.  Both a transbronchial biopsy and CT FNA carry a high risk of false negative.     If on PET scan he has hypermetabolic mediastinal lymphadenopathy, then we will pursue an EBUS.     At some point, he will need preoperative PFTs which we will arrange     Although he does not have evidence of heart failure, he probably needs a preoperative cardiac reevaluation with Dr. Blackwell, as well.    EBUS revealed hypermetabolic mediastinal LN so will pursue EBUS     Clint Thompson MD  Note electronically signed

## 2020-11-20 LAB
LAB AP CASE REPORT: NORMAL
PATH REPORT.FINAL DX SPEC: NORMAL

## 2020-11-21 LAB
BACTERIA SPEC RESP CULT: NORMAL
GIE STN SPEC: NORMAL
GIE STN SPEC: NORMAL
GRAM STN SPEC: NORMAL

## 2020-11-23 ENCOUNTER — TELEPHONE (OUTPATIENT)
Dept: PULMONOLOGY | Facility: CLINIC | Age: 70
End: 2020-11-23

## 2020-11-23 LAB
CYTO UR: NORMAL
LAB AP CASE REPORT: NORMAL
LAB AP CLINICAL INFORMATION: NORMAL
LAB AP DIAGNOSIS COMMENT: NORMAL
PATH REPORT.FINAL DX SPEC: NORMAL
PATH REPORT.GROSS SPEC: NORMAL

## 2020-11-23 NOTE — TELEPHONE ENCOUNTER
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  I discussed these results with the patient on the phone.  We will refer to medical oncology and I will see him back in the office in about 2 months.

## 2020-11-24 DIAGNOSIS — C34.91 MALIGNANT NEOPLASM OF RIGHT LUNG, UNSPECIFIED PART OF LUNG (HCC): Primary | ICD-10-CM

## 2020-11-24 DIAGNOSIS — C34.90 SMALL CELL LUNG CANCER (HCC): Primary | ICD-10-CM

## 2020-11-30 LAB — VIRUS SPEC CULT: NORMAL

## 2020-12-01 ENCOUNTER — HOSPITAL ENCOUNTER (OUTPATIENT)
Dept: MRI IMAGING | Facility: HOSPITAL | Age: 70
Discharge: HOME | End: 2020-12-01
Admitting: INTERNAL MEDICINE

## 2020-12-01 DIAGNOSIS — C34.90 SMALL CELL LUNG CANCER (HCC): ICD-10-CM

## 2020-12-01 PROCEDURE — A9577 INJ MULTIHANCE: HCPCS | Performed by: INTERNAL MEDICINE

## 2020-12-01 PROCEDURE — 0 GADOBENATE DIMEGLUMINE 529 MG/ML SOLUTION: Performed by: INTERNAL MEDICINE

## 2020-12-01 PROCEDURE — 70553 MRI BRAIN STEM W/O & W/DYE: CPT

## 2020-12-01 RX ADMIN — GADOBENATE DIMEGLUMINE 18 ML: 529 INJECTION, SOLUTION INTRAVENOUS at 15:23

## 2020-12-04 ENCOUNTER — CONSULT (OUTPATIENT)
Dept: ONCOLOGY | Facility: CLINIC | Age: 70
End: 2020-12-04

## 2020-12-04 VITALS
HEIGHT: 67 IN | SYSTOLIC BLOOD PRESSURE: 140 MMHG | HEART RATE: 67 BPM | BODY MASS INDEX: 28.25 KG/M2 | DIASTOLIC BLOOD PRESSURE: 82 MMHG | RESPIRATION RATE: 16 BRPM | WEIGHT: 180 LBS | TEMPERATURE: 97.8 F | OXYGEN SATURATION: 96 %

## 2020-12-04 DIAGNOSIS — C34.31 SMALL CELL LUNG CANCER, RIGHT LOWER LOBE (HCC): Primary | ICD-10-CM

## 2020-12-04 DIAGNOSIS — C34.90 SMALL CELL LUNG CANCER (HCC): Primary | ICD-10-CM

## 2020-12-04 DIAGNOSIS — C34.31 SMALL CELL LUNG CANCER, RIGHT LOWER LOBE (HCC): ICD-10-CM

## 2020-12-04 PROCEDURE — 99205 OFFICE O/P NEW HI 60 MIN: CPT | Performed by: INTERNAL MEDICINE

## 2020-12-04 RX ORDER — SODIUM CHLORIDE 9 MG/ML
250 INJECTION, SOLUTION INTRAVENOUS ONCE
Status: CANCELLED | OUTPATIENT
Start: 2020-12-15

## 2020-12-04 RX ORDER — SODIUM CHLORIDE 9 MG/ML
250 INJECTION, SOLUTION INTRAVENOUS ONCE
Status: CANCELLED | OUTPATIENT
Start: 2020-12-16

## 2020-12-04 RX ORDER — ONDANSETRON HYDROCHLORIDE 8 MG/1
8 TABLET, FILM COATED ORAL 3 TIMES DAILY PRN
Qty: 30 TABLET | Refills: 5 | Status: SHIPPED | OUTPATIENT
Start: 2020-12-04 | End: 2021-03-15

## 2020-12-04 RX ORDER — DEXAMETHASONE 4 MG/1
TABLET ORAL
Qty: 6 TABLET | Refills: 5 | Status: SHIPPED | OUTPATIENT
Start: 2020-12-04 | End: 2021-05-24

## 2020-12-04 RX ORDER — SODIUM CHLORIDE 9 MG/ML
250 INJECTION, SOLUTION INTRAVENOUS ONCE
Status: CANCELLED | OUTPATIENT
Start: 2020-12-14

## 2020-12-04 RX ORDER — PALONOSETRON 0.05 MG/ML
0.25 INJECTION, SOLUTION INTRAVENOUS ONCE
Status: CANCELLED | OUTPATIENT
Start: 2020-12-14

## 2020-12-04 NOTE — PROGRESS NOTES
DATE OF CONSULTATION: 12/4/2020    REFERRING PHYSICIAN: Silvestre Coleman DO    Dear Silvestre Spencer, DO  Thank you for asking for my medical advice on this patient. I saw him in the  [place] office on 12/4/2020    REASON FOR CONSULTATION: Limited stage small cell lung cancer    HISTORY OF PRESENT ILLNESS: The patient is a very pleasant 70 y.o.  male   who was in his usual state of health until September 2020.  Patient presented for screening CT chest for lung cancer that revealed increasing in size right lower lobe lung nodule.  He was referred to  who did bronchoscopy with biopsy November 19, 2020 that confirmed small cell lung cancer.  Whole-body PET scan revealed hypermetabolic activity in the lung nodule as well as right hilar nodes.  MRI brain done December 1, 2020 revealed 6 mm isolated abnormality right cerebellum suspicious for metastatic disease.  The patient was referred to me for further recommendations.    SUBJECTIVE: When I saw the patient today he is here with his wife.  He is doing fairly well but has been anxious or has a new cancer diagnosis.    Review of Systems   Constitutional: Negative for activity change, appetite change, chills, fatigue, fever and unexpected weight change.   HENT: Negative for hearing loss, mouth sores, nosebleeds, sore throat and trouble swallowing.    Eyes: Negative for visual disturbance.   Respiratory: Negative for cough, chest tightness, shortness of breath and wheezing.    Cardiovascular: Negative for chest pain, palpitations and leg swelling.   Gastrointestinal: Negative for abdominal distention, abdominal pain, blood in stool, constipation, diarrhea, nausea, rectal pain and vomiting.   Endocrine: Negative for cold intolerance and heat intolerance.   Genitourinary: Negative for difficulty urinating, dysuria, frequency and urgency.   Musculoskeletal: Negative for arthralgias, back pain, gait problem, joint swelling and myalgias.   Skin:  Negative for rash.   Neurological: Negative for dizziness, tremors, syncope, weakness, light-headedness, numbness and headaches.   Hematological: Negative for adenopathy. Does not bruise/bleed easily.   Psychiatric/Behavioral: Negative for confusion, sleep disturbance and suicidal ideas. The patient is not nervous/anxious.        Past Medical History:   Diagnosis Date   • Cancer (CMS/[facility])     PROSTATE   • COPD (chronic obstructive pulmonary disease) (CMS/[facility])     dr valente thinks pt has this    • Coronary artery disease    • DDD (degenerative disc disease), cervical    • Depression    • Diabetes mellitus (CMS/[facility])     let  check sugar    • Erectile dysfunction    • Glaucoma    • Headache    • Hyperlipidemia    • Hypertension    • Impingement syndrome of left shoulder    • Infection, bacterial     sees ID -  42 days of antibiotics    • MI, old     89   • Mitral valve vegetation    • Osteoarthritis    • SOBOE (shortness of breath on exertion)    • Varicose veins of lower limb     RIGHT   • Wears glasses    • Wears partial dentures     bottom        Social History     Socioeconomic History   • Marital status:      Spouse name: Sabina   • Number of children: 2   • Years of education: Not on file   • Highest education level: Not on file   Occupational History   • Occupation: Retired   Tobacco Use   • Smoking status: Former Smoker     Packs/day: 1.50     Years: 36.00     Pack years: 54.00     Types: Cigarettes     Quit date: 2011     Years since quittin.6   • Smokeless tobacco: Former User     Types: Chew     Quit date:    • Tobacco comment: quit smoking for 12 years then started again but then quit a final time    Substance and Sexual Activity   • Alcohol use: Yes     Alcohol/week: 2.0 standard drinks     Types: 2 Cans of beer per week     Comment:  quit a couple weeks ago   • Drug use: Yes     Types: Marijuana     Comment: 2-3 days ago   • Sexual activity: Defer   Social History Narrative     "9/18:     to Sabina x 35yrs    2 children from previous marriage    2 gk.    Retired - / [employer] -fiberg[employer] exposure    Exercise:10acre farm - vegetables.    Dental: utd    Eye: utd    Previously smoked up to 2 packs of cigarettes per day, starting at age 16.    Stop smoking for good in April 2012    No history of IVDU       Family History   Problem Relation Age of Onset   • Hypertension Mother    • Atrial fibrillation Mother    • Alcohol abuse Father    • Heart attack Father    • Diabetes Father    • No Known Problems Sister    • No Known Problems Brother        Past Surgical History:   Procedure Laterality Date   • BRONCHOSCOPY N/A 11/19/2020    Procedure: BRONCHOSCOPY WITH ENDOBRONCHIAL ULTRASOUND WITH FLUOROSCOPY;  Surgeon: Clint Thompson MD;  Location:  [facility];  Service: Pulmonary;  Laterality: N/A;   • CARDIAC CATHETERIZATION N/A 12/20/2016    Procedure: Left Heart Cath;  Surgeon: Kan Blackwell MD;  Location:  [facility];  Service:    • CATARACT EXTRACTION      BILATERAL CATARACTS REMOVED.   • COLONOSCOPY     • COLONOSCOPY W/ POLYPECTOMY     • CORONARY ANGIOPLASTY     • CORONARY ANGIOPLASTY WITH STENT PLACEMENT      x3    • EYE SURGERY      right- stent for drainage   • HERNIA REPAIR      RIGHT   • KNEE ARTHROSCOPY      LEFT   • KNEE SURGERY      LEFT TOTAL KNEE REPLACEMENT 12/2012   • LUMBAR EPIDURAL INJECTION     • W[place] RT/LT HEART CATHETERS N/A 12/27/2016    Procedure: Percutaneous Coronary Intervention;  Surgeon: Kan Blackwell MD;  Location:  [facility];  Service: Cardiovascular   • PROSTATECTOMY      2003   • TENDON TRANSFER ELBOW      TENDON REPAIR-  right    • TONSILLECTOMY     • TRABECULECTOMY      FOR GLAUCOMA       Allergies   Allergen Reactions   • Xarelto [Rivaroxaban] Other (See Comments)     MADE ME FEEL LIKE \" I WAS HAVING A HEART ATTACK.\"          Current Outpatient Medications:   •  aspirin 81 MG EC " "tablet, Take 81 mg by mouth Every Night., Disp: , Rfl:   •  atorvastatin (LIPITOR) 40 MG tablet, Take 40 mg by mouth Every Night., Disp: , Rfl:   •  calcium carbonate-cholecalciferol 500-400 MG-UNIT tablet tablet, Take 1 tablet by mouth Daily., Disp: , Rfl:   •  cholecalciferol (VITAMIN D3) 1000 units tablet, Take 1,000 Units by mouth Daily., Disp: , Rfl:   •  ezetimibe (ZETIA) 10 MG tablet, Take 10 mg by mouth Daily., Disp: , Rfl:   •  metFORMIN (GLUCOPHAGE) 500 MG tablet, TAKE ONE TABLET BY MOUTH DAILY WITH BREAKFAST (Patient taking differently: Take 500 mg by mouth Daily With Breakfast.), Disp: 90 tablet, Rfl: 0  •  Omega-3 Fatty Acids (FISH OIL) 1200 MG capsule capsule, Take 1,200 mg by mouth Daily., Disp: , Rfl:   •  sertraline (ZOLOFT) 50 MG tablet, TAKE ONE TABLET BY MOUTH DAILY (Patient taking differently: Take 50 mg by mouth Daily.), Disp: 90 tablet, Rfl: 0  •  nitroglycerin (NITROSTAT) 0.4 MG SL tablet, Place 0.4 mg under the tongue Every 5 (Five) Minutes As Needed for chest pain. Take no more than 3 doses in 15 minutes., Disp: , Rfl:     PHYSICAL EXAMINATION:   /82   Pulse 67   Temp 97.8 °F (36.6 °C) (Temporal)   Resp 16   Ht 170.2 cm (67.01\")   Wt 81.6 kg (180 lb)   SpO2 96%   BMI 28.19 kg/m²   Pain Score    12/04/20 0934   PainSc: 0-No pain       ECOG Performance Status: 1 - Symptomatic but completely ambulatory  General Appearance:  alert, cooperative, no apparent distress and appears stated age   Neurologic/Psychiatric: A&O x 3, gait steady, appropriate affect, strength 5/5 in all muscle groups   HEENT:  Normocephalic, without obvious abnormality, mucous membranes moist   Neck: Supple, symmetrical, trachea midline, no adenopathy;  No thyromegaly, masses, or tenderness   Lungs:   Clear to auscultation bilaterally; respirations regular, even, and unlabored bilaterally   Heart:  Regular rate and rhythm, no murmurs appreciated   Abdomen:   Soft, non-tender, non-distended and no organomegaly "   Lymph nodes: No cervical, supraclavicular, inguinal or axillary adenopathy noted   Extremities: Normal, atraumatic; no clubbing, cyanosis, or edema    Skin: No rashes, ulcers, or suspicious lesions noted       No visits with results within 2 Week(s) from this visit.   Latest known visit with results is:   Admission on 11/19/2020, Discharged on 11/19/2020   Component Date Value Ref Range Status   • Potassium 11/19/2020 4.5  3.5 - 5.2 mmol/L Final    Slight hemolysis detected by analyzer. Results may be affected.   • QT Interval 11/19/2020 442  ms Final   • QTC Interval 11/19/2020 402  ms Final   • Glucose 11/19/2020 142* 70 - 130 mg/dL Final   • Case Report 11/19/2020    Final                    Value:Non-gynecologic Cytology                          Case: [id number]                                  Authorizing Provider:  Clint Thompson MD Collected:           11/19/2020 07:55 AM          Ordering Location:     [facility]   Received:            11/19/2020 11:14 AM                                 ENDO SUITES                                                                  Pathologist:           Antonio Barrett MD                                                          Specimens:   1) - Lung, Right Lower Lobe, right lower lobe brush                                                 2) - Lung, Right Lower Lobe, right lower lobe wash                                        • Final Diagnosis 11/19/2020    Final                    Value:This result contains rich text formatting which cannot be displayed here.   • Fungus Culture 11/19/2020 No fungus isolated at 2 weeks   Preliminary   • Viral Culture, General 11/19/2020 No virus isolated.   Final   • Case Report 11/19/2020    Final                    Value:Surgical Pathology Report                         Case: [id number]                                  Authorizing Provider:  Clint Thompson MD Collected:           11/19/2020 07:58 AM           Ordering Location:     [facility]   Received:            11/19/2020 11:13 AM                                 ENDO SUITES                                                                  Pathologist:           Darrick Thayer MD                                                        Specimens:   1) - Lung, Right Lower Lobe, right lower lobe                                                       2) - Lung, Right Lower Lobe, right lower lobe station seven                                         3) - Lung, Right Lower Lobe, right lower lobe station 11 R                                          4) - Lung, Right Lower Lobe, right lower lobe station 10 r                                • Clinical Information 11/19/2020    Final                    Value:This result contains rich text formatting which cannot be displayed here.   • Final Diagnosis 11/19/2020    Final                    Value:This result contains rich text formatting which cannot be displayed here.   • Comment 11/19/2020    Final                    Value:This result contains rich text formatting which cannot be displayed here.   • Gross Description 11/19/2020    Final                    Value:This result contains rich text formatting which cannot be displayed here.   • Microscopic Description 11/19/2020    Final                    Value:This result contains rich text formatting which cannot be displayed here.   • AFB Culture 11/19/2020 No AFB isolated at 2 weeks   Preliminary   • AFB Stain 11/19/2020 No acid fast bacilli seen on concentrated smear   Preliminary   • Fungal Stain 11/19/2020 No fungal elements seen   Final   • Fungal Stain 11/19/2020 No Pneumocystis jirovecci (formerly Pneumocystis carinii) noted on smear.   Final    SLIDE READ BY PATHOLOGIST   • Respiratory Culture 11/19/2020 Rare Normal Respiratory Ro: NO S.aureus/MRSA or Pseudomonas aeruginosa   Final   • Gram Stain 11/19/2020 Few (2+) Epithelial cells per low power field    Final   • Gram Stain 11/19/2020 Few (2+) WBCs seen   Final   • Gram Stain 11/19/2020 No organisms seen   Final        Mri Brain With & Without Contrast    Result Date: 12/2/2020  Narrative: EXAMINATION: MRI BRAIN W WO CONTRAST- 12/01/2020  INDICATION: small cell lung cancer; C34.90-Malignant neoplasm of unspecified part of unspecified bronchus or lung  TECHNIQUE: Sagittal and axial T1 and axial T2 FLAIR diffusion-weighted images of the brain, post gadolinium contrast axial, sagittal and coronal T1-weighted images.  COMPARISON: Head CT scan of 12/21/2018.  FINDINGS: Patient history indicates new diagnosis of lung cancer, staging.  There is a small rounded focus of weak diffusion restriction in the lateral right cerebellum. Although subacute infarct might have a similar appearance, this corresponds to a round uniformly enhancing 6 mm right cerebellar lesion on postcontrast series. No evidence of restricted diffusion is seen elsewhere.  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 No other areas suggesting vasogenic edema are identified. There is no evidence of mass effect, hydrocephalus, hemorrhage, or abnormal extra-axial collection.  Postcontrast images show the previously mentioned right cerebellar lesion, but no other evidence of enhancing mass or other pathologic postcontrast brain or meningeal enhancement.      Impression: Apparently solitary 6 mm enhancing lesion of the right cerebellum, presumably an isolated metastasis. No other evidence of metastatic disease or other acute intracranial disease is seen elsewhere.  D:  12/01/2020 E:  12/02/2020  This report was finalized on 12/2/2020 10:10 PM by Dr. Chucho Faith MD.      Xr Chest 1 View    Result Date: 11/19/2020  Narrative: EXAMINATION: XR CHEST 1 VW-  INDICATION: r/o PTX; R91.1-Solitary  pulmonary nodule  COMPARISON: 12/13/2018  FINDINGS: Hazy right lower lung field opacity, possibly related to recent biopsy. No pneumothorax or significant pleural effusion. Unchanged heart and mediastinal contours.      Impression: Hazy right lower lung field opacity, possibly related to recent biopsy. No pneumothorax or significant pleural effusion. Unchanged heart and mediastinal contours.  This report was finalized on 11/19/2020 8:54 AM by Kan Webber.      Fl C Arm During Surgery    Result Date: 11/19/2020  Narrative: EXAMINATION: FL C ARM DURING SURGERY- 11/19/2020  INDICATION: EBUS BRONCH; R91.1-Solitary pulmonary nodule  TECHNIQUE: Intraoperative fluoroscopy for improved localization and treatment planning  COMPARISON: NONE  FINDINGS: Intraoperative fluoroscopy with total fluoroscopic time usage 1 minute 27 seconds and one image saved during bronchoscopy       Impression: Intraoperative fluoroscopy utilized during bronchoscopy.  D:  11/19/2020 E:  11/19/2020    This report was finalized on 11/19/2020 6:41 PM by Dr. Ren Malone.      Nm Pet Skull Base To Mid Thigh    Result Date: 11/9/2020  Narrative: EXAMINATION: NM PET SKULL BASE TO MID THIGH-  INDICATION: R91.1-Solitary pulmonary nodule; history of prostate cancer.  TECHNIQUE: With fasting blood glucose level of 138 mg/dL, a total of 13.24 mCi of FDG was administered via the right antecubital vein. Following appropriate delay, PET CT imaging was obtained from the skull vertex to the mid thighs bilaterally and fused multiplanar images were reconstructed. The CT scan is an unenhanced study and used for reference to the PET scan only and should not be considered a diagnostic CT scan. PET CT imaging was reviewed in the axial, coronal, and sagittal planes as well as within the cine mode.  COMPARISON: Initial exam for treatment with no prior studies available for comparison.  FINDINGS: Normal variant activity is seen within the oropharynx and muscles of  phonation. Normal variant activity identified in the region of the vocal cords. There is no hypermetabolic activity identified within the neck to suggest evidence of metastatic disease. There is low level activity correlating to the nodule seen posteriorly within the right lower lobe. The maximum SUV is 3.0. Findings are borderline, however, there is a second area of hypermetabolic activity seen within the right infrahilar region suggesting a metastatic lymph node with maximum SUV of 3.9. The remainder of the chest is unremarkable. No additional hypermetabolic focus is present.  Within the abdomen and pelvis, there is heterogeneous activity seen within the liver. There is no evidence of hypermetabolic activity identified within the abdomen or pelvis to suggest evidence of metastatic disease. There are stones in the gallbladder. A cyst is identified on the right kidney. Normal variant activity is seen within the GI and  tract. The upper thighs are unremarkable.      Impression: Low level activity in the pulmonary nodule seen in the right lower lobe with additional hypermetabolic activity seen in a lymph node in the right infrahilar region. Findings concerning for malignancy and metastatic disease confined to the right hilum and right lower lobe.   D:  11/09/2020 E:  11/09/2020  This report was finalized on 11/9/2020 10:46 AM by Dr. Sherry Peña MD.          DIAGNOSTIC DATA:   1. Radiology: As above  2. 's note reviewed by me and documented in the  chart.   3. Pathology report: Bronchoscopy with biopsy November 19, 2020 by  positive for small cell lung cancer  4. Laboratory data:    Results for JENN MARTINES (MRN [id number]) as of 12/4/2020 09:58   Ref. Range 11/17/2020 08:57   WBC Latest Ref Range: 3.40 - 10.80 10*3/mm3 6.89   RBC Latest Ref Range: 4.14 - 5.80 10*6/mm3 4.51   Hemoglobin Latest Ref Range: 13.0 - 17.7 g/dL 14.1   Hematocrit Latest Ref Range: 37.5 - 51.0 % 42.7   RDW  Latest Ref Range: 12.3 - 15.4 % 12.7   MCV Latest Ref Range: 79.0 - 97.0 fL 94.7   MCH Latest Ref Range: 26.6 - 33.0 pg 31.3   MCHC Latest Ref Range: 31.5 - 35.7 g/dL 33.0   MPV Latest Ref Range: 6.0 - 12.0 fL 11.8   Platelets Latest Ref Range: 140 - 450 10*3/mm3 141       ASSESSMENT: The patient is a very pleasant 70 y.o.  male  with limited stage small cell lung cancer    PROBLEM LIST:   1.  Limited stage small cell lung cancer [id number] stage IIIa:  A.  Presented with abnormal screening CT chest  B.  Diagnosed after bronchoscopy with biopsy done by  November 19, 2020 + for small cell from level 7 level 4R and level 10 R lymph nodes.  C.  Whole-body PET scan did not reveal any other sites of disease.  D.  Will start definitive concurrent chemotherapy with radiation using cisplatin etoposide December 2020  2.  Isolated 6 mm right cerebellar lesion:  A.  Evident MRI brain done on December 1, 2020  B.  The patient will receive I will be radiation after completion of treatment.    PLAN:   1. I had a long discussion today with the patient and his wife about his  new diagnosis of lung cancer. I did go over the final pathology report in  detail with both of them. I reviewed the patient's documents including refereing provider's notes, lab results, imaging, and path report.   2.  I reviewed the patient's films myself I went over the pictures with him and his wife.  3.  I will give the patient the benefit of the doubt since his brain lesion is isolated and only seen on one slice.  I will treat him as stage III disease or limited stage disease with concurrent chemotherapy and radiation.  4.  The patient will be started on definitive cisplatin etoposide 3 days in a row every 3 weeks this will be given in addition to concurrent radiation on twice daily schedule start with cycle #2.  5.  I will set the patient up to meet with my nurse practitioner for treatment education.  6.  The patient will follow-up with me for  cycle #2.  7.  I will refer the patient to meet with radiation oncology.  8.  I will monitor the patient blood work including blood counts kidney function liver function and electrolytes.  9.  The patient would benefit from hospitalization after completion of chemotherapy.  10. We reviewed the potential side effects of this regimen including fatigue, vomiting and nausea, hair loss, nephropathy, neuropathy, hearing loss, myelosuppression, and risk of infusion reaction.    Daniel Cartagena MD  12/4/2020

## 2020-12-08 ENCOUNTER — OFFICE VISIT (OUTPATIENT)
Dept: ONCOLOGY | Facility: CLINIC | Age: 70
End: 2020-12-08

## 2020-12-08 VITALS
SYSTOLIC BLOOD PRESSURE: 141 MMHG | DIASTOLIC BLOOD PRESSURE: 85 MMHG | HEART RATE: 72 BPM | TEMPERATURE: 97.5 F | BODY MASS INDEX: 28.88 KG/M2 | OXYGEN SATURATION: 97 % | WEIGHT: 184 LBS | RESPIRATION RATE: 18 BRPM | HEIGHT: 67 IN

## 2020-12-08 DIAGNOSIS — C34.31 SMALL CELL LUNG CANCER, RIGHT LOWER LOBE (HCC): Primary | ICD-10-CM

## 2020-12-08 PROCEDURE — 99215 OFFICE O/P EST HI 40 MIN: CPT | Performed by: NURSE PRACTITIONER

## 2020-12-08 NOTE — PROGRESS NOTES
CHEMOTHERAPY PREPARATION    Luis Elliott  [id number]  1950    Chief Complaint: chemo education     History of present illness:  Luis Elliott is a 70 y.o. year old male who is here today for chemotherapy preparation and needs assessment. The patient has been diagnosed with limited stage small cell lung cancer and is scheduled to begin treatment with cisplatin/etoposide.     Oncology History:    Oncology/Hematology History   Small cell lung cancer, right lower lobe (CMS/HCC)   11/19/2020 Cancer Staged    Staging form: Lung, AJCC 8th Edition  - Clinical stage from 11/19/2020: Stage IIIA (cT1b, cN2, cM0) - Signed by Daniel Cartagena MD on 12/4/2020 12/4/2020 Initial Diagnosis    Small cell lung cancer (CMS/HCC)     12/14/2020 -  Chemotherapy    OP LUNG CISplatin 100 mg/m2 / Etoposide Q21D         Past Medical History:   Diagnosis Date   • Cancer (CMS/HCC)     PROSTATE   • COPD (chronic obstructive pulmonary disease) (CMS/HCC)     dr valente thinks pt has this    • Coronary artery disease    • DDD (degenerative disc disease), cervical    • Depression    • Diabetes mellitus (CMS/HCC)     let dr check sugar    • Erectile dysfunction    • Glaucoma    • Headache    • Hyperlipidemia    • Hypertension    • Impingement syndrome of left shoulder    • Infection, bacterial     sees ID -  42 days of antibiotics    • MI, old     89   • Mitral valve vegetation    • Osteoarthritis    • SOBOE (shortness of breath on exertion)    • Varicose veins of lower limb     RIGHT   • Wears glasses    • Wears partial dentures     bottom        Past Surgical History:   Procedure Laterality Date   • BRONCHOSCOPY N/A 11/19/2020    Procedure: BRONCHOSCOPY WITH ENDOBRONCHIAL ULTRASOUND WITH FLUOROSCOPY;  Surgeon: Clint Valente MD;  Location:  [facility];  Service: Pulmonary;  Laterality: N/A;   • CARDIAC CATHETERIZATION N/A 12/20/2016    Procedure: Left Heart Cath;  Surgeon: Kan Blackwell MD;  Location:  [facility] CATH  INVASIVE LOCATION;  Service:    • CATARACT EXTRACTION      BILATERAL CATARACTS REMOVED.   • COLONOSCOPY     • COLONOSCOPY W/ POLYPECTOMY     • CORONARY ANGIOPLASTY     • CORONARY ANGIOPLASTY WITH STENT PLACEMENT      x3    • EYE SURGERY      right- stent for drainage   • HERNIA REPAIR      RIGHT   • KNEE ARTHROSCOPY      LEFT   • KNEE SURGERY      LEFT TOTAL KNEE REPLACEMENT 12/2012   • LUMBAR EPIDURAL INJECTION     • [place] RT/LT HEART CATHETERS N/A 12/27/2016    Procedure: Percutaneous Coronary Intervention;  Surgeon: Kan Blackwell MD;  Location: [facility] INVASIVE LOCATION;  Service: Cardiovascular   • PROSTATECTOMY      2003   • TENDON TRANSFER ELBOW      TENDON REPAIR-  right    • TONSILLECTOMY     • TRABECULECTOMY      FOR GLAUCOMA       MEDICATIONS: The current medication list was reviewed and reconciled.     Allergies:  is allergic to xarelto [rivaroxaban].    Family History   Problem Relation Age of Onset   • Hypertension Mother    • Atrial fibrillation Mother    • Alcohol abuse Father    • Heart attack Father    • Diabetes Father    • No Known Problems Sister    • No Known Problems Brother          Review of Systems   Constitutional: Negative for fatigue, fever and unexpected weight change.   HENT: Negative for congestion, hearing loss, sore throat and trouble swallowing.    Eyes: Negative for visual disturbance.   Respiratory: Negative for cough, shortness of breath and wheezing.    Cardiovascular: Negative for chest pain and leg swelling.   Gastrointestinal: Negative for abdominal distention, abdominal pain, constipation, diarrhea, nausea and vomiting.   Endocrine: Negative.    Genitourinary: Negative.    Musculoskeletal: Negative for arthralgias, back pain and gait problem.   Skin: Negative.    Allergic/Immunologic: Negative.    Neurological: Negative for dizziness, weakness, numbness and headaches.   Hematological: Negative for adenopathy. Does not bruise/bleed easily.   Psychiatric/Behavioral:  "The patient is nervous/anxious.    All other systems reviewed and are negative.      Physical Exam  Vital Signs: /85   Pulse 72   Temp 97.5 °F (36.4 °C) (Temporal)   Resp 18   Ht 170.2 cm (67.01\")   Wt 83.5 kg (184 lb)   SpO2 97%   BMI 28.81 kg/m²   Vitals:    12/08/20 1355   PainSc: 0-No pain           General Appearance:  alert, cooperative, no apparent distress and appears stated age   Neurologic/Psychiatric: A&O x 3, gait steady, appropriate affect   HEENT:  Normocephalic, without obvious abnormality, mucous membranes moist   Lungs:   Clear to auscultation bilaterally; respirations regular, even, and unlabored bilaterally   Heart:  Regular rate and rhythm, no murmurs appreciated   Extremities: Normal, atraumatic; no clubbing, cyanosis, or edema    Skin: No rashes, lesions, or abnormal coloration noted     ECOG Performance Status: (1) Restricted in Physically Strenuous Activity, Ambulatory & Able to Do Work of Light Nature          NEEDS ASSESSMENTS    Genetics  The patient's new diagnosis and family history have been reviewed for genetic counseling needs. A genetic referral is not recommended.     Molecular Testing  Further molecular testing on tumor is not indicated.     Psychosocial  The patient has completed a PHQ-9 Depression Screening and the Distress Thermometer (DT) today.   PHQ-9 Total Score: 5. PHQ-9 results show 5-9 (Mild Depression). The patient scored their distress today as 5 on a scale of 0-10 with 0 being no distress and 10 being extreme distress.   Problems marked by the patient as being an issue for them within the last week include emotional problems.   Results were reviewed along with psychosocial resources offered by our cancer center. Our oncology social worker will be flagged for a DT score of 4 or above, and a same day call will be made for a score of 9 or 10. A mental health referral is recommended at this time. The patient is not accepting of a referral to [facility]. " "  Copies of patient's questionnaires will be scanned into EMR for details and further reference.    Barriers to care  A barriers form was also completed by the patient today. We discussed services offered by our facility to help him have adequate access to care. The patient was given the name and card for our Oncology Social Worker, Hansa Knox. Based upon barriers assessment today, the patient will require a follow-up call from the  to further discuss needs.   A copy of the barriers form will also be scanned into EMR for details and further reference.     VAD Assessment  The patient and I discussed planned intervenous chemotherapy as well as other IV treatments that are often needed throughout the course of treatment. These may include, but are not limited to blood transfusions, antibiotics, and IV hydration. The vasculature does appear to be adequate for multiple peripheral IVs throughout their treatment course. Discussed risks and benefits of VADs. The patient would not like to pursue Port-A-Cath insertion prior to initiation of treatment.     Advance Care Planning   ACP discussion was held with the patient during this visit. Patient has an advance directive (not in EMR), copy requested.  The patient and I discussed advanced care planning, \"Conversations that Matter\".   This service was offered, free of charge, for development of advance directives with a certified ACP facilitator.  The patient does have an up-to-date advanced directive. This document is not on file with our office. The patient is not interested in an appointment with one of our facilitators to create or update their advanced directives.         Palliative Care  The patient and I discussed palliative care services. Palliative care is not the same as Hospice care. This is specialized medical care for people living with serious illness with the goal of improving quality of life for the patient and their family. [facility] has " partnered with [facility] to offer our patients outpatient palliative care early along with their treatment to assist in coordination of care, symptom management, pain management, and medical decision making.  Oncology criteria for palliative care referral is not met at this time. The patient is not interested in a palliative care consultation.     Additional Referral needs  none      CHEMOTHERAPY EDUCATION    Booklets Given: Chemotherapy and You [x]  Eating Hints [x]    Sexuality/Fertility Books []      Chemotherapy/Biotherapy Education Sheets: (list all that apply)  nausea management, acid reflux management, diarrhea management, Cancer resourse contacts information and skin and mouth care                                                                                                                                                                 Chemotherapy Regimen:   Treatment Plans     Name Type Plan Dates Plan Provider         Active    OP LUNG CISplatin 100 mg/m2 / Etoposide Q21D ONCOLOGY TREATMENT  12/13/2020 - Present Daniel Cartagena MD                    TOPICS EDUCATION PROVIDED COMMENTS   ANEMIA:  role of RBC, cause, s/s, ways to manage, role of transfusion []    THROMBOCYTOPENIA:  role of platelet, cause, s/s, ways to prevent bleeding, things to avoid, when to seek help []    NEUTROPENIA:  role of WBC, cause, infection precautions, s/s of infection, when to call MD []    NUTRITION & APPETITE CHANGES:  importance of maintaining healthy diet & weight, ways to manage to improve intake, dietary consult, exercise regimen []    DIARRHEA:  causes, s/s of dehydration, ways to manage, dietary changes, when to call MD []    CONSTIPATION:  causes, ways to manage, dietary changes, when to call MD []    NAUSEA & VOMITING:  cause, use of antiemetics, dietary changes, when to call MD []    MOUTH SORES:  causes, oral care, ways to manage []    ALOPECIA:  cause, ways to manage, resources []    INFERTILITY  & SEXUALITY:  causes, fertility preservation options, sexuality changes, ways to manage, importance of birth control []    NERVOUS SYSTEM CHANGES:  causes, s/s, neuropathies, cognitive changes, ways to manage []    PAIN:  causes, ways to manage []    SKIN & NAIL CHANGES:  cause, s/s, ways to manage []    ORGAN TOXICITIES:  cause, s/s, need for diagnostic tests, labs, when to notify MD []    SURVIVORSHIP:  distress, distress assessment, secondary malignancies, early/late effects, follow-up, social issues, social support []    HOME CARE:  use of spill kits, storing of PO chemo, how to manage bodily fluids []    MISCELLANEOUS:  drug interactions, administration, vesicant, et []        Assessment and Plan:    Diagnoses and all orders for this visit:    1. Small cell lung cancer, right lower lobe (CMS/HCC) (Primary)  -     Ambulatory Referral to ONC Social Work        This was a 60 minute face-to-face visit with 60 minutes spent in  counseling and coordination of care as documented above.   The patient and I have reviewed their new cancer diagnosis and scheduled treatment plan. Needs assessment was completed including genetics, psychosocial needs, barriers to care, VAD evaluation, advanced care planning, and palliative care services. Referrals have been ordered as appropriate based upon our evaluation and patient desires.     Chemotherapy teaching was also completed today as documented above. Adequate time was given to answer all questions to his satisfaction. Patient and family are aware of their care team members and contact information if they have questions or problems throughout the treatment course. Needs assessments and education has been completed. The patient is adequately prepared to begin treatment as scheduled.     Pain assessment was performed today as a part of patient’s care. For patients with pain related to surgery, gynecologic malignancy or cancer treatment, the plan is as noted in the assessment/plan.   For patients with pain not related to these issues, they are to seek any further needed care from a more appropriate provider, such as PCP.    Reviewed home medication use including Zofran every 8 hours as needed for treatment induced nausea and decadron 2 tabs in the AM on days 2, 3, and 4 of treatment.     Referral placed for  due to distress score of 5.     The patient is scheduled to see Dr. Urias on Thursday regarding concurrent radiation.     The patient will have pre-treatment labs drawn on Thursday.     Consent obtained for cisplatin/etoposide.     Reviewed option for [facility] visits if needed for symptom management while on treatment.     Electronically signed by [facility] Caceres on [unfilled] at 16:27 EST.

## 2020-12-10 ENCOUNTER — OFFICE VISIT (OUTPATIENT)
Dept: RADIATION ONCOLOGY | Facility: HOSPITAL | Age: 70
End: 2020-12-10

## 2020-12-10 ENCOUNTER — TELEPHONE (OUTPATIENT)
Dept: ONCOLOGY | Facility: CLINIC | Age: 70
End: 2020-12-10

## 2020-12-10 ENCOUNTER — LAB (OUTPATIENT)
Dept: LAB | Facility: HOSPITAL | Age: 70
End: 2020-12-10

## 2020-12-10 ENCOUNTER — HOSPITAL ENCOUNTER (OUTPATIENT)
Dept: RADIATION ONCOLOGY | Facility: HOSPITAL | Age: 70
Setting detail: RADIATION/ONCOLOGY SERIES
Discharge: HOME | End: 2020-12-10

## 2020-12-10 VITALS
TEMPERATURE: 97.3 F | RESPIRATION RATE: 16 BRPM | HEART RATE: 72 BPM | OXYGEN SATURATION: 97 % | SYSTOLIC BLOOD PRESSURE: 156 MMHG | BODY MASS INDEX: 28.75 KG/M2 | DIASTOLIC BLOOD PRESSURE: 89 MMHG | HEIGHT: 67 IN | WEIGHT: 183.2 LBS

## 2020-12-10 DIAGNOSIS — R91.1 PULMONARY NODULE: Primary | ICD-10-CM

## 2020-12-10 DIAGNOSIS — C34.31 SMALL CELL LUNG CANCER, RIGHT LOWER LOBE (HCC): ICD-10-CM

## 2020-12-10 LAB
ALBUMIN SERPL-MCNC: 4.6 G/DL (ref 3.5–5.2)
ALBUMIN/GLOB SERPL: 1.8 G/DL
ALP SERPL-CCNC: 108 U/L (ref 39–117)
ALT SERPL W P-5'-P-CCNC: 53 U/L (ref 1–41)
ANION GAP SERPL CALCULATED.3IONS-SCNC: 9 MMOL/L (ref 5–15)
AST SERPL-CCNC: 27 U/L (ref 1–40)
BILIRUB SERPL-MCNC: 0.9 MG/DL (ref 0–1.2)
BUN SERPL-MCNC: 19 MG/DL (ref 8–23)
BUN/CREAT SERPL: 19.8 (ref 7–25)
CALCIUM SPEC-SCNC: 10 MG/DL (ref 8.6–10.5)
CHLORIDE SERPL-SCNC: 105 MMOL/L (ref 98–107)
CO2 SERPL-SCNC: 28 MMOL/L (ref 22–29)
CREAT SERPL-MCNC: 0.96 MG/DL (ref 0.76–1.27)
ERYTHROCYTE [DISTWIDTH] IN BLOOD BY AUTOMATED COUNT: 13.4 % (ref 12.3–15.4)
GFR SERPL CREATININE-BSD FRML MDRD: 78 ML/MIN/1.73
GLOBULIN UR ELPH-MCNC: 2.5 GM/DL
GLUCOSE SERPL-MCNC: 115 MG/DL (ref 65–99)
HCT VFR BLD AUTO: 42 % (ref 37.5–51)
HGB BLD-MCNC: 14 G/DL (ref 13–17.7)
LYMPHOCYTES # BLD AUTO: 2.4 10*3/MM3 (ref 0.7–3.1)
LYMPHOCYTES NFR BLD AUTO: 28.8 % (ref 19.6–45.3)
MAGNESIUM SERPL-MCNC: 2.3 MG/DL (ref 1.6–2.4)
MCH RBC QN AUTO: 30.8 PG (ref 26.6–33)
MCHC RBC AUTO-ENTMCNC: 33.3 G/DL (ref 31.5–35.7)
MCV RBC AUTO: 92.4 FL (ref 79–97)
MONOCYTES # BLD AUTO: 0.6 10*3/MM3 (ref 0.1–0.9)
MONOCYTES NFR BLD AUTO: 7.4 % (ref 5–12)
NEUTROPHILS NFR BLD AUTO: 5.2 10*3/MM3 (ref 1.7–7)
NEUTROPHILS NFR BLD AUTO: 63.8 % (ref 42.7–76)
PLATELET # BLD AUTO: 128 10*3/MM3 (ref 140–450)
PMV BLD AUTO: 8.6 FL (ref 6–12)
POTASSIUM SERPL-SCNC: 4.4 MMOL/L (ref 3.5–5.2)
PROT SERPL-MCNC: 7.1 G/DL (ref 6–8.5)
RBC # BLD AUTO: 4.55 10*6/MM3 (ref 4.14–5.8)
SODIUM SERPL-SCNC: 142 MMOL/L (ref 136–145)
WBC # BLD AUTO: 8.2 10*3/MM3 (ref 3.4–10.8)

## 2020-12-10 PROCEDURE — 85025 COMPLETE CBC W/AUTO DIFF WBC: CPT

## 2020-12-10 PROCEDURE — G0463 HOSPITAL OUTPT CLINIC VISIT: HCPCS | Performed by: RADIOLOGY

## 2020-12-10 PROCEDURE — 83735 ASSAY OF MAGNESIUM: CPT

## 2020-12-10 PROCEDURE — 36415 COLL VENOUS BLD VENIPUNCTURE: CPT

## 2020-12-10 PROCEDURE — 80053 COMPREHEN METABOLIC PANEL: CPT

## 2020-12-10 NOTE — TELEPHONE ENCOUNTER
Pt returned another missed call, he doesn't see a VM. He's not sure of the reasons for call.    [phone] PH

## 2020-12-10 NOTE — PROGRESS NOTES
CONSULTATION NOTE    NAME:      Luis Elliott  :                                                          1950  DATE OF CONSULTATION:                       12/10/20  REQUESTING PHYSICIAN:                   Daniel Cartagena MD  REASON FOR CONSULTATION:           Evaluation for the management of the patient's likely limited stage small cell lung cancer of the right lower lobe of the lung cT1 cN2 M0 stage IIIa specifically to consider concurrent extremity radiotherapy with systemic chemotherapy.  Small cell lung cancer, right lower lobe (CMS/HCC)  Staging form: Lung, AJCC 8th Edition  - Clinical stage from 2020: Stage IIIA (cT1b, cN2, cM0) - Signed by Daniel Cartagena MD on 2020           BRIEF HISTORY:  Luis Elliott  is a very pleasant 70 y.o. male  with a significant smoking history who was undergoing surveillance CT scans including one on 2018 that showed no concern for malignancy.  The patient was also hospitalized in December with possible concern for mitral valve issues possibly related to a vegetation versus prolapse only after he was found to have group A strep growing in his blood.  He received antibiotics IV for quite some time during the end of  for this.  He did not have an MRI scan at that time due to the presence of what was felt to be metallic bodies in his orbits which is actually related to fenestrations in his eyes for his glaucoma.  The patient eventually had a repeat screening CT scan on 2020 that showed a small right lung nodule which had increased in size.  He was sent for a PET scan on 10/14/2020 that showed a right lower lobe nodule which increased about 2.6 cm and was hypermetabolic.  Patient had some hilar and subcarinal lymphadenopathy which was hypermetabolic.  Patient was then taken for a bronchoscopy and EBUS on 2020 that showed small cell lung cancer and station 7, 11 R, and 10 arm.  Patient sees generally TTF-1 positive and synaptophysin  "positive.  Patient was sent for restaging MRI scan 12/1/2020 that showed a very small indeterminate right cerebellar lesion which enhances had some weak diffusion restriction but no edema.  Is unclear particular given his history with a possible mitral valve vegetation and possible endocarditis with positive blood cultures what this lesion could potentially represent.  Patient was evaluated by Dr. Cartagena who recommended course of concurrent chemotherapy and radiation for what clinically could likely be limited stage disease.  Patient was consented for chemotherapy and has plans to start in the near future.  Patient was sent to my clinic to be evaluated for concurrent chemotherapy and radiation start with cycle 2.  The patient reports that he feels very well.  He reports that he has normal functionality.  He denies any new fevers, chills, nausea, vomiting, diarrhea, headaches, neurologic symptoms.    Allergies   Allergen Reactions   • Xarelto [Rivaroxaban] Other (See Comments)     MADE ME FEEL LIKE \" I WAS HAVING A HEART ATTACK.\"     Current Outpatient Medications:   •  aspirin 81 MG EC tablet, Take 81 mg by mouth Every Night., Disp: , Rfl:   •  atorvastatin (LIPITOR) 40 MG tablet, Take 40 mg by mouth Every Night., Disp: , Rfl:   •  calcium carbonate-cholecalciferol 500-400 MG-UNIT tablet tablet, Take 1 tablet by mouth Daily., Disp: , Rfl:   •  cholecalciferol (VITAMIN D3) 1000 units tablet, Take 1,000 Units by mouth Daily., Disp: , Rfl:   •  dexamethasone (DECADRON) 4 MG tablet, Take 2 tablets in the morning daily on days 2, 3 & 4.  Take with food., Disp: 6 tablet, Rfl: 5  •  ezetimibe (ZETIA) 10 MG tablet, Take 10 mg by mouth Daily., Disp: , Rfl:   •  metFORMIN (GLUCOPHAGE) 500 MG tablet, TAKE ONE TABLET BY MOUTH DAILY WITH BREAKFAST (Patient taking differently: Take 500 mg by mouth Daily With Breakfast.), Disp: 90 tablet, Rfl: 0  •  nitroglycerin (NITROSTAT) 0.4 MG SL tablet, Place 0.4 mg under the tongue Every 5 " (Five) Minutes As Needed for chest pain. Take no more than 3 doses in 15 minutes., Disp: , Rfl:   •  Omega-3 Fatty Acids (FISH OIL) 1200 MG capsule capsule, Take 1,200 mg by mouth Daily., Disp: , Rfl:   •  ondansetron (ZOFRAN) 8 MG tablet, Take 1 tablet by mouth 3 (Three) Times a Day As Needed for Nausea or Vomiting., Disp: 30 tablet, Rfl: 5  •  sertraline (ZOLOFT) 50 MG tablet, TAKE ONE TABLET BY MOUTH DAILY (Patient taking differently: Take 50 mg by mouth Daily.), Disp: 90 tablet, Rfl: 0    Social History     Tobacco Use   • Smoking status: Former Smoker     Packs/day: 1.50     Years: 36.00     Pack years: 54.00     Types: Cigarettes     Quit date: 2011     Years since quittin.6   • Smokeless tobacco: Former User     Types: Chew     Quit date:    • Tobacco comment: quit smoking for 12 years then started again but then quit a final time    Substance Use Topics   • Alcohol use: Not Currently   • Drug use: Yes     Types: Marijuana     Comment: a month ago         Past Medical History:   Diagnosis Date   • Cancer (CMS/HCC)     PROSTATE   • COPD (chronic obstructive pulmonary disease) (CMS/HCC)     dr valente thinks pt has this    • Coronary artery disease    • DDD (degenerative disc disease), cervical    • Depression    • Diabetes mellitus (CMS/HCC)     let  check sugar    • Erectile dysfunction    • Glaucoma    • Headache    • Hyperlipidemia    • Hypertension    • Impingement syndrome of left shoulder    • Infection, bacterial     sees ID -  42 days of antibiotics    • Lung cancer (CMS/HCC)    • MI, old     89   • Mitral valve vegetation    • Osteoarthritis    • Prostate cancer (CMS/HCC)        • SOBOE (shortness of breath on exertion)    • Varicose veins of lower limb     RIGHT   • Wears glasses    • Wears partial dentures     bottom        family history includes Alcohol abuse in his father; Atrial fibrillation in his mother; Diabetes in his father; Heart attack in his father; Hypertension in  "his mother; No Known Problems in his sister; Prostate cancer in his brother.     Past Surgical History:   Procedure Laterality Date   • BRONCHOSCOPY N/A 11/19/2020    Procedure: BRONCHOSCOPY WITH ENDOBRONCHIAL ULTRASOUND WITH FLUOROSCOPY;  Surgeon: Clint Thompson MD;  Location:  [facility];  Service: Pulmonary;  Laterality: N/A;   • CARDIAC CATHETERIZATION N/A 12/20/2016    Procedure: Left Heart Cath;  Surgeon: Kan Blackwell MD;  Location:  [facility];  Service:    • CATARACT EXTRACTION      BILATERAL CATARACTS REMOVED.   • COLONOSCOPY     • COLONOSCOPY W/ POLYPECTOMY     • CORONARY ANGIOPLASTY     • CORONARY ANGIOPLASTY WITH STENT PLACEMENT      x3    • EYE SURGERY      right- stent for drainage   • HERNIA REPAIR      RIGHT   • KNEE ARTHROSCOPY      LEFT   • KNEE SURGERY      LEFT TOTAL KNEE REPLACEMENT 12/2012   • LUMBAR EPIDURAL INJECTION     • DC RT/LT HEART CATHETERS N/A 12/27/2016    Procedure: Percutaneous Coronary Intervention;  Surgeon: Kan Blackwell MD;  Location:  [facility];  Service: Cardiovascular   • PROSTATECTOMY      2003   • TENDON TRANSFER ELBOW      TENDON REPAIR-  right    • TONSILLECTOMY     • TRABECULECTOMY      FOR GLAUCOMA        Review of Systems   Constitutional: Positive for appetite change.   Respiratory: Positive for cough.    Neurological: Positive for headaches.   Psychiatric/Behavioral: Positive for depression. The patient is nervous/anxious.            Objective   VITAL SIGNS:   Vitals:    12/10/20 1050   BP: 156/89   Pulse: 72   Resp: 16   Temp: 97.3 °F (36.3 °C)   TempSrc: Temporal   SpO2: 97%  Comment: RA   Weight: 83.1 kg (183 lb 3.2 oz)   Height: 170.2 cm (67.01\")   PainSc: 0-No pain        KPS       90%    Physical Exam  Constitutional:       General: He is not in acute distress.     Appearance: He is well-developed.   HENT:      Head: Normocephalic and atraumatic.   Eyes:      Conjunctiva/sclera: Conjunctivae " normal.      Pupils: Pupils are equal, round, and reactive to light.   Neck:      Musculoskeletal: Normal range of motion.      Trachea: No tracheal deviation.   Cardiovascular:      Comments: Well-perfused  Noncyanotic  Pulmonary:      Effort: Pulmonary effort is normal. No respiratory distress.      Breath sounds: No wheezing.   Abdominal:      General: There is no distension.      Palpations: Abdomen is soft.   Musculoskeletal: Normal range of motion.   Skin:     General: Skin is warm and dry.   Neurological:      General: No focal deficit present.      Mental Status: He is alert.      Cranial Nerves: No cranial nerve deficit.      Sensory: Sensory deficit present.      Motor: No weakness.      Coordination: Coordination normal.      Gait: Gait normal.   Psychiatric:         Mood and Affect: Mood normal.         Behavior: Behavior normal.         Thought Content: Thought content normal.         Judgment: Judgment normal.              The following portions of the patient's history were reviewed and updated as appropriate: allergies, current medications, past family history, past medical history, past social history, past surgical history and problem list.  EXAMINATION: NM PET SKULL BASE TO MID THIGH-     INDICATION: R91.1-Solitary pulmonary nodule; history of prostate cancer.     TECHNIQUE: With fasting blood glucose level of 138 mg/dL, a total of  13.24 mCi of FDG was administered via the right antecubital vein.  Following appropriate delay, PET CT imaging was obtained from the skull  vertex to the mid thighs bilaterally and fused multiplanar images were  reconstructed. The CT scan is an unenhanced study and used for reference  to the PET scan only and should not be considered a diagnostic CT scan.  PET CT imaging was reviewed in the axial, coronal, and sagittal planes  as well as within the cine mode.     COMPARISON: Initial exam for treatment with no prior studies available  for comparison.     FINDINGS:  Normal variant activity is seen within the oropharynx and  muscles of phonation. Normal variant activity identified in the region  of the vocal cords. There is no hypermetabolic activity identified  within the neck to suggest evidence of metastatic disease. There is low  level activity correlating to the nodule seen posteriorly within the  right lower lobe. The maximum SUV is 3.0. Findings are borderline,  however, there is a second area of hypermetabolic activity seen within  the right infrahilar region suggesting a metastatic lymph node with  maximum SUV of 3.9. The remainder of the chest is unremarkable. No  additional hypermetabolic focus is present.     Within the abdomen and pelvis, there is heterogeneous activity seen  within the liver. There is no evidence of hypermetabolic activity  identified within the abdomen or pelvis to suggest evidence of  metastatic disease. There are stones in the gallbladder. A cyst is  identified on the right kidney. Normal variant activity is seen within  the GI and  tract. The upper thighs are unremarkable.     IMPRESSION:  Low level activity in the pulmonary nodule seen in the right  lower lobe with additional hypermetabolic activity seen in a lymph node  in the right infrahilar region. Findings concerning for malignancy and  metastatic disease confined to the right hilum and right lower lobe.      D:  11/09/2020  E:  11/09/2020     This report was finalized on 11/9/2020 10:46 AM by Dr. Sherry Peña MD.    EXAMINATION: CT CHEST W CONTRAST- 10/01/2020     INDICATION: LLL lung nodule on low dose ct scan screening;  R91.1-Solitary pulmonary nodule     TECHNIQUE: 5 mm post IV contrast images through the chest and upper  abdomen     The radiation dose reduction device was turned on for each scan per the  ALARA (As Low as Reasonably Achievable) protocol.     COMPARISON: Low-dose chest CT scan 09/20/2018     FINDINGS: Patient history indicates left lower lung nodule,  follow-up.  Report of the 09/20/2018 study indicates 6 mm noncalcified peripheral  left lower lobe nodule.     Today's exam shows only a faint punctate remnant of the previously noted  6 mm left lower lobe peripheral lung nodule. There is mild peripheral  subpleural scarring of the upper lobes as on the prior exam.     There is, however, interval development of a somewhat elongated 26 x 14  mm noncalcified nodule of the superior segment of the right lower lobe,  worrisome for neoplasm. Please refer to axial image 29 series 4. No new  pulmonary parenchymal disease is identified elsewhere. No pleural  effusion is seen.     Mediastinal window images show no evidence of significant mediastinal  adenopathy or other mass, and grossly normal appearance of the thoracic  aorta and pulmonary arteries.     Included images of the upper abdomen show small gallstones layered in  the dependent portion of the otherwise normal-appearing gallbladder.  There is diffuse fatty liver change. No liver lesions are seen in the  included upper and mid portions of the liver. Spleen is not enlarged. No  significant abnormalities are seen of the included pancreatic tail,  adrenal glands, or the upper renal poles. Bony structures appear grossly  intact.     IMPRESSION:  1. Previously noted small left lower lobe pulmonary nodule is only  faintly visible on today's exam, decreased to only 2 or 3 mm in  diameter.  2. New, worrisome 26 x 14 mm nodule in the superior segment of the right  lower lobe, suspicious for   neoplasm.  3. Mild chronic appearing interstitial changes elsewhere but no evidence  to suggest metastatic   disease in the chest or upper abdomen.  4. Gallstones.     NOTE: Preliminary report called to Dr. Coleman at 1:45 PM 10/01/2020.     D:  10/01/2020  E:  10/01/2020       INDICATION: R78.81-Bacteremia; B95.5-Unspecified streptococcus as the  cause of diseases classified elsewhere.     TECHNIQUE:  Axial CT data of the head were  acquired helically without  contrast. Multiplanar reformatted images were generated and reviewed.  The radiation dose reduction device was turned on for each scan per the  ALARA (As Low as Reasonably Achievable) protocol.     COMPARISONS:  None.     FINDINGS:  There is no CT evidence of acute infarction, intracranial  hemorrhage or hydrocephalus.  There is no large mass or significant mass  effect and the basal cisterns are patent.  There is no calvarial  destructive lesion or fracture. Minimal mucosal thickening is noted in  the posterior aspect of the left maxillary sinus. There is a tiny  punctate metallic foreign body in the right optic globe.     IMPRESSION:  1. No acute intracranial abnormality.  2. Metallic foreign body in the right optic globe most likely precludes  MRI.     DICTATED:   12/22/2018  EDITED/ls :   12/22/2018      This report was finalized on 12/22/2018 12:30 PM by Castro Doll.    Tangela Malhotra on 12/1/2020  3:25 PM   Newly diagnosed with Lung CA. 10/13/2020 Staging. No headaches.     Rt ac/18ml leno/ 22G      Appointment Information    Display Notes    [facility]           PACS Images     Radiology Images   Study Result    EXAMINATION: MRI BRAIN W WO CONTRAST- 12/01/2020      INDICATION: small cell lung cancer; C34.90-Malignant neoplasm of  unspecified part of unspecified bronchus or lung     TECHNIQUE: Sagittal and axial T1 and axial T2 FLAIR diffusion-weighted  images of the brain, post gadolinium contrast axial, sagittal and  coronal T1-weighted images.     COMPARISON: Head CT scan of 12/21/2018.     FINDINGS: Patient history indicates new diagnosis of lung cancer,  staging.     There is a small rounded focus of weak diffusion restriction in the  lateral right cerebellum. Although subacute infarct might have a similar  appearance, this corresponds to a round uniformly enhancing 6 mm right  cerebellar lesion on postcontrast series. No evidence of restricted  diffusion is seen  elsewhere.     Remaining imaging sequences show subtle increased FLAIR signal at the  site of the patient's right cerebellar lesion, and multiple punctate  central white matter lesions typical of microvascular  leukoencephalopathy. No other areas suggesting vasogenic edema are  identified. There is no evidence of mass effect, hydrocephalus,  hemorrhage, or abnormal extra-axial collection.     Postcontrast images show the previously mentioned right cerebellar  lesion, but no other evidence of enhancing mass or other pathologic  postcontrast brain or meningeal enhancement.     IMPRESSION:  Apparently solitary 6 mm enhancing lesion of the right  cerebellum, presumably an isolated metastasis. No other evidence of  metastatic disease or other acute intracranial disease is seen  elsewhere.     D:  12/01/2020  E:  12/02/2020     This report was finalized on 12/2/2020 10:10 PM by Dr. Chucho Faith MD.      1. LUNG, RIGHT LOWER LOBE, TRANSBRONCHIAL BIOPSY:  Alveolated parenchyma and limited benign appearing bronchial epithelium.   2. 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 No tumor or granuloma is seen.  The three lymph node specimens all show a mixture of apparent lymphocytes as well as a second population of atypical small cells. Immunohistochemical staining was undertaken on the 10R specimen. CD45 stain highlights the lymphocyte population  which shows the vast majority of cells to be negative. The second population of cells shows diffuse positivity for CD56, pancytokeratin, TTF-1, and synaptophysin. There is limited focal staining with p63. The proliferation marker Ki-67 is positive in >66% of the neoplastic cells.  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  Based upon these results, limited immunohistochemical stains were undertaken on the station 7 and station 11R specimens. In both specimens, CD45 highlights the lymphocyte population but shows a significant FH18-waukiymm population to be present. This additional population of cells is positive for cytokeratin and CD56.      In summary, the three lymph node samples all show involvement by small cell neuroendocrine carcinoma. Adequate tissue is present for molecular diagnostic studies if necessary.            Assessment      IMPRESSION:     Mr. Elliott is a very pleasant 70-year-old gentleman with a right lower lobe small cell lung cancer cT1 cN2 M0 stage IIIa with hypermetabolic hilar and subcarinal lymphadenopathy.  The patient has a complicated medical history including possible endocarditis and status post IV antibiotics for bacteremia in 2018/2019.  The patient at this point time does have a very small lesion in the right cerebellum of indeterminate etiology.  Given that this is a very small lesion with no enhancement we have offered him definitive concurrent chemotherapy and radiation to the chest followed by PCI should he have a good response.  We discussed with the patient today and I discussed with Dr. Cartagena and the need for close follow-up of the brain lesion which could potentially change his stage.  He will start with 1 cycle of chemotherapy before adding concurrent daily radiation treatments in the future.  I recommended an MRI of the brain to be performed in about 6 weeks to determine what should be done about his brain imaging finding.  We scheduled  the patient possible implications of this including the potential for overtreatment of the chest and under treatment of the brain.  He was instructed to monitor his neurologic symptoms very closely and to call with any new changes.  The patient is wife much referred to be overly aggressive at this point in time and proceed with interventions which may increase his overall survival.  They are aware that his plan may change as we acquire more information in the future.  We discussed the risk and benefits of concurrent chemotherapy and radiation today and discussed the expected acute side effects and possible long-term complications of chemo/radiotherapy.  We discussed the need for potential PCI versus whole brain therapy in the future.  We discussed the options between daily versus twice daily treatments, and to be socially inconvenient for the patient received twice daily treatments and he and his wife would rather have daily treatments.    The decision to treat the patient with definitive concurrent chemotherapy and radiation is a complex one and carries the risk of long-term side effects and complications.    I have personally reviewed the most updated version of the available NCCN guidelines in preparing to recommend care for this patient, and have discussed this with the patient personally.  I discussed the case personally with Dr. Cartagena.    Greater than 1 hour was spent preparing for and coordinating this visit. >50% of the time was spent in direct face to face conversation with the patient teaching, answering question, and providing explanations regarding the patient's case.    RECOMMENDATIONS:    cT1 cN2 M0 stage IIIa right lower lobe small cell lung cancer  -Has an indeterminate lesion in the right cerebellum which requires further close follow-up  -MRI brain in 6 weeks to reevaluate indeterminate right cerebellar lesion  -Patient starting chemotherapy on the 14th cycle 1  -Cycle 2 1/4/21 at this time  point  -We will have patient return in about 2 weeks for radiation planning scan  -Patient much prefers daily treatments versus twice daily  -Plan for definitive concurrent chemotherapy and radiation 66 Gy in 33 fractions to PET avid disease  -Recommend IMRT and IGR T to decrease treatment margins with respiratory motion management and optimize lung, heart and esophageal doses    COPD  -Continue present regimen    Tobacco abuse  -Has been 8 years off of smoking  -Encouraged to to continue cessation    Diabetes  -Continue present regimen is good as per primary    Headache  -Continue present regimen    Hyperlipidemia  -Continue present regimen    Mitral valve prolapse/vegetation  -Possibly contributes to lesion in right cerebellum  -Continue to monitor  -Status post IV antibiotics in 2019    Prostate cancer  -Status post treatment           Jay Jay Urias MD      Errors in dictation may reflect use of voice recognition software and not all errors in transcription may have been detected prior to signing.

## 2020-12-10 NOTE — TELEPHONE ENCOUNTER
Patient and wife walked into clinic today asking some questions about the chemo education visit.  They specifically wanted to ask about his aspirin 81mg if he was ok to take that.  I advised that generally yes and they said it is because of his heart his cardiologist put him on it.  The papers say to avoid aspirin. I told them that we are generally more concerned with nsaids taking a lot during chemo due to decreased platelets.  He will be getting cisplatin/etoposide.  Advised that we just have to watch for bleedinga nd to limit use of ibuprophen/aleve as it increases risk of bleeding. I told them I checked with diana Harris who advised that it is ok to continue his current medications-including his baby aspirin, unless we tell him otherwise. Patient verbalized understanding.

## 2020-12-14 ENCOUNTER — DOCUMENTATION (OUTPATIENT)
Dept: NUTRITION | Facility: HOSPITAL | Age: 70
End: 2020-12-14

## 2020-12-14 ENCOUNTER — EDUCATION (OUTPATIENT)
Dept: ONCOLOGY | Facility: HOSPITAL | Age: 70
End: 2020-12-14

## 2020-12-14 ENCOUNTER — DOCUMENTATION (OUTPATIENT)
Dept: SOCIAL WORK | Facility: HOSPITAL | Age: 70
End: 2020-12-14

## 2020-12-14 ENCOUNTER — HOSPITAL ENCOUNTER (OUTPATIENT)
Dept: ONCOLOGY | Facility: HOSPITAL | Age: 70
Setting detail: INFUSION SERIES
Discharge: HOME | End: 2020-12-14

## 2020-12-14 VITALS
TEMPERATURE: 97.3 F | DIASTOLIC BLOOD PRESSURE: 75 MMHG | WEIGHT: 183 LBS | SYSTOLIC BLOOD PRESSURE: 116 MMHG | RESPIRATION RATE: 20 BRPM | BODY MASS INDEX: 28.72 KG/M2 | HEIGHT: 67 IN | HEART RATE: 76 BPM

## 2020-12-14 DIAGNOSIS — C34.31 SMALL CELL LUNG CANCER, RIGHT LOWER LOBE (HCC): Primary | ICD-10-CM

## 2020-12-14 LAB — CREAT BLDA-MCNC: 1.3 MG/DL (ref 0.6–1.3)

## 2020-12-14 PROCEDURE — 25010000002 PALONOSETRON 0.25 MG/5ML SOLUTION PREFILLED SYRINGE: Performed by: INTERNAL MEDICINE

## 2020-12-14 PROCEDURE — 96366 THER/PROPH/DIAG IV INF ADDON: CPT

## 2020-12-14 PROCEDURE — 25010000002 POTASSIUM CHLORIDE PER 2 MEQ OF POTASSIUM: Performed by: INTERNAL MEDICINE

## 2020-12-14 PROCEDURE — 96417 CHEMO IV INFUS EACH ADDL SEQ: CPT

## 2020-12-14 PROCEDURE — 82565 ASSAY OF CREATININE: CPT

## 2020-12-14 PROCEDURE — 96375 TX/PRO/DX INJ NEW DRUG ADDON: CPT

## 2020-12-14 PROCEDURE — 25010000002 FOSAPREPITANT PER 1 MG: Performed by: INTERNAL MEDICINE

## 2020-12-14 PROCEDURE — 96361 HYDRATE IV INFUSION ADD-ON: CPT

## 2020-12-14 PROCEDURE — 25010000002 ETOPOSIDE 500 MG/25ML SOLUTION 25 ML VIAL: Performed by: INTERNAL MEDICINE

## 2020-12-14 PROCEDURE — 96413 CHEMO IV INFUSION 1 HR: CPT

## 2020-12-14 PROCEDURE — 96415 CHEMO IV INFUSION ADDL HR: CPT

## 2020-12-14 PROCEDURE — 25010000002 DEXAMETHASONE SODIUM PHOSPHATE 100 MG/10ML SOLUTION: Performed by: INTERNAL MEDICINE

## 2020-12-14 PROCEDURE — 96360 HYDRATION IV INFUSION INIT: CPT

## 2020-12-14 PROCEDURE — 25010000002 CISPLATIN PER 50 MG: Performed by: INTERNAL MEDICINE

## 2020-12-14 PROCEDURE — 96367 TX/PROPH/DG ADDL SEQ IV INF: CPT

## 2020-12-14 PROCEDURE — 25010000002 MAGNESIUM SULFATE PER 500 MG OF MAGNESIUM: Performed by: INTERNAL MEDICINE

## 2020-12-14 PROCEDURE — 96368 THER/DIAG CONCURRENT INF: CPT

## 2020-12-14 RX ORDER — SODIUM CHLORIDE 9 MG/ML
250 INJECTION, SOLUTION INTRAVENOUS ONCE
Status: DISCONTINUED | OUTPATIENT
Start: 2020-12-14 | End: 2020-12-15 | Stop reason: HOSPADM

## 2020-12-14 RX ORDER — PALONOSETRON 0.05 MG/ML
0.25 INJECTION, SOLUTION INTRAVENOUS ONCE
Status: COMPLETED | OUTPATIENT
Start: 2020-12-14 | End: 2020-12-14

## 2020-12-14 RX ADMIN — POTASSIUM CHLORIDE 1000 ML: 2 INJECTION, SOLUTION, CONCENTRATE INTRAVENOUS at 08:37

## 2020-12-14 RX ADMIN — PALONOSETRON 0.25 MG: 0.25 INJECTION, SOLUTION INTRAVENOUS at 11:04

## 2020-12-14 RX ADMIN — POTASSIUM CHLORIDE 1000 ML: 2 INJECTION, SOLUTION, CONCENTRATE INTRAVENOUS at 15:02

## 2020-12-14 RX ADMIN — DEXAMETHASONE SODIUM PHOSPHATE 12 MG: 10 INJECTION, SOLUTION INTRAMUSCULAR; INTRAVENOUS at 11:06

## 2020-12-14 RX ADMIN — CISPLATIN 194 MG: 1 INJECTION INTRAVENOUS at 11:43

## 2020-12-14 RX ADMIN — SODIUM CHLORIDE 150 MG: 9 INJECTION, SOLUTION INTRAVENOUS at 11:06

## 2020-12-14 RX ADMIN — SODIUM CHLORIDE 190 MG: 9 INJECTION, SOLUTION INTRAVENOUS at 13:54

## 2020-12-14 NOTE — PLAN OF CARE
[facility] • [address]  • [address] • [place] [id number] • [phone]      CHEMOTHERAPY EDUCATION SHEET    NAME:  Luis Elliott      : 1950           DATE: 20    Booklets Given: Chemotherapy and You []  Eating Hints []    Sexuality/Fertility Books []     Chemotherapy/Biotherapy Education Sheets: (list all that apply)  Cisplatin and Etoposide                                                                                                                                                                Chemotherapy Regimen: Cisplatin IV infused on day 1 of each 21 day cycle + Etoposide IV infused on days 1-3 of each 21 day cycle    TOPICS EDUCATION PROVIDED EDUCATION REINFORCED COMMENTS   ANEMIA:  role of RBC, cause, s/s, ways to manage, role of transfusion [x] [] Discussed the role of red blood cells in our body as well as the signs and symptoms of anemia, such as fatigue and weakness.   THROMBOCYTOPENIA:  role of platelet, cause, s/s, ways to prevent bleeding, things to avoid, when to seek help [x] [] Discussed the role of platelets in our body and the potential for decreased platelet counts with therapy which could lead to an increased risk of bleeding.   NEUTROPENIA:  role of WBC, cause, infection precautions, s/s of infection, when to call MD [x] [] Discussed the role of white blood cells and how they help to decrease infection in our body, and how these are often decreased with chemotherapy. Discussed the importance of hand hygiene, avoiding large crowds and people that could possibly be sick. Instructed patient to call MD if temperature reaches 100.4.    NUTRITION & APPETITE CHANGES:  importance of maintaining healthy diet & weight, ways to manage to improve intake, dietary consult, exercise regimen [x] [] Discussed risk of decreased appetite, reviewed plan for small more frequent meals.   DIARRHEA:  causes, s/s of dehydration, ways to manage, dietary changes, when to call MD [x] []  Discussed the possibility of diarrhea while on therapy. Instructed patient that they can use OTC loperamide, but to contact MD if they find no relief in 24 hours.   CONSTIPATION:  causes, ways to manage, dietary changes, when to call MD [x] [] Discussed risk of constipation associated with antiemetic regimen. Reviewed over the counter use of stool softeners and stimulants as needed should constipation present as a problem.   NAUSEA & VOMITING:  cause, use of antiemetics, dietary changes, when to call MD [x] [] Reviewed risk of Nausea and vomiting, counseled on prn ondansetron and to call MD if taking the max dose and unrelieved.Reviewed use of Dexamethasone scheduled on Days 2-4 of each cycle to prevent delayed N/V.    MOUTH SORES:  causes, oral care, ways to manage [x] [] Discussed preventative measures such as salt/soda rinse, use of soft brissel tooth brush, avoidance of acidic foods, and avoiding alcohol based mouth rinses   ALOPECIA:  cause, ways to manage, resources [x] [] Patient states no concern for hair loss.    INFERTILITY & SEXUALITY:  causes, fertility preservation options, sexuality changes, ways to manage, importance of birth control [x] [] Safe sex practices with two forms of birth control including condom discussed.   NERVOUS SYSTEM CHANGES:  causes, s/s, neuropathies, cognitive changes, ways to manage [x] [] Discussed the potential for peripheral neuropathy while on this therapy. Patient educated on what to expect and when to call MD.   PAIN:  causes, ways to manage [] [] ????   SKIN & NAIL CHANGES:  cause, s/s, ways to manage [x] [] Discussed potential for a rash. Informed patient of how/when to self treat and when to contact physician.   ORGAN TOXICITIES:  cause, s/s, need for diagnostic tests, labs, when to notify MD [x] [] Discussed the potential for renal and hepatic toxicities. Informed patient that we would be monitoring patients organ function when we take their labs.   SURVIVORSHIP:   distress, distress assessment, secondary malignancies, early/late effects, follow-up, social issues, social support [] []    HOME CARE:  use of spill kits, storing of PO chemo, how to manage bodily fluids [x] [] Counseled on management of soiled linens and proper flush technique.  Discussed how to manage all the side effects at home and advised when to contact the MD office. Reinforced adequate hydration    MISCELLANEOUS:  drug interactions, administration, vesicant, et [x] [] DDI: No DDI to discuss. Discussed frequency and length of each infusion medication. Informed patient that he can not consume alcohol while on etoposide. Patient states that he does not drink alcohol.     Referrals:    N/A  Notes:   Patient was very nice and asked good questions. Patient seems educated on his care and wants to be informed. Discussed above material with patient in the infusion center. All questions and concerns were addressed. Patient was provided with a personalized calendar, written educational materials, and Kassy May's card. Instructed patient to call should they have any additional questions

## 2020-12-14 NOTE — PROGRESS NOTES
ABDIAS met with pt during his first infusion to address his recent distress screening.  Pt lives with his wife in [place] and has two children, who live out of state.  Pt states that he is feeling good today and is ready to proceed with his treatments.  ABDIAS provided an advance care planning packet to pt and encouraged him to review it and bring back if/when he decides to complete the document.  Pt is interested in completing this and will speak with his wife about it.  ABDIAS provided contact information and a goody bag to pt and encouraged pt to call with any future needs or concerns.  SW available for ongoing support and resource needs.

## 2020-12-15 ENCOUNTER — HOSPITAL ENCOUNTER (OUTPATIENT)
Dept: ONCOLOGY | Facility: HOSPITAL | Age: 70
Setting detail: INFUSION SERIES
Discharge: HOME | End: 2020-12-15

## 2020-12-15 VITALS
RESPIRATION RATE: 16 BRPM | HEART RATE: 65 BPM | TEMPERATURE: 97.5 F | DIASTOLIC BLOOD PRESSURE: 68 MMHG | SYSTOLIC BLOOD PRESSURE: 125 MMHG | HEIGHT: 67 IN | WEIGHT: 190 LBS | BODY MASS INDEX: 29.82 KG/M2

## 2020-12-15 DIAGNOSIS — C34.31 SMALL CELL LUNG CANCER, RIGHT LOWER LOBE (HCC): Primary | ICD-10-CM

## 2020-12-15 PROCEDURE — 96413 CHEMO IV INFUSION 1 HR: CPT

## 2020-12-15 PROCEDURE — 25010000002 ETOPOSIDE 500 MG/25ML SOLUTION 25 ML VIAL: Performed by: INTERNAL MEDICINE

## 2020-12-15 RX ORDER — SODIUM CHLORIDE 9 MG/ML
250 INJECTION, SOLUTION INTRAVENOUS ONCE
Status: COMPLETED | OUTPATIENT
Start: 2020-12-15 | End: 2020-12-15

## 2020-12-15 RX ADMIN — SODIUM CHLORIDE 250 ML: 9 INJECTION, SOLUTION INTRAVENOUS at 12:24

## 2020-12-15 RX ADMIN — SODIUM CHLORIDE 190 MG: 9 INJECTION, SOLUTION INTRAVENOUS at 12:24

## 2020-12-15 NOTE — PROGRESS NOTES
"Outpatient Oncology Nutrition     Reason for Visit:     Oncology Nutrition Screening and Patient Education during patient's initial chemotherapy infusion appointment.    Patient Name:  Luis Elliott    :  1950    MRN:  [id number]    Date of Encounter: 2020    Nutrition Assessment     Cancer Dx: limited stage small cell lung cancer    Type of Cancer Treatment:      Chemotherapy: Cisplatin(D1) / Etoposide(D1-3) - every 21 days x 4     Radiation: Plan for definitive concurrent chemotherapy and radiation 66 Gy in 33 fractions to PET avid disease (start date TBD)    Patient Active Problem List:    Patient Active Problem List   Diagnosis   • Type 2 diabetes mellitus without complication (CMS/HCC)   • Hyperlipidemia   • Essential hypertension   • Erectile dysfunction   • History of prostate cancer   • Osteoarthritis of multiple joints   • Coronary artery disease involving native heart without angina pectoris   • Colon polyps   • Glaucoma   • Low back pain   • Streptococcal bacteremia   • H/O subacute bacterial endocarditis   • Pulmonary nodule (2.6cm RLL)   • Severe mitral regurgitation   • Former smoker (Stopped )   • COPD   • Small cell lung cancer, right lower lobe (CMS/HCC)       Food / Nutrition Related History   Note patient has a history of diabetes for which he takes Metformin.  He states he has a glucometer at home but does not check his blood sugars.  Provided written diet material \"Blood Glucose Management\" and discussed the effects of steroids on blood sugars.    Hydration Status   Discussed the importance of hydration during treatment and reviewed good hydrating fluid options.    Goal: 88-96 ounces per day     How many 8 ounces glasses of water do you consume per day? Patient reports drinking several cups of coffee daily plus water, soft drinks and Gatorade Zero.    Enteral Feeding   N/A    Anthropometric Measurements     Height:    Ht Readings from Last 1 Encounters:   20 170.2 cm " "(67\")     Weight:    Wt Readings from Last 1 Encounters:   12/14/20 83 kg (183 lb)     BMI:  28.7 - Overweight  Usual Body Weight: 180-190#    Weight Change: patient reports losing weight (~8# (4.3%) x 2 months) with initial diagnosis but has regained ~3# the past couple of weeks    Review of Lab Data (Time Frame - 1 month / 2 month)   Labs reviewed - HbgA1c - 6.65 (9/17/20)    Medication Review   Meds noted - Decadron 8mg daily on days 2,3,4 of each cycle    Nutrition Focused Physical Findings       Nutrition Impact Symptoms      No problems with eating    Physical Activity      Not my normal self, but able to be up and about with fairly normal activities    Current Nutritional Intake     Oral diet:  Regular     Oral nutritional supplements: [employer] Breakfast Essentials    Intake: Patient reports his oral intake has improved and is back to normal.     Malnutrition Risk Assessment     Recent weight loss over the past 6 months:  Yes    How much weight loss:  1 = 2-13 lbs    Eating poorly because of a decreased appetite:  0 = No    Malnutrition Screening Score:     MST = 0 or 1 Patient not at risk for malnutrition    Nutrition Diagnosis     Problem    Etiology    Signs / Symptoms      Nutrition Intervention   Discussed the importance of good nutrition during his treatment course focusing on adequate calorie, protein, nutrient and fluid intake.  Advised him to be consuming smaller more frequent meals/snacks throughout the day to aid with potential nausea management.  Emphasized the importance of protein and its role in the diet; reviewed high protein foods; and recommended he have a protein source at each meal/snack.  Discussed types of ONS and their role in the diet.   Provided samples of Boost Glucose Control, Boost Plus, and Ensure Plus for him to try at home as needed.  Briefly discussed potential nutritional impact symptoms associated with chemo and radiation regimen including taste changes, nausea, and " esophagitis and offered tips to aid with management.    Answered his questions and he voiced understanding of information discussed.     Goal   To maintain adequate nutritional and hydration status during treatment  To aid with nutritional impact symptom management as needed    Monitoring / Evaluation   RD's contact information provided and encouraged to call with questions.  Will monitor as needed during his treatment course.    Sherry Ruffin MS, RD, LD

## 2020-12-16 ENCOUNTER — HOSPITAL ENCOUNTER (OUTPATIENT)
Dept: ONCOLOGY | Facility: HOSPITAL | Age: 70
Setting detail: INFUSION SERIES
Discharge: HOME | End: 2020-12-16

## 2020-12-16 ENCOUNTER — TELEPHONE (OUTPATIENT)
Dept: ONCOLOGY | Facility: CLINIC | Age: 70
End: 2020-12-16

## 2020-12-16 VITALS
BODY MASS INDEX: 30.29 KG/M2 | RESPIRATION RATE: 20 BRPM | DIASTOLIC BLOOD PRESSURE: 70 MMHG | TEMPERATURE: 97.4 F | WEIGHT: 193 LBS | HEIGHT: 67 IN | SYSTOLIC BLOOD PRESSURE: 128 MMHG | HEART RATE: 65 BPM

## 2020-12-16 DIAGNOSIS — C34.31 SMALL CELL LUNG CANCER, RIGHT LOWER LOBE (HCC): Primary | ICD-10-CM

## 2020-12-16 PROCEDURE — 25010000002 ETOPOSIDE 500 MG/25ML SOLUTION 25 ML VIAL: Performed by: INTERNAL MEDICINE

## 2020-12-16 PROCEDURE — 96413 CHEMO IV INFUSION 1 HR: CPT

## 2020-12-16 RX ADMIN — SODIUM CHLORIDE 190 MG: 9 INJECTION, SOLUTION INTRAVENOUS at 11:25

## 2020-12-16 NOTE — TELEPHONE ENCOUNTER
Patient called and left a voicemail on the triage line c/o constipation.  He states his last bowel movement was on Monday and his regular pattern is daily.  States BM on Monday was not firmer than usual, but was not as much volume as he typically has.  He did take one dose of Miralax.  He is coming in today at 11:00 for infusion.  Denies any new medications other than his treatment related medications and isn't taking any pain medications.      Discussed with RONNY Harris.  Patient advised to take 2nd dose of miralax when he returns home after treatment.  He has a bottle of colace at home and will also start taking 1 tab BID unless he starts to have loose stools.  Verbalized understanding.

## 2020-12-17 ENCOUNTER — TELEPHONE (OUTPATIENT)
Dept: RADIATION ONCOLOGY | Facility: HOSPITAL | Age: 70
End: 2020-12-17

## 2020-12-21 ENCOUNTER — OFFICE VISIT (OUTPATIENT)
Dept: RADIATION ONCOLOGY | Facility: HOSPITAL | Age: 70
End: 2020-12-21

## 2020-12-21 ENCOUNTER — HOSPITAL ENCOUNTER (OUTPATIENT)
Dept: RADIATION ONCOLOGY | Facility: HOSPITAL | Age: 70
Discharge: HOME | End: 2020-12-21

## 2020-12-21 ENCOUNTER — APPOINTMENT (OUTPATIENT)
Dept: MRI IMAGING | Facility: HOSPITAL | Age: 70
End: 2020-12-21

## 2020-12-21 VITALS
HEIGHT: 67 IN | HEART RATE: 86 BPM | TEMPERATURE: 97.7 F | OXYGEN SATURATION: 96 % | DIASTOLIC BLOOD PRESSURE: 84 MMHG | WEIGHT: 186.6 LBS | BODY MASS INDEX: 29.29 KG/M2 | RESPIRATION RATE: 16 BRPM | SYSTOLIC BLOOD PRESSURE: 142 MMHG

## 2020-12-21 DIAGNOSIS — Z85.46 HISTORY OF PROSTATE CANCER: ICD-10-CM

## 2020-12-21 PROCEDURE — 77321 SPECIAL TELETX PORT PLAN: CPT | Performed by: RADIOLOGY

## 2020-12-21 PROCEDURE — G0463 HOSPITAL OUTPT CLINIC VISIT: HCPCS | Performed by: RADIOLOGY

## 2020-12-21 NOTE — PROGRESS NOTES
RE-EVALUATION    PATIENT:                                                      Luis Elliott  :                                                          1950  DATE:                          2020   DIAGNOSIS:     Small cell lung cancer, right lower lobe (CMS/HCC)  - Stage IIIA (cT1b, cN2, cM0)         BRIEF HISTORY:   Luis Elliott  is a very pleasant 70 y.o. male  with a significant smoking history who was undergoing surveillance CT scans including one on 2018 that showed no concern for malignancy.  The patient was also hospitalized in December with possible concern for mitral valve issues possibly related to a vegetation versus prolapse only after he was found to have group A strep growing in his blood.  He received antibiotics IV for quite some time during the end of  for this.  He did not have an MRI scan at that time due to the presence of what was felt to be metallic bodies in his orbits which is actually related to fenestrations in his eyes for his glaucoma.  The patient eventually had a repeat screening CT scan on 2020 that showed a small right lung nodule which had increased in size.  He was sent for a PET scan on 10/14/2020 that showed a right lower lobe nodule which increased about 2.6 cm and was hypermetabolic.  Patient had some hilar and subcarinal lymphadenopathy which was hypermetabolic.  Patient was then taken for a bronchoscopy and EBUS on 2020 that showed small cell lung cancer and station 7, 11 R, and 10 arm.  Patient sees generally TTF-1 positive and synaptophysin positive.  Patient was sent for restaging MRI scan 2020 that showed a very small indeterminate right cerebellar lesion which enhances had some weak diffusion restriction but no edema.  Is unclear particular given his history with a possible mitral valve vegetation and possible endocarditis with positive blood cultures what this lesion could potentially represent.  The patient was then  "started on chemotherapy on 12/14/2020.  Patient ports that he did well.  He reports today to discuss radiotherapy and to undergo his simulation for treatment planning to start concurrently with cycle 2.  Patient ports that he has some constipation following chemotherapy.  He denies any recent fevers, chills, nausea, vomiting, diarrhea    Allergies   Allergen Reactions   • Xarelto [Rivaroxaban] Other (See Comments)     MADE ME FEEL LIKE \" I WAS HAVING A HEART ATTACK.\"       Review of Systems   Respiratory: Positive for cough.    Gastrointestinal: Positive for constipation.   Neurological: Positive for dizziness.    A full 14 point review of systems was performed and was negative except as noted in the HPI.       Objective   VITAL SIGNS:   Vitals:    12/21/20 1323   BP: 142/84   Pulse: 86   Resp: 16   Temp: 97.7 °F (36.5 °C)   SpO2: 96%  Comment: RA   Weight: 84.6 kg (186 lb 9.6 oz)   Height: 170.2 cm (67\")   PainSc: 0-No pain        KPS 90%    Physical Exam  Vitals signs and nursing note reviewed.   Constitutional:       Appearance: He is well-developed.   HENT:      Head: Normocephalic and atraumatic.   Eyes:      Conjunctiva/sclera: Conjunctivae normal.      Pupils: Pupils are equal, round, and reactive to light.   Neck:      Musculoskeletal: Normal range of motion and neck supple.      Comments: No obviously enlarged cervical or supraclavicular LAD.  Cardiovascular:      Comments: Patient well perfused. Non cyanotic. No prominent JVD. No pedal edema  Pulmonary:      Effort: Pulmonary effort is normal.      Breath sounds: Normal breath sounds. No stridor. No wheezing.   Abdominal:      General: There is no distension.      Palpations: Abdomen is soft.   Musculoskeletal: Normal range of motion.      Comments: Patient moves all extremities spontaneously.    Skin:     General: Skin is warm and dry.   Neurological:      Mental Status: He is alert and oriented to person, place, and time.      Comments: Coordination " intact.   Psychiatric:         Behavior: Behavior normal.         Thought Content: Thought content normal.         Judgment: Judgment normal.              The following portions of the patient's history were reviewed and updated as appropriate: allergies, current medications, past family history, past medical history, past social history, past surgical history and problem list.  EXAMINATION: NM PET SKULL BASE TO MID THIGH-     INDICATION: R91.1-Solitary pulmonary nodule; history of prostate cancer.     TECHNIQUE: With fasting blood glucose level of 138 mg/dL, a total of  13.24 mCi of FDG was administered via the right antecubital vein.  Following appropriate delay, PET CT imaging was obtained from the skull  vertex to the mid thighs bilaterally and fused multiplanar images were  reconstructed. The CT scan is an unenhanced study and used for reference  to the PET scan only and should not be considered a diagnostic CT scan.  PET CT imaging was reviewed in the axial, coronal, and sagittal planes  as well as within the cine mode.     COMPARISON: Initial exam for treatment with no prior studies available  for comparison.     FINDINGS: Normal variant activity is seen within the oropharynx and  muscles of phonation. Normal variant activity identified in the region  of the vocal cords. There is no hypermetabolic activity identified  within the neck to suggest evidence of metastatic disease. There is low  level activity correlating to the nodule seen posteriorly within the  right lower lobe. The maximum SUV is 3.0. Findings are borderline,  however, there is a second area of hypermetabolic activity seen within  the right infrahilar region suggesting a metastatic lymph node with  maximum SUV of 3.9. The remainder of the chest is unremarkable. No  additional hypermetabolic focus is present.     Within the abdomen and pelvis, there is heterogeneous activity seen  within the liver. There is no evidence of hypermetabolic  activity  identified within the abdomen or pelvis to suggest evidence of  metastatic disease. There are stones in the gallbladder. A cyst is  identified on the right kidney. Normal variant activity is seen within  the GI and  tract. The upper thighs are unremarkable.     IMPRESSION:  Low level activity in the pulmonary nodule seen in the right  lower lobe with additional hypermetabolic activity seen in a lymph node  in the right infrahilar region. Findings concerning for malignancy and  metastatic disease confined to the right hilum and right lower lobe.      D:  11/09/2020  E:  11/09/2020     This report was finalized on 11/9/2020 10:46 AM by Dr. Sherry Peña MD.     EXAMINATION: CT CHEST W CONTRAST- 10/01/2020     INDICATION: LLL lung nodule on low dose ct scan screening;  R91.1-Solitary pulmonary nodule     TECHNIQUE: 5 mm post IV contrast images through the chest and upper  abdomen     The radiation dose reduction device was turned on for each scan per the  ALARA (As Low as Reasonably Achievable) protocol.     COMPARISON: Low-dose chest CT scan 09/20/2018     FINDINGS: Patient history indicates left lower lung nodule, follow-up.  Report of the 09/20/2018 study indicates 6 mm noncalcified peripheral  left lower lobe nodule.     Today's exam shows only a faint punctate remnant of the previously noted  6 mm left lower lobe peripheral lung nodule. There is mild peripheral  subpleural scarring of the upper lobes as on the prior exam.     There is, however, interval development of a somewhat elongated 26 x 14  mm noncalcified nodule of the superior segment of the right lower lobe,  worrisome for neoplasm. Please refer to axial image 29 series 4. No new  pulmonary parenchymal disease is identified elsewhere. No pleural  effusion is seen.     Mediastinal window images show no evidence of significant mediastinal  adenopathy or other mass, and grossly normal appearance of the thoracic  aorta and pulmonary  arteries.     Included images of the upper abdomen show small gallstones layered in  the dependent portion of the otherwise normal-appearing gallbladder.  There is diffuse fatty liver change. No liver lesions are seen in the  included upper and mid portions of the liver. Spleen is not enlarged. No  significant abnormalities are seen of the included pancreatic tail,  adrenal glands, or the upper renal poles. Bony structures appear grossly  intact.     IMPRESSION:  1. Previously noted small left lower lobe pulmonary nodule is only  faintly visible on today's exam, decreased to only 2 or 3 mm in  diameter.  2. New, worrisome 26 x 14 mm nodule in the superior segment of the right  lower lobe, suspicious for   neoplasm.  3. Mild chronic appearing interstitial changes elsewhere but no evidence  to suggest metastatic   disease in the chest or upper abdomen.  4. Gallstones.     NOTE: Preliminary report called to Dr. Coleman at 1:45 PM 10/01/2020.     D:  10/01/2020  E:  10/01/2020        INDICATION: R78.81-Bacteremia; B95.5-Unspecified streptococcus as the  cause of diseases classified elsewhere.     TECHNIQUE:  Axial CT data of the head were acquired helically without  contrast. Multiplanar reformatted images were generated and reviewed.  The radiation dose reduction device was turned on for each scan per the  ALARA (As Low as Reasonably Achievable) protocol.     COMPARISONS:  None.     FINDINGS:  There is no CT evidence of acute infarction, intracranial  hemorrhage or hydrocephalus.  There is no large mass or significant mass  effect and the basal cisterns are patent.  There is no calvarial  destructive lesion or fracture. Minimal mucosal thickening is noted in  the posterior aspect of the left maxillary sinus. There is a tiny  punctate metallic foreign body in the right optic globe.     IMPRESSION:  1. No acute intracranial abnormality.  2. Metallic foreign body in the right optic globe most likely  precludes  MRI.     DICTATED:   12/22/2018  EDITED/ls :   12/22/2018      This report was finalized on 12/22/2018 12:30 PM by Castro Doll.     Tangela Malhotra on 12/1/2020  3:25 PM   Newly diagnosed with Lung CA. 10/13/2020 Staging. No headaches.     Rt ac/18ml leno/ 22G      Appointment Information     Display Notes     MRI [facility]            PACS Images      Radiology Images   Study Result     EXAMINATION: MRI BRAIN W WO CONTRAST- 12/01/2020      INDICATION: small cell lung cancer; C34.90-Malignant neoplasm of  unspecified part of unspecified bronchus or lung     TECHNIQUE: Sagittal and axial T1 and axial T2 FLAIR diffusion-weighted  images of the brain, post gadolinium contrast axial, sagittal and  coronal T1-weighted images.     COMPARISON: Head CT scan of 12/21/2018.     FINDINGS: Patient history indicates new diagnosis of lung cancer,  staging.     There is a small rounded focus of weak diffusion restriction in the  lateral right cerebellum. Although subacute infarct might have a similar  appearance, this corresponds to a round uniformly enhancing 6 mm right  cerebellar lesion on postcontrast series. No evidence of restricted  diffusion is seen elsewhere.     Remaining imaging sequences show subtle increased FLAIR signal at the  site of the patient's right cerebellar lesion, and multiple punctate  central white matter lesions typical of microvascular  leukoencephalopathy. No other areas suggesting vasogenic edema are  identified. There is no evidence of mass effect, hydrocephalus,  hemorrhage, or abnormal extra-axial collection.     Postcontrast images show the previously mentioned right cerebellar  lesion, but no other evidence of enhancing mass or other pathologic  postcontrast brain or meningeal enhancement.     IMPRESSION:  Apparently solitary 6 mm enhancing lesion of the right  cerebellum, presumably an isolated metastasis. No other evidence of  metastatic disease or other acute intracranial  disease is seen  elsewhere.     D:  12/01/2020  E:  12/02/2020     This report was finalized on 12/2/2020 10:10 PM by Dr. Chucho Faith MD.           1. LUNG, RIGHT LOWER LOBE, TRANSBRONCHIAL BIOPSY:  Alveolated parenchyma and limited benign appearing bronchial epithelium.   2. 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 No tumor or granuloma is seen.  The three lymph node specimens all show a mixture of apparent lymphocytes as well as a second population of atypical small cells. Immunohistochemical staining was undertaken on the 10R specimen. CD45 stain highlights the lymphocyte population which shows the vast majority of cells to be negative. The second population of cells shows diffuse positivity for CD56, pancytokeratin, TTF-1, and synaptophysin. There is limited focal staining with p63. The proliferation marker Ki-67 is positive in >66% of the neoplastic cells.  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  Based upon these results, limited immunohistochemical stains were undertaken on the station 7 and station 11R specimens. In both specimens, CD45 highlights the lymphocyte population but shows a significant XX68-vymgyxga population to be present. This additional population of cells is positive for cytokeratin and CD56.      In summary, the three lymph node samples all show involvement by small cell  neuroendocrine carcinoma. Adequate tissue is present for molecular diagnostic studies if necessary.        There are no diagnoses linked to this encounter.  IMPRESSION:    Mr. Elliott is a very pleasant 70-year-old gentleman with a right lower lobe small cell lung cancer cT1 cN2 M0 stage IIIa with hypermetabolic hilar and subcarinal lymphadenopathy.  The patient has a complicated medical history including possible endocarditis and status post IV antibiotics for bacteremia in 2018/2019.  The patient at this point time does have a very small lesion in the right cerebellum of indeterminate etiology.  Given that this is a very small lesion with no edema we have offered him definitive concurrent chemotherapy and radiation to the chest followed by PCI should he have a good response.     We will use respiratory motion management.  Recommend IGR T and IMRT to decrease treatment margins and improve dose constraints for lung, heart, and esophagus.    To simulation today.    RECOMMENDATIONS:    cT1 cN2 M0 stage IIIa right lower lobe small cell lung cancer  -Has an indeterminate lesion in the right cerebellum which requires further close follow-up  -MRI brain in 6 weeks to reevaluate indeterminate right cerebellar lesion on 1/19/2021  -Cycle 1 12/14/20  -Cycle 2 1/4/21 at this time point  -Radiotherapy to PET avid disease to start 66 Gy in 33 fractions 1/4/2021  -Patient much prefers daily treatments versus twice daily  -Plan for definitive concurrent chemotherapy and radiation 66 Gy in 33 fractions to PET avid disease  -Recommend IMRT and IGR T to decrease treatment margins with respiratory motion management and optimize lung, heart and esophageal doses     COPD  -Continue present regimen     Tobacco abuse  -Has been 8 years off of smoking  -Encouraged to to continue cessation     Diabetes  -Continue present regimen is good as per primary     Headache  -Continue present regimen     Hyperlipidemia  -Continue present  regimen     Mitral valve prolapse/vegetation  -Possibly contributes to lesion in right cerebellum  -Continue to monitor  -Status post IV antibiotics in 2019     Prostate cancer  -Status post treatment        MD Latoya Velazquez RN

## 2020-12-23 PROCEDURE — 77470 SPECIAL RADIATION TREATMENT: CPT | Performed by: RADIOLOGY

## 2020-12-28 ENCOUNTER — TELEPHONE (OUTPATIENT)
Dept: ONCOLOGY | Facility: CLINIC | Age: 70
End: 2020-12-28

## 2020-12-28 NOTE — TELEPHONE ENCOUNTER
Called and discussed with patient.  He advised it is a very small amount of blood just when blowing his nose.  Advised that this is fine for now and to continue his aspirin and call us if the bleeding were to get worse.

## 2020-12-28 NOTE — TELEPHONE ENCOUNTER
Patient called he said when he wipes his nose with a tissue there is some blood. He wonders if he needs to stop taking the aspirin? Please call to discuss.

## 2020-12-30 PROCEDURE — 77301 RADIOTHERAPY DOSE PLAN IMRT: CPT | Performed by: RADIOLOGY

## 2020-12-30 PROCEDURE — 77338 DESIGN MLC DEVICE FOR IMRT: CPT | Performed by: RADIOLOGY

## 2020-12-30 PROCEDURE — 77300 RADIATION THERAPY DOSE PLAN: CPT | Performed by: RADIOLOGY

## 2020-12-31 LAB — FUNGUS WND CULT: NORMAL

## 2021-01-04 ENCOUNTER — HOSPITAL ENCOUNTER (OUTPATIENT)
Dept: ONCOLOGY | Facility: HOSPITAL | Age: 71
Setting detail: INFUSION SERIES
Discharge: HOME | End: 2021-01-04

## 2021-01-04 ENCOUNTER — NURSE NAVIGATOR (OUTPATIENT)
Dept: OTHER | Facility: HOSPITAL | Age: 71
End: 2021-01-04

## 2021-01-04 ENCOUNTER — HOSPITAL ENCOUNTER (OUTPATIENT)
Dept: RADIATION ONCOLOGY | Facility: HOSPITAL | Age: 71
Setting detail: RADIATION/ONCOLOGY SERIES
Discharge: HOME | End: 2021-01-04

## 2021-01-04 ENCOUNTER — OFFICE VISIT (OUTPATIENT)
Dept: ONCOLOGY | Facility: CLINIC | Age: 71
End: 2021-01-04

## 2021-01-04 VITALS
TEMPERATURE: 97.3 F | HEART RATE: 76 BPM | OXYGEN SATURATION: 94 % | BODY MASS INDEX: 29.19 KG/M2 | WEIGHT: 186 LBS | HEIGHT: 67 IN | RESPIRATION RATE: 16 BRPM | DIASTOLIC BLOOD PRESSURE: 80 MMHG | SYSTOLIC BLOOD PRESSURE: 146 MMHG

## 2021-01-04 DIAGNOSIS — C34.31 SMALL CELL LUNG CANCER, RIGHT LOWER LOBE (HCC): Primary | ICD-10-CM

## 2021-01-04 LAB
ALBUMIN SERPL-MCNC: 4.5 G/DL (ref 3.5–5.2)
ALBUMIN/GLOB SERPL: 1.7 G/DL
ALP SERPL-CCNC: 130 U/L (ref 39–117)
ALT SERPL W P-5'-P-CCNC: 64 U/L (ref 1–41)
ANION GAP SERPL CALCULATED.3IONS-SCNC: 11 MMOL/L (ref 5–15)
AST SERPL-CCNC: 22 U/L (ref 1–40)
BILIRUB SERPL-MCNC: 0.6 MG/DL (ref 0–1.2)
BUN SERPL-MCNC: 31 MG/DL (ref 8–23)
BUN/CREAT SERPL: 25.6 (ref 7–25)
CALCIUM SPEC-SCNC: 9.6 MG/DL (ref 8.6–10.5)
CHLORIDE SERPL-SCNC: 101 MMOL/L (ref 98–107)
CO2 SERPL-SCNC: 27 MMOL/L (ref 22–29)
CREAT BLDA-MCNC: 1.3 MG/DL (ref 0.6–1.3)
CREAT SERPL-MCNC: 1.21 MG/DL (ref 0.76–1.27)
ERYTHROCYTE [DISTWIDTH] IN BLOOD BY AUTOMATED COUNT: 13.4 % (ref 12.3–15.4)
GFR SERPL CREATININE-BSD FRML MDRD: 59 ML/MIN/1.73
GLOBULIN UR ELPH-MCNC: 2.6 GM/DL
GLUCOSE SERPL-MCNC: 289 MG/DL (ref 65–99)
HCT VFR BLD AUTO: 40 % (ref 37.5–51)
HGB BLD-MCNC: 13.6 G/DL (ref 13–17.7)
LYMPHOCYTES # BLD AUTO: 1.8 10*3/MM3 (ref 0.7–3.1)
LYMPHOCYTES NFR BLD AUTO: 44 % (ref 19.6–45.3)
MAGNESIUM SERPL-MCNC: 2.1 MG/DL (ref 1.6–2.4)
MCH RBC QN AUTO: 31.3 PG (ref 26.6–33)
MCHC RBC AUTO-ENTMCNC: 34.1 G/DL (ref 31.5–35.7)
MCV RBC AUTO: 91.7 FL (ref 79–97)
MONOCYTES # BLD AUTO: 0.3 10*3/MM3 (ref 0.1–0.9)
MONOCYTES NFR BLD AUTO: 8.1 % (ref 5–12)
NEUTROPHILS NFR BLD AUTO: 2 10*3/MM3 (ref 1.7–7)
NEUTROPHILS NFR BLD AUTO: 47.9 % (ref 42.7–76)
PLATELET # BLD AUTO: 191 10*3/MM3 (ref 140–450)
PMV BLD AUTO: 8.3 FL (ref 6–12)
POTASSIUM SERPL-SCNC: 4.8 MMOL/L (ref 3.5–5.2)
PROT SERPL-MCNC: 7.1 G/DL (ref 6–8.5)
RBC # BLD AUTO: 4.36 10*6/MM3 (ref 4.14–5.8)
SODIUM SERPL-SCNC: 139 MMOL/L (ref 136–145)
WBC # BLD AUTO: 4.2 10*3/MM3 (ref 3.4–10.8)

## 2021-01-04 PROCEDURE — 96413 CHEMO IV INFUSION 1 HR: CPT

## 2021-01-04 PROCEDURE — 85025 COMPLETE CBC W/AUTO DIFF WBC: CPT | Performed by: INTERNAL MEDICINE

## 2021-01-04 PROCEDURE — 25010000002 PALONOSETRON 0.25 MG/5ML SOLUTION PREFILLED SYRINGE: Performed by: INTERNAL MEDICINE

## 2021-01-04 PROCEDURE — 80053 COMPREHEN METABOLIC PANEL: CPT | Performed by: INTERNAL MEDICINE

## 2021-01-04 PROCEDURE — 96417 CHEMO IV INFUS EACH ADDL SEQ: CPT

## 2021-01-04 PROCEDURE — 96366 THER/PROPH/DIAG IV INF ADDON: CPT

## 2021-01-04 PROCEDURE — 99215 OFFICE O/P EST HI 40 MIN: CPT | Performed by: INTERNAL MEDICINE

## 2021-01-04 PROCEDURE — 25010000002 DEXAMETHASONE SODIUM PHOSPHATE 100 MG/10ML SOLUTION: Performed by: INTERNAL MEDICINE

## 2021-01-04 PROCEDURE — 96375 TX/PRO/DX INJ NEW DRUG ADDON: CPT

## 2021-01-04 PROCEDURE — 82565 ASSAY OF CREATININE: CPT

## 2021-01-04 PROCEDURE — 83735 ASSAY OF MAGNESIUM: CPT | Performed by: INTERNAL MEDICINE

## 2021-01-04 PROCEDURE — 25010000002 MAGNESIUM SULFATE PER 500 MG OF MAGNESIUM: Performed by: INTERNAL MEDICINE

## 2021-01-04 PROCEDURE — 25010000002 CISPLATIN PER 50 MG: Performed by: INTERNAL MEDICINE

## 2021-01-04 PROCEDURE — 96415 CHEMO IV INFUSION ADDL HR: CPT

## 2021-01-04 PROCEDURE — 96367 TX/PROPH/DG ADDL SEQ IV INF: CPT

## 2021-01-04 PROCEDURE — 25010000002 POTASSIUM CHLORIDE PER 2 MEQ OF POTASSIUM: Performed by: INTERNAL MEDICINE

## 2021-01-04 PROCEDURE — 25010000002 ETOPOSIDE 500 MG/25ML SOLUTION 25 ML VIAL: Performed by: INTERNAL MEDICINE

## 2021-01-04 PROCEDURE — 25010000002 FOSAPREPITANT PER 1 MG: Performed by: INTERNAL MEDICINE

## 2021-01-04 RX ORDER — PALONOSETRON 0.05 MG/ML
0.25 INJECTION, SOLUTION INTRAVENOUS ONCE
Status: CANCELLED | OUTPATIENT
Start: 2021-01-04

## 2021-01-04 RX ORDER — ZOLPIDEM TARTRATE 5 MG/1
5 TABLET ORAL NIGHTLY PRN
Qty: 30 TABLET | Refills: 1 | Status: SHIPPED | OUTPATIENT
Start: 2021-01-04 | End: 2021-09-13

## 2021-01-04 RX ORDER — SODIUM CHLORIDE 9 MG/ML
250 INJECTION, SOLUTION INTRAVENOUS ONCE
Status: COMPLETED | OUTPATIENT
Start: 2021-01-04 | End: 2021-01-04

## 2021-01-04 RX ORDER — PALONOSETRON 0.05 MG/ML
0.25 INJECTION, SOLUTION INTRAVENOUS ONCE
Status: COMPLETED | OUTPATIENT
Start: 2021-01-04 | End: 2021-01-04

## 2021-01-04 RX ORDER — SODIUM CHLORIDE 9 MG/ML
250 INJECTION, SOLUTION INTRAVENOUS ONCE
Status: CANCELLED | OUTPATIENT
Start: 2021-01-06

## 2021-01-04 RX ORDER — SODIUM CHLORIDE 9 MG/ML
250 INJECTION, SOLUTION INTRAVENOUS ONCE
Status: CANCELLED | OUTPATIENT
Start: 2021-01-05

## 2021-01-04 RX ORDER — SODIUM CHLORIDE 9 MG/ML
250 INJECTION, SOLUTION INTRAVENOUS ONCE
Status: CANCELLED | OUTPATIENT
Start: 2021-01-04

## 2021-01-04 RX ADMIN — DEXAMETHASONE SODIUM PHOSPHATE 12 MG: 10 INJECTION, SOLUTION INTRAMUSCULAR; INTRAVENOUS at 11:32

## 2021-01-04 RX ADMIN — SODIUM CHLORIDE 250 ML: 9 INJECTION, SOLUTION INTRAVENOUS at 09:18

## 2021-01-04 RX ADMIN — CISPLATIN 194 MG: 1 INJECTION INTRAVENOUS at 12:15

## 2021-01-04 RX ADMIN — POTASSIUM CHLORIDE 1000 ML: 2 INJECTION, SOLUTION, CONCENTRATE INTRAVENOUS at 09:19

## 2021-01-04 RX ADMIN — POTASSIUM CHLORIDE 1000 ML: 2 INJECTION, SOLUTION, CONCENTRATE INTRAVENOUS at 15:30

## 2021-01-04 RX ADMIN — SODIUM CHLORIDE 190 MG: 9 INJECTION, SOLUTION INTRAVENOUS at 14:28

## 2021-01-04 RX ADMIN — PALONOSETRON 0.25 MG: 0.25 INJECTION, SOLUTION INTRAVENOUS at 11:26

## 2021-01-04 RX ADMIN — SODIUM CHLORIDE 150 MG: 9 INJECTION, SOLUTION INTRAVENOUS at 11:31

## 2021-01-04 NOTE — PROGRESS NOTES
DATE OF VISIT: 1/4/2021    REASON FOR VISIT: Followup for limited stage small cell lung cancer     HISTORY OF PRESENT ILLNESS: The patient is a very pleasant 70 y.o. male  with past medical history significant for SCLCA diagnosed November 2020. The patient was started on concurrent chemotherapy with XRT using Cisplatin/ 16 December 2020. The  patient is here today for scheduled follow up visit with chemotherapy cycle number 2.    SUBJECTIVE: The patient has been doing fairly well. he was able to tolerate  his treatment without any serious side effects. he denied any fever or  chills, no night sweats, denied any headaches    PAST MEDICAL HISTORY/SOCIAL HISTORY/FAMILY HISTORY: Reviewed by me and unchanged from my documentation done on 01/04/21.    Review of Systems   Constitutional: Negative for activity change, appetite change, chills, fatigue, fever and unexpected weight change.   HENT: Negative for hearing loss, mouth sores, nosebleeds, sore throat and trouble swallowing.    Eyes: Negative for visual disturbance.   Respiratory: Negative for cough, chest tightness, shortness of breath and wheezing.    Cardiovascular: Negative for chest pain, palpitations and leg swelling.   Gastrointestinal: Positive for nausea. Negative for abdominal distention, abdominal pain, blood in stool, constipation, diarrhea, rectal pain and vomiting.   Endocrine: Negative for cold intolerance and heat intolerance.   Genitourinary: Negative for difficulty urinating, dysuria, frequency and urgency.   Musculoskeletal: Negative for arthralgias, back pain, gait problem, joint swelling and myalgias.   Skin: Negative for rash.   Neurological: Negative for dizziness, tremors, syncope, weakness, light-headedness, numbness and headaches.   Hematological: Negative for adenopathy. Does not bruise/bleed easily.   Psychiatric/Behavioral: Positive for sleep disturbance. Negative for confusion and suicidal ideas. The patient is not nervous/anxious.   "        Current Outpatient Medications:   •  aspirin 81 MG EC tablet, Take 81 mg by mouth Every Night., Disp: , Rfl:   •  atorvastatin (LIPITOR) 40 MG tablet, Take 40 mg by mouth Every Night., Disp: , Rfl:   •  calcium carbonate-cholecalciferol 500-400 MG-UNIT tablet tablet, Take 1 tablet by mouth Daily., Disp: , Rfl:   •  cholecalciferol (VITAMIN D3) 1000 units tablet, Take 1,000 Units by mouth Daily., Disp: , Rfl:   •  dexamethasone (DECADRON) 4 MG tablet, Take 2 tablets in the morning daily on days 2, 3 & 4.  Take with food., Disp: 6 tablet, Rfl: 5  •  ezetimibe (ZETIA) 10 MG tablet, Take 10 mg by mouth Daily., Disp: , Rfl:   •  metFORMIN (GLUCOPHAGE) 500 MG tablet, TAKE ONE TABLET BY MOUTH DAILY WITH BREAKFAST (Patient taking differently: Take 500 mg by mouth Daily With Breakfast.), Disp: 90 tablet, Rfl: 0  •  nitroglycerin (NITROSTAT) 0.4 MG SL tablet, Place 0.4 mg under the tongue Every 5 (Five) Minutes As Needed for chest pain. Take no more than 3 doses in 15 minutes., Disp: , Rfl:   •  Omega-3 Fatty Acids (FISH OIL) 1200 MG capsule capsule, Take 1,200 mg by mouth Daily., Disp: , Rfl:   •  ondansetron (ZOFRAN) 8 MG tablet, Take 1 tablet by mouth 3 (Three) Times a Day As Needed for Nausea or Vomiting., Disp: 30 tablet, Rfl: 5  •  sertraline (ZOLOFT) 50 MG tablet, TAKE ONE TABLET BY MOUTH DAILY (Patient taking differently: Take 50 mg by mouth Daily.), Disp: 90 tablet, Rfl: 0  •  zolpidem (AMBIEN) 5 MG tablet, Take 1 tablet by mouth At Night As Needed for Sleep., Disp: 30 tablet, Rfl: 1    PHYSICAL EXAMINATION:   /80   Pulse 76   Temp 97.3 °F (36.3 °C) (Temporal)   Resp 16   Ht 170.2 cm (67.01\")   Wt 84.4 kg (186 lb)   SpO2 94%   BMI 29.12 kg/m²    Pain Score    01/04/21 0810   PainSc: 0-No pain       ECOG Performance Status: 1 - Symptomatic but completely ambulatory  General Appearance:  alert, cooperative, no apparent distress and appears stated age   Neurologic/Psychiatric: A&O x 3, gait steady, " appropriate affect, strength 5/5 in all muscle groups   HEENT:  Normocephalic, without obvious abnormality, mucous membranes moist   Neck: Supple, symmetrical, trachea midline, no adenopathy;  No thyromegaly, masses, or tenderness   Lungs:   Clear to auscultation bilaterally; respirations regular, even, and unlabored bilaterally   Heart:  Regular rate and rhythm, no murmurs appreciated   Abdomen:   Soft, non-tender, non-distended and no organomegaly   Lymph nodes: No cervical, supraclavicular, inguinal or axillary adenopathy noted   Extremities: Normal, atraumatic; no clubbing, cyanosis, or edema    Skin: No rashes, ulcers, or suspicious lesions noted     No visits with results within 2 Week(s) from this visit.   Latest known visit with results is:   Hospital Outpatient Visit on 12/14/2020   Component Date Value Ref Range Status   • Creatinine 12/14/2020 1.30  0.60 - 1.30 mg/dL Final    Serial Number: [id number]:  [id number]        No results found.    ASSESSMENT: The patient is a very pleasant 70 y.o. male  with limited stage small cell lung cancer    PROBLEM LIST:   1.  Limited stage small cell lung cancer [id number] stage IIIa:  A.  Presented with abnormal screening CT chest  B.  Diagnosed after bronchoscopy with biopsy done by  November 19, 2020 + for small cell from level 7 level 4R and level 10 R lymph nodes.  C.  Whole-body PET scan did not reveal any other sites of disease.  D.  Will start definitive concurrent chemotherapy with radiation using cisplatin etoposide December 2020  2.  Isolated 6 mm right cerebellar lesion:  A.  Evident MRI brain done on December 1, 2020  B.  The patient will receive whole brain radiation after completion of treatment.  3.  Chemotherapy-induced nausea  4. Insomnia     PLAN:  1.  I will proceed with chemotherapy as scheduled today cisplatin with etoposide cycle #2.  Cisplatin will be had about a prescription day 1 and etoposide 100 mg per metered squared day 123.  2.   The patient will start concurrent radiation today.  3.  He will follow-up with us in 3 weeks for cycle #3.  4.  I will repeat the patient's scans after cycle #4.  5.  The patient will be a candidate for whole brain radiation after completion of chemotherapy.  6.  I will monitor the patient blood work including blood counts kidney function liver function and electrolytes.  7.  I will start the patient on Zofran as needed for chemotherapy-induced nausea.  8. We reviewed the potential side effects of this regimen including fatigue, vomiting and nausea, hair loss, nephropathy, neuropathy, hearing loss, myelosuppression, and risk of infusion reaction.  9.  I will start the patient on Ambien 5 mg nightly as needed for insomnia.    Daniel Cartagena MD  1/4/2021

## 2021-01-04 NOTE — PROGRESS NOTES
Spoke with patient at infusion regarding upcoming radiation appointments. Patient will start radiation tomorrow after his chemo is completed. He will receive the rest of his schedule at this time. Patient verbalizes understanding and will call with any new questions or concerns.

## 2021-01-05 ENCOUNTER — DOCUMENTATION (OUTPATIENT)
Dept: NUTRITION | Facility: HOSPITAL | Age: 71
End: 2021-01-05

## 2021-01-05 ENCOUNTER — HOSPITAL ENCOUNTER (OUTPATIENT)
Dept: RADIATION ONCOLOGY | Facility: HOSPITAL | Age: 71
Discharge: HOME | End: 2021-01-05

## 2021-01-05 ENCOUNTER — HOSPITAL ENCOUNTER (OUTPATIENT)
Dept: ONCOLOGY | Facility: HOSPITAL | Age: 71
Setting detail: INFUSION SERIES
Discharge: HOME | End: 2021-01-05

## 2021-01-05 VITALS — WEIGHT: 191.5 LBS | BODY MASS INDEX: 29.99 KG/M2

## 2021-01-05 VITALS
BODY MASS INDEX: 30.13 KG/M2 | TEMPERATURE: 97.9 F | WEIGHT: 192 LBS | HEART RATE: 83 BPM | SYSTOLIC BLOOD PRESSURE: 122 MMHG | DIASTOLIC BLOOD PRESSURE: 67 MMHG | HEIGHT: 67 IN | RESPIRATION RATE: 18 BRPM

## 2021-01-05 DIAGNOSIS — C34.31 SMALL CELL LUNG CANCER, RIGHT LOWER LOBE (HCC): Primary | ICD-10-CM

## 2021-01-05 LAB
MYCOBACTERIUM SPEC CULT: ABNORMAL
NIGHT BLUE STAIN TISS: ABNORMAL

## 2021-01-05 PROCEDURE — 25010000002 ETOPOSIDE 500 MG/25ML SOLUTION 25 ML VIAL: Performed by: INTERNAL MEDICINE

## 2021-01-05 PROCEDURE — 77386: CPT | Performed by: RADIOLOGY

## 2021-01-05 PROCEDURE — 96413 CHEMO IV INFUSION 1 HR: CPT

## 2021-01-05 RX ORDER — SODIUM CHLORIDE 9 MG/ML
250 INJECTION, SOLUTION INTRAVENOUS ONCE
Status: DISCONTINUED | OUTPATIENT
Start: 2021-01-05 | End: 2021-01-06 | Stop reason: HOSPADM

## 2021-01-05 RX ADMIN — SODIUM CHLORIDE 190 MG: 9 INJECTION, SOLUTION INTRAVENOUS at 13:00

## 2021-01-05 NOTE — PROGRESS NOTES
Onc Nutrition    Patient: Luis Elliott  YOB: 1950    Cancer Dx: limited stage small cell lung cancer  Chemotherapy: Cisplatin(D1) / Etoposide(D1-3) - every 21 days (2/4)  Radiation: Plan for definitive concurrent chemotherapy and radiation 66 Gy in 33 fractions to PET avid disease (start date 1/5/21)    Weight: 186#  Usual Body Weight: 180-190#    Follow up with patient during his infusion appointment.  He states his appetite remains normal and states he is eating well.  He denies nausea and taste changes.  He does complain of constipation but states he is moving his bowels regularly and is using stool softeners and Miralax as needed.  He reports trying ONS samples provided at previous RD visit and found them to be acceptable.    Encouraged him to continue with his good oral intake and to drink ONS as needed.  Briefly reviewed potential for difficulty / painful swallow as radiation treatment progresses and offered tips to aid with management.  Advised him to push fluids and increase fiber intake to aid with constipation management.      Answered his questions and he voiced understanding of information discussed.  Encouraged to call RD with questions.  Will monitor as needed during his treatment course.    Sherry Ruffin, ISA  01/05/21

## 2021-01-06 ENCOUNTER — HOSPITAL ENCOUNTER (OUTPATIENT)
Dept: ONCOLOGY | Facility: HOSPITAL | Age: 71
Setting detail: INFUSION SERIES
Discharge: HOME | End: 2021-01-06

## 2021-01-06 ENCOUNTER — HOSPITAL ENCOUNTER (OUTPATIENT)
Dept: RADIATION ONCOLOGY | Facility: HOSPITAL | Age: 71
Discharge: HOME | End: 2021-01-06

## 2021-01-06 VITALS
RESPIRATION RATE: 18 BRPM | SYSTOLIC BLOOD PRESSURE: 137 MMHG | TEMPERATURE: 97.5 F | DIASTOLIC BLOOD PRESSURE: 76 MMHG | HEIGHT: 67 IN | BODY MASS INDEX: 30.01 KG/M2 | WEIGHT: 191.2 LBS | HEART RATE: 64 BPM

## 2021-01-06 DIAGNOSIS — C34.31 SMALL CELL LUNG CANCER, RIGHT LOWER LOBE (HCC): Primary | ICD-10-CM

## 2021-01-06 PROCEDURE — 77386: CPT | Performed by: RADIOLOGY

## 2021-01-06 PROCEDURE — 96413 CHEMO IV INFUSION 1 HR: CPT

## 2021-01-06 PROCEDURE — 25010000002 ETOPOSIDE 500 MG/25ML SOLUTION 25 ML VIAL: Performed by: INTERNAL MEDICINE

## 2021-01-06 RX ORDER — SODIUM CHLORIDE 9 MG/ML
250 INJECTION, SOLUTION INTRAVENOUS ONCE
Status: DISCONTINUED | OUTPATIENT
Start: 2021-01-06 | End: 2021-01-07 | Stop reason: HOSPADM

## 2021-01-06 RX ADMIN — SODIUM CHLORIDE 190 MG: 9 INJECTION, SOLUTION INTRAVENOUS at 13:22

## 2021-01-07 ENCOUNTER — HOSPITAL ENCOUNTER (OUTPATIENT)
Dept: RADIATION ONCOLOGY | Facility: HOSPITAL | Age: 71
Discharge: HOME | End: 2021-01-07

## 2021-01-07 PROCEDURE — 77386: CPT | Performed by: RADIOLOGY

## 2021-01-07 PROCEDURE — 77336 RADIATION PHYSICS CONSULT: CPT | Performed by: RADIOLOGY

## 2021-01-08 ENCOUNTER — HOSPITAL ENCOUNTER (OUTPATIENT)
Dept: RADIATION ONCOLOGY | Facility: HOSPITAL | Age: 71
Discharge: HOME | End: 2021-01-08

## 2021-01-08 PROCEDURE — 77386: CPT | Performed by: RADIOLOGY

## 2021-01-11 ENCOUNTER — HOSPITAL ENCOUNTER (OUTPATIENT)
Dept: RADIATION ONCOLOGY | Facility: HOSPITAL | Age: 71
Discharge: HOME | End: 2021-01-11

## 2021-01-11 PROCEDURE — 77386: CPT | Performed by: RADIOLOGY

## 2021-01-12 ENCOUNTER — HOSPITAL ENCOUNTER (OUTPATIENT)
Dept: RADIATION ONCOLOGY | Facility: HOSPITAL | Age: 71
Discharge: HOME | End: 2021-01-12

## 2021-01-12 VITALS — WEIGHT: 185.2 LBS | BODY MASS INDEX: 29 KG/M2

## 2021-01-12 PROCEDURE — 77386: CPT | Performed by: RADIOLOGY

## 2021-01-13 ENCOUNTER — HOSPITAL ENCOUNTER (OUTPATIENT)
Dept: RADIATION ONCOLOGY | Facility: HOSPITAL | Age: 71
Discharge: HOME | End: 2021-01-13

## 2021-01-13 PROCEDURE — 77386: CPT | Performed by: RADIOLOGY

## 2021-01-14 ENCOUNTER — DOCUMENTATION (OUTPATIENT)
Dept: NUTRITION | Facility: HOSPITAL | Age: 71
End: 2021-01-14

## 2021-01-14 ENCOUNTER — HOSPITAL ENCOUNTER (OUTPATIENT)
Dept: RADIATION ONCOLOGY | Facility: HOSPITAL | Age: 71
Discharge: HOME | End: 2021-01-14

## 2021-01-14 PROCEDURE — 77386: CPT | Performed by: RADIOLOGY

## 2021-01-14 NOTE — PROGRESS NOTES
ONC Nutrition    Diagnosis: Limited stage small cell lung cancer  Chemotherapy: Cisplatin(D1) / Etoposide(D1-3) - every 21 days (2/4) / 2 of 4 cycles completed  Radiation:  66 Gy in 33 fractions to PET avid disease / patient has completed 5/33 fractions     Weight: 185.2#   Usual Body Weight: 180-190#    Patient continues to do well with progression of treatment.  1 episode of diarrhea over the weekend, which may have been related to chemo.  Discussed foods that may exacerbate the diarrhea and provided tips for management, including use of imodium.  Stressed the importance of hydration.  Will continue to follow closely.

## 2021-01-15 ENCOUNTER — HOSPITAL ENCOUNTER (OUTPATIENT)
Dept: RADIATION ONCOLOGY | Facility: HOSPITAL | Age: 71
Discharge: HOME | End: 2021-01-15

## 2021-01-15 PROCEDURE — 77386: CPT | Performed by: RADIOLOGY

## 2021-01-15 PROCEDURE — 77336 RADIATION PHYSICS CONSULT: CPT | Performed by: RADIOLOGY

## 2021-01-18 ENCOUNTER — HOSPITAL ENCOUNTER (OUTPATIENT)
Dept: RADIATION ONCOLOGY | Facility: HOSPITAL | Age: 71
Discharge: HOME | End: 2021-01-18

## 2021-01-18 PROCEDURE — 77386: CPT | Performed by: RADIOLOGY

## 2021-01-19 ENCOUNTER — HOSPITAL ENCOUNTER (OUTPATIENT)
Dept: RADIATION ONCOLOGY | Facility: HOSPITAL | Age: 71
Discharge: HOME | End: 2021-01-19

## 2021-01-19 ENCOUNTER — HOSPITAL ENCOUNTER (OUTPATIENT)
Dept: MRI IMAGING | Facility: HOSPITAL | Age: 71
Discharge: HOME | End: 2021-01-19
Admitting: RADIOLOGY

## 2021-01-19 ENCOUNTER — DOCUMENTATION (OUTPATIENT)
Dept: NUTRITION | Facility: HOSPITAL | Age: 71
End: 2021-01-19

## 2021-01-19 VITALS — WEIGHT: 187.3 LBS | BODY MASS INDEX: 29.34 KG/M2

## 2021-01-19 DIAGNOSIS — R91.1 PULMONARY NODULE: ICD-10-CM

## 2021-01-19 PROCEDURE — 0 GADOBENATE DIMEGLUMINE 529 MG/ML SOLUTION: Performed by: RADIOLOGY

## 2021-01-19 PROCEDURE — 70553 MRI BRAIN STEM W/O & W/DYE: CPT

## 2021-01-19 PROCEDURE — 77386: CPT | Performed by: RADIOLOGY

## 2021-01-19 PROCEDURE — A9577 INJ MULTIHANCE: HCPCS | Performed by: RADIOLOGY

## 2021-01-19 RX ADMIN — GADOBENATE DIMEGLUMINE 17 ML: 529 INJECTION, SOLUTION INTRAVENOUS at 11:45

## 2021-01-19 NOTE — PROGRESS NOTES
ONC Nutrition     Diagnosis: Limited stage small cell lung cancer  Chemotherapy: Cisplatin(D1) / Etoposide(D1-3) - every 21 days (2/4) / 2 of 4 cycles completed  Radiation:  66 Gy in 33 fractions to PET avid disease / patient has completed 10/33 fractions     Weight: 187.3 #   Usual Body Weight: 180-190#    Patient continues to do well with progression of treatment.  Good appetite and eating well; denies any nutritional impact symptoms.  Patient states that he sometimes has days that he get depressed as he progresses through treatment.    Will follow.

## 2021-01-21 ENCOUNTER — HOSPITAL ENCOUNTER (OUTPATIENT)
Dept: RADIATION ONCOLOGY | Facility: HOSPITAL | Age: 71
Discharge: HOME | End: 2021-01-21

## 2021-01-21 PROCEDURE — 77386: CPT | Performed by: RADIOLOGY

## 2021-01-22 ENCOUNTER — HOSPITAL ENCOUNTER (OUTPATIENT)
Dept: RADIATION ONCOLOGY | Facility: HOSPITAL | Age: 71
Discharge: HOME | End: 2021-01-22

## 2021-01-22 PROCEDURE — 77386: CPT | Performed by: RADIOLOGY

## 2021-01-22 PROCEDURE — 77336 RADIATION PHYSICS CONSULT: CPT | Performed by: RADIOLOGY

## 2021-01-25 ENCOUNTER — OFFICE VISIT (OUTPATIENT)
Dept: ONCOLOGY | Facility: CLINIC | Age: 71
End: 2021-01-25

## 2021-01-25 ENCOUNTER — DOCUMENTATION (OUTPATIENT)
Dept: NUTRITION | Facility: HOSPITAL | Age: 71
End: 2021-01-25

## 2021-01-25 ENCOUNTER — HOSPITAL ENCOUNTER (OUTPATIENT)
Dept: ONCOLOGY | Facility: HOSPITAL | Age: 71
Setting detail: INFUSION SERIES
Discharge: HOME | End: 2021-01-25

## 2021-01-25 VITALS
RESPIRATION RATE: 20 BRPM | BODY MASS INDEX: 29.35 KG/M2 | WEIGHT: 187 LBS | SYSTOLIC BLOOD PRESSURE: 123 MMHG | HEIGHT: 67 IN | DIASTOLIC BLOOD PRESSURE: 85 MMHG | OXYGEN SATURATION: 93 % | HEART RATE: 79 BPM | TEMPERATURE: 97.8 F

## 2021-01-25 DIAGNOSIS — C34.31 SMALL CELL LUNG CANCER, RIGHT LOWER LOBE (HCC): Primary | ICD-10-CM

## 2021-01-25 LAB
ALBUMIN SERPL-MCNC: 4.1 G/DL (ref 3.5–5.2)
ALBUMIN/GLOB SERPL: 1.6 G/DL
ALP SERPL-CCNC: 121 U/L (ref 39–117)
ALT SERPL W P-5'-P-CCNC: 48 U/L (ref 1–41)
ANION GAP SERPL CALCULATED.3IONS-SCNC: 11 MMOL/L (ref 5–15)
AST SERPL-CCNC: 29 U/L (ref 1–40)
BILIRUB SERPL-MCNC: 0.6 MG/DL (ref 0–1.2)
BUN SERPL-MCNC: 29 MG/DL (ref 8–23)
BUN/CREAT SERPL: 26.1 (ref 7–25)
CALCIUM SPEC-SCNC: 9.5 MG/DL (ref 8.6–10.5)
CHLORIDE SERPL-SCNC: 102 MMOL/L (ref 98–107)
CO2 SERPL-SCNC: 24 MMOL/L (ref 22–29)
CREAT BLDA-MCNC: 1.1 MG/DL
CREAT BLDA-MCNC: 1.1 MG/DL (ref 0.6–1.3)
CREAT SERPL-MCNC: 1.11 MG/DL (ref 0.76–1.27)
ERYTHROCYTE [DISTWIDTH] IN BLOOD BY AUTOMATED COUNT: 14.1 % (ref 12.3–15.4)
GFR SERPL CREATININE-BSD FRML MDRD: 66 ML/MIN/1.73
GLOBULIN UR ELPH-MCNC: 2.5 GM/DL
GLUCOSE SERPL-MCNC: 234 MG/DL (ref 65–99)
HCT VFR BLD AUTO: 33.2 % (ref 37.5–51)
HGB BLD-MCNC: 11.4 G/DL (ref 13–17.7)
LYMPHOCYTES # BLD AUTO: 1.5 10*3/MM3 (ref 0.7–3.1)
LYMPHOCYTES NFR BLD AUTO: 29.8 % (ref 19.6–45.3)
MAGNESIUM SERPL-MCNC: 2 MG/DL (ref 1.6–2.4)
MCH RBC QN AUTO: 30.9 PG (ref 26.6–33)
MCHC RBC AUTO-ENTMCNC: 34.3 G/DL (ref 31.5–35.7)
MCV RBC AUTO: 89.9 FL (ref 79–97)
MONOCYTES # BLD AUTO: 0.4 10*3/MM3 (ref 0.1–0.9)
MONOCYTES NFR BLD AUTO: 8 % (ref 5–12)
NEUTROPHILS NFR BLD AUTO: 3 10*3/MM3 (ref 1.7–7)
NEUTROPHILS NFR BLD AUTO: 62.2 % (ref 42.7–76)
PLATELET # BLD AUTO: 157 10*3/MM3 (ref 140–450)
PMV BLD AUTO: 8 FL (ref 6–12)
POTASSIUM SERPL-SCNC: 5.5 MMOL/L (ref 3.5–5.2)
PROT SERPL-MCNC: 6.6 G/DL (ref 6–8.5)
RBC # BLD AUTO: 3.69 10*6/MM3 (ref 4.14–5.8)
SODIUM SERPL-SCNC: 137 MMOL/L (ref 136–145)
WBC # BLD AUTO: 4.9 10*3/MM3 (ref 3.4–10.8)

## 2021-01-25 PROCEDURE — 96417 CHEMO IV INFUS EACH ADDL SEQ: CPT

## 2021-01-25 PROCEDURE — 83735 ASSAY OF MAGNESIUM: CPT | Performed by: INTERNAL MEDICINE

## 2021-01-25 PROCEDURE — 25010000002 DEXAMETHASONE SODIUM PHOSPHATE 100 MG/10ML SOLUTION: Performed by: INTERNAL MEDICINE

## 2021-01-25 PROCEDURE — 96367 TX/PROPH/DG ADDL SEQ IV INF: CPT

## 2021-01-25 PROCEDURE — 96415 CHEMO IV INFUSION ADDL HR: CPT

## 2021-01-25 PROCEDURE — 80053 COMPREHEN METABOLIC PANEL: CPT | Performed by: INTERNAL MEDICINE

## 2021-01-25 PROCEDURE — 25010000002 CISPLATIN PER 50 MG: Performed by: INTERNAL MEDICINE

## 2021-01-25 PROCEDURE — 25010000002 ETOPOSIDE 1 GM/50ML SOLUTION 50 ML VIAL: Performed by: INTERNAL MEDICINE

## 2021-01-25 PROCEDURE — 96360 HYDRATION IV INFUSION INIT: CPT

## 2021-01-25 PROCEDURE — 25010000002 FOSAPREPITANT PER 1 MG: Performed by: INTERNAL MEDICINE

## 2021-01-25 PROCEDURE — 96361 HYDRATE IV INFUSION ADD-ON: CPT

## 2021-01-25 PROCEDURE — 82565 ASSAY OF CREATININE: CPT

## 2021-01-25 PROCEDURE — 25010000002 MAGNESIUM SULFATE PER 500 MG OF MAGNESIUM: Performed by: INTERNAL MEDICINE

## 2021-01-25 PROCEDURE — 96413 CHEMO IV INFUSION 1 HR: CPT

## 2021-01-25 PROCEDURE — 96375 TX/PRO/DX INJ NEW DRUG ADDON: CPT

## 2021-01-25 PROCEDURE — 85025 COMPLETE CBC W/AUTO DIFF WBC: CPT | Performed by: INTERNAL MEDICINE

## 2021-01-25 PROCEDURE — 96366 THER/PROPH/DIAG IV INF ADDON: CPT

## 2021-01-25 PROCEDURE — 25010000002 PALONOSETRON 0.25 MG/5ML SOLUTION PREFILLED SYRINGE: Performed by: INTERNAL MEDICINE

## 2021-01-25 PROCEDURE — 96368 THER/DIAG CONCURRENT INF: CPT

## 2021-01-25 PROCEDURE — 25010000002 POTASSIUM CHLORIDE PER 2 MEQ OF POTASSIUM: Performed by: INTERNAL MEDICINE

## 2021-01-25 PROCEDURE — 99215 OFFICE O/P EST HI 40 MIN: CPT | Performed by: INTERNAL MEDICINE

## 2021-01-25 RX ORDER — PALONOSETRON 0.05 MG/ML
0.25 INJECTION, SOLUTION INTRAVENOUS ONCE
Status: COMPLETED | OUTPATIENT
Start: 2021-01-25 | End: 2021-01-25

## 2021-01-25 RX ORDER — SODIUM CHLORIDE 9 MG/ML
250 INJECTION, SOLUTION INTRAVENOUS ONCE
Status: CANCELLED | OUTPATIENT
Start: 2021-01-25

## 2021-01-25 RX ORDER — SODIUM CHLORIDE 9 MG/ML
250 INJECTION, SOLUTION INTRAVENOUS ONCE
Status: CANCELLED | OUTPATIENT
Start: 2021-02-16

## 2021-01-25 RX ORDER — SODIUM CHLORIDE 9 MG/ML
250 INJECTION, SOLUTION INTRAVENOUS ONCE
Status: CANCELLED | OUTPATIENT
Start: 2021-01-26

## 2021-01-25 RX ORDER — SODIUM CHLORIDE 9 MG/ML
250 INJECTION, SOLUTION INTRAVENOUS ONCE
Status: CANCELLED | OUTPATIENT
Start: 2021-01-27

## 2021-01-25 RX ORDER — SODIUM CHLORIDE 9 MG/ML
250 INJECTION, SOLUTION INTRAVENOUS ONCE
Status: CANCELLED | OUTPATIENT
Start: 2021-02-15

## 2021-01-25 RX ORDER — PALONOSETRON 0.05 MG/ML
0.25 INJECTION, SOLUTION INTRAVENOUS ONCE
Status: CANCELLED | OUTPATIENT
Start: 2021-02-15

## 2021-01-25 RX ORDER — PALONOSETRON 0.05 MG/ML
0.25 INJECTION, SOLUTION INTRAVENOUS ONCE
Status: CANCELLED | OUTPATIENT
Start: 2021-01-25

## 2021-01-25 RX ORDER — SODIUM CHLORIDE 9 MG/ML
250 INJECTION, SOLUTION INTRAVENOUS ONCE
Status: CANCELLED | OUTPATIENT
Start: 2021-02-17

## 2021-01-25 RX ADMIN — POTASSIUM CHLORIDE 1000 ML: 2 INJECTION, SOLUTION, CONCENTRATE INTRAVENOUS at 14:28

## 2021-01-25 RX ADMIN — SODIUM CHLORIDE 150 MG: 9 INJECTION, SOLUTION INTRAVENOUS at 11:47

## 2021-01-25 RX ADMIN — PALONOSETRON HYDROCHLORIDE 0.25 MG: 0.05 INJECTION, SOLUTION INTRAVENOUS at 11:45

## 2021-01-25 RX ADMIN — CISPLATIN 194 MG: 1 INJECTION INTRAVENOUS at 12:20

## 2021-01-25 RX ADMIN — DEXAMETHASONE SODIUM PHOSPHATE 12 MG: 10 INJECTION, SOLUTION INTRAMUSCULAR; INTRAVENOUS at 11:47

## 2021-01-25 RX ADMIN — SODIUM CHLORIDE 190 MG: 9 INJECTION, SOLUTION INTRAVENOUS at 14:30

## 2021-01-25 RX ADMIN — POTASSIUM CHLORIDE 1000 ML: 2 INJECTION, SOLUTION, CONCENTRATE INTRAVENOUS at 09:52

## 2021-01-25 NOTE — PROGRESS NOTES
DATE OF VISIT: 1/25/2021    REASON FOR VISIT: Followup for limited stage small cell lung cancer     HISTORY OF PRESENT ILLNESS: The patient is a very pleasant 70 y.o. male  with past medical history significant for SCLCA diagnosed November 2020. The patient was started on concurrent chemotherapy with XRT using Cisplatin/ 16 December 2020. The  patient is here today for scheduled follow up visit with chemotherapy cycle number 3.    SUBJECTIVE: The patient is here today by himself.  Has been able to tolerate treatment without any serious side effects.  Any fever chills night sweats but is complaining of nausea that improved with Zofran.  Sometimes he gets lightheaded when he changes position quickly.    PAST MEDICAL HISTORY/SOCIAL HISTORY/FAMILY HISTORY: Reviewed by me and unchanged from my documentation done on 01/25/21.    Review of Systems   Constitutional: Negative for activity change, appetite change, chills, fatigue, fever and unexpected weight change.   HENT: Negative for hearing loss, mouth sores, nosebleeds, sore throat and trouble swallowing.    Eyes: Negative for visual disturbance.   Respiratory: Negative for cough, chest tightness, shortness of breath and wheezing.    Cardiovascular: Negative for chest pain, palpitations and leg swelling.   Gastrointestinal: Positive for nausea. Negative for abdominal distention, abdominal pain, blood in stool, constipation, diarrhea, rectal pain and vomiting.   Endocrine: Negative for cold intolerance and heat intolerance.   Genitourinary: Negative for difficulty urinating, dysuria, frequency and urgency.   Musculoskeletal: Negative for arthralgias, back pain, gait problem, joint swelling and myalgias.   Skin: Negative for rash.   Neurological: Negative for dizziness, tremors, syncope, weakness, light-headedness, numbness and headaches.   Hematological: Negative for adenopathy. Does not bruise/bleed easily.   Psychiatric/Behavioral: Positive for sleep disturbance.  "Negative for confusion and suicidal ideas. The patient is not nervous/anxious.          Current Outpatient Medications:   •  aspirin 81 MG EC tablet, Take 81 mg by mouth Every Night., Disp: , Rfl:   •  atorvastatin (LIPITOR) 40 MG tablet, Take 40 mg by mouth Every Night., Disp: , Rfl:   •  calcium carbonate-cholecalciferol 500-400 MG-UNIT tablet tablet, Take 1 tablet by mouth Daily., Disp: , Rfl:   •  cholecalciferol (VITAMIN D3) 1000 units tablet, Take 1,000 Units by mouth Daily., Disp: , Rfl:   •  dexamethasone (DECADRON) 4 MG tablet, Take 2 tablets in the morning daily on days 2, 3 & 4.  Take with food., Disp: 6 tablet, Rfl: 5  •  ezetimibe (ZETIA) 10 MG tablet, Take 10 mg by mouth Daily., Disp: , Rfl:   •  metFORMIN (GLUCOPHAGE) 500 MG tablet, TAKE ONE TABLET BY MOUTH DAILY WITH BREAKFAST (Patient taking differently: Take 500 mg by mouth Daily With Breakfast.), Disp: 90 tablet, Rfl: 0  •  nitroglycerin (NITROSTAT) 0.4 MG SL tablet, Place 0.4 mg under the tongue Every 5 (Five) Minutes As Needed for chest pain. Take no more than 3 doses in 15 minutes., Disp: , Rfl:   •  Omega-3 Fatty Acids (FISH OIL) 1200 MG capsule capsule, Take 1,200 mg by mouth Daily., Disp: , Rfl:   •  ondansetron (ZOFRAN) 8 MG tablet, Take 1 tablet by mouth 3 (Three) Times a Day As Needed for Nausea or Vomiting., Disp: 30 tablet, Rfl: 5  •  sertraline (ZOLOFT) 50 MG tablet, TAKE ONE TABLET BY MOUTH DAILY (Patient taking differently: Take 50 mg by mouth Daily.), Disp: 90 tablet, Rfl: 0  •  zolpidem (AMBIEN) 5 MG tablet, Take 1 tablet by mouth At Night As Needed for Sleep., Disp: 30 tablet, Rfl: 1    PHYSICAL EXAMINATION:   /85   Pulse 79   Temp 97.8 °F (36.6 °C) (Temporal)   Resp 20   Ht 170.2 cm (67.01\")   Wt 84.8 kg (187 lb)   SpO2 93%   BMI 29.28 kg/m²    Pain Score    01/25/21 0832   PainSc: 0-No pain       ECOG Performance Status: 1 - Symptomatic but completely ambulatory  General Appearance:  alert, cooperative, no apparent " distress and appears stated age   Neurologic/Psychiatric: A&O x 3, gait steady, appropriate affect, strength 5/5 in all muscle groups   HEENT:  Normocephalic, without obvious abnormality, mucous membranes moist   Neck: Supple, symmetrical, trachea midline, no adenopathy;  No thyromegaly, masses, or tenderness   Lungs:   Clear to auscultation bilaterally; respirations regular, even, and unlabored bilaterally   Heart:  Regular rate and rhythm, no murmurs appreciated   Abdomen:   Soft, non-tender, non-distended and no organomegaly   Lymph nodes: No cervical, supraclavicular, inguinal or axillary adenopathy noted   Extremities: Normal, atraumatic; no clubbing, cyanosis, or edema    Skin: No rashes, ulcers, or suspicious lesions noted     No visits with results within 2 Week(s) from this visit.   Latest known visit with results is:   Hospital Outpatient Visit on 01/04/2021   Component Date Value Ref Range Status   • Glucose 01/04/2021 289* 65 - 99 mg/dL Final   • BUN 01/04/2021 31* 8 - 23 mg/dL Final   • Creatinine 01/04/2021 1.21  0.76 - 1.27 mg/dL Final   • Sodium 01/04/2021 139  136 - 145 mmol/L Final   • Potassium 01/04/2021 4.8  3.5 - 5.2 mmol/L Final   • Chloride 01/04/2021 101  98 - 107 mmol/L Final   • CO2 01/04/2021 27.0  22.0 - 29.0 mmol/L Final   • Calcium 01/04/2021 9.6  8.6 - 10.5 mg/dL Final   • Total Protein 01/04/2021 7.1  6.0 - 8.5 g/dL Final   • Albumin 01/04/2021 4.50  3.50 - 5.20 g/dL Final   • ALT (SGPT) 01/04/2021 64* 1 - 41 U/L Final   • AST (SGOT) 01/04/2021 22  1 - 40 U/L Final   • Alkaline Phosphatase 01/04/2021 130* 39 - 117 U/L Final   • Total Bilirubin 01/04/2021 0.6  0.0 - 1.2 mg/dL Final   • eGFR Non African Amer 01/04/2021 59* >60 mL/min/1.73 Final   • Globulin 01/04/2021 2.6  gm/dL Final   • A/G Ratio 01/04/2021 1.7  g/dL Final   • BUN/Creatinine Ratio 01/04/2021 25.6* 7.0 - 25.0 Final   • Anion Gap 01/04/2021 11.0  5.0 - 15.0 mmol/L Final   • Magnesium 01/04/2021 2.1  1.6 - 2.4 mg/dL  Final   • WBC 01/04/2021 4.20  3.40 - 10.80 10*3/mm3 Final   • RBC 01/04/2021 4.36  4.14 - 5.80 10*6/mm3 Final   • Hemoglobin 01/04/2021 13.6  13.0 - 17.7 g/dL Final   • Hematocrit 01/04/2021 40.0  37.5 - 51.0 % Final   • RDW 01/04/2021 13.4  12.3 - 15.4 % Final   • MCV 01/04/2021 91.7  79.0 - 97.0 fL Final   • MCH 01/04/2021 31.3  26.6 - 33.0 pg Final   • MCHC 01/04/2021 34.1  31.5 - 35.7 g/dL Final   • MPV 01/04/2021 8.3  6.0 - 12.0 fL Final   • Platelets 01/04/2021 191  140 - 450 10*3/mm3 Final   • Neutrophil % 01/04/2021 47.9  42.7 - 76.0 % Final   • Lymphocyte % 01/04/2021 44.0  19.6 - 45.3 % Final   • Monocyte % 01/04/2021 8.1  5.0 - 12.0 % Final   • Neutrophils, Absolute 01/04/2021 2.00  1.70 - 7.00 10*3/mm3 Final   • Lymphocytes, Absolute 01/04/2021 1.80  0.70 - 3.10 10*3/mm3 Final   • Monocytes, Absolute 01/04/2021 0.30  0.10 - 0.90 10*3/mm3 Final   • Creatinine 01/04/2021 1.30  0.60 - 1.30 mg/dL Final    Serial Number: [id number]:  [id number]        Mri Brain With & Without Contrast    Result Date: 1/19/2021  Narrative: EXAMINATION: MRI BRAIN W WO CONTRAST-  INDICATION: Non-small cell lung cancer, staging; R91.1-Solitary pulmonary nodule.  TECHNIQUE: Multiplanar MRI of the brain with and without intravenous contrast administration.  COMPARISON: MRI brain dated 12/01/2020.  FINDINGS: No restriction on diffusion-weighted sequences to suggest acute ischemia. Midline structures are symmetric without evidence of mass, mass effect or midline shift. Ventricles and sulci are within normal limits. Mild to moderate increased signal findings on T2 and FLAIR throughout the supratentorial white matter consistent with chronic small vessel ischemic disease. Postcontrast sequences reveal decreased size of right cerebellar round focus of enhancement measuring 4 mm previously 6 cm on prior (series 14 image 61).  No new sites of abnormal enhancement adjacent or separate from this with grossly expected in appropriate vascular  enhancement. Globes and orbits retain normal T2 signal characteristics. Paranasal sinuses and mastoid cells are grossly clear and well pneumatized. Pituitary and sella within normal limits. Cervicomedullary junction is widely patent.      Impression: Decreased size of solitary right cerebellar enhancing focus now measuring 4 mm maximum dimension and decreased in prominence as well as size from prior comparison where this previously measured 6 mm. No new sites of abnormal enhancement elsewhere or acute intracranial pathology.   D:  01/19/2021 E:  01/19/2021  This report was finalized on 1/19/2021 7:04 PM by Dr. Rne Malone.        ASSESSMENT: The patient is a very pleasant 70 y.o. male  with limited stage small cell lung cancer    PROBLEM LIST:   1.  Limited stage small cell lung cancer [id number] stage IIIa:  A.  Presented with abnormal screening CT chest  B.  Diagnosed after bronchoscopy with biopsy done by  November 19, 2020 + for small cell from level 7 level 4R and level 10 R lymph nodes.  C.  Whole-body PET scan did not reveal any other sites of disease.  D.  Started definitive concurrent chemotherapy with radiation using cisplatin etoposide December 2020 1 status post 2 cycles  2.  Isolated 6 mm right cerebellar lesion:  A.  Evident MRI brain done on December 1, 2020  B.  The patient will receive whole brain radiation after completion of treatment.  3.  Chemotherapy-induced nausea  4. Insomnia     PLAN:  1.  I will proceed with chemotherapy as scheduled today cisplatin with etoposide cycle #3.  Cisplatin 100 mg/m² day 1 and etoposide 100 milligrams per meter square day 1, 2, 3.  2.  The patient will will continue concurrent radiation.  3.  He will follow-up with us in 3 weeks for cycle #4.  4.  I will repeat the patient's scans after cycle #4.  5.  The patient will be a candidate for whole brain radiation after completion of chemotherapy.  6.  I will monitor the patient blood work including blood  counts kidney function liver function and electrolytes.  7.  I will start the patient on Zofran as needed for chemotherapy-induced nausea.  8. We reviewed the potential side effects of this regimen including fatigue, vomiting and nausea, hair loss, nephropathy, neuropathy, hearing loss, myelosuppression, and risk of infusion reaction.  9.  I will start the patient on Ambien 5 mg nightly as needed for insomnia.  10.  I discussed the case with Dr. Urias from radiation oncology to coordinate patient's care.  Daniel Cartagena MD  1/25/2021

## 2021-01-25 NOTE — PROGRESS NOTES
Onc Nutrition    Patient: Luis Elliott  YOB: 1950    Diagnosis: Limited stage small cell lung cancer  Chemotherapy: Cisplatin(D1) / Etoposide(D1-3) - every 21 days (C3D1)  Radiation:  66 Gy in 33 fractions to PET avid disease / patient has completed 14/33 fractions     Weight: 187#   Usual Body Weight: 180-190#    Follow up with patient during his infusion appointment.  He reports he continues to tolerate treatment well and denies significant nutritional complaints at this time.  He states his appetite and oral intake have been good.  He reports he will have both constipation and diarrhea after chemo but feels like he knows how to treat both and what to expect.    Encouraged him to continue with his good oral intake of small frequent snacks throughout the day.  Also encouraged him to be sipping on fluids throughout the day to aid with hydration.  He denies questions at this time.  Advised him to call RD if questions arise.  He voiced understanding of information discussed.  Will monitor as needed during his treatment course.    Sherry Ruffin RD  01/25/21

## 2021-01-26 ENCOUNTER — HOSPITAL ENCOUNTER (OUTPATIENT)
Dept: ONCOLOGY | Facility: HOSPITAL | Age: 71
Setting detail: INFUSION SERIES
Discharge: HOME | End: 2021-01-26

## 2021-01-26 ENCOUNTER — HOSPITAL ENCOUNTER (OUTPATIENT)
Dept: RADIATION ONCOLOGY | Facility: HOSPITAL | Age: 71
Discharge: HOME | End: 2021-01-26

## 2021-01-26 ENCOUNTER — DOCUMENTATION (OUTPATIENT)
Dept: NUTRITION | Facility: HOSPITAL | Age: 71
End: 2021-01-26

## 2021-01-26 VITALS — BODY MASS INDEX: 30.13 KG/M2 | WEIGHT: 192.4 LBS

## 2021-01-26 VITALS
RESPIRATION RATE: 16 BRPM | TEMPERATURE: 98 F | DIASTOLIC BLOOD PRESSURE: 65 MMHG | SYSTOLIC BLOOD PRESSURE: 108 MMHG | WEIGHT: 192 LBS | BODY MASS INDEX: 30.13 KG/M2 | HEIGHT: 67 IN | HEART RATE: 77 BPM

## 2021-01-26 DIAGNOSIS — C34.31 SMALL CELL LUNG CANCER, RIGHT LOWER LOBE (HCC): Primary | ICD-10-CM

## 2021-01-26 PROCEDURE — 77386: CPT | Performed by: RADIOLOGY

## 2021-01-26 PROCEDURE — 25010000002 ETOPOSIDE 1 GM/50ML SOLUTION 50 ML VIAL: Performed by: INTERNAL MEDICINE

## 2021-01-26 PROCEDURE — 96413 CHEMO IV INFUSION 1 HR: CPT

## 2021-01-26 RX ADMIN — SODIUM CHLORIDE 190 MG: 9 INJECTION, SOLUTION INTRAVENOUS at 14:15

## 2021-01-26 NOTE — PROGRESS NOTES
ONC Nutrition     Diagnosis: Limited stage small cell lung cancer  Chemotherapy: Cisplatin(D1) / Etoposide(D1-3) - every 21 days (2/4) / 2 of 4 cycles completed - Chemo yesterday and today  Radiation:  66 Gy in 33 fractions to PET avid disease / patient has completed 15 / 33 fractions     Weight: 192.4 #   Usual Body Weight: 180-190#     Patient continues to do well with progression of treatment.  Good appetite and eating well; denies any difficulties with swallowing or indication to modify textures and consistencies at this point.  He states that he can definitely tell that the fatigue is beginning to set in.  Will continue to follow.

## 2021-01-27 ENCOUNTER — HOSPITAL ENCOUNTER (OUTPATIENT)
Dept: ONCOLOGY | Facility: HOSPITAL | Age: 71
Setting detail: INFUSION SERIES
Discharge: HOME | End: 2021-01-27

## 2021-01-27 ENCOUNTER — HOSPITAL ENCOUNTER (OUTPATIENT)
Dept: RADIATION ONCOLOGY | Facility: HOSPITAL | Age: 71
Discharge: HOME | End: 2021-01-27

## 2021-01-27 DIAGNOSIS — C34.31 SMALL CELL LUNG CANCER, RIGHT LOWER LOBE (HCC): Primary | ICD-10-CM

## 2021-01-27 PROCEDURE — 77386: CPT | Performed by: RADIOLOGY

## 2021-01-27 PROCEDURE — 96413 CHEMO IV INFUSION 1 HR: CPT

## 2021-01-27 PROCEDURE — 25010000002 ETOPOSIDE 1 GM/50ML SOLUTION 50 ML VIAL: Performed by: INTERNAL MEDICINE

## 2021-01-27 RX ADMIN — SODIUM CHLORIDE 190 MG: 9 INJECTION, SOLUTION INTRAVENOUS at 13:52

## 2021-01-28 ENCOUNTER — HOSPITAL ENCOUNTER (OUTPATIENT)
Dept: RADIATION ONCOLOGY | Facility: HOSPITAL | Age: 71
Discharge: HOME | End: 2021-01-28

## 2021-01-28 PROCEDURE — 77336 RADIATION PHYSICS CONSULT: CPT | Performed by: RADIOLOGY

## 2021-01-28 PROCEDURE — 77386: CPT | Performed by: RADIOLOGY

## 2021-01-29 ENCOUNTER — NURSE NAVIGATOR (OUTPATIENT)
Dept: ONCOLOGY | Facility: CLINIC | Age: 71
End: 2021-01-29

## 2021-01-29 ENCOUNTER — HOSPITAL ENCOUNTER (OUTPATIENT)
Dept: RADIATION ONCOLOGY | Facility: HOSPITAL | Age: 71
Discharge: HOME | End: 2021-01-29

## 2021-01-29 ENCOUNTER — TELEPHONE (OUTPATIENT)
Dept: ONCOLOGY | Facility: CLINIC | Age: 71
End: 2021-01-29

## 2021-01-29 ENCOUNTER — TELEPHONE (OUTPATIENT)
Dept: FAMILY MEDICINE CLINIC | Facility: CLINIC | Age: 71
End: 2021-01-29

## 2021-01-29 PROCEDURE — 77386: CPT | Performed by: RADIOLOGY

## 2021-01-29 NOTE — TELEPHONE ENCOUNTER
Both of these medications were sent in on 1/27/21. Pharmacy confirmed receipt.    Called and spoke with Sabina who said the pharmacy told her that they did not receive anything, informed her that I would call the pharmacy.    Spoke with the pharmacy who states that they did not receive anything. Gave verbals while on the phone, will get ready for patient.    Called and spoke with Sabina again and informed her that the medications were called in and should be ready for  soon.   Call was disconnected so unsure if she heard everything, attempted to call back with no answer. Unable to leave a voicemail.

## 2021-01-29 NOTE — PROGRESS NOTES
Patient called and was saying that he has taken powder, pills and a suppository for his constipation, but has only had a small BM. Patient says that he feels bloated and needs something other than what he had taken to have a real BM. I sent Carolyn a message in Epic. She emailed me back and said that she took care of it. AG

## 2021-01-29 NOTE — TELEPHONE ENCOUNTER
Called and spoke with patient.  He had previously had constipation back in December and it resolved.  I asked how long he has been using the stool softeners.  He just started back on Wednesday taking colace daily and miralax a couple times.  I advised to do the colace BID and continue miralax daily.  He reports having two small bowel movements, feels bloated but no abdominal pain.  Advised to continue this over the weekend and call us back Monday if bowel movements were not getting better.  Patient verbalized understanding.

## 2021-01-29 NOTE — TELEPHONE ENCOUNTER
Caller: Sabina Elliott    Relationship: Emergency Contact    PHONE [phone]    Medication needed:   METFORMIN AND SERTRALINE    When do you need the refill by: 4 DAYS LEFT    What details did the patient provide when requesting the medication: NEED TO RENEW SCRIPTS    Does the patient have less than a 3 day supply:  [] Yes  [x] No    What is the patient's preferred pharmacy:    Best call back number: [facility] [address] [place] [address] - [facility] [phone] [facility] [phone]   [phone]

## 2021-01-29 NOTE — TELEPHONE ENCOUNTER
----- Message from Carol Jules RN sent at 1/29/2021  9:46 AM EST -----  Carolyn and Dr. Cartagena,    Patient called today and said that he is still having constipation after taking some powder, pills, and a suppository. Patient is complaining of some bloating without nausea after a very small BM. Patient is asking what else does Dr. Cartagena suggest that he take for his constipation.    Thank you,    Carol  [phone]

## 2021-02-01 ENCOUNTER — HOSPITAL ENCOUNTER (OUTPATIENT)
Dept: RADIATION ONCOLOGY | Facility: HOSPITAL | Age: 71
Setting detail: RADIATION/ONCOLOGY SERIES
Discharge: HOME | End: 2021-02-01

## 2021-02-01 ENCOUNTER — HOSPITAL ENCOUNTER (OUTPATIENT)
Dept: RADIATION ONCOLOGY | Facility: HOSPITAL | Age: 71
Discharge: HOME | End: 2021-02-01

## 2021-02-01 PROCEDURE — 77386: CPT | Performed by: RADIOLOGY

## 2021-02-02 ENCOUNTER — HOSPITAL ENCOUNTER (OUTPATIENT)
Dept: RADIATION ONCOLOGY | Facility: HOSPITAL | Age: 71
Discharge: HOME | End: 2021-02-02

## 2021-02-02 VITALS — WEIGHT: 186.9 LBS | BODY MASS INDEX: 29.27 KG/M2

## 2021-02-02 PROCEDURE — 77386: CPT | Performed by: RADIOLOGY

## 2021-02-03 ENCOUNTER — HOSPITAL ENCOUNTER (OUTPATIENT)
Dept: RADIATION ONCOLOGY | Facility: HOSPITAL | Age: 71
Discharge: HOME | End: 2021-02-03

## 2021-02-03 PROCEDURE — 77386: CPT | Performed by: RADIOLOGY

## 2021-02-04 ENCOUNTER — HOSPITAL ENCOUNTER (OUTPATIENT)
Dept: RADIATION ONCOLOGY | Facility: HOSPITAL | Age: 71
Discharge: HOME | End: 2021-02-04

## 2021-02-04 DIAGNOSIS — F41.9 ANXIETY: Primary | ICD-10-CM

## 2021-02-04 PROCEDURE — 77386: CPT | Performed by: RADIOLOGY

## 2021-02-04 PROCEDURE — 77336 RADIATION PHYSICS CONSULT: CPT | Performed by: RADIOLOGY

## 2021-02-05 ENCOUNTER — HOSPITAL ENCOUNTER (OUTPATIENT)
Dept: RADIATION ONCOLOGY | Facility: HOSPITAL | Age: 71
Discharge: HOME | End: 2021-02-05

## 2021-02-05 DIAGNOSIS — F41.9 ANXIETY: Primary | ICD-10-CM

## 2021-02-05 PROCEDURE — 77386: CPT | Performed by: RADIOLOGY

## 2021-02-05 RX ORDER — LORAZEPAM 0.5 MG/1
0.5 TABLET ORAL EVERY 8 HOURS PRN
Qty: 20 TABLET | Refills: 0 | Status: SHIPPED | OUTPATIENT
Start: 2021-02-05 | End: 2021-03-15

## 2021-02-08 ENCOUNTER — HOSPITAL ENCOUNTER (OUTPATIENT)
Dept: RADIATION ONCOLOGY | Facility: HOSPITAL | Age: 71
Discharge: HOME | End: 2021-02-08

## 2021-02-08 PROCEDURE — 77386: CPT | Performed by: RADIOLOGY

## 2021-02-09 ENCOUNTER — HOSPITAL ENCOUNTER (OUTPATIENT)
Dept: RADIATION ONCOLOGY | Facility: HOSPITAL | Age: 71
Discharge: HOME | End: 2021-02-09

## 2021-02-09 VITALS — WEIGHT: 187.8 LBS | BODY MASS INDEX: 29.41 KG/M2

## 2021-02-09 PROCEDURE — 77386: CPT | Performed by: RADIOLOGY

## 2021-02-10 ENCOUNTER — TELEPHONE (OUTPATIENT)
Dept: ONCOLOGY | Facility: CLINIC | Age: 71
End: 2021-02-10

## 2021-02-10 ENCOUNTER — HOSPITAL ENCOUNTER (OUTPATIENT)
Dept: RADIATION ONCOLOGY | Facility: HOSPITAL | Age: 71
Discharge: HOME | End: 2021-02-10

## 2021-02-10 ENCOUNTER — OFFICE VISIT (OUTPATIENT)
Dept: PULMONOLOGY | Facility: CLINIC | Age: 71
End: 2021-02-10

## 2021-02-10 VITALS
BODY MASS INDEX: 29.35 KG/M2 | OXYGEN SATURATION: 97 % | TEMPERATURE: 96.9 F | WEIGHT: 187 LBS | DIASTOLIC BLOOD PRESSURE: 80 MMHG | HEART RATE: 95 BPM | HEIGHT: 67 IN | SYSTOLIC BLOOD PRESSURE: 130 MMHG

## 2021-02-10 DIAGNOSIS — Z87.891 FORMER SMOKER: ICD-10-CM

## 2021-02-10 DIAGNOSIS — C34.31 SMALL CELL LUNG CANCER, RIGHT LOWER LOBE (HCC): Primary | ICD-10-CM

## 2021-02-10 DIAGNOSIS — J44.9 COPD MIXED TYPE (HCC): ICD-10-CM

## 2021-02-10 PROCEDURE — 77386: CPT | Performed by: RADIOLOGY

## 2021-02-10 PROCEDURE — 99214 OFFICE O/P EST MOD 30 MIN: CPT | Performed by: INTERNAL MEDICINE

## 2021-02-10 NOTE — TELEPHONE ENCOUNTER
CALLER: MARIO    RELATION: PT WIFE    REASON:    MARIO WAS WANTING TO SPEAK TO SOMEONE IN THE OFFICE ABOUT GETTING PERMISSION TO BE A VISITOR IN PT VISIT ON 2/15.    PT WIFE IS FEELING HELPLESS NOT KNOWING EXACTLY WHAT IS GOING ON.    PLEASE ADVISE    BEST C/B: [phone]

## 2021-02-11 ENCOUNTER — HOSPITAL ENCOUNTER (OUTPATIENT)
Dept: RADIATION ONCOLOGY | Facility: HOSPITAL | Age: 71
Discharge: HOME | End: 2021-02-11

## 2021-02-11 PROCEDURE — 77386: CPT | Performed by: RADIOLOGY

## 2021-02-11 NOTE — PROGRESS NOTES
"  PULMONARY  NOTE    Chief Complaint     Small cell lung cancer extensive stage, COPD, former smoker, mitral valve disease    History of Present Illness     70-year-old male returns today for follow-up.  I initially saw him on October 13, 2020    He underwent a bronchoscopy with biopsy which revealed small cell carcinoma.  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  He has been on systemic therapy.  It has been \"rough\" but overall he feels that he is tolerating his chemotherapy fairly well    He has a history of COPD.  He does not use a regular bronchodilator but has had albuterol to use on an as-needed basis    Has a history of coronary artery disease but denies chest pain or palpitations    Patient Active Problem List   Diagnosis   • Type 2 diabetes mellitus without complication (CMS/HCC)   • Hyperlipidemia   • Essential hypertension   • Erectile dysfunction   • History of prostate cancer   • Osteoarthritis of multiple joints   • Coronary artery disease involving native heart without angina pectoris   • Colon polyps   • Glaucoma   • Low back pain   • Streptococcal bacteremia   • H/O subacute bacterial endocarditis   • Pulmonary nodule (2.6cm RLL)   • Severe mitral regurgitation   • Former smoker (Stopped 2012)   • COPD   • Small cell lung cancer, right lower lobe (CMS/HCC)     Allergies   Allergen Reactions   • Xarelto [Rivaroxaban] Other (See Comments)     MADE ME FEEL LIKE \" I WAS HAVING A HEART ATTACK.\"       Current Outpatient Medications:   •  aspirin 81 MG EC tablet, Take 81 mg by mouth Every Night., Disp: , Rfl:   •  atorvastatin (LIPITOR) 40 MG tablet, Take 40 mg by mouth Every Night., Disp: , Rfl:   •  calcium carbonate-cholecalciferol 500-400 MG-UNIT tablet tablet, Take 1 tablet by mouth Daily., Disp: , Rfl:   •  cholecalciferol (VITAMIN D3) 1000 units tablet, Take 1,000 Units by mouth Daily., Disp: , Rfl:   •  dexamethasone (DECADRON) 4 MG tablet, Take 2 tablets in the morning daily on days 2, 3 & 4. "  Take with food., Disp: 6 tablet, Rfl: 5  •  ezetimibe (ZETIA) 10 MG tablet, Take 10 mg by mouth Daily., Disp: , Rfl:   •  LORazepam (Ativan) 0.5 MG tablet, Take 1 tablet by mouth Every 8 (Eight) Hours As Needed for Anxiety., Disp: 20 tablet, Rfl: 0  •  metFORMIN (GLUCOPHAGE) 500 MG tablet, TAKE ONE TABLET BY MOUTH DAILY WITH BREAKFAST, Disp: 90 tablet, Rfl: 3  •  nitroglycerin (NITROSTAT) 0.4 MG SL tablet, Place 0.4 mg under the tongue Every 5 (Five) Minutes As Needed for chest pain. Take no more than 3 doses in 15 minutes., Disp: , Rfl:   •  Omega-3 Fatty Acids (FISH OIL) 1200 MG capsule capsule, Take 1,200 mg by mouth Daily., Disp: , Rfl:   •  ondansetron (ZOFRAN) 8 MG tablet, Take 1 tablet by mouth 3 (Three) Times a Day As Needed for Nausea or Vomiting., Disp: 30 tablet, Rfl: 5  •  sertraline (ZOLOFT) 50 MG tablet, TAKE ONE TABLET BY MOUTH DAILY, Disp: 90 tablet, Rfl: 3  •  zolpidem (AMBIEN) 5 MG tablet, Take 1 tablet by mouth At Night As Needed for Sleep., Disp: 30 tablet, Rfl: 1  MEDICATION LIST AND ALLERGIES REVIEWED.    Family History   Problem Relation Age of Onset   • Hypertension Mother    • Atrial fibrillation Mother    • Alcohol abuse Father    • Heart attack Father    • Diabetes Father    • No Known Problems Sister    • Prostate cancer Brother      Social History     Tobacco Use   • Smoking status: Former Smoker     Packs/day: 1.50     Years: 36.00     Pack years: 54.00     Types: Cigarettes     Quit date: 2011     Years since quittin.8   • Smokeless tobacco: Former User     Types: Chew     Quit date:    • Tobacco comment: quit smoking for 12 years then started again but then quit a final time    Substance Use Topics   • Alcohol use: Not Currently   • Drug use: Yes     Types: Marijuana     Comment: a month ago     Social History     Social History Narrative    :     to Sabina x 35yrs    2 children from previous marriage    2 gk.    Retired - / [employer]  "-fiberglass exposure    Exercise:10acre farm - vegetables.    Dental: utd    Eye: utd    Previously smoked up to 2 packs of cigarettes per day, starting at age 16.    Stop smoking for good in April 2012    No history of IVDU     FAMILY AND SOCIAL HISTORY REVIEWED.    Review of Systems  ALSO REFER TO SCANNED ROS SHEET FROM SAME DATE.    /80   Pulse 95   Temp 96.9 °F (36.1 °C)   Ht 170.2 cm (67\")   Wt 84.8 kg (187 lb)   SpO2 97% Comment: resting, room air  BMI 29.29 kg/m²   Physical Exam  Vitals signs and nursing note reviewed.   Constitutional:       General: He is not in acute distress.     Appearance: He is well-developed. He is not diaphoretic.   HENT:      Head: Normocephalic and atraumatic.   Neck:      Thyroid: No thyromegaly.   Cardiovascular:      Rate and Rhythm: Normal rate and regular rhythm.      Heart sounds: Murmur present.      Comments: Grade 2/6 systolic murmur  Pulmonary:      Effort: Pulmonary effort is normal.      Breath sounds: Normal breath sounds. No stridor.   Abdominal:      General: Bowel sounds are normal.      Palpations: Abdomen is soft.   Musculoskeletal: Normal range of motion.      Right lower leg: No edema.      Left lower leg: No edema.   Lymphadenopathy:      Cervical: No cervical adenopathy.      Upper Body:      Right upper body: No supraclavicular or epitrochlear adenopathy.      Left upper body: No supraclavicular or epitrochlear adenopathy.   Skin:     General: Skin is warm and dry.   Neurological:      Mental Status: He is alert and oriented to person, place, and time.   Psychiatric:         Behavior: Behavior normal.         Results     MRI of the brain on January 19, 2021 reviewed on PACS.  Reduction in the size of the cerebellar metastasis    Problem List       ICD-10-CM ICD-9-CM   1. Small cell lung cancer, right lower lobe (CMS/HCC)  C34.31 162.5   2. Former smoker (Stopped 2012)  Z87.891 V15.82   3. COPD  J44.9 496       Discussion     Overall he appears to " be doing fairly well with his chemotherapy.  He continues to have a good attitude.    I will look forward to subsequent imaging.  Hopefully will see good response to therapy.    He will use albuterol on an as-needed basis for shortness of breath    I will see him back in 6 months or earlier if there are any problems in the meantime    Moderate level of Medical Decision Making complexity based on 2 or more chronic stable illnesses and prescription drug management.    Clint Thompson MD  Note electronically signed    CC: Silvestre Coleman,

## 2021-02-12 ENCOUNTER — HOSPITAL ENCOUNTER (OUTPATIENT)
Dept: RADIATION ONCOLOGY | Facility: HOSPITAL | Age: 71
Discharge: HOME | End: 2021-02-12

## 2021-02-12 PROCEDURE — 77386: CPT | Performed by: RADIOLOGY

## 2021-02-12 PROCEDURE — 77336 RADIATION PHYSICS CONSULT: CPT | Performed by: RADIOLOGY

## 2021-02-15 ENCOUNTER — HOSPITAL ENCOUNTER (OUTPATIENT)
Dept: ONCOLOGY | Facility: HOSPITAL | Age: 71
Setting detail: INFUSION SERIES
Discharge: HOME | End: 2021-02-15

## 2021-02-15 ENCOUNTER — OFFICE VISIT (OUTPATIENT)
Dept: ONCOLOGY | Facility: CLINIC | Age: 71
End: 2021-02-15

## 2021-02-15 ENCOUNTER — DOCUMENTATION (OUTPATIENT)
Dept: NUTRITION | Facility: HOSPITAL | Age: 71
End: 2021-02-15

## 2021-02-15 VITALS
OXYGEN SATURATION: 98 % | HEART RATE: 91 BPM | HEIGHT: 67 IN | RESPIRATION RATE: 16 BRPM | SYSTOLIC BLOOD PRESSURE: 110 MMHG | BODY MASS INDEX: 29.35 KG/M2 | WEIGHT: 187 LBS | DIASTOLIC BLOOD PRESSURE: 72 MMHG | TEMPERATURE: 97.3 F

## 2021-02-15 DIAGNOSIS — C34.31 SMALL CELL LUNG CANCER, RIGHT LOWER LOBE (HCC): Primary | ICD-10-CM

## 2021-02-15 LAB
ALBUMIN SERPL-MCNC: 4.4 G/DL (ref 3.5–5.2)
ALBUMIN/GLOB SERPL: 1.7 G/DL
ALP SERPL-CCNC: 122 U/L (ref 39–117)
ALT SERPL W P-5'-P-CCNC: 27 U/L (ref 1–41)
ANION GAP SERPL CALCULATED.3IONS-SCNC: 9 MMOL/L (ref 5–15)
AST SERPL-CCNC: 16 U/L (ref 1–40)
BILIRUB SERPL-MCNC: 0.6 MG/DL (ref 0–1.2)
BUN SERPL-MCNC: 28 MG/DL (ref 8–23)
BUN/CREAT SERPL: 19.6 (ref 7–25)
CALCIUM SPEC-SCNC: 9.7 MG/DL (ref 8.6–10.5)
CHLORIDE SERPL-SCNC: 96 MMOL/L (ref 98–107)
CO2 SERPL-SCNC: 25 MMOL/L (ref 22–29)
CREAT BLDA-MCNC: 1.5 MG/DL (ref 0.6–1.3)
CREAT SERPL-MCNC: 1.43 MG/DL (ref 0.76–1.27)
ERYTHROCYTE [DISTWIDTH] IN BLOOD BY AUTOMATED COUNT: 16.4 % (ref 12.3–15.4)
GFR SERPL CREATININE-BSD FRML MDRD: 49 ML/MIN/1.73
GLOBULIN UR ELPH-MCNC: 2.6 GM/DL
GLUCOSE SERPL-MCNC: 379 MG/DL (ref 65–99)
HCT VFR BLD AUTO: 32 % (ref 37.5–51)
HGB BLD-MCNC: 11 G/DL (ref 13–17.7)
LYMPHOCYTES # BLD AUTO: 0.9 10*3/MM3 (ref 0.7–3.1)
LYMPHOCYTES NFR BLD AUTO: 20.5 % (ref 19.6–45.3)
MAGNESIUM SERPL-MCNC: 2 MG/DL (ref 1.6–2.4)
MCH RBC QN AUTO: 31.6 PG (ref 26.6–33)
MCHC RBC AUTO-ENTMCNC: 34.4 G/DL (ref 31.5–35.7)
MCV RBC AUTO: 91.9 FL (ref 79–97)
MONOCYTES # BLD AUTO: 0.4 10*3/MM3 (ref 0.1–0.9)
MONOCYTES NFR BLD AUTO: 8.9 % (ref 5–12)
NEUTROPHILS NFR BLD AUTO: 3 10*3/MM3 (ref 1.7–7)
NEUTROPHILS NFR BLD AUTO: 70.6 % (ref 42.7–76)
PLATELET # BLD AUTO: 164 10*3/MM3 (ref 140–450)
PMV BLD AUTO: 8 FL (ref 6–12)
POTASSIUM SERPL-SCNC: 5.2 MMOL/L (ref 3.5–5.2)
PROT SERPL-MCNC: 7 G/DL (ref 6–8.5)
RBC # BLD AUTO: 3.48 10*6/MM3 (ref 4.14–5.8)
SODIUM SERPL-SCNC: 130 MMOL/L (ref 136–145)
WBC # BLD AUTO: 4.3 10*3/MM3 (ref 3.4–10.8)

## 2021-02-15 PROCEDURE — 85025 COMPLETE CBC W/AUTO DIFF WBC: CPT | Performed by: INTERNAL MEDICINE

## 2021-02-15 PROCEDURE — 99214 OFFICE O/P EST MOD 30 MIN: CPT | Performed by: NURSE PRACTITIONER

## 2021-02-15 PROCEDURE — 80053 COMPREHEN METABOLIC PANEL: CPT | Performed by: INTERNAL MEDICINE

## 2021-02-15 PROCEDURE — 82565 ASSAY OF CREATININE: CPT

## 2021-02-15 PROCEDURE — 96368 THER/DIAG CONCURRENT INF: CPT

## 2021-02-15 PROCEDURE — 96375 TX/PRO/DX INJ NEW DRUG ADDON: CPT

## 2021-02-15 PROCEDURE — 25010000002 ETOPOSIDE 500 MG/25ML SOLUTION 25 ML VIAL: Performed by: INTERNAL MEDICINE

## 2021-02-15 PROCEDURE — 25010000002 FOSAPREPITANT PER 1 MG: Performed by: INTERNAL MEDICINE

## 2021-02-15 PROCEDURE — 96413 CHEMO IV INFUSION 1 HR: CPT

## 2021-02-15 PROCEDURE — 96417 CHEMO IV INFUS EACH ADDL SEQ: CPT

## 2021-02-15 PROCEDURE — 96376 TX/PRO/DX INJ SAME DRUG ADON: CPT

## 2021-02-15 PROCEDURE — 25010000002 MAGNESIUM SULFATE PER 500 MG OF MAGNESIUM: Performed by: INTERNAL MEDICINE

## 2021-02-15 PROCEDURE — 96415 CHEMO IV INFUSION ADDL HR: CPT

## 2021-02-15 PROCEDURE — 83735 ASSAY OF MAGNESIUM: CPT | Performed by: INTERNAL MEDICINE

## 2021-02-15 PROCEDURE — 25010000002 PALONOSETRON 0.25 MG/5ML SOLUTION PREFILLED SYRINGE: Performed by: INTERNAL MEDICINE

## 2021-02-15 PROCEDURE — 25010000002 CISPLATIN PER 50 MG: Performed by: INTERNAL MEDICINE

## 2021-02-15 PROCEDURE — 25010000002 DEXAMETHASONE SODIUM PHOSPHATE 100 MG/10ML SOLUTION: Performed by: INTERNAL MEDICINE

## 2021-02-15 PROCEDURE — 25010000002 POTASSIUM CHLORIDE PER 2 MEQ OF POTASSIUM: Performed by: INTERNAL MEDICINE

## 2021-02-15 PROCEDURE — 96366 THER/PROPH/DIAG IV INF ADDON: CPT

## 2021-02-15 PROCEDURE — 96367 TX/PROPH/DG ADDL SEQ IV INF: CPT

## 2021-02-15 RX ORDER — PALONOSETRON 0.05 MG/ML
0.25 INJECTION, SOLUTION INTRAVENOUS ONCE
Status: COMPLETED | OUTPATIENT
Start: 2021-02-15 | End: 2021-02-15

## 2021-02-15 RX ORDER — SODIUM CHLORIDE 9 MG/ML
250 INJECTION, SOLUTION INTRAVENOUS ONCE
Status: COMPLETED | OUTPATIENT
Start: 2021-02-15 | End: 2021-02-15

## 2021-02-15 RX ADMIN — POTASSIUM CHLORIDE 1000 ML: 2 INJECTION, SOLUTION, CONCENTRATE INTRAVENOUS at 14:25

## 2021-02-15 RX ADMIN — PALONOSETRON 0.25 MG: 0.25 INJECTION, SOLUTION INTRAVENOUS at 11:46

## 2021-02-15 RX ADMIN — SODIUM CHLORIDE 250 ML: 9 INJECTION, SOLUTION INTRAVENOUS at 11:46

## 2021-02-15 RX ADMIN — POTASSIUM CHLORIDE 1000 ML: 2 INJECTION, SOLUTION, CONCENTRATE INTRAVENOUS at 09:45

## 2021-02-15 RX ADMIN — SODIUM CHLORIDE 190 MG: 9 INJECTION, SOLUTION INTRAVENOUS at 14:30

## 2021-02-15 RX ADMIN — SODIUM CHLORIDE 150 MG: 9 INJECTION, SOLUTION INTRAVENOUS at 11:49

## 2021-02-15 RX ADMIN — DEXAMETHASONE SODIUM PHOSPHATE 12 MG: 10 INJECTION, SOLUTION INTRAMUSCULAR; INTRAVENOUS at 11:49

## 2021-02-15 RX ADMIN — CISPLATIN 194 MG: 1 INJECTION INTRAVENOUS at 12:14

## 2021-02-15 NOTE — PROGRESS NOTES
DATE OF VISIT: 2/15/2021    REASON FOR VISIT: Followup for limited stage small cell lung cancer     HISTORY OF PRESENT ILLNESS: The patient is a very pleasant 70 y.o. male  with past medical history significant for SCLCA diagnosed November 2020. The patient was started on concurrent chemotherapy with XRT using Cisplatin/ 16 December 2020. The  patient is here today for scheduled follow up visit with chemotherapy cycle number 4.    SUBJECTIVE: The patient is here today with his wife. He has been doing fair since his last visit. He continues to tolerate treatment with only minimal side effects. He complains of increased fatigue as well as occasional constipation for which he is using over-the-counter stool softeners and laxatives with good results. He has some occasional acid reflux for which he is using Tums with good relief. He denies fever, chills, increased shortness of breath, or cough. He has had no nausea or vomiting. He has questions regarding next steps following completion of his chemotherapy and radiation.    PAST MEDICAL HISTORY/SOCIAL HISTORY/FAMILY HISTORY: Reviewed by me and unchanged from my documentation done on 02/15/21.    Review of Systems   Constitutional: Positive for fatigue. Negative for activity change, appetite change, chills, fever and unexpected weight change.   HENT: Negative for hearing loss, mouth sores, nosebleeds, sore throat and trouble swallowing.    Eyes: Negative for visual disturbance.   Respiratory: Negative for cough, chest tightness, shortness of breath and wheezing.    Cardiovascular: Negative for chest pain, palpitations and leg swelling.   Gastrointestinal: Positive for constipation. Negative for abdominal distention, abdominal pain, blood in stool, diarrhea, nausea, rectal pain and vomiting.        Acid reflux   Endocrine: Negative for cold intolerance and heat intolerance.   Genitourinary: Negative for difficulty urinating, dysuria, frequency and urgency.    Musculoskeletal: Negative for arthralgias, back pain, gait problem, joint swelling and myalgias.   Skin: Negative for rash.   Neurological: Negative for dizziness, tremors, syncope, weakness, light-headedness, numbness and headaches.   Hematological: Negative for adenopathy. Does not bruise/bleed easily.   Psychiatric/Behavioral: Positive for sleep disturbance. Negative for confusion and suicidal ideas. The patient is not nervous/anxious.          Current Outpatient Medications:   •  aspirin 81 MG EC tablet, Take 81 mg by mouth Every Night., Disp: , Rfl:   •  atorvastatin (LIPITOR) 40 MG tablet, Take 40 mg by mouth Every Night., Disp: , Rfl:   •  calcium carbonate-cholecalciferol 500-400 MG-UNIT tablet tablet, Take 1 tablet by mouth Daily., Disp: , Rfl:   •  cholecalciferol (VITAMIN D3) 1000 units tablet, Take 1,000 Units by mouth Daily., Disp: , Rfl:   •  dexamethasone (DECADRON) 4 MG tablet, Take 2 tablets in the morning daily on days 2, 3 & 4.  Take with food., Disp: 6 tablet, Rfl: 5  •  ezetimibe (ZETIA) 10 MG tablet, Take 10 mg by mouth Daily., Disp: , Rfl:   •  LORazepam (Ativan) 0.5 MG tablet, Take 1 tablet by mouth Every 8 (Eight) Hours As Needed for Anxiety., Disp: 20 tablet, Rfl: 0  •  metFORMIN (GLUCOPHAGE) 500 MG tablet, TAKE ONE TABLET BY MOUTH DAILY WITH BREAKFAST, Disp: 90 tablet, Rfl: 3  •  nitroglycerin (NITROSTAT) 0.4 MG SL tablet, Place 0.4 mg under the tongue Every 5 (Five) Minutes As Needed for chest pain. Take no more than 3 doses in 15 minutes., Disp: , Rfl:   •  Omega-3 Fatty Acids (FISH OIL) 1200 MG capsule capsule, Take 1,200 mg by mouth Daily., Disp: , Rfl:   •  ondansetron (ZOFRAN) 8 MG tablet, Take 1 tablet by mouth 3 (Three) Times a Day As Needed for Nausea or Vomiting., Disp: 30 tablet, Rfl: 5  •  sertraline (ZOLOFT) 50 MG tablet, TAKE ONE TABLET BY MOUTH DAILY, Disp: 90 tablet, Rfl: 3  •  zolpidem (AMBIEN) 5 MG tablet, Take 1 tablet by mouth At Night As Needed for Sleep., Disp: 30  "tablet, Rfl: 1    PHYSICAL EXAMINATION:   /72   Pulse 91   Temp 97.3 °F (36.3 °C) (Temporal)   Resp 16   Ht 170.2 cm (67.01\")   Wt 84.8 kg (187 lb)   SpO2 98%   BMI 29.28 kg/m²    Pain Score    02/15/21 0844   PainSc: 0-No pain       ECOG Performance Status: 1 - Symptomatic but completely ambulatory  General Appearance:  alert, cooperative, no apparent distress and appears stated age   Neurologic/Psychiatric: A&O x 3, gait steady, appropriate affect, strength 5/5 in all muscle groups   HEENT:  Normocephalic, without obvious abnormality, mucous membranes moist   Neck: Supple, symmetrical, trachea midline, no adenopathy;  No thyromegaly, masses, or tenderness   Lungs:   Clear to auscultation bilaterally; respirations regular, even, and unlabored bilaterally   Heart:  Regular rate and rhythm, no murmurs appreciated   Abdomen:   Soft, non-tender, non-distended and no organomegaly   Lymph nodes: No cervical, supraclavicular, inguinal or axillary adenopathy noted   Extremities: Normal, atraumatic; no clubbing, cyanosis, or edema    Skin: No rashes, ulcers, or suspicious lesions noted     No visits with results within 2 Week(s) from this visit.   Latest known visit with results is:   Hospital Outpatient Visit on 01/25/2021   Component Date Value Ref Range Status   • Glucose 01/25/2021 234* 65 - 99 mg/dL Final   • BUN 01/25/2021 29* 8 - 23 mg/dL Final   • Creatinine 01/25/2021 1.11  0.76 - 1.27 mg/dL Final   • Sodium 01/25/2021 137  136 - 145 mmol/L Final   • Potassium 01/25/2021 5.5* 3.5 - 5.2 mmol/L Final    Specimen hemolyzed.  Results may be affected.   • Chloride 01/25/2021 102  98 - 107 mmol/L Final   • CO2 01/25/2021 24.0  22.0 - 29.0 mmol/L Final   • Calcium 01/25/2021 9.5  8.6 - 10.5 mg/dL Final   • Total Protein 01/25/2021 6.6  6.0 - 8.5 g/dL Final   • Albumin 01/25/2021 4.10  3.50 - 5.20 g/dL Final   • ALT (SGPT) 01/25/2021 48* 1 - 41 U/L Final    Specimen hemolyzed.  Results may be affected.   • AST " (SGOT) 01/25/2021 29  1 - 40 U/L Final   • Alkaline Phosphatase 01/25/2021 121* 39 - 117 U/L Final   • Total Bilirubin 01/25/2021 0.6  0.0 - 1.2 mg/dL Final   • eGFR Non  Amer 01/25/2021 66  >60 mL/min/1.73 Final   • Globulin 01/25/2021 2.5  gm/dL Final   • A/G Ratio 01/25/2021 1.6  g/dL Final   • BUN/Creatinine Ratio 01/25/2021 26.1* 7.0 - 25.0 Final   • Anion Gap 01/25/2021 11.0  5.0 - 15.0 mmol/L Final   • Magnesium 01/25/2021 2.0  1.6 - 2.4 mg/dL Final   • WBC 01/25/2021 4.90  3.40 - 10.80 10*3/mm3 Final   • RBC 01/25/2021 3.69* 4.14 - 5.80 10*6/mm3 Final   • Hemoglobin 01/25/2021 11.4* 13.0 - 17.7 g/dL Final   • Hematocrit 01/25/2021 33.2* 37.5 - 51.0 % Final   • RDW 01/25/2021 14.1  12.3 - 15.4 % Final   • MCV 01/25/2021 89.9  79.0 - 97.0 fL Final   • MCH 01/25/2021 30.9  26.6 - 33.0 pg Final   • MCHC 01/25/2021 34.3  31.5 - 35.7 g/dL Final   • MPV 01/25/2021 8.0  6.0 - 12.0 fL Final   • Platelets 01/25/2021 157  140 - 450 10*3/mm3 Final    Verified by repeat analysis.    • Neutrophil % 01/25/2021 62.2  42.7 - 76.0 % Final   • Lymphocyte % 01/25/2021 29.8  19.6 - 45.3 % Final   • Monocyte % 01/25/2021 8.0  5.0 - 12.0 % Final   • Neutrophils, Absolute 01/25/2021 3.00  1.70 - 7.00 10*3/mm3 Final   • Lymphocytes, Absolute 01/25/2021 1.50  0.70 - 3.10 10*3/mm3 Final   • Monocytes, Absolute 01/25/2021 0.40  0.10 - 0.90 10*3/mm3 Final   • Creatinine 01/25/2021 1.10  mg/dL Final   • Creatinine 01/25/2021 1.10  0.60 - 1.30 mg/dL Final    Serial Number: [id number]:  [id number]        Mri Brain With & Without Contrast    Result Date: 1/19/2021  Narrative: EXAMINATION: MRI BRAIN W WO CONTRAST-  INDICATION: Non-small cell lung cancer, staging; R91.1-Solitary pulmonary nodule.  TECHNIQUE: Multiplanar MRI of the brain with and without intravenous contrast administration.  COMPARISON: MRI brain dated 12/01/2020.  FINDINGS: No restriction on diffusion-weighted sequences to suggest acute ischemia. Midline structures are  symmetric without evidence of mass, mass effect or midline shift. Ventricles and sulci are within normal limits. Mild to moderate increased signal findings on T2 and FLAIR throughout the supratentorial white matter consistent with chronic small vessel ischemic disease. Postcontrast sequences reveal decreased size of right cerebellar round focus of enhancement measuring 4 mm previously 6 cm on prior (series 14 image 61).  No new sites of abnormal enhancement adjacent or separate from this with grossly expected in appropriate vascular enhancement. Globes and orbits retain normal T2 signal characteristics. Paranasal sinuses and mastoid cells are grossly clear and well pneumatized. Pituitary and sella within normal limits. Cervicomedullary junction is widely patent.      Impression: Decreased size of solitary right cerebellar enhancing focus now measuring 4 mm maximum dimension and decreased in prominence as well as size from prior comparison where this previously measured 6 mm. No new sites of abnormal enhancement elsewhere or acute intracranial pathology.   D:  01/19/2021 E:  01/19/2021  This report was finalized on 1/19/2021 7:04 PM by Dr. Ren Malone.        ASSESSMENT: The patient is a very pleasant 70 y.o. male  with limited stage small cell lung cancer    PROBLEM LIST:   1.  Limited stage small cell lung cancer [id number] stage IIIa:  A.  Presented with abnormal screening CT chest  B.  Diagnosed after bronchoscopy with biopsy done by  November 19, 2020 + for small cell from level 7 level 4R and level 10 R lymph nodes.  C.  Whole-body PET scan did not reveal any other sites of disease.  D.  Started definitive concurrent chemotherapy with radiation using cisplatin etoposide December 2020 1 status post 3 cycles  2.  Isolated 6 mm right cerebellar lesion:  A.  Evident MRI brain done on December 1, 2020  B.  The patient will receive whole brain radiation after completion of treatment.  3.   Chemotherapy-induced nausea  4. Insomnia     PLAN:  1.  I will proceed with chemotherapy as scheduled today cisplatin with etoposide cycle #4.  Cisplatin 100 mg/m² day 1 and etoposide 100 milligrams per meter square day 1, 2, 3.  2.  The patient will will continue concurrent radiation. This is scheduled to be completed 2/23/2021.   3.  He will follow-up with us in 3 -4 weeks to evaluate for treatment tolerance.   4.  I will repeat the patient's scans after cycle #4. These will be ordered for prior to return.   5.  The patient will be a candidate for whole brain radiation after completion of chemotherapy. We will repeat MRI brain with next set of scans to evaluate the 6 mm right cerebellar lesion.   6.  We will continue to monitor the patient's blood work including blood counts, kidney function, liver function, and electrolytes.  7.  We will continue Zofran as needed for chemotherapy-induced nausea.  8. We reviewed the potential side effects of this regimen including fatigue, vomiting and nausea, hair loss, nephropathy, neuropathy, hearing loss, myelosuppression, and risk of infusion reaction.  9. We will continue the patient on Ambien 5 mg nightly as needed for insomnia.    Gaby Pandya, APRN  2/15/2021

## 2021-02-15 NOTE — PROGRESS NOTES
Onc Nutrition    Patient: Luis Elliott  YOB: 1950    Diagnosis: Limited stage small cell lung cancer  Chemotherapy: Cisplatin(D1) / Etoposide(D1-3) - every 21 days (C4D1)  Radiation:  66 Gy in 33 fractions to PET avid disease / patient has 6 remaining fractions      Weight: 187#   Usual Body Weight: 180-190#    Follow up with patient during his infusion appointment.  He reports he continues to tolerate treatment well.  He states he feels he is eating and drinking well.  He does complain of constipation but reports managing it well with stool softeners bid, Miralax as needed and occasional prunes.  He denies any issues with swallowing.      Encouraged him to continue with his good oral intake of food and fluids.  Also encouraged him to continue with his current bowel regimen to aid with regularity.  He denies nutritional questions or concerns at this time.    Advised him to call RD if questions arise.  He voiced understanding of information discussed.  Will follow up as indicated.    Sherry Ruffin RD  02/15/21

## 2021-02-16 ENCOUNTER — HOSPITAL ENCOUNTER (OUTPATIENT)
Dept: ONCOLOGY | Facility: HOSPITAL | Age: 71
Setting detail: INFUSION SERIES
Discharge: HOME | End: 2021-02-16

## 2021-02-16 ENCOUNTER — HOSPITAL ENCOUNTER (OUTPATIENT)
Dept: RADIATION ONCOLOGY | Facility: HOSPITAL | Age: 71
Discharge: HOME | End: 2021-02-16

## 2021-02-16 VITALS — BODY MASS INDEX: 29.27 KG/M2 | TEMPERATURE: 97.8 F | WEIGHT: 186.95 LBS | RESPIRATION RATE: 16 BRPM

## 2021-02-16 VITALS — WEIGHT: 192.7 LBS | BODY MASS INDEX: 30.17 KG/M2

## 2021-02-16 DIAGNOSIS — C34.31 SMALL CELL LUNG CANCER, RIGHT LOWER LOBE (HCC): Primary | ICD-10-CM

## 2021-02-16 PROCEDURE — 25010000002 ETOPOSIDE 500 MG/25ML SOLUTION 25 ML VIAL: Performed by: INTERNAL MEDICINE

## 2021-02-16 PROCEDURE — 96413 CHEMO IV INFUSION 1 HR: CPT

## 2021-02-16 PROCEDURE — 77386: CPT | Performed by: RADIOLOGY

## 2021-02-16 RX ORDER — SODIUM CHLORIDE 9 MG/ML
250 INJECTION, SOLUTION INTRAVENOUS ONCE
Status: DISCONTINUED | OUTPATIENT
Start: 2021-02-16 | End: 2021-02-17 | Stop reason: HOSPADM

## 2021-02-16 RX ADMIN — SODIUM CHLORIDE 190 MG: 9 INJECTION, SOLUTION INTRAVENOUS at 12:42

## 2021-02-17 ENCOUNTER — HOSPITAL ENCOUNTER (OUTPATIENT)
Dept: ONCOLOGY | Facility: HOSPITAL | Age: 71
Setting detail: INFUSION SERIES
Discharge: HOME | End: 2021-02-17

## 2021-02-17 ENCOUNTER — HOSPITAL ENCOUNTER (OUTPATIENT)
Dept: RADIATION ONCOLOGY | Facility: HOSPITAL | Age: 71
Discharge: HOME | End: 2021-02-17

## 2021-02-17 DIAGNOSIS — C34.31 SMALL CELL LUNG CANCER, RIGHT LOWER LOBE (HCC): Primary | ICD-10-CM

## 2021-02-17 PROCEDURE — 77386: CPT | Performed by: RADIOLOGY

## 2021-02-17 PROCEDURE — 96413 CHEMO IV INFUSION 1 HR: CPT

## 2021-02-17 PROCEDURE — 25010000002 ETOPOSIDE 500 MG/25ML SOLUTION 25 ML VIAL: Performed by: INTERNAL MEDICINE

## 2021-02-17 RX ORDER — SODIUM CHLORIDE 9 MG/ML
250 INJECTION, SOLUTION INTRAVENOUS ONCE
Status: DISCONTINUED | OUTPATIENT
Start: 2021-02-17 | End: 2021-02-18 | Stop reason: HOSPADM

## 2021-02-17 RX ADMIN — SODIUM CHLORIDE 190 MG: 9 INJECTION, SOLUTION INTRAVENOUS at 12:57

## 2021-02-18 ENCOUNTER — HOSPITAL ENCOUNTER (OUTPATIENT)
Dept: RADIATION ONCOLOGY | Facility: HOSPITAL | Age: 71
Discharge: HOME | End: 2021-02-18

## 2021-02-18 PROCEDURE — 77386: CPT | Performed by: RADIOLOGY

## 2021-02-19 ENCOUNTER — HOSPITAL ENCOUNTER (OUTPATIENT)
Dept: RADIATION ONCOLOGY | Facility: HOSPITAL | Age: 71
Discharge: HOME | End: 2021-02-19

## 2021-02-19 PROCEDURE — 77386: CPT | Performed by: RADIOLOGY

## 2021-02-19 PROCEDURE — 77336 RADIATION PHYSICS CONSULT: CPT | Performed by: RADIOLOGY

## 2021-02-22 ENCOUNTER — HOSPITAL ENCOUNTER (OUTPATIENT)
Dept: RADIATION ONCOLOGY | Facility: HOSPITAL | Age: 71
Discharge: HOME | End: 2021-02-22

## 2021-02-22 PROCEDURE — 77386: CPT | Performed by: RADIOLOGY

## 2021-02-23 ENCOUNTER — HOSPITAL ENCOUNTER (OUTPATIENT)
Dept: RADIATION ONCOLOGY | Facility: HOSPITAL | Age: 71
Discharge: HOME | End: 2021-02-23

## 2021-02-23 VITALS — WEIGHT: 187.4 LBS | BODY MASS INDEX: 29.34 KG/M2

## 2021-02-23 PROCEDURE — 77386: CPT | Performed by: RADIOLOGY

## 2021-02-24 NOTE — PROGRESS NOTES
DATE OF COMPLETION: 2/23/2021  DIAGNOSIS: Small cell lung cancer    REFERRING: Dr. Osiel Cartagena   Luis Elliott completed radiation therapy today.      BACKGROUND: Luis Elliott is a very pleasant 70 y.o. male  with a significant smoking history who was undergoing surveillance CT scans including one on 9/12/2018 that showed no concern for malignancy.  The patient was also hospitalized in December with possible concern for mitral valve issues possibly related to a vegetation versus prolapse only after he was found to have group A strep growing in his blood.  He received antibiotics IV for quite some time during the end of 2018 for this.  He did not have an MRI scan at that time due to the presence of what was felt to be metallic bodies in his orbits which is actually related to fenestrations in his eyes for his glaucoma.  The patient eventually had a repeat screening CT scan on September 17, 2020 that showed a small right lung nodule which had increased in size.  He was sent for a PET scan on 10/14/2020 that showed a right lower lobe nodule which increased about 2.6 cm and was hypermetabolic.  Patient had some hilar and subcarinal lymphadenopathy which was hypermetabolic.  Patient was then taken for a bronchoscopy and EBUS on 11/19/2020 that showed small cell lung cancer and station 7, 11 R, and 10 arm.  Patient sees generally TTF-1 positive and synaptophysin positive.  Patient was sent for restaging MRI scan 12/1/2020 that showed a very small indeterminate right cerebellar lesion which enhances had some weak diffusion restriction but no edema.  Is unclear particular given his history with a possible mitral valve vegetation and possible endocarditis with positive blood cultures what this lesion could potentially represent.  The patient was then started on chemotherapy on 12/14/2020.   The patient pleated his radiation course as below    Treatment Summary     Dates of Therapy:  1/5/2021-2/23/2021  Treatment Site: Right upper lobe/mediastinum  Dose: 66 Gy in 33 fractions of 2 Gy each  Technique: Helical arc based chemotherapy and 6 xMV photons    Treatment Course and Tolerance: Mr. Calles tolerated rated treatment very well so far.  The patient has had some fatigue but otherwise has had no acute symptoms from his radiation treatments.  His tumor appears to be responding very well based on the cone beam images.    The initial follow up visit will be in 1 month.    Luis Elliott knows to call if any problems or concerns develop in the meantime.     Electronically signed by: Jay Jay Urias MD                    Cc: Silvestre Coleman DO

## 2021-02-25 ENCOUNTER — DOCUMENTATION (OUTPATIENT)
Dept: NUTRITION | Facility: HOSPITAL | Age: 71
End: 2021-02-25

## 2021-02-25 NOTE — PROGRESS NOTES
ONC Nutrition     Diagnosis: Limited stage small cell lung cancer  Chemotherapy: Cisplatin(D1) / Etoposide(D1-3) - every 21 days (2/4) / 2 of 4 cycles completed - Chemo yesterday and today  Radiation:  66 Gy in 33 fractions to PET avid disease / patient has completed 33 / 33 fractions     Weight: 187.4 lbs   Usual Body Weight: 180-190    Patient completed treatment 2/23/21 and has tolerated with minimal side effects.  Over the past week, he has experienced GERD; discussed tips for management.  As expected, he is fatigued; discussed emphasis on protein, hydration and physical activity to build stamina.

## 2021-03-09 ENCOUNTER — HOSPITAL ENCOUNTER (OUTPATIENT)
Dept: MRI IMAGING | Facility: HOSPITAL | Age: 71
Discharge: HOME | End: 2021-03-09

## 2021-03-09 ENCOUNTER — HOSPITAL ENCOUNTER (OUTPATIENT)
Dept: CT IMAGING | Facility: HOSPITAL | Age: 71
Discharge: HOME | End: 2021-03-09

## 2021-03-09 DIAGNOSIS — C34.31 SMALL CELL LUNG CANCER, RIGHT LOWER LOBE (HCC): ICD-10-CM

## 2021-03-09 PROCEDURE — 70553 MRI BRAIN STEM W/O & W/DYE: CPT

## 2021-03-09 PROCEDURE — 25010000002 IOPAMIDOL 61 % SOLUTION: Performed by: NURSE PRACTITIONER

## 2021-03-09 PROCEDURE — A9577 INJ MULTIHANCE: HCPCS | Performed by: NURSE PRACTITIONER

## 2021-03-09 PROCEDURE — 74177 CT ABD & PELVIS W/CONTRAST: CPT

## 2021-03-09 PROCEDURE — 71260 CT THORAX DX C+: CPT

## 2021-03-09 PROCEDURE — 0 GADOBENATE DIMEGLUMINE 529 MG/ML SOLUTION: Performed by: NURSE PRACTITIONER

## 2021-03-09 RX ADMIN — GADOBENATE DIMEGLUMINE 17 ML: 529 INJECTION, SOLUTION INTRAVENOUS at 14:48

## 2021-03-09 RX ADMIN — IOPAMIDOL 60 ML: 612 INJECTION, SOLUTION INTRAVENOUS at 14:41

## 2021-03-10 ENCOUNTER — OFFICE VISIT (OUTPATIENT)
Dept: RADIATION ONCOLOGY | Facility: HOSPITAL | Age: 71
End: 2021-03-10

## 2021-03-10 ENCOUNTER — HOSPITAL ENCOUNTER (OUTPATIENT)
Dept: RADIATION ONCOLOGY | Facility: HOSPITAL | Age: 71
Setting detail: RADIATION/ONCOLOGY SERIES
Discharge: HOME | End: 2021-03-10

## 2021-03-10 VITALS
BODY MASS INDEX: 29.03 KG/M2 | OXYGEN SATURATION: 93 % | HEART RATE: 90 BPM | TEMPERATURE: 98.4 F | WEIGHT: 185.4 LBS | RESPIRATION RATE: 18 BRPM | SYSTOLIC BLOOD PRESSURE: 97 MMHG | DIASTOLIC BLOOD PRESSURE: 65 MMHG

## 2021-03-10 DIAGNOSIS — C34.31 SMALL CELL LUNG CANCER, RIGHT LOWER LOBE (HCC): ICD-10-CM

## 2021-03-10 PROCEDURE — G0463 HOSPITAL OUTPT CLINIC VISIT: HCPCS | Performed by: RADIOLOGY

## 2021-03-10 RX ORDER — MEMANTINE HYDROCHLORIDE 10 MG/1
10 TABLET ORAL 2 TIMES DAILY
Qty: 60 TABLET | Refills: 6 | Status: SHIPPED | OUTPATIENT
Start: 2021-03-10 | End: 2021-09-13

## 2021-03-10 NOTE — PROGRESS NOTES
RE-EVALUATION    PATIENT:                                                      Luis Elliott  :                                                          1950  DATE:                          3/10/2021   DIAGNOSIS:     Small cell lung cancer, right lower lobe (CMS/HCC)  - Stage IIIA (cT1b, cN2, cM0)    The patient returns today specifically to discuss PCI after his restaging scans and this is independent from his previous treatment.     BRIEF HISTORY:  Luis Elliott is a very pleasant 70 y.o. male  with a significant smoking history who was undergoing surveillance CT scans including one on 2018 that showed no concern for malignancy.  The patient was also hospitalized in December with possible concern for mitral valve issues possibly related to a vegetation versus prolapse only after he was found to have group A strep growing in his blood.  He received antibiotics IV for quite some time during the end of  for this.  He did not have an MRI scan at that time due to the presence of what was felt to be metallic bodies in his orbits which is actually related to fenestrations in his eyes for his glaucoma.  The patient eventually had a repeat screening CT scan on 2020 that showed a small right lung nodule which had increased in size.  He was sent for a PET scan on 10/14/2020 that showed a right lower lobe nodule which increased about 2.6 cm and was hypermetabolic.  Patient had some hilar and subcarinal lymphadenopathy which was hypermetabolic.  Patient was then taken for a bronchoscopy and EBUS on 2020 that showed small cell lung cancer and station 7, 11 R, and 10 arm.  Patient sees generally TTF-1 positive and synaptophysin positive.  Patient was sent for restaging MRI scan 2020 that showed a very small indeterminate right cerebellar lesion which enhances had some weak diffusion restriction but no edema.  Is unclear particular given his history with a possible mitral valve  "vegetation and possible endocarditis with positive blood cultures what this lesion could potentially represent.  The patient was then started on chemotherapy on 12/14/2020.   The patient pleated his radiation course as below     Treatment Summary      Dates of Therapy: 1/5/2021-2/23/2021  Treatment Site: Right upper lobe/mediastinum  Dose: 66 Gy in 33 fractions of 2 Gy each    The patient reports that he is still having some fatigue and occasional lightheadedness following his radiation and chemotherapy treatments.  Overall he reports that he is feeling better though.  Patient had his scans yesterday and returns today to discuss those and planning for PCI if he is a candidate.    Allergies   Allergen Reactions   • Xarelto [Rivaroxaban] Other (See Comments)     MADE ME FEEL LIKE \" I WAS HAVING A HEART ATTACK.\"       Review of Systems   Neurological: Positive for dizziness.   Psychiatric/Behavioral: Positive for sleep disturbance. The patient is nervous/anxious.     A full 14 point review of systems was performed and was negative except as noted in the HPI.         Objective   VITAL SIGNS:   Vitals:    03/10/21 0759   BP: 97/65   Pulse: 90   Resp: 18   Temp: 98.4 °F (36.9 °C)   SpO2: 93%   Weight: 84.1 kg (185 lb 6.4 oz)   PainSc:   2        KPS 80%    Physical Exam  Constitutional:       General: He is not in acute distress.     Appearance: He is well-developed.   HENT:      Head: Normocephalic and atraumatic.   Eyes:      Conjunctiva/sclera: Conjunctivae normal.      Pupils: Pupils are equal, round, and reactive to light.   Neck:      Trachea: No tracheal deviation.   Pulmonary:      Effort: Pulmonary effort is normal. No respiratory distress.      Breath sounds: No wheezing.   Abdominal:      General: There is no distension.      Palpations: Abdomen is soft.   Musculoskeletal:         General: Normal range of motion.      Cervical back: Normal range of motion.   Skin:     General: Skin is warm and dry. "   Neurological:      Mental Status: He is alert.      Cranial Nerves: No cranial nerve deficit.      Coordination: Coordination normal.   Psychiatric:         Behavior: Behavior normal.         Judgment: Judgment normal.              The following portions of the patient's history were reviewed and updated as appropriate: allergies, current medications, past family history, past medical history, past social history, past surgical history and problem list.  I have personally reviewed the patient's images and radiology reports and pathology reports listed below:  EXAMINATION: CT CHEST W CONTRAST DIAGNOSTIC-, CT ABDOMEN PELVIS W  CONTRAST- 03/09/2021     INDICATION: Restaging small cell lung cancer; C34.31-Malignant neoplasm  of lower lobe, right bronchus or lung     TECHNIQUE: Multiple axial CT imaging was obtained of the chest, abdomen  and pelvis following the administration of intravenous contrast.      The radiation dose reduction device was turned on for each scan per the  ALARA (As Low as Reasonably Achievable) protocol.      COMPARISON:  NONE     FINDINGS:  Previously seen nodule identified within the right lower lobe  has near completely resolved in size in the interval. The nodular area  on today's examination measures approximately 6 mm and previously  measured largest dimension 2.9 cm. There is no adenopathy identified in  the hilar region. No mediastinal mass. Cardiac chambers within normal  limits. No pericardial effusion. Coronary artery calcification  identified with vascular calcification seen of the thoracic aorta. The  thyroid is homogeneous. No bulky hilar or axillary adenopathy  identified. The visualized bony structures reveal minimal degenerative  changes seen within the spine.     ABDOMEN: The liver is homogeneous with stones in the gallbladder. Spleen  is unremarkable. Cyst seen on the right kidney. Left kidney is  unremarkable. Adrenal glands within normal limits. Pancreas is  homogeneous. No  free fluid or free air. No abnormal mass or fluid  collections identified.     PELVIS: Small left inguinal hernia containing fat only. The pelvic  portion of the gastrointestinal tract within normal limits.  Diverticulosis with no evidence of diverticulitis. The bony structures  are unremarkable.     Delayed imaging reveals contrast seen in the renal collecting systems  bilaterally as well as within the ureters and bladder with no evidence  of obstruction.     IMPRESSION:  Previously seen nodule within the posterior aspect of the  right lower lobe is only minimally seen on today's examination at  approximately 6 mm. Findings may suggest an area of scarring. There is  near-complete resolution. The hilar adenopathy is also completely  resolved in the interval. There is no evidence of progression of  disease. No definite evidence of metastatic disease.     D:  03/09/2021  E:  03/09/2021  EXAMINATION: MRI BRAIN W WO CONTRAST- 03/09/2021     INDICATION: Restaging small cell lung cancer; C34.31-Malignant neoplasm  of lower lobe, right bronchus or lung     TECHNIQUE: Sagittal and axial T1 and axial T2 FLAIR diffusion-weighted  images of the brain, post gadolinium contrast axial, sagittal and  coronal T1-weighted images.      COMPARISON: 01/19/2021     FINDINGS: Patient history indicates small cell lung cancer. Previous  exam report indicated a small enhancing right cerebellar lesion,  decreased from six mm to 4 mm between the December and January scans.     Today's study shows no evidence of residual enhancement in the right  cerebellum, and no evidence of new enhancing brain lesion elsewhere. No  pathologic meningeal enhancement is identified. Remaining sequences show  no evidence of restricted diffusion. There is no evidence of mass, mass  effect or edema. There are scattered punctate nonconfluent central white  matter lesions typical of microvascular leukoencephalopathy and similar  in pattern and extent to the prior  study. None of these areas appear to  show postcontrast enhancement. There is no unusual focal or generalized  encephalomalacia, no evidence of hydrocephalus or abnormal extra-axial  collection.     IMPRESSION:  1. Apparent interval resolution of small right cerebellar enhancing  lesion. No evidence of intracranial metastatic disease is seen on  today's study.  2. Stable chronic appearing central white matter changes. No evidence of  new intracranial disease.     D:  03/09/2021  EXAMINATION: MRI BRAIN W WO CONTRAST-     INDICATION: Non-small cell lung cancer, staging; R91.1-Solitary  pulmonary nodule.      TECHNIQUE: Multiplanar MRI of the brain with and without intravenous  contrast administration.     COMPARISON: MRI brain dated 12/01/2020.     FINDINGS: No restriction on diffusion-weighted sequences to suggest  acute ischemia. Midline structures are symmetric without evidence of  mass, mass effect or midline shift. Ventricles and sulci are within  normal limits. Mild to moderate increased signal findings on T2 and  FLAIR throughout the supratentorial white matter consistent with chronic  small vessel ischemic disease. Postcontrast sequences reveal decreased  size of right cerebellar round focus of enhancement measuring 4 mm  previously 6 cm on prior (series 14 image 61).  No new sites of abnormal  enhancement adjacent or separate from this with grossly expected in  appropriate vascular enhancement. Globes and orbits retain normal T2  signal characteristics. Paranasal sinuses and mastoid cells are grossly  clear and well pneumatized. Pituitary and sella within normal limits.  Cervicomedullary junction is widely patent.     IMPRESSION:  Decreased size of solitary right cerebellar enhancing focus  now measuring 4 mm maximum dimension and decreased in prominence as well  as size from prior comparison where this previously measured 6 mm. No  new sites of abnormal enhancement elsewhere or acute  intracranial  pathology.      D:  01/19/2021  E:  01/19/2021     This report was finalized on 1/19/2021 7:04 PM by Dr. Ren Malone.    EXAMINATION: MRI BRAIN W WO CONTRAST- 12/01/2020      INDICATION: small cell lung cancer; C34.90-Malignant neoplasm of  unspecified part of unspecified bronchus or lung     TECHNIQUE: Sagittal and axial T1 and axial T2 FLAIR diffusion-weighted  images of the brain, post gadolinium contrast axial, sagittal and  coronal T1-weighted images.     COMPARISON: Head CT scan of 12/21/2018.     FINDINGS: Patient history indicates new diagnosis of lung cancer,  staging.     There is a small rounded focus of weak diffusion restriction in the  lateral right cerebellum. Although subacute infarct might have a similar  appearance, this corresponds to a round uniformly enhancing 6 mm right  cerebellar lesion on postcontrast series. No evidence of restricted  diffusion is seen elsewhere.     Remaining imaging sequences show subtle increased FLAIR signal at the  site of the patient's right cerebellar lesion, and multiple punctate  central white matter lesions typical of microvascular  leukoencephalopathy. No other areas suggesting vasogenic edema are  identified. There is no evidence of mass effect, hydrocephalus,  hemorrhage, or abnormal extra-axial collection.     Postcontrast images show the previously mentioned right cerebellar  lesion, but no other evidence of enhancing mass or other pathologic  postcontrast brain or meningeal enhancement.     IMPRESSION:  Apparently solitary 6 mm enhancing lesion of the right  cerebellum, presumably an isolated metastasis. No other evidence of  metastatic disease or other acute intracranial disease is seen  elsewhere.     D:  12/01/2020  E:  12/02/2020     This report was finalized on 12/2/2020 10:10 PM by Dr. Chucho Faith MD.  Narrative & Impression   EXAMINATION: NM PET SKULL BASE TO MID THIGH-     INDICATION: R91.1-Solitary pulmonary nodule; history of prostate  cancer.     TECHNIQUE: With fasting blood glucose level of 138 mg/dL, a total of  13.24 mCi of FDG was administered via the right antecubital vein.  Following appropriate delay, PET CT imaging was obtained from the skull  vertex to the mid thighs bilaterally and fused multiplanar images were  reconstructed. The CT scan is an unenhanced study and used for reference  to the PET scan only and should not be considered a diagnostic CT scan.  PET CT imaging was reviewed in the axial, coronal, and sagittal planes  as well as within the cine mode.     COMPARISON: Initial exam for treatment with no prior studies available  for comparison.     FINDINGS: Normal variant activity is seen within the oropharynx and  muscles of phonation. Normal variant activity identified in the region  of the vocal cords. There is no hypermetabolic activity identified  within the neck to suggest evidence of metastatic disease. There is low  level activity correlating to the nodule seen posteriorly within the  right lower lobe. The maximum SUV is 3.0. Findings are borderline,  however, there is a second area of hypermetabolic activity seen within  the right infrahilar region suggesting a metastatic lymph node with  maximum SUV of 3.9. The remainder of the chest is unremarkable. No  additional hypermetabolic focus is present.     Within the abdomen and pelvis, there is heterogeneous activity seen  within the liver. There is no evidence of hypermetabolic activity  identified within the abdomen or pelvis to suggest evidence of  metastatic disease. There are stones in the gallbladder. A cyst is  identified on the right kidney. Normal variant activity is seen within  the GI and  tract. The upper thighs are unremarkable.     IMPRESSION:  Low level activity in the pulmonary nodule seen in the right  lower lobe with additional hypermetabolic activity seen in a lymph node  in the right infrahilar region. Findings concerning for malignancy and  metastatic  disease confined to the right hilum and right lower lobe.      D:  11/09/2020  E:  11/09/2020     This report was finalized on 11/9/2020 10:46 AM by Dr. Sherry Peña MD.         Diagnoses and all orders for this visit:    Small cell lung cancer, right lower lobe (CMS/HCC)    Other orders  -     memantine (NAMENDA) 10 MG tablet; Take 1 tablet by mouth 2 (Two) Times a Day. Titrate as directed.      The decision to treat the patient with radiation is a complex one and carries the risk of long-term side effects and complications.  The patient's malignancy represents a complicated life threatening condition that requires complex multidisciplinary management for treatment and followup.    IMPRESSION/RECOMMENDATIONS:   cT1 cN2 M0 stage IIIa right lower lobe small cell lung cancer  -Has an indeterminate lesion in the right cerebellum which requires further close follow-up  -MRI brain in 6 weeks to reevaluate indeterminate right cerebellar lesion on 1/19/2021  -Received concurrent chemo starting 12/14/20  -Radiotherapy to PET avid disease to start 66 Gy in 33 fractions completed 2/23/2021  -Tolerated treatment well  -Restaging scans 3/9/2021 showed excellent response in the chest with no progression in the brain  -Recommended PCI discussed that with the patient and Dr. Cartagena today  -Patient will return for radiation planning  -Recommend IMRT and IGR T for hippocampal sparing as the patient has expected life span greater than 4 months in effort to decrease neurocognitive toxicity  -Recommend concurrent memantine   -Patient given prescription and titration sheet today  -Patient signed consent for PCI  -Recommend 25 Gray in 10 fractions with hippocampal sparing per the NCCN guidelines       COPD  -Continue present regimen     Tobacco abuse  -Has been 8 years off of smoking  -Encouraged to to continue cessation     Diabetes  -Continue present regimen is good as per primary     Headache  -Continue present  regimen     Hyperlipidemia  -Continue present regimen     Mitral valve prolapse/vegetation  -Possibly contributes to lesion in right cerebellum  -Continue to monitor  -Status post IV antibiotics in 2019     Prostate cancer  -Status post treatment            Jay Jay Urias MD

## 2021-03-12 ENCOUNTER — TELEPHONE (OUTPATIENT)
Dept: ONCOLOGY | Facility: CLINIC | Age: 71
End: 2021-03-12

## 2021-03-12 NOTE — TELEPHONE ENCOUNTER
Caller: MARIO MARTINES    Relationship to patient: WIFE    Best call back number: [phone]    Patient is needing: WIFE IS REQUESTING SHE ATTEND PT APPT ON Monday, 3-.

## 2021-03-15 ENCOUNTER — OFFICE VISIT (OUTPATIENT)
Dept: ONCOLOGY | Facility: CLINIC | Age: 71
End: 2021-03-15

## 2021-03-15 VITALS
RESPIRATION RATE: 18 BRPM | WEIGHT: 185 LBS | SYSTOLIC BLOOD PRESSURE: 92 MMHG | OXYGEN SATURATION: 98 % | BODY MASS INDEX: 29.03 KG/M2 | HEIGHT: 67 IN | HEART RATE: 99 BPM | TEMPERATURE: 97.7 F | DIASTOLIC BLOOD PRESSURE: 57 MMHG

## 2021-03-15 DIAGNOSIS — C34.31 SMALL CELL LUNG CANCER, RIGHT LOWER LOBE (HCC): Primary | ICD-10-CM

## 2021-03-15 PROCEDURE — 99214 OFFICE O/P EST MOD 30 MIN: CPT | Performed by: INTERNAL MEDICINE

## 2021-03-15 NOTE — PROGRESS NOTES
DATE OF VISIT: 3/15/2021    REASON FOR VISIT: Followup for limited stage small cell lung cancer     HISTORY OF PRESENT ILLNESS: The patient is a very pleasant 71 y.o. male  with past medical history significant for SCLCA diagnosed November 2020. The patient was started on concurrent chemotherapy with XRT using Cisplatin/ 16 December 2020.  He completed his treatment course with 4 cycles of chemotherapy February 23, 2021.  Repeated scans revealed complete response to treatment.  The  patient is here today for scheduled follow up visit.    SUBJECTIVE: The patient is here today with his wife.  All in all he has been doing fairly well.  Denied fever chills night sweats.  His energy is getting better.    PAST MEDICAL HISTORY/SOCIAL HISTORY/FAMILY HISTORY: Reviewed by me and unchanged from my documentation done on 03/15/21.    Review of Systems   Constitutional: Positive for fatigue. Negative for activity change, appetite change, chills, fever and unexpected weight change.   HENT: Negative for hearing loss, mouth sores, nosebleeds, sore throat and trouble swallowing.    Eyes: Negative for visual disturbance.   Respiratory: Negative for cough, chest tightness, shortness of breath and wheezing.    Cardiovascular: Negative for chest pain, palpitations and leg swelling.   Gastrointestinal: Positive for constipation. Negative for abdominal distention, abdominal pain, blood in stool, diarrhea, nausea, rectal pain and vomiting.        Acid reflux   Endocrine: Negative for cold intolerance and heat intolerance.   Genitourinary: Negative for difficulty urinating, dysuria, frequency and urgency.   Musculoskeletal: Negative for arthralgias, back pain, gait problem, joint swelling and myalgias.   Skin: Negative for rash.   Neurological: Negative for dizziness, tremors, syncope, weakness, light-headedness, numbness and headaches.   Hematological: Negative for adenopathy. Does not bruise/bleed easily.   Psychiatric/Behavioral:  "Positive for sleep disturbance. Negative for confusion and suicidal ideas. The patient is not nervous/anxious.          Current Outpatient Medications:   •  aspirin 81 MG EC tablet, Take 81 mg by mouth Every Night., Disp: , Rfl:   •  atorvastatin (LIPITOR) 40 MG tablet, Take 40 mg by mouth Every Night., Disp: , Rfl:   •  calcium carbonate-cholecalciferol 500-400 MG-UNIT tablet tablet, Take 1 tablet by mouth Daily., Disp: , Rfl:   •  cholecalciferol (VITAMIN D3) 1000 units tablet, Take 1,000 Units by mouth Daily., Disp: , Rfl:   •  dexamethasone (DECADRON) 4 MG tablet, Take 2 tablets in the morning daily on days 2, 3 & 4.  Take with food., Disp: 6 tablet, Rfl: 5  •  ezetimibe (ZETIA) 10 MG tablet, Take 10 mg by mouth Daily., Disp: , Rfl:   •  LORazepam (Ativan) 0.5 MG tablet, Take 1 tablet by mouth Every 8 (Eight) Hours As Needed for Anxiety., Disp: 20 tablet, Rfl: 0  •  memantine (NAMENDA) 10 MG tablet, Take 1 tablet by mouth 2 (Two) Times a Day. Titrate as directed., Disp: 60 tablet, Rfl: 6  •  metFORMIN (GLUCOPHAGE) 500 MG tablet, TAKE ONE TABLET BY MOUTH DAILY WITH BREAKFAST, Disp: 90 tablet, Rfl: 3  •  nitroglycerin (NITROSTAT) 0.4 MG SL tablet, Place 0.4 mg under the tongue Every 5 (Five) Minutes As Needed for chest pain. Take no more than 3 doses in 15 minutes., Disp: , Rfl:   •  Omega-3 Fatty Acids (FISH OIL) 1200 MG capsule capsule, Take 1,200 mg by mouth Daily., Disp: , Rfl:   •  ondansetron (ZOFRAN) 8 MG tablet, Take 1 tablet by mouth 3 (Three) Times a Day As Needed for Nausea or Vomiting., Disp: 30 tablet, Rfl: 5  •  sertraline (ZOLOFT) 50 MG tablet, TAKE ONE TABLET BY MOUTH DAILY, Disp: 90 tablet, Rfl: 3  •  zolpidem (AMBIEN) 5 MG tablet, Take 1 tablet by mouth At Night As Needed for Sleep., Disp: 30 tablet, Rfl: 1    PHYSICAL EXAMINATION:   BP 92/57   Pulse 99   Temp 97.7 °F (36.5 °C) (Temporal)   Resp 18   Ht 170.2 cm (67.01\")   Wt 83.9 kg (185 lb)   SpO2 98%   BMI 28.97 kg/m²    Pain Score    " 03/15/21 1441   PainSc: 0-No pain       ECOG Performance Status: 1 - Symptomatic but completely ambulatory  General Appearance:  alert, cooperative, no apparent distress and appears stated age   Neurologic/Psychiatric: A&O x 3, gait steady, appropriate affect, strength 5/5 in all muscle groups   HEENT:  Normocephalic, without obvious abnormality, mucous membranes moist   Neck: Supple, symmetrical, trachea midline, no adenopathy;  No thyromegaly, masses, or tenderness   Lungs:   Clear to auscultation bilaterally; respirations regular, even, and unlabored bilaterally   Heart:  Regular rate and rhythm, no murmurs appreciated   Abdomen:   Soft, non-tender, non-distended and no organomegaly   Lymph nodes: No cervical, supraclavicular, inguinal or axillary adenopathy noted   Extremities: Normal, atraumatic; no clubbing, cyanosis, or edema    Skin: No rashes, ulcers, or suspicious lesions noted     No visits with results within 2 Week(s) from this visit.   Latest known visit with results is:   Hospital Outpatient Visit on 02/15/2021   Component Date Value Ref Range Status   • Glucose 02/15/2021 379* 65 - 99 mg/dL Final   • BUN 02/15/2021 28* 8 - 23 mg/dL Final   • Creatinine 02/15/2021 1.43* 0.76 - 1.27 mg/dL Final   • Sodium 02/15/2021 130* 136 - 145 mmol/L Final   • Potassium 02/15/2021 5.2  3.5 - 5.2 mmol/L Final    Slight hemolysis detected by analyzer. Results may be affected.   • Chloride 02/15/2021 96* 98 - 107 mmol/L Final   • CO2 02/15/2021 25.0  22.0 - 29.0 mmol/L Final   • Calcium 02/15/2021 9.7  8.6 - 10.5 mg/dL Final   • Total Protein 02/15/2021 7.0  6.0 - 8.5 g/dL Final   • Albumin 02/15/2021 4.40  3.50 - 5.20 g/dL Final   • ALT (SGPT) 02/15/2021 27  1 - 41 U/L Final   • AST (SGOT) 02/15/2021 16  1 - 40 U/L Final   • Alkaline Phosphatase 02/15/2021 122* 39 - 117 U/L Final   • Total Bilirubin 02/15/2021 0.6  0.0 - 1.2 mg/dL Final   • eGFR Non African Amer 02/15/2021 49* >60 mL/min/1.73 Final   • Globulin  02/15/2021 2.6  gm/dL Final   • A/G Ratio 02/15/2021 1.7  g/dL Final   • BUN/Creatinine Ratio 02/15/2021 19.6  7.0 - 25.0 Final   • Anion Gap 02/15/2021 9.0  5.0 - 15.0 mmol/L Final   • Magnesium 02/15/2021 2.0  1.6 - 2.4 mg/dL Final   • WBC 02/15/2021 4.30  3.40 - 10.80 10*3/mm3 Final   • RBC 02/15/2021 3.48* 4.14 - 5.80 10*6/mm3 Final   • Hemoglobin 02/15/2021 11.0* 13.0 - 17.7 g/dL Final   • Hematocrit 02/15/2021 32.0* 37.5 - 51.0 % Final   • RDW 02/15/2021 16.4* 12.3 - 15.4 % Final   • MCV 02/15/2021 91.9  79.0 - 97.0 fL Final   • MCH 02/15/2021 31.6  26.6 - 33.0 pg Final   • MCHC 02/15/2021 34.4  31.5 - 35.7 g/dL Final   • MPV 02/15/2021 8.0  6.0 - 12.0 fL Final   • Platelets 02/15/2021 164  140 - 450 10*3/mm3 Final   • Neutrophil % 02/15/2021 70.6  42.7 - 76.0 % Final   • Lymphocyte % 02/15/2021 20.5  19.6 - 45.3 % Final   • Monocyte % 02/15/2021 8.9  5.0 - 12.0 % Final   • Neutrophils, Absolute 02/15/2021 3.00  1.70 - 7.00 10*3/mm3 Final   • Lymphocytes, Absolute 02/15/2021 0.90  0.70 - 3.10 10*3/mm3 Final   • Monocytes, Absolute 02/15/2021 0.40  0.10 - 0.90 10*3/mm3 Final   • Creatinine 02/15/2021 1.50* 0.60 - 1.30 mg/dL Final    Serial Number: [id number]:  [id number]        CT Chest With Contrast Diagnostic    Result Date: 3/11/2021  Narrative: EXAMINATION: CT CHEST W CONTRAST DIAGNOSTIC-, CT ABDOMEN PELVIS W CONTRAST- 03/09/2021  INDICATION: Restaging small cell lung cancer; C34.31-Malignant neoplasm of lower lobe, right bronchus or lung  TECHNIQUE: Multiple axial CT imaging was obtained of the chest, abdomen and pelvis following the administration of intravenous contrast.  The radiation dose reduction device was turned on for each scan per the ALARA (As Low as Reasonably Achievable) protocol.  COMPARISON:  NONE  FINDINGS:  Previously seen nodule identified within the right lower lobe has near completely resolved in size in the interval. The nodular area on today's examination measures approximately 6 mm  and previously measured largest dimension 2.9 cm. There is no adenopathy identified in the hilar region. No mediastinal mass. Cardiac chambers within normal limits. No pericardial effusion. Coronary artery calcification identified with vascular calcification seen of the thoracic aorta. The thyroid is homogeneous. No bulky hilar or axillary adenopathy identified. The visualized bony structures reveal minimal degenerative changes seen within the spine.  ABDOMEN: The liver is homogeneous with stones in the gallbladder. Spleen is unremarkable. Cyst seen on the right kidney. Left kidney is unremarkable. Adrenal glands within normal limits. Pancreas is homogeneous. No free fluid or free air. No abnormal mass or fluid collections identified.  PELVIS: Small left inguinal hernia containing fat only. The pelvic portion of the gastrointestinal tract within normal limits. Diverticulosis with no evidence of diverticulitis. The bony structures are unremarkable.  Delayed imaging reveals contrast seen in the renal collecting systems bilaterally as well as within the ureters and bladder with no evidence of obstruction.      Impression: Previously seen nodule within the posterior aspect of the right lower lobe is only minimally seen on today's examination at approximately 6 mm. Findings may suggest an area of scarring. There is near-complete resolution. The hilar adenopathy is also completely resolved in the interval. There is no evidence of progression of disease. No definite evidence of metastatic disease.  D:  03/09/2021 E:  03/09/2021  This report was finalized on 3/11/2021 5:03 PM by Dr. Sherry Peña MD.      MRI Brain With & Without Contrast    Result Date: 3/11/2021  Narrative: EXAMINATION: MRI BRAIN W WO CONTRAST- 03/09/2021  INDICATION: Restaging small cell lung cancer; C34.31-Malignant neoplasm of lower lobe, right bronchus or lung  TECHNIQUE: Sagittal and axial T1 and axial T2 FLAIR diffusion-weighted images of the  brain, post gadolinium contrast axial, sagittal and coronal T1-weighted images.  COMPARISON: 01/19/2021  FINDINGS: Patient history indicates small cell lung cancer. Previous exam report indicated a small enhancing right cerebellar lesion, decreased from six mm to 4 mm between the December and January scans.  Today's study shows no evidence of residual enhancement in the right cerebellum, and no evidence of new enhancing brain lesion elsewhere. No pathologic meningeal enhancement is identified. Remaining sequences show no evidence of restricted diffusion. There is no evidence of mass, mass effect or edema. There are scattered punctate nonconfluent central white matter lesions typical of microvascular leukoencephalopathy and similar in pattern and extent to the prior study. None of these areas appear to show postcontrast enhancement. There is no unusual focal or generalized encephalomalacia, no evidence of hydrocephalus or abnormal extra-axial collection.      Impression: 1. Apparent interval resolution of small right cerebellar enhancing lesion. No evidence of intracranial metastatic disease is seen on today's study. 2. Stable chronic appearing central white matter changes. No evidence of new intracranial disease.  D:  03/09/2021 E:  03/09/2021  This report was finalized on 3/11/2021 9:18 PM by Dr. Chucho Faith MD.      CT Abdomen Pelvis With Contrast    Result Date: 3/11/2021  Narrative: EXAMINATION: CT CHEST W CONTRAST DIAGNOSTIC-, CT ABDOMEN PELVIS W CONTRAST- 03/09/2021  INDICATION: Restaging small cell lung cancer; C34.31-Malignant neoplasm of lower lobe, right bronchus or lung  TECHNIQUE: Multiple axial CT imaging was obtained of the chest, abdomen and pelvis following the administration of intravenous contrast.  The radiation dose reduction device was turned on for each scan per the ALARA (As Low as Reasonably Achievable) protocol.  COMPARISON:  NONE  FINDINGS:  Previously seen nodule identified within the right  lower lobe has near completely resolved in size in the interval. The nodular area on today's examination measures approximately 6 mm and previously measured largest dimension 2.9 cm. There is no adenopathy identified in the hilar region. No mediastinal mass. Cardiac chambers within normal limits. No pericardial effusion. Coronary artery calcification identified with vascular calcification seen of the thoracic aorta. The thyroid is homogeneous. No bulky hilar or axillary adenopathy identified. The visualized bony structures reveal minimal degenerative changes seen within the spine.  ABDOMEN: The liver is homogeneous with stones in the gallbladder. Spleen is unremarkable. Cyst seen on the right kidney. Left kidney is unremarkable. Adrenal glands within normal limits. Pancreas is homogeneous. No free fluid or free air. No abnormal mass or fluid collections identified.  PELVIS: Small left inguinal hernia containing fat only. The pelvic portion of the gastrointestinal tract within normal limits. Diverticulosis with no evidence of diverticulitis. The bony structures are unremarkable.  Delayed imaging reveals contrast seen in the renal collecting systems bilaterally as well as within the ureters and bladder with no evidence of obstruction.      Impression: Previously seen nodule within the posterior aspect of the right lower lobe is only minimally seen on today's examination at approximately 6 mm. Findings may suggest an area of scarring. There is near-complete resolution. The hilar adenopathy is also completely resolved in the interval. There is no evidence of progression of disease. No definite evidence of metastatic disease.  D:  03/09/2021 E:  03/09/2021  This report was finalized on 3/11/2021 5:03 PM by Dr. Sherry Peña MD.        ASSESSMENT: The patient is a very pleasant 71 y.o. male  with limited stage small cell lung cancer    PROBLEM LIST:   1.  Limited stage small cell lung cancer [id number] stage IIIa:  A.   Presented with abnormal screening CT chest  B.  Diagnosed after bronchoscopy with biopsy done by  November 19, 2020 + for small cell from level 7 level 4R and level 10 R lymph nodes.  C.  Whole-body PET scan did not reveal any other sites of disease.  D.  Started definitive concurrent chemotherapy with radiation using cisplatin etoposide December 2020 1 status post 4 cycles completed February 23, 2021  2.  Isolated 6 mm right cerebellar lesion:  A.  Evident MRI brain done on December 1, 2020  B.  The patient will receive whole brain radiation after completion of treatment.  3.  Chemotherapy-induced nausea  4. Insomnia     PLAN:  1.  I did go over the scan results with the patient I reassured him he had complete response to treatment with no evidence of residual disease.  2.  The patient follow-up with me in 3 months   3.  Repeat patient scans prior to return.  4.  I did go over the MRI brain results with the patient reassured him no abnormalities were noted.  The patient is an excellent candidate for whole brain radiation.  He will get this through Dr. Urias's office.  6.  We will continue to monitor the patient's blood work including blood counts, kidney function, liver function, and electrolytes.  7.  We will continue Zofran as needed for chemotherapy-induced nausea.  8. We reviewed the potential side effects of this regimen including fatigue, vomiting and nausea, hair loss, nephropathy, neuropathy, hearing loss, myelosuppression, and risk of infusion reaction.  9. We will continue the patient on Ambien 5 mg nightly as needed for insomnia.    Daniel Cartagena MD  3/15/2021

## 2021-03-17 ENCOUNTER — OFFICE VISIT (OUTPATIENT)
Dept: FAMILY MEDICINE CLINIC | Facility: CLINIC | Age: 71
End: 2021-03-17

## 2021-03-17 ENCOUNTER — LAB (OUTPATIENT)
Dept: LAB | Facility: HOSPITAL | Age: 71
End: 2021-03-17

## 2021-03-17 VITALS
HEIGHT: 67 IN | SYSTOLIC BLOOD PRESSURE: 110 MMHG | HEART RATE: 92 BPM | WEIGHT: 186 LBS | OXYGEN SATURATION: 98 % | DIASTOLIC BLOOD PRESSURE: 70 MMHG | BODY MASS INDEX: 29.19 KG/M2

## 2021-03-17 DIAGNOSIS — E11.9 TYPE 2 DIABETES MELLITUS WITHOUT COMPLICATION, WITHOUT LONG-TERM CURRENT USE OF INSULIN (HCC): ICD-10-CM

## 2021-03-17 DIAGNOSIS — E11.9 TYPE 2 DIABETES MELLITUS WITHOUT COMPLICATION, WITHOUT LONG-TERM CURRENT USE OF INSULIN (HCC): Primary | ICD-10-CM

## 2021-03-17 DIAGNOSIS — R42 DIZZINESS: ICD-10-CM

## 2021-03-17 DIAGNOSIS — M79.89 SWELLING OF RIGHT LOWER EXTREMITY: ICD-10-CM

## 2021-03-17 DIAGNOSIS — C34.31 SMALL CELL LUNG CANCER, RIGHT LOWER LOBE (HCC): ICD-10-CM

## 2021-03-17 LAB
ALBUMIN SERPL-MCNC: 4.4 G/DL (ref 3.5–5.2)
ALBUMIN/GLOB SERPL: 1.8 G/DL
ALP SERPL-CCNC: 122 U/L (ref 39–117)
ALT SERPL W P-5'-P-CCNC: 19 U/L (ref 1–41)
ANION GAP SERPL CALCULATED.3IONS-SCNC: 8.3 MMOL/L (ref 5–15)
AST SERPL-CCNC: 15 U/L (ref 1–40)
BASOPHILS # BLD AUTO: 0.03 10*3/MM3 (ref 0–0.2)
BASOPHILS NFR BLD AUTO: 0.5 % (ref 0–1.5)
BILIRUB SERPL-MCNC: 0.6 MG/DL (ref 0–1.2)
BUN SERPL-MCNC: 28 MG/DL (ref 8–23)
BUN/CREAT SERPL: 15.5 (ref 7–25)
CALCIUM SPEC-SCNC: 9.5 MG/DL (ref 8.6–10.5)
CHLORIDE SERPL-SCNC: 103 MMOL/L (ref 98–107)
CO2 SERPL-SCNC: 25.7 MMOL/L (ref 22–29)
CREAT SERPL-MCNC: 1.81 MG/DL (ref 0.76–1.27)
DEPRECATED RDW RBC AUTO: 62.5 FL (ref 37–54)
EOSINOPHIL # BLD AUTO: 0.01 10*3/MM3 (ref 0–0.4)
EOSINOPHIL NFR BLD AUTO: 0.2 % (ref 0.3–6.2)
ERYTHROCYTE [DISTWIDTH] IN BLOOD BY AUTOMATED COUNT: 17.3 % (ref 12.3–15.4)
GFR SERPL CREATININE-BSD FRML MDRD: 37 ML/MIN/1.73
GLOBULIN UR ELPH-MCNC: 2.4 GM/DL
GLUCOSE BLDC GLUCOMTR-MCNC: 382 MG/DL (ref 70–130)
GLUCOSE SERPL-MCNC: 348 MG/DL (ref 65–99)
HBA1C MFR BLD: 9.3 %
HCT VFR BLD AUTO: 27.2 % (ref 37.5–51)
HGB BLD-MCNC: 8.8 G/DL (ref 13–17.7)
IMM GRANULOCYTES # BLD AUTO: 0.03 10*3/MM3 (ref 0–0.05)
IMM GRANULOCYTES NFR BLD AUTO: 0.5 % (ref 0–0.5)
LYMPHOCYTES # BLD AUTO: 0.74 10*3/MM3 (ref 0.7–3.1)
LYMPHOCYTES NFR BLD AUTO: 13.5 % (ref 19.6–45.3)
MCH RBC QN AUTO: 32.4 PG (ref 26.6–33)
MCHC RBC AUTO-ENTMCNC: 32.4 G/DL (ref 31.5–35.7)
MCV RBC AUTO: 100 FL (ref 79–97)
MONOCYTES # BLD AUTO: 0.88 10*3/MM3 (ref 0.1–0.9)
MONOCYTES NFR BLD AUTO: 16.1 % (ref 5–12)
NEUTROPHILS NFR BLD AUTO: 3.79 10*3/MM3 (ref 1.7–7)
NEUTROPHILS NFR BLD AUTO: 69.2 % (ref 42.7–76)
NRBC BLD AUTO-RTO: 0 /100 WBC (ref 0–0.2)
PLATELET # BLD AUTO: 157 10*3/MM3 (ref 140–450)
PMV BLD AUTO: 11.3 FL (ref 6–12)
POTASSIUM SERPL-SCNC: 4.8 MMOL/L (ref 3.5–5.2)
PROT SERPL-MCNC: 6.8 G/DL (ref 6–8.5)
RBC # BLD AUTO: 2.72 10*6/MM3 (ref 4.14–5.8)
SODIUM SERPL-SCNC: 137 MMOL/L (ref 136–145)
WBC # BLD AUTO: 5.48 10*3/MM3 (ref 3.4–10.8)

## 2021-03-17 PROCEDURE — 85025 COMPLETE CBC W/AUTO DIFF WBC: CPT

## 2021-03-17 PROCEDURE — 99214 OFFICE O/P EST MOD 30 MIN: CPT | Performed by: INTERNAL MEDICINE

## 2021-03-17 PROCEDURE — 80053 COMPREHEN METABOLIC PANEL: CPT

## 2021-03-17 PROCEDURE — 83036 HEMOGLOBIN GLYCOSYLATED A1C: CPT | Performed by: INTERNAL MEDICINE

## 2021-03-17 PROCEDURE — 82962 GLUCOSE BLOOD TEST: CPT | Performed by: INTERNAL MEDICINE

## 2021-03-17 RX ORDER — GLIPIZIDE AND METFORMIN HCL 2.5; 5 MG/1; MG/1
1 TABLET, FILM COATED ORAL
Qty: 60 TABLET | Refills: 2 | Status: SHIPPED | OUTPATIENT
Start: 2021-03-17 | End: 2021-03-17 | Stop reason: SDUPTHER

## 2021-03-17 RX ORDER — GLIPIZIDE AND METFORMIN HCL 2.5; 5 MG/1; MG/1
1 TABLET, FILM COATED ORAL
Qty: 60 TABLET | Refills: 2 | Status: SHIPPED | OUTPATIENT
Start: 2021-03-17 | End: 2021-06-10 | Stop reason: SDUPTHER

## 2021-03-17 RX ORDER — GLIPIZIDE 5 MG/1
5 TABLET ORAL
Qty: 60 TABLET | Refills: 2 | Status: SHIPPED | OUTPATIENT
Start: 2021-03-17 | End: 2021-03-17

## 2021-03-17 RX ORDER — METFORMIN HYDROCHLORIDE 500 MG/1
500 TABLET, EXTENDED RELEASE ORAL 2 TIMES DAILY
Qty: 60 TABLET | Refills: 2 | Status: SHIPPED | OUTPATIENT
Start: 2021-03-17 | End: 2021-03-17

## 2021-03-17 NOTE — PROGRESS NOTES
Chief Complaint   Patient presents with   • Diabetes   • Edema     Right leg upper thigh.    • Dizziness     low BP        HPI:  Luis Elliott is a 71 y.o. male who presents today for diabetes follow-up.  Patient following with oncology for lung cancer.  He will begin whole brain radiation next month.  He does not check his sugar at home.  He reports dizziness and low blood pressure over the last month.  He also reports increased swelling in the right lower extremity.  He has a history of varicose veins.  He is currently finished with chemotherapy at this time.    ROS:  Constitutional: no fevers, night sweats or unexplained weight loss  Eyes: no vision changes  ENT: no runny nose, ear pain, sore throat  Cardio: no chest pain, palpitations  Pulm: no shortness of breath, wheezing, or cough  GI: no abdominal pain or changes in bowel movements  : no difficulty urinating  MSK: no difficulty ambulating, no joint pain  Neuro: no weakness, dizziness or headache  Psych: no trouble sleeping  Endo: no change in appetite      Past Medical History:   Diagnosis Date   • Cancer (CMS/[facility])     PROSTATE   • COPD (chronic obstructive pulmonary disease) (CMS/[facility])     dr valente thinks pt has this    • Coronary artery disease    • DDD (degenerative disc disease), cervical    • Depression    • Diabetes mellitus (CMS/[facility])     let dr check sugar    • Erectile dysfunction    • Glaucoma    • Headache    • Hyperlipidemia    • Hypertension    • Impingement syndrome of left shoulder    • Infection, bacterial     sees ID -  42 days of antibiotics    • Lung cancer (CMS/HCC)    • MI, old     89   • Mitral valve vegetation    • Osteoarthritis    • Prostate cancer (CMS/[facility])     2003   • SOBOE (shortness of breath on exertion)    • Varicose veins of lower limb     RIGHT   • Wears glasses    • Wears partial dentures     bottom       Family History   Problem Relation Age of Onset   • Hypertension Mother    • Atrial fibrillation Mother    • Alcohol  "abuse Father    • Heart attack Father    • Diabetes Father    • No Known Problems Sister    • Prostate cancer Brother       Social History     Socioeconomic History   • Marital status:      Spouse name: Sabina   • Number of children: 2   • Years of education: Not on file   • Highest education level: Not on file   Tobacco Use   • Smoking status: Former Smoker     Packs/day: 1.50     Years: 36.00     Pack years: 54.00     Types: Cigarettes     Quit date: 2011     Years since quittin.9   • Smokeless tobacco: Former User     Types: Chew     Quit date:    • Tobacco comment: quit smoking for 12 years then started again but then quit a final time    Vaping Use   • Vaping Use: Never used   Substance and Sexual Activity   • Alcohol use: Not Currently   • Drug use: Yes     Types: Marijuana     Comment: a month ago   • Sexual activity: Defer      Allergies   Allergen Reactions   • Xarelto [Rivaroxaban] Other (See Comments)     MADE ME FEEL LIKE \" I WAS HAVING A HEART ATTACK.\"      Immunization History   Administered Date(s) Administered   • FLUAD TRI 65YR+ 2019   • Flu Vaccine Quad PF >36MO 2015, 2016, 10/24/2017   • Fluad Quad 65+ 2020   • Fluzone High Dose =>65 Years ([employer] ONLY) 2017, 2018, 2019   • Hepatitis A 2019   • PEDS-Pneumococcal Conjugate (PCV7) 2015   • Pneumococcal Conjugate 13-Valent (PCV13) 2015, 10/02/2020   • Pneumococcal Polysaccharide (PPSV23) 2013   • Shingrix 10/02/2020        PE:  Vitals:    21 0837   BP: 110/70   Pulse: 92   SpO2: 98%      Body mass index is 29.12 kg/m².    Gen Appearance: NAD  HEENT: Normocephalic, PERRLA, no thyromegaly, trache midline  Heart: RRR, normal S1 and S2, no murmur  Lungs: CTA b/l, no wheezing, no crackles  Abdomen: Soft, non-tender, non-distended, no guarding and BSx4  MSK: Moves all extremities well, normal gait, no peripheral edema, varicose veins throughout right lower " extremity minimal swelling right thigh  Pulses: Palpable and equal b/l  Lymph nodes: No palpable lymphadenopathy   Neuro: No focal deficits      Current Outpatient Medications   Medication Sig Dispense Refill   • aspirin 81 MG EC tablet Take 81 mg by mouth Every Night.     • atorvastatin (LIPITOR) 40 MG tablet Take 40 mg by mouth Every Night.     • calcium carbonate-cholecalciferol 500-400 MG-UNIT tablet tablet Take 1 tablet by mouth Daily.     • cholecalciferol (VITAMIN D3) 1000 units tablet Take 1,000 Units by mouth Daily.     • dexamethasone (DECADRON) 4 MG tablet Take 2 tablets in the morning daily on days 2, 3 & 4.  Take with food. 6 tablet 5   • ezetimibe (ZETIA) 10 MG tablet Take 10 mg by mouth Daily.     • memantine (NAMENDA) 10 MG tablet Take 1 tablet by mouth 2 (Two) Times a Day. Titrate as directed. 60 tablet 6   • nitroglycerin (NITROSTAT) 0.4 MG SL tablet Place 0.4 mg under the tongue Every 5 (Five) Minutes As Needed for chest pain. Take no more than 3 doses in 15 minutes.     • Omega-3 Fatty Acids (FISH OIL) 1200 MG capsule capsule Take 1,200 mg by mouth Daily.     • sertraline (ZOLOFT) 50 MG tablet TAKE ONE TABLET BY MOUTH DAILY 90 tablet 3   • zolpidem (AMBIEN) 5 MG tablet Take 1 tablet by mouth At Night As Needed for Sleep. 30 tablet 1   • glipiZIDE-metFORMIN (METAGLIP) 2.5-500 MG per tablet Take 1 tablet by mouth 2 (Two) Times a Day Before Meals. 60 tablet 2     No current facility-administered medications for this visit.        Diagnoses and all orders for this visit:    1. Type 2 diabetes mellitus without complication, without long-term current use of insulin (CMS/[facility]) (Primary)  -     POC Glycosylated Hemoglobin (Hb A1C)  -     POCT Glucose  -     CBC & Differential; Future  -     Comprehensive Metabolic Panel; Future  -     Discontinue: metFORMIN ER (Glucophage XR) 500 MG 24 hr tablet; Take 1 tablet by mouth 2 (two) times a day.  Dispense: 60 tablet; Refill: 2  -     Discontinue: glipizide  (Glucotrol) 5 MG tablet; Take 1 tablet by mouth 2 (Two) Times a Day Before Meals.  Dispense: 60 tablet; Refill: 2  A1c elevated over 9%.  Chemo versus steroid related.  Will trial increase of oral medication but he may need insulin.  We are limited with his GFR on what medications we can use.  Recommend checking blood work today.  Starting on glipizide Metformin combo at increased dose.  See back in 2 weeks for reassessment.  If fasting sugars are not well controlled on this regimen will recommend long-acting insulin.  Blood glucose check was over 350 today.  2. Dizziness  Possibly dehydration due to hyperglycemia.  Would recommend follow-up with his cardiologist for further recommendations as well.  3. Swelling of right lower extremity  Rule out DVT with lower extremity ultrasound.  4. Small cell lung cancer, right lower lobe (CMS/HCC)  Following with oncology.  Other orders  -     Discontinue: glipiZIDE-metFORMIN (METAGLIP) 2.5-500 MG per tablet; Take 1 tablet by mouth 2 (Two) Times a Day Before Meals.  Dispense: 60 tablet; Refill: 2  -     glipiZIDE-metFORMIN (METAGLIP) 2.5-500 MG per tablet; Take 1 tablet by mouth 2 (Two) Times a Day Before Meals.  Dispense: 60 tablet; Refill: 2         Return in about 2 weeks (around 3/31/2021) for telephone visit.     Please note that portions of this document were completed with a voice recognition program. Efforts were made to edit the dictations, but occasionally words are mis-transcribed.

## 2021-03-18 ENCOUNTER — TELEPHONE (OUTPATIENT)
Dept: FAMILY MEDICINE CLINIC | Facility: CLINIC | Age: 71
End: 2021-03-18

## 2021-03-18 ENCOUNTER — HOSPITAL ENCOUNTER (OUTPATIENT)
Dept: CARDIOLOGY | Facility: HOSPITAL | Age: 71
Discharge: HOME | End: 2021-03-18
Admitting: INTERNAL MEDICINE

## 2021-03-18 DIAGNOSIS — M79.89 SWELLING OF RIGHT LOWER EXTREMITY: ICD-10-CM

## 2021-03-18 LAB
BH CV ECHO MEAS - BSA(HAYCOCK): 2 M^2
BH CV ECHO MEAS - BSA: 2 M^2
BH CV ECHO MEAS - BZI_BMI: 29.1 KILOGRAMS/M^2
BH CV ECHO MEAS - BZI_METRIC_HEIGHT: 170.2 CM
BH CV ECHO MEAS - BZI_METRIC_WEIGHT: 84.4 KG
BH CV LOW VAS RIGHT GREATER SAPH AK VESSEL: 1
BH CV LOW VAS RIGHT GREATER SAPH BK VESSEL: 1
BH CV LOWER VASCULAR LEFT COMMON FEMORAL AUGMENT: NORMAL
BH CV LOWER VASCULAR LEFT COMMON FEMORAL COMPRESS: NORMAL
BH CV LOWER VASCULAR LEFT COMMON FEMORAL PHASIC: NORMAL
BH CV LOWER VASCULAR LEFT COMMON FEMORAL SPONT: NORMAL
BH CV LOWER VASCULAR RIGHT COMMON FEMORAL AUGMENT: NORMAL
BH CV LOWER VASCULAR RIGHT COMMON FEMORAL COMPRESS: NORMAL
BH CV LOWER VASCULAR RIGHT COMMON FEMORAL PHASIC: NORMAL
BH CV LOWER VASCULAR RIGHT COMMON FEMORAL SPONT: NORMAL
BH CV LOWER VASCULAR RIGHT DISTAL FEMORAL COMPRESS: NORMAL
BH CV LOWER VASCULAR RIGHT GASTRONEMIUS COMPRESS: NORMAL
BH CV LOWER VASCULAR RIGHT GREATER SAPH AK COMPRESS: NORMAL
BH CV LOWER VASCULAR RIGHT GREATER SAPH BK COMPRESS: NORMAL
BH CV LOWER VASCULAR RIGHT LESSER SAPH COMPRESS: NORMAL
BH CV LOWER VASCULAR RIGHT MID FEMORAL AUGMENT: NORMAL
BH CV LOWER VASCULAR RIGHT MID FEMORAL COMPRESS: NORMAL
BH CV LOWER VASCULAR RIGHT MID FEMORAL PHASIC: NORMAL
BH CV LOWER VASCULAR RIGHT MID FEMORAL SPONT: NORMAL
BH CV LOWER VASCULAR RIGHT PERONEAL COMPRESS: NORMAL
BH CV LOWER VASCULAR RIGHT POPLITEAL AUGMENT: NORMAL
BH CV LOWER VASCULAR RIGHT POPLITEAL COMPRESS: NORMAL
BH CV LOWER VASCULAR RIGHT POPLITEAL PHASIC: NORMAL
BH CV LOWER VASCULAR RIGHT POPLITEAL SPONT: NORMAL
BH CV LOWER VASCULAR RIGHT POSTERIOR TIBIAL COMPRESS: NORMAL
BH CV LOWER VASCULAR RIGHT PROFUNDA FEMORAL COMPRESS: NORMAL
BH CV LOWER VASCULAR RIGHT PROXIMAL FEMORAL COMPRESS: NORMAL
BH CV LOWER VASCULAR RIGHT SAPHENOFEMORAL JUNCTION COMPRESS: NORMAL

## 2021-03-18 PROCEDURE — 93971 EXTREMITY STUDY: CPT

## 2021-03-18 PROCEDURE — 93971 EXTREMITY STUDY: CPT | Performed by: INTERNAL MEDICINE

## 2021-03-18 NOTE — TELEPHONE ENCOUNTER
Contacted patient's wife and advised her that  sent in a prescription yesterday for glipizide-metformin that shows confirmed/received by the pharmacy. I verified the pharmacy with the patient's wife. Pt's wife states she will call the pharmacy she just wasn't sure if it was sent because she normally receives a text. Pt's wife also wanted to know if a prescription for metformin was sent in for his original prescription, I advised her that in the system that medication has been discontinued and the new one (glipizide-metformin) is now on the current meds list. Pt's wife verbalized understanding and did not have any additional questions at this time.

## 2021-03-18 NOTE — TELEPHONE ENCOUNTER
Patient's wife, Sabina, states that Dr. Coleman was supposed to send in a new Diabetic Medicine prescription, but it is not at [facility].  She can be reached at [phone]

## 2021-03-22 ENCOUNTER — TELEPHONE (OUTPATIENT)
Dept: FAMILY MEDICINE CLINIC | Facility: CLINIC | Age: 71
End: 2021-03-22

## 2021-03-22 NOTE — TELEPHONE ENCOUNTER
Attempted to contact patient, no answer message says call cannot be completed as dialed, try again later.      ----- Message from Silvestre Coleman DO sent at 3/22/2021 12:13 PM EDT -----  Please call patient to make an appointment today or tomorrow.  His kidney function has declined and we will need to change his new diabetic medication as well as discuss superficial thrombosis on recent ultrasound.  He is also more anemic than usual _  and we will need to work this up.

## 2021-03-22 NOTE — PROGRESS NOTES
Please call patient to make an appointment today or tomorrow.  His kidney function has declined and we will need to change his new diabetic medication as well as discuss superficial thrombosis on recent ultrasound.  He is also more anemic than usual and we will need to work this up.

## 2021-03-22 NOTE — TELEPHONE ENCOUNTER
Pt's wife, Sabina, called back after missed phone call. Relayed message to wife and scheduled appt for tomorrow 3/23/2021. Sabina expresses that she understands the message that was relayed today and has no further questions at this time.

## 2021-03-23 ENCOUNTER — LAB (OUTPATIENT)
Dept: LAB | Facility: HOSPITAL | Age: 71
End: 2021-03-23

## 2021-03-23 ENCOUNTER — OFFICE VISIT (OUTPATIENT)
Dept: FAMILY MEDICINE CLINIC | Facility: CLINIC | Age: 71
End: 2021-03-23

## 2021-03-23 VITALS
HEIGHT: 67 IN | SYSTOLIC BLOOD PRESSURE: 120 MMHG | WEIGHT: 188 LBS | BODY MASS INDEX: 29.51 KG/M2 | HEART RATE: 79 BPM | DIASTOLIC BLOOD PRESSURE: 82 MMHG | OXYGEN SATURATION: 100 %

## 2021-03-23 DIAGNOSIS — D53.9 MACROCYTIC ANEMIA: ICD-10-CM

## 2021-03-23 DIAGNOSIS — N18.4 TYPE 2 DIABETES MELLITUS WITH STAGE 4 CHRONIC KIDNEY DISEASE, WITH LONG-TERM CURRENT USE OF INSULIN (HCC): ICD-10-CM

## 2021-03-23 DIAGNOSIS — Z79.4 TYPE 2 DIABETES MELLITUS WITH STAGE 4 CHRONIC KIDNEY DISEASE, WITH LONG-TERM CURRENT USE OF INSULIN (HCC): ICD-10-CM

## 2021-03-23 DIAGNOSIS — E11.22 TYPE 2 DIABETES MELLITUS WITH STAGE 4 CHRONIC KIDNEY DISEASE, WITH LONG-TERM CURRENT USE OF INSULIN (HCC): ICD-10-CM

## 2021-03-23 DIAGNOSIS — I82.811 SUPERFICIAL THROMBOSIS OF LEG, RIGHT: ICD-10-CM

## 2021-03-23 DIAGNOSIS — D53.9 MACROCYTIC ANEMIA: Primary | ICD-10-CM

## 2021-03-23 DIAGNOSIS — C34.31 SMALL CELL LUNG CANCER, RIGHT LOWER LOBE (HCC): ICD-10-CM

## 2021-03-23 LAB
ALBUMIN SERPL-MCNC: 4.4 G/DL (ref 3.5–5.2)
ALBUMIN/GLOB SERPL: 1.7 G/DL
ALP SERPL-CCNC: 117 U/L (ref 39–117)
ALT SERPL W P-5'-P-CCNC: 26 U/L (ref 1–41)
ANION GAP SERPL CALCULATED.3IONS-SCNC: 12 MMOL/L (ref 5–15)
AST SERPL-CCNC: 21 U/L (ref 1–40)
BACTERIA UR QL AUTO: NORMAL /HPF
BASOPHILS # BLD AUTO: 0.03 10*3/MM3 (ref 0–0.2)
BASOPHILS NFR BLD AUTO: 0.5 % (ref 0–1.5)
BILIRUB SERPL-MCNC: 0.6 MG/DL (ref 0–1.2)
BILIRUB UR QL STRIP: NEGATIVE
BUN SERPL-MCNC: 20 MG/DL (ref 8–23)
BUN/CREAT SERPL: 12.3 (ref 7–25)
CALCIUM SPEC-SCNC: 9.2 MG/DL (ref 8.6–10.5)
CHLORIDE SERPL-SCNC: 106 MMOL/L (ref 98–107)
CLARITY UR: CLEAR
CO2 SERPL-SCNC: 22 MMOL/L (ref 22–29)
COLOR UR: YELLOW
CREAT SERPL-MCNC: 1.63 MG/DL (ref 0.76–1.27)
DEPRECATED RDW RBC AUTO: 65.9 FL (ref 37–54)
EOSINOPHIL # BLD AUTO: 0.01 10*3/MM3 (ref 0–0.4)
EOSINOPHIL NFR BLD AUTO: 0.2 % (ref 0.3–6.2)
ERYTHROCYTE [DISTWIDTH] IN BLOOD BY AUTOMATED COUNT: 17.1 % (ref 12.3–15.4)
FERRITIN SERPL-MCNC: 594 NG/ML (ref 30–400)
FOLATE SERPL-MCNC: 11.1 NG/ML (ref 4.78–24.2)
GFR SERPL CREATININE-BSD FRML MDRD: 42 ML/MIN/1.73
GLOBULIN UR ELPH-MCNC: 2.6 GM/DL
GLUCOSE SERPL-MCNC: 152 MG/DL (ref 65–99)
GLUCOSE UR STRIP-MCNC: ABNORMAL MG/DL
HCT VFR BLD AUTO: 31.6 % (ref 37.5–51)
HGB BLD-MCNC: 9.9 G/DL (ref 13–17.7)
HGB UR QL STRIP.AUTO: NEGATIVE
HYALINE CASTS UR QL AUTO: NORMAL /LPF
IMM GRANULOCYTES # BLD AUTO: 0.06 10*3/MM3 (ref 0–0.05)
IMM GRANULOCYTES NFR BLD AUTO: 1.1 % (ref 0–0.5)
IRON 24H UR-MRATE: 91 MCG/DL (ref 59–158)
IRON SATN MFR SERPL: 23 % (ref 20–50)
KETONES UR QL STRIP: NEGATIVE
LEUKOCYTE ESTERASE UR QL STRIP.AUTO: NEGATIVE
LYMPHOCYTES # BLD AUTO: 0.71 10*3/MM3 (ref 0.7–3.1)
LYMPHOCYTES NFR BLD AUTO: 12.8 % (ref 19.6–45.3)
MCH RBC QN AUTO: 32.9 PG (ref 26.6–33)
MCHC RBC AUTO-ENTMCNC: 31.3 G/DL (ref 31.5–35.7)
MCV RBC AUTO: 105 FL (ref 79–97)
MONOCYTES # BLD AUTO: 0.72 10*3/MM3 (ref 0.1–0.9)
MONOCYTES NFR BLD AUTO: 12.9 % (ref 5–12)
NEUTROPHILS NFR BLD AUTO: 4.03 10*3/MM3 (ref 1.7–7)
NEUTROPHILS NFR BLD AUTO: 72.5 % (ref 42.7–76)
NITRITE UR QL STRIP: NEGATIVE
NRBC BLD AUTO-RTO: 0 /100 WBC (ref 0–0.2)
PH UR STRIP.AUTO: 5.5 [PH] (ref 5–8)
PLATELET # BLD AUTO: 155 10*3/MM3 (ref 140–450)
PMV BLD AUTO: 11.5 FL (ref 6–12)
POTASSIUM SERPL-SCNC: 4.3 MMOL/L (ref 3.5–5.2)
PROT SERPL-MCNC: 7 G/DL (ref 6–8.5)
PROT UR QL STRIP: ABNORMAL
RBC # BLD AUTO: 3.01 10*6/MM3 (ref 4.14–5.8)
RBC # UR: NORMAL /HPF
REF LAB TEST METHOD: NORMAL
RETICS # AUTO: 0.16 10*6/MM3 (ref 0.02–0.13)
RETICS/RBC NFR AUTO: 5.43 % (ref 0.7–1.9)
SODIUM SERPL-SCNC: 140 MMOL/L (ref 136–145)
SP GR UR STRIP: 1.02 (ref 1–1.03)
SQUAMOUS #/AREA URNS HPF: NORMAL /HPF
TIBC SERPL-MCNC: 390 MCG/DL (ref 298–536)
TRANSFERRIN SERPL-MCNC: 262 MG/DL (ref 200–360)
UROBILINOGEN UR QL STRIP: ABNORMAL
VIT B12 BLD-MCNC: 730 PG/ML (ref 211–946)
WBC # BLD AUTO: 5.56 10*3/MM3 (ref 3.4–10.8)
WBC UR QL AUTO: NORMAL /HPF

## 2021-03-23 PROCEDURE — 82728 ASSAY OF FERRITIN: CPT

## 2021-03-23 PROCEDURE — 99214 OFFICE O/P EST MOD 30 MIN: CPT | Performed by: INTERNAL MEDICINE

## 2021-03-23 PROCEDURE — 82746 ASSAY OF FOLIC ACID SERUM: CPT

## 2021-03-23 PROCEDURE — 84466 ASSAY OF TRANSFERRIN: CPT

## 2021-03-23 PROCEDURE — 85025 COMPLETE CBC W/AUTO DIFF WBC: CPT

## 2021-03-23 PROCEDURE — 85045 AUTOMATED RETICULOCYTE COUNT: CPT

## 2021-03-23 PROCEDURE — 81001 URINALYSIS AUTO W/SCOPE: CPT

## 2021-03-23 PROCEDURE — 80053 COMPREHEN METABOLIC PANEL: CPT

## 2021-03-23 PROCEDURE — 36415 COLL VENOUS BLD VENIPUNCTURE: CPT

## 2021-03-23 PROCEDURE — 82607 VITAMIN B-12: CPT

## 2021-03-23 PROCEDURE — 83540 ASSAY OF IRON: CPT

## 2021-03-23 NOTE — PROGRESS NOTES
Chief Complaint   Patient presents with   • Results     Discuss lab results and tx        HPI:  Luis Elliott is a 71 y.o. male who presents today for follow-up diabetes and anemia.  Worsening anemia on recent blood work.  Kidney function has declined significantly as well.  Patient is asymptomatic.  Denies any blood in stool.  He would like to review recent ultrasound results.  He reports his leg pain is improved.    ROS:  Constitutional: no fevers, night sweats or unexplained weight loss  Eyes: no vision changes  ENT: no runny nose, ear pain, sore throat  Cardio: no chest pain, palpitations  Pulm: no shortness of breath, wheezing, or cough  GI: no abdominal pain or changes in bowel movements  : no difficulty urinating  MSK: no difficulty ambulating, no joint pain  Neuro: no weakness, dizziness or headache  Psych: no trouble sleeping  Endo: no change in appetite      Past Medical History:   Diagnosis Date   • Cancer (CMS/HCC)     PROSTATE   • COPD (chronic obstructive pulmonary disease) (CMS/[facility])     dr valente thinks pt has this    • Coronary artery disease    • DDD (degenerative disc disease), cervical    • Depression    • Diabetes mellitus (CMS/HCC)     let  check sugar    • Erectile dysfunction    • Glaucoma    • Headache    • Hyperlipidemia    • Hypertension    • Impingement syndrome of left shoulder    • Infection, bacterial     sees ID -  42 days of antibiotics    • Lung cancer (CMS/HCC)    • MI, old     89   • Mitral valve vegetation    • Osteoarthritis    • Prostate cancer (CMS/HCC)     2003   • SOBOE (shortness of breath on exertion)    • Varicose veins of lower limb     RIGHT   • Wears glasses    • Wears partial dentures     bottom       Family History   Problem Relation Age of Onset   • Hypertension Mother    • Atrial fibrillation Mother    • Alcohol abuse Father    • Heart attack Father    • Diabetes Father    • No Known Problems Sister    • Prostate cancer Brother       Social History  "    Socioeconomic History   • Marital status:      Spouse name: Sabina   • Number of children: 2   • Years of education: Not on file   • Highest education level: Not on file   Tobacco Use   • Smoking status: Former Smoker     Packs/day: 1.50     Years: 36.00     Pack years: 54.00     Types: Cigarettes     Quit date: 2011     Years since quittin.9   • Smokeless tobacco: Former User     Types: Chew     Quit date:    • Tobacco comment: quit smoking for 12 years then started again but then quit a final time    Vaping Use   • Vaping Use: Never used   Substance and Sexual Activity   • Alcohol use: Not Currently   • Drug use: Yes     Types: Marijuana     Comment: a month ago   • Sexual activity: Defer      Allergies   Allergen Reactions   • Xarelto [Rivaroxaban] Other (See Comments)     MADE ME FEEL LIKE \" I WAS HAVING A HEART ATTACK.\"      Immunization History   Administered Date(s) Administered   • FLUAD TRI 65YR+ 2019   • Flu Vaccine Quad PF >36MO 2015, 2016, 10/24/2017   • Fluad Quad 65+ 2020   • Fluzone High Dose =>65 Years (Vaxcare ONLY) 2017, 2018, 2019   • Hepatitis A 2019   • PEDS-Pneumococcal Conjugate (PCV7) 2015   • Pneumococcal Conjugate 13-Valent (PCV13) 2015, 10/02/2020   • Pneumococcal Polysaccharide (PPSV23) 2013   • Shingrix 10/02/2020        PE:  Vitals:    21 1016   BP: 120/82   Pulse: 79   SpO2: 100%      Body mass index is 29.44 kg/m².    Gen Appearance: NAD  HEENT: Normocephalic, PERRLA, no thyromegaly, trache midline  Heart: RRR, normal S1 and S2, no murmur  Lungs: CTA b/l, no wheezing, no crackles  Abdomen: Soft, non-tender, non-distended, no guarding and BSx4  MSK: Moves all extremities well, normal gait, no peripheral edema  Pulses: Palpable and equal b/l  Lymph nodes: No palpable lymphadenopathy   Neuro: No focal deficits      Current Outpatient Medications   Medication Sig Dispense Refill   • aspirin 81 " MG EC tablet Take 81 mg by mouth Every Night.     • atorvastatin (LIPITOR) 40 MG tablet Take 40 mg by mouth Every Night.     • calcium carbonate-cholecalciferol 500-400 MG-UNIT tablet tablet Take 1 tablet by mouth Daily.     • cholecalciferol (VITAMIN D3) 1000 units tablet Take 1,000 Units by mouth Daily.     • dexamethasone (DECADRON) 4 MG tablet Take 2 tablets in the morning daily on days 2, 3 & 4.  Take with food. 6 tablet 5   • ezetimibe (ZETIA) 10 MG tablet Take 10 mg by mouth Daily.     • glipiZIDE-metFORMIN (METAGLIP) 2.5-500 MG per tablet Take 1 tablet by mouth 2 (Two) Times a Day Before Meals. 60 tablet 2   • memantine (NAMENDA) 10 MG tablet Take 1 tablet by mouth 2 (Two) Times a Day. Titrate as directed. 60 tablet 6   • nitroglycerin (NITROSTAT) 0.4 MG SL tablet Place 0.4 mg under the tongue Every 5 (Five) Minutes As Needed for chest pain. Take no more than 3 doses in 15 minutes.     • Omega-3 Fatty Acids (FISH OIL) 1200 MG capsule capsule Take 1,200 mg by mouth Daily.     • sertraline (ZOLOFT) 50 MG tablet TAKE ONE TABLET BY MOUTH DAILY 90 tablet 3   • zolpidem (AMBIEN) 5 MG tablet Take 1 tablet by mouth At Night As Needed for Sleep. 30 tablet 1     No current facility-administered medications for this visit.      New onset anemia, macrocytic at 8.8.  Checking Hemoccult to rule out GI bleed.  I would recommend follow-up with Dr. Cartagena for further recommendations as well as repeating blood work today.  I have referred him to nephrology to establish care since his GFR is nearing 30.  Repeating kidney function today.  If GFR is persistently low will need to DC Metformin and glipizide and start on long-acting insulin.  I discussed this with patient and his wife.  Continue Metformin/glipizide until we get results back.  We will start with 15 units and increase from there.  Will need follow-up in 2 weeks if we do start insulin.  Symptomatic treatment for superficial thrombosis.  Continue aspirin 81  mg.    Diagnoses and all orders for this visit:    1. Macrocytic anemia (Primary)  -     Occult Blood X 1, Stool - Stool, Per Rectum; Future  -     Comprehensive Metabolic Panel; Future  -     Ferritin; Future  -     Iron Profile; Future  -     Vitamin B12 & Folate; Future  -     Urinalysis With Culture If Indicated - Urine, Clean Catch; Future  -     CBC & Differential; Future  -     Peripheral Blood Smear; Future  -     Reticulocytes; Future    2. Type 2 diabetes mellitus with stage 4 chronic kidney disease, with long-term current use of insulin (CMS/[facility])  -     Ambulatory Referral to Nephrology  -     Urinalysis With Culture If Indicated - Urine, Clean Catch; Future    3. Superficial thrombosis of leg, right         No follow-ups on file.     Please note that portions of this document were completed with a voice recognition program. Efforts were made to edit the dictations, but occasionally words are mis-transcribed.

## 2021-03-24 ENCOUNTER — TELEPHONE (OUTPATIENT)
Dept: ONCOLOGY | Facility: CLINIC | Age: 71
End: 2021-03-24

## 2021-03-24 ENCOUNTER — LAB (OUTPATIENT)
Dept: LAB | Facility: HOSPITAL | Age: 71
End: 2021-03-24

## 2021-03-24 ENCOUNTER — HOSPITAL ENCOUNTER (OUTPATIENT)
Dept: RADIATION ONCOLOGY | Facility: HOSPITAL | Age: 71
Discharge: HOME | End: 2021-03-24

## 2021-03-24 LAB
BASOPHILS # BLD AUTO: 0.03 10*3/MM3 (ref 0–0.2)
BASOPHILS NFR BLD AUTO: 0.5 % (ref 0–1.5)
DEPRECATED RDW RBC AUTO: 65.4 FL (ref 37–54)
EOSINOPHIL # BLD AUTO: 0.01 10*3/MM3 (ref 0–0.4)
EOSINOPHIL NFR BLD AUTO: 0.2 % (ref 0.3–6.2)
ERYTHROCYTE [DISTWIDTH] IN BLOOD BY AUTOMATED COUNT: 16.7 % (ref 12.3–15.4)
HCT VFR BLD AUTO: 31.3 % (ref 37.5–51)
HEMOCCULT STL QL: NEGATIVE
HGB BLD-MCNC: 10.1 G/DL (ref 13–17.7)
IMM GRANULOCYTES # BLD AUTO: 0.05 10*3/MM3 (ref 0–0.05)
IMM GRANULOCYTES NFR BLD AUTO: 0.9 % (ref 0–0.5)
LAB AP CASE REPORT: NORMAL
LYMPHOCYTES # BLD AUTO: 0.73 10*3/MM3 (ref 0.7–3.1)
LYMPHOCYTES NFR BLD AUTO: 13.4 % (ref 19.6–45.3)
MCH RBC QN AUTO: 33.7 PG (ref 26.6–33)
MCHC RBC AUTO-ENTMCNC: 32.3 G/DL (ref 31.5–35.7)
MCV RBC AUTO: 104.3 FL (ref 79–97)
MONOCYTES # BLD AUTO: 0.6 10*3/MM3 (ref 0.1–0.9)
MONOCYTES NFR BLD AUTO: 11 % (ref 5–12)
NEUTROPHILS NFR BLD AUTO: 4.04 10*3/MM3 (ref 1.7–7)
NEUTROPHILS NFR BLD AUTO: 74 % (ref 42.7–76)
NRBC BLD AUTO-RTO: 0.2 /100 WBC (ref 0–0.2)
PATH REPORT.FINAL DX SPEC: NORMAL
PATH REPORT.GROSS SPEC: NORMAL
PATHOLOGY REVIEW: YES
PLATELET # BLD AUTO: 157 10*3/MM3 (ref 140–450)
PMV BLD AUTO: 11.5 FL (ref 6–12)
RBC # BLD AUTO: 3 10*6/MM3 (ref 4.14–5.8)
WBC # BLD AUTO: 5.46 10*3/MM3 (ref 3.4–10.8)

## 2021-03-24 PROCEDURE — 77334 RADIATION TREATMENT AID(S): CPT | Performed by: RADIOLOGY

## 2021-03-24 PROCEDURE — 82272 OCCULT BLD FECES 1-3 TESTS: CPT

## 2021-03-24 NOTE — TELEPHONE ENCOUNTER
Caller: MARIO    Relationship: [employer]    Best call back number: [phone]    Who are you requesting to speak with (clinical staff, provider,  specific staff member): MD OR STAFF    What was the call regarding: PT IS WITH DR. BRO PHAN AND PT WIFE HAS PHONE. SHE WAS TRYING TO CALL BACK BUT THE PHONE CALL KEPT SAYING INCOMPLETE.    Do you require a callback:   YES

## 2021-03-24 NOTE — TELEPHONE ENCOUNTER
JENN HAD LABS DONE WITH HIS PCP YESTERDAY, SAYS HIS RBC IS LOW AND HIS KIDNEY ISN'T FUNCTIONING PROPERLY. HE'D LIKE TO KNOW IF DR CHRISTIE WOULD WANT FOR HIM TO COME IN OR PRESCRIBE SOMETHING    PH# [phone]

## 2021-03-24 NOTE — TELEPHONE ENCOUNTER
Called and spoke with patients wife after running by diana Harris and it looks like his PCP is managing with f/u in 4 weeks and f/u with nephrology as well from their office. She advised Anemia could still be related to treatment side effects. Continue to monitor for now. I let patient wife know this.  She wanted to know If it was ok to do radiation with counts and I advised she needs to contact their office with their concerns.

## 2021-04-01 ENCOUNTER — HOSPITAL ENCOUNTER (OUTPATIENT)
Dept: RADIATION ONCOLOGY | Facility: HOSPITAL | Age: 71
Setting detail: RADIATION/ONCOLOGY SERIES
Discharge: HOME | End: 2021-04-01

## 2021-04-08 PROCEDURE — 77300 RADIATION THERAPY DOSE PLAN: CPT | Performed by: RADIOLOGY

## 2021-04-08 PROCEDURE — 77338 DESIGN MLC DEVICE FOR IMRT: CPT | Performed by: RADIOLOGY

## 2021-04-08 PROCEDURE — 77301 RADIOTHERAPY DOSE PLAN IMRT: CPT | Performed by: RADIOLOGY

## 2021-04-12 ENCOUNTER — HOSPITAL ENCOUNTER (OUTPATIENT)
Dept: RADIATION ONCOLOGY | Facility: HOSPITAL | Age: 71
Discharge: HOME | End: 2021-04-12

## 2021-04-12 PROCEDURE — 77386: CPT | Performed by: RADIOLOGY

## 2021-04-13 ENCOUNTER — HOSPITAL ENCOUNTER (OUTPATIENT)
Dept: RADIATION ONCOLOGY | Facility: HOSPITAL | Age: 71
Discharge: HOME | End: 2021-04-13

## 2021-04-13 VITALS — WEIGHT: 187.9 LBS | BODY MASS INDEX: 29.42 KG/M2

## 2021-04-13 PROCEDURE — 77386: CPT | Performed by: RADIOLOGY

## 2021-04-14 ENCOUNTER — HOSPITAL ENCOUNTER (OUTPATIENT)
Dept: RADIATION ONCOLOGY | Facility: HOSPITAL | Age: 71
Discharge: HOME | End: 2021-04-14

## 2021-04-14 PROCEDURE — 77386: CPT | Performed by: RADIOLOGY

## 2021-04-15 ENCOUNTER — HOSPITAL ENCOUNTER (OUTPATIENT)
Dept: RADIATION ONCOLOGY | Facility: HOSPITAL | Age: 71
Discharge: HOME | End: 2021-04-15

## 2021-04-15 PROCEDURE — 77386: CPT | Performed by: RADIOLOGY

## 2021-04-15 PROCEDURE — 77336 RADIATION PHYSICS CONSULT: CPT | Performed by: RADIOLOGY

## 2021-04-16 ENCOUNTER — HOSPITAL ENCOUNTER (OUTPATIENT)
Dept: RADIATION ONCOLOGY | Facility: HOSPITAL | Age: 71
Discharge: HOME | End: 2021-04-16

## 2021-04-16 PROCEDURE — 77386: CPT | Performed by: RADIOLOGY

## 2021-04-19 ENCOUNTER — HOSPITAL ENCOUNTER (OUTPATIENT)
Dept: RADIATION ONCOLOGY | Facility: HOSPITAL | Age: 71
Discharge: HOME | End: 2021-04-19

## 2021-04-19 PROCEDURE — 77386: CPT | Performed by: RADIOLOGY

## 2021-04-20 ENCOUNTER — HOSPITAL ENCOUNTER (OUTPATIENT)
Dept: RADIATION ONCOLOGY | Facility: HOSPITAL | Age: 71
Discharge: HOME | End: 2021-04-20

## 2021-04-20 VITALS — BODY MASS INDEX: 29.62 KG/M2 | WEIGHT: 189.2 LBS

## 2021-04-20 PROCEDURE — 77386: CPT | Performed by: RADIOLOGY

## 2021-04-21 ENCOUNTER — OFFICE VISIT (OUTPATIENT)
Dept: FAMILY MEDICINE CLINIC | Facility: CLINIC | Age: 71
End: 2021-04-21

## 2021-04-21 ENCOUNTER — LAB (OUTPATIENT)
Dept: LAB | Facility: HOSPITAL | Age: 71
End: 2021-04-21

## 2021-04-21 ENCOUNTER — HOSPITAL ENCOUNTER (OUTPATIENT)
Dept: RADIATION ONCOLOGY | Facility: HOSPITAL | Age: 71
Discharge: HOME | End: 2021-04-21

## 2021-04-21 VITALS
SYSTOLIC BLOOD PRESSURE: 122 MMHG | OXYGEN SATURATION: 98 % | HEIGHT: 67 IN | WEIGHT: 189 LBS | BODY MASS INDEX: 29.66 KG/M2 | HEART RATE: 78 BPM | DIASTOLIC BLOOD PRESSURE: 80 MMHG

## 2021-04-21 DIAGNOSIS — E11.65 TYPE 2 DIABETES MELLITUS WITH HYPERGLYCEMIA, WITHOUT LONG-TERM CURRENT USE OF INSULIN (HCC): ICD-10-CM

## 2021-04-21 DIAGNOSIS — Z12.5 ENCOUNTER FOR PROSTATE CANCER SCREENING: ICD-10-CM

## 2021-04-21 DIAGNOSIS — D64.9 ANEMIA, UNSPECIFIED TYPE: ICD-10-CM

## 2021-04-21 DIAGNOSIS — D64.9 ANEMIA, UNSPECIFIED TYPE: Primary | ICD-10-CM

## 2021-04-21 DIAGNOSIS — N18.30 STAGE 3 CHRONIC KIDNEY DISEASE, UNSPECIFIED WHETHER STAGE 3A OR 3B CKD (HCC): ICD-10-CM

## 2021-04-21 LAB
ALBUMIN SERPL-MCNC: 4.6 G/DL (ref 3.5–5.2)
ALBUMIN/GLOB SERPL: 2.2 G/DL
ALP SERPL-CCNC: 97 U/L (ref 39–117)
ALT SERPL W P-5'-P-CCNC: 33 U/L (ref 1–41)
ANION GAP SERPL CALCULATED.3IONS-SCNC: 11 MMOL/L (ref 5–15)
AST SERPL-CCNC: 20 U/L (ref 1–40)
BILIRUB SERPL-MCNC: 0.6 MG/DL (ref 0–1.2)
BUN SERPL-MCNC: 21 MG/DL (ref 8–23)
BUN/CREAT SERPL: 14.3 (ref 7–25)
CALCIUM SPEC-SCNC: 9.2 MG/DL (ref 8.6–10.5)
CHLORIDE SERPL-SCNC: 107 MMOL/L (ref 98–107)
CO2 SERPL-SCNC: 22 MMOL/L (ref 22–29)
CREAT SERPL-MCNC: 1.47 MG/DL (ref 0.76–1.27)
GFR SERPL CREATININE-BSD FRML MDRD: 47 ML/MIN/1.73
GLOBULIN UR ELPH-MCNC: 2.1 GM/DL
GLUCOSE SERPL-MCNC: 142 MG/DL (ref 65–99)
POTASSIUM SERPL-SCNC: 4.7 MMOL/L (ref 3.5–5.2)
PROT SERPL-MCNC: 6.7 G/DL (ref 6–8.5)
SODIUM SERPL-SCNC: 140 MMOL/L (ref 136–145)

## 2021-04-21 PROCEDURE — G0103 PSA SCREENING: HCPCS

## 2021-04-21 PROCEDURE — 77386: CPT | Performed by: RADIOLOGY

## 2021-04-21 PROCEDURE — 99214 OFFICE O/P EST MOD 30 MIN: CPT | Performed by: INTERNAL MEDICINE

## 2021-04-21 PROCEDURE — 80053 COMPREHEN METABOLIC PANEL: CPT

## 2021-04-21 NOTE — PROGRESS NOTES
Chief Complaint   Patient presents with   • Diabetes     4 wk f/u        HPI:  Luis Elliott is a 71 y.o. male who presents today for follow-up.  Fasting sugars typically 100 1:30 in the morning.  No episodes of hypoglycemia.  Takes medication as prescribed.  No other acute concerns today.    ROS:  Constitutional: no fevers, night sweats or unexplained weight loss  Eyes: no vision changes  ENT: no runny nose, ear pain, sore throat  Cardio: no chest pain, palpitations  Pulm: no shortness of breath, wheezing, or cough  GI: no abdominal pain or changes in bowel movements  : no difficulty urinating  MSK: no difficulty ambulating, no joint pain  Neuro: no weakness, dizziness or headache  Psych: no trouble sleeping  Endo: no change in appetite      Past Medical History:   Diagnosis Date   • Cancer (CMS/HCC)     PROSTATE   • COPD (chronic obstructive pulmonary disease) (CMS/[facility])     dr valente thinks pt has this    • Coronary artery disease    • DDD (degenerative disc disease), cervical    • Depression    • Diabetes mellitus (CMS/[facility])     let dr check sugar    • Erectile dysfunction    • Glaucoma    • Headache    • Hyperlipidemia    • Hypertension    • Impingement syndrome of left shoulder    • Infection, bacterial     sees ID -  42 days of antibiotics    • Lung cancer (CMS/HCC)    • MI, old     89   • Mitral valve vegetation    • Osteoarthritis    • Prostate cancer (CMS/HCC)     2003   • SOBOE (shortness of breath on exertion)    • Varicose veins of lower limb     RIGHT   • Wears glasses    • Wears partial dentures     bottom       Family History   Problem Relation Age of Onset   • Hypertension Mother    • Atrial fibrillation Mother    • Alcohol abuse Father    • Heart attack Father    • Diabetes Father    • No Known Problems Sister    • Prostate cancer Brother       Social History     Socioeconomic History   • Marital status:      Spouse name: Sabina   • Number of children: 2   • Years of education: Not on  "file   • Highest education level: Not on file   Tobacco Use   • Smoking status: Former Smoker     Packs/day: 1.50     Years: 36.00     Pack years: 54.00     Types: Cigarettes     Quit date: 4/12/2011     Years since quitting: 10.0   • Smokeless tobacco: Former User     Types: Chew     Quit date: 2011   • Tobacco comment: quit smoking for 12 years then started again but then quit a final time    Vaping Use   • Vaping Use: Never used   Substance and Sexual Activity   • Alcohol use: Not Currently   • Drug use: Yes     Types: Marijuana     Comment: a month ago   • Sexual activity: Defer      Allergies   Allergen Reactions   • Xarelto [Rivaroxaban] Other (See Comments)     MADE ME FEEL LIKE \" I WAS HAVING A HEART ATTACK.\"      Immunization History   Administered Date(s) Administered   • FLUAD TRI 65YR+ 09/16/2019   • Flu Vaccine Quad PF >36MO 11/25/2015, 12/12/2016, 10/24/2017   • Fluad Quad 65+ 09/17/2020   • Fluzone High Dose =>65 Years (Vaxcare ONLY) 11/01/2017, 09/12/2018, 09/16/2019   • Hepatitis A 05/31/2019   • PEDS-Pneumococcal Conjugate (PCV7) 06/03/2015   • Pneumococcal Conjugate 13-Valent (PCV13) 06/03/2015, 10/02/2020   • Pneumococcal Polysaccharide (PPSV23) 06/03/2013   • Shingrix 10/02/2020        PE:  Vitals:    04/21/21 0858   BP: 122/80   Pulse: 78   SpO2: 98%      Body mass index is 29.59 kg/m².    Gen Appearance: NAD  HEENT: Normocephalic, PERRLA, no thyromegaly, trache midline  Heart: RRR, normal S1 and S2, no murmur  Lungs: CTA b/l, no wheezing, no crackles  Abdomen: Soft, non-tender, non-distended, no guarding and BSx4  MSK: Moves all extremities well, normal gait, no peripheral edema  Pulses: Palpable and equal b/l  Lymph nodes: No palpable lymphadenopathy   Neuro: No focal deficits      Current Outpatient Medications   Medication Sig Dispense Refill   • aspirin 81 MG EC tablet Take 81 mg by mouth Every Night.     • atorvastatin (LIPITOR) 40 MG tablet Take 40 mg by mouth Every Night.     • calcium " carbonate-cholecalciferol 500-400 MG-UNIT tablet tablet Take 1 tablet by mouth Daily.     • cholecalciferol (VITAMIN D3) 1000 units tablet Take 1,000 Units by mouth Daily.     • dexamethasone (DECADRON) 4 MG tablet Take 2 tablets in the morning daily on days 2, 3 & 4.  Take with food. 6 tablet 5   • ezetimibe (ZETIA) 10 MG tablet Take 10 mg by mouth Daily.     • glipiZIDE-metFORMIN (METAGLIP) 2.5-500 MG per tablet Take 1 tablet by mouth 2 (Two) Times a Day Before Meals. 60 tablet 2   • memantine (NAMENDA) 10 MG tablet Take 1 tablet by mouth 2 (Two) Times a Day. Titrate as directed. 60 tablet 6   • nitroglycerin (NITROSTAT) 0.4 MG SL tablet Place 0.4 mg under the tongue Every 5 (Five) Minutes As Needed for chest pain. Take no more than 3 doses in 15 minutes.     • Omega-3 Fatty Acids (FISH OIL) 1200 MG capsule capsule Take 1,200 mg by mouth Daily.     • sertraline (ZOLOFT) 50 MG tablet TAKE ONE TABLET BY MOUTH DAILY 90 tablet 3   • zolpidem (AMBIEN) 5 MG tablet Take 1 tablet by mouth At Night As Needed for Sleep. 30 tablet 1     No current facility-administered medications for this visit.      Repeating lab work today.  He had an acute drop in hemoglobin and kidney function on last blood draw that self resolved.  Continue glipizide and Metformin for now.  Recheck A1c 2 months.  He has an appointment with nephrology to establish care May 3.  Can potentially hold off on this referral if kidney function continues to improve.    Diagnoses and all orders for this visit:    1. Anemia, unspecified type (Primary)  -     CBC & Differential; Future  -     Comprehensive Metabolic Panel; Future  -     PSA Screen; Future    2. Stage 3 chronic kidney disease, unspecified whether stage 3a or 3b CKD (CMS/HCC)  -     CBC & Differential; Future  -     Comprehensive Metabolic Panel; Future  -     PSA Screen; Future    3. Encounter for prostate cancer screening  -     CBC & Differential; Future  -     Comprehensive Metabolic Panel;  Future  -     PSA Screen; Future    4. Type 2 diabetes mellitus with hyperglycemia, without long-term current use of insulin (CMS/[facility])         Return in about 2 months (around 6/21/2021) for a1c.     Please note that portions of this document were completed with a voice recognition program. Efforts were made to edit the dictations, but occasionally words are mis-transcribed.

## 2021-04-22 ENCOUNTER — HOSPITAL ENCOUNTER (OUTPATIENT)
Dept: RADIATION ONCOLOGY | Facility: HOSPITAL | Age: 71
Discharge: HOME | End: 2021-04-22

## 2021-04-22 LAB — PSA SERPL-MCNC: <0.014 NG/ML (ref 0–4)

## 2021-04-22 PROCEDURE — 77336 RADIATION PHYSICS CONSULT: CPT | Performed by: RADIOLOGY

## 2021-04-22 PROCEDURE — 77386: CPT | Performed by: RADIOLOGY

## 2021-04-23 ENCOUNTER — HOSPITAL ENCOUNTER (OUTPATIENT)
Dept: RADIATION ONCOLOGY | Facility: HOSPITAL | Age: 71
Discharge: HOME | End: 2021-04-23

## 2021-04-23 PROCEDURE — 77386: CPT | Performed by: RADIOLOGY

## 2021-04-23 NOTE — PROGRESS NOTES
DATE OF COMPLETION: 4/23/2021  DIAGNOSIS: Small cell lung cancer    REFERRING: Dr. Cartagena        Dear Earl Miranda,Luis ROBERTS completed radiation therapy today.      BACKGROUND: Luis Elliott is a very pleasant 70 y.o. male  with a significant smoking history who was undergoing surveillance CT scans including one on 9/12/2018 that showed no concern for malignancy.  The patient was also hospitalized in December with possible concern for mitral valve issues possibly related to a vegetation versus prolapse only after he was found to have group A strep growing in his blood.  He received antibiotics IV for quite some time during the end of 2018 for this.  He did not have an MRI scan at that time due to the presence of what was felt to be metallic bodies in his orbits which is actually related to fenestrations in his eyes for his glaucoma.  The patient eventually had a repeat screening CT scan on September 17, 2020 that showed a small right lung nodule which had increased in size.  He was sent for a PET scan on 10/14/2020 that showed a right lower lobe nodule which increased about 2.6 cm and was hypermetabolic.  Patient had some hilar and subcarinal lymphadenopathy which was hypermetabolic.  Patient was then taken for a bronchoscopy and EBUS on 11/19/2020 that showed small cell lung cancer and station 7, 11 R, and 10 arm.  Patient sees generally TTF-1 positive and synaptophysin positive.  Patient was sent for restaging MRI scan 12/1/2020 that showed a very small indeterminate right cerebellar lesion which enhances had some weak diffusion restriction but no edema.  Is unclear particular given his history with a possible mitral valve vegetation and possible endocarditis with positive blood cultures what this lesion could potentially represent.  The patient was then started on chemotherapy on 12/14/2020.  He completed external beam radiotherapy to dose of 66 [place] in 33 fractions on 2/23/2021.  The patient was restaged  and found to have no progressive sites of disease.  He was then sent back for PCI given that he had very suspicious brain lesion that resolved during his treatments.  He completed PCI as below.    Treatment Summary     Dates of Therapy: 4/12/2021-4/23/2021  Treatment Site: Whole brain minus hippocampus  Dose: 30 Gy in 10 fractions of 3 Gy each to the area initially involved with a suspected malignancy  The remainder of the brain received 25 Gray in 10 fractions a 2.5 Gy each with hippocampal avoidance  Technique: Helical Colby therapy IMRT with 6X MV photons SIB and a custom head mask for immobilization.    Treatment Course and Tolerance: Mr. Elliott tolerated treatments well.  He continue memantine during his treatments.  His planned course of memantine following his treatments.  The patient did report some fatigue during treatments however he overall tolerated them very well.    The initial follow up visit will be in 1 month.    Luis Elliott knows to call if any problems or concerns develop in the meantime.     Electronically signed by: Jay Jay Urias MD                    Cc: Silvestre Coleman DO

## 2021-05-24 ENCOUNTER — HOSPITAL ENCOUNTER (OUTPATIENT)
Dept: RADIATION ONCOLOGY | Facility: HOSPITAL | Age: 71
Setting detail: RADIATION/ONCOLOGY SERIES
Discharge: HOME | End: 2021-05-24

## 2021-05-24 ENCOUNTER — OFFICE VISIT (OUTPATIENT)
Dept: RADIATION ONCOLOGY | Facility: HOSPITAL | Age: 71
End: 2021-05-24

## 2021-05-24 DIAGNOSIS — C34.31 SMALL CELL LUNG CANCER, RIGHT LOWER LOBE (HCC): Primary | ICD-10-CM

## 2021-05-24 NOTE — PROGRESS NOTES
TELEMEDICINE FOLLOW UP NOTE    PATIENT:                                                      Luis Elliott  MEDICAL RECORD #:                        [id number]  :                                                          1950  COMPLETION DATE:   2021 (RLL); 2021 (PCI)  DIAGNOSIS:     Small cell lung cancer, right lower lobe (CMS/HCC)  - Stage IIIA (cT1b, cN2, cM0)    This visit has been rescheduled as a phone visit to comply with patient safety concerns in accordance with CDC recommendations in light of the COVID-19 pandemic. Total time of discussion was 15 minutes.  Verbal consent given.    COVID-19 RISK SCREEN     1. Has the patient had close contact without PPE with a lab confirmed COVID-19 (+) person or a person under investigation (PUI) for COVID-19 infection?  -- No     2. Has the patient had respiratory symptoms, worsened/new cough and/or SOA, unexplained fever, or sudden loss of smell and/or taste in the past 7 days? --  No    3. Does the patient have baseline higher exposure risk such as working in healthcare field or currently residing in healthcare facility?  --  No         BRIEF HISTORY:  Luis Elliott is a 71 y.o. gentleman found to have an enlarging right lower lobe nodule on screening CT scan of the chest.  He underwent staging PET/CT scan in 2020 that showed the pulmonary nodule to be hypermetabolic and increased in size.  There was some hilar and subcarinal lymphadenopathy which was also hypermetabolic, but otherwise no evidence of distant disease.  He then underwent bronchoscopy and EBUS that confirmed diagnosis of small cell lung cancer.  Staging MRI brain 2020 showed a very small indeterminate enhancing right cerebellar lesion with some weak diffusion restriction but no edema.  It was unclear whether this intracranial abnormality represented metastasis or was related to his history of mitral valve vegetation and possible endocarditis with positive blood cultures.  He  underwent definitive concurrent chemotherapy with radiation using cisplatin etoposide, which he completed in February 2021.  He had a complete response to treatment with no evidence of residual disease in the chest.  Repeat MRI brain showed resolution of the suspicious brain lesion.  He therefore was recommended to undergo prophylactic cranial irradiation.  The area initially involved with a suspected malignancy received 30 Gy in 10 fractions, while the remainder of the brain received 25 Gy in 10 fractions with hippocampal avoidance utilizing IMRT and IGRT technique.  He tolerated treatment well.  Grade 1 fatigue continues to aileen.  He continues to remain active, doing  push-ups daily and riding his motorcycle.  He does report intermittent lightheadedness and dizziness which are improving.  He notes occasional headache in the morning which is chronic and self-limiting.  He otherwise denies new/progressive neurologic complaints including vision change, focal weakness or paresthesias, nausea, vomiting, gait instability, confusion, or seizure.  He does report some forgetfulness following radiotherapy.  He continues on memantine.  He has a hard time falling asleep some nights.  He is no longer on steroids.  He has occasional cough with clear phlegm, and otherwise denies dyspnea, esophagitis, chest pain, fever, chills, weight loss, or other concerns today.  Overall he feels well.        MEDICATIONS: Medication reconciliation for the patient was reviewed and confirmed in the electronic medical record.    Review of Systems   Constitutional: Positive for fatigue.   Respiratory: Positive for cough.    Neurological: Positive for dizziness, headaches and light-headedness.   Psychiatric/Behavioral: Positive for sleep disturbance.   All other systems reviewed and are negative.      KPS 80%    Physical Exam  Pulmonary:      Respirations even, unlabored. No audible wheezing or cough.  Neurological:      A+Ox4, conversant,  answers questions appropriately.  Psychiatric:     Judgement, affect, and decision-making WNL.    Limited physical exam as visit was conducted remotely via telephone in light of the COVID-19 pandemic.            The following portions of the patient's history were reviewed and updated as appropriate: allergies, current medications, past family history, past medical history, past social history, past surgical history and problem list.         Diagnoses and all orders for this visit:    1. Small cell lung cancer, right lower lobe (CMS/HCC) (Primary)  -     MRI Brain With & Without Contrast; Future         IMPRESSION:  Luis Elliott is a 71 y.o. gentleman diagnosed last fall with limited stage small cell lung cancer, status post definitive concurrent chemoradiation in February 2021 with complete response.  Repeat MRI brain 3/9/2021 showed apparent interval resolution of the small right cerebellar enhancing lesion and no evidence of intracranial metastatic disease.  He therefore underwent hippocampal sparing PCI, which he completed 1 month ago.  He tolerated treatment well.  Clinically, he feels well and and denies new or progressive neurologic deficits.  He will continue memantine with taper as previously instructed over the next several months.  He is scheduled for repeat CT scans of the chest/abdomen/pelvis in June for continued surveillance.  He continues routine oncologic surveillance under the care of Dr. Cartagena.  We reviewed NCCN guidelines, as outlined below:    ·oncology follow-up Q 3 months for the first 2 years then Q 6 months during year 3 then annually.    ·survivorship care plan after completion of initial therapy   ·H&P recommended every visit as well as CT chest/abdomen/pelvis.    ·blood work only as clinically indicated  ·MRI brain preferred per NCCN guidelines or CT brain with contrast Q 3-4 months during year 1 then Q 6 months during year 2 regardless of prophylactic cranial irradiation status.  ·new  pulmonary nodule should initiate work-up for potential new primary.          RECOMMENDATIONS:   cT1 cN2 M0 stage IIIA RLL small cell lung cancer  -MRI 12/14/2020 shows very small indeterminate lesion in the right cerebellum  -s/p concurrent chemo + radiotherapy to PET avid disease 66 Gy in 33 fractions, completed 2/23/2021  -Restaging scans 3/9/2021 showed excellent response in the chest and repeat MRI brain without evidence of metastatic disease  -Underwent PCI 25 Gy in 10 fractions with hippocampal sparing as the patient has expected life span greater than 4 months in effort to decrease neurocognitive toxicity  -Continues 6 month Rx for memantine, taper as instructed  -Repeat CT C/A/P scheduled 6/14/2021 per Dr. Cartagena with follow-up  -Will repeat MRI brain in 3-4 months for continued surveillance    Insomnia  -Recommend Melatonin 5-10 mg nightly as needed    COPD  -Continue present regimen     Tobacco abuse  - >8 years without cigarette use  -Encouraged to to continue cessation     Diabetes  -Continue present regimen per primary     Headache, unchanged  -Continue present regimen     Hyperlipidemia  -Continue present regimen     Mitral valve prolapse/vegetation  -Possibly contributing to lesion in right cerebellum formerly identified on MRI brain  -Continue to monitor  -Status post IV antibiotics in 2019     Prostate cancer  -Status post treatment      Return in about 3 months (around 9/7/2021) for Office Visit, Imaging - See orders.    Lukasz Jung, APRN

## 2021-06-10 RX ORDER — GLIPIZIDE AND METFORMIN HCL 2.5; 5 MG/1; MG/1
1 TABLET, FILM COATED ORAL
Qty: 60 TABLET | Refills: 2 | Status: SHIPPED | OUTPATIENT
Start: 2021-06-10 | End: 2021-08-09

## 2021-06-10 NOTE — TELEPHONE ENCOUNTER
Caller: Sabina Elliott    Relationship: Emergency Contact    Best call back number: [phone]    Medication needed:   Requested Prescriptions     Pending Prescriptions Disp Refills   • glipiZIDE-metFORMIN (METAGLIP) 2.5-500 MG per tablet 60 tablet 2     Sig: Take 1 tablet by mouth 2 (Two) Times a Day Before Meals.       When do you need the refill by: 06/15/2021    What additional details did the patient provide when requesting the medication: PATIENTS WIFE STATES THAT THE PATIENT HAS ENOUGH MEDICATION TO LAST UNTIL 06/16/2021 BUT HIS NEXT APPOINTMENT IS NOT UNTIL 06/21/2021    Does the patient have less than a 3 day supply:  [] Yes  [x] No    What is the patient's preferred pharmacy: [facility] [address] [phone] [facility] [phone] [facility]

## 2021-06-11 ENCOUNTER — TELEPHONE (OUTPATIENT)
Dept: GYNECOLOGIC ONCOLOGY | Facility: CLINIC | Age: 71
End: 2021-06-11

## 2021-06-11 DIAGNOSIS — C34.31 SMALL CELL LUNG CANCER, RIGHT LOWER LOBE (HCC): Primary | ICD-10-CM

## 2021-06-11 NOTE — TELEPHONE ENCOUNTER
Caller: MARIO    Relationship to patient: SPOUSE    Best call back number: [phone]    What is the best time to reach you: ANYTIME    Who are you requesting to speak with (clinical staff, provider,  specific staff member): MINNA JEFFERSON     What was the call regarding: PATIENT'S NEPHROLOGIST SUGGESTED NOT TO HAVE THE CONTRAST FOR HIS 06/14 CT OR 06/21 MRI BECAUSE IT MAY DAMAGE HIS KIDNEYS. SHE ALSO STATES HIS CREATININE COULD BE ELEVATED DUE TO THE METFORMIN HE'S TAKING, BUT SHE DOESN'T KNOW THAT FOR SURE. SHE'D LIKE TO DISCUSS THESE ISSUES WITH MINNA    Do you require a callback: YES

## 2021-06-11 NOTE — TELEPHONE ENCOUNTER
Discussed with diana Harris.  She advised to go ahead and change only the ct scans to no contrast since that is what patient/wife want us to do. I did let wife know the scans won't be as good without the contrast but since this is what they want we will change it to no contrast.   Advised her Gaby said the mri would be fine as it is a different type of contrast.  Scans entered.

## 2021-06-14 ENCOUNTER — HOSPITAL ENCOUNTER (OUTPATIENT)
Dept: CT IMAGING | Facility: HOSPITAL | Age: 71
Discharge: HOME | End: 2021-06-14
Admitting: INTERNAL MEDICINE

## 2021-06-14 ENCOUNTER — HOSPITAL ENCOUNTER (OUTPATIENT)
Dept: CT IMAGING | Facility: HOSPITAL | Age: 71
End: 2021-06-14

## 2021-06-14 DIAGNOSIS — C34.31 SMALL CELL LUNG CANCER, RIGHT LOWER LOBE (HCC): ICD-10-CM

## 2021-06-14 PROCEDURE — 71250 CT THORAX DX C-: CPT

## 2021-06-14 PROCEDURE — 74176 CT ABD & PELVIS W/O CONTRAST: CPT

## 2021-06-16 ENCOUNTER — OFFICE VISIT (OUTPATIENT)
Dept: ONCOLOGY | Facility: CLINIC | Age: 71
End: 2021-06-16

## 2021-06-16 VITALS
SYSTOLIC BLOOD PRESSURE: 118 MMHG | HEART RATE: 78 BPM | BODY MASS INDEX: 29.66 KG/M2 | RESPIRATION RATE: 18 BRPM | DIASTOLIC BLOOD PRESSURE: 79 MMHG | WEIGHT: 189 LBS | OXYGEN SATURATION: 95 % | TEMPERATURE: 98.7 F | HEIGHT: 67 IN

## 2021-06-16 DIAGNOSIS — C34.31 SMALL CELL LUNG CANCER, RIGHT LOWER LOBE (HCC): Primary | ICD-10-CM

## 2021-06-16 PROCEDURE — 99214 OFFICE O/P EST MOD 30 MIN: CPT | Performed by: NURSE PRACTITIONER

## 2021-06-16 NOTE — PROGRESS NOTES
DATE OF VISIT: 6/16/2021    REASON FOR VISIT: Followup for limited stage small cell lung cancer     HISTORY OF PRESENT ILLNESS: The patient is a very pleasant 71 y.o. male  with past medical history significant for SCLCA diagnosed November 2020. The patient was started on concurrent chemotherapy with XRT using Cisplatin/ 16 December 2020.  He completed his treatment course with 4 cycles of chemotherapy February 23, 2021.  Repeated scans revealed complete response to treatment.  The patient is here today for scheduled follow up visit.    SUBJECTIVE: The patient is here today with his wife.  He has been doing well since his last visit.  He continues to feel stronger with less fatigue after completing his treatment.  He continues to have some residual dizziness following completion of whole brain radiation.  He reports not having as robust of an appetite, however he is eating well without nausea, vomiting, or weight loss.  His breathing has been stable with no increase in shortness of breath or cough.  He has no difficulty swallowing.  He denies new complaints of pain.  He has had some increase in his blood sugars as well as his creatinine for which she was referred to Dr. Webber with nephrology.  It was recommended that he avoid NSAIDs as well as contrast dye with follow with CT scans.  He has been monitoring his blood sugars at home at least once a day with average morning glucose is around 110.    PAST MEDICAL HISTORY/SOCIAL HISTORY/FAMILY HISTORY: Reviewed by me and unchanged from my documentation done on 06/16/21.    Review of Systems   Constitutional: Positive for appetite change and fatigue. Negative for activity change, chills, fever and unexpected weight change.   HENT: Negative for hearing loss, mouth sores, nosebleeds, sore throat and trouble swallowing.    Eyes: Negative for visual disturbance.   Respiratory: Negative for cough, chest tightness, shortness of breath and wheezing.    Cardiovascular:  Negative for chest pain, palpitations and leg swelling.   Gastrointestinal: Positive for constipation. Negative for abdominal distention, abdominal pain, blood in stool, diarrhea, nausea, rectal pain and vomiting.   Endocrine: Negative for cold intolerance and heat intolerance.   Genitourinary: Negative for difficulty urinating, dysuria, frequency and urgency.   Musculoskeletal: Negative for arthralgias, back pain, gait problem, joint swelling and myalgias.   Skin: Negative for rash.   Neurological: Positive for dizziness and numbness. Negative for tremors, syncope, weakness, light-headedness and headaches.   Hematological: Negative for adenopathy. Does not bruise/bleed easily.   Psychiatric/Behavioral: Negative for confusion, sleep disturbance and suicidal ideas. The patient is not nervous/anxious.          Current Outpatient Medications:   •  aspirin 81 MG EC tablet, Take 81 mg by mouth Every Night., Disp: , Rfl:   •  atorvastatin (LIPITOR) 40 MG tablet, Take 40 mg by mouth Every Night., Disp: , Rfl:   •  calcium carbonate-cholecalciferol 500-400 MG-UNIT tablet tablet, Take 1 tablet by mouth Daily., Disp: , Rfl:   •  cholecalciferol (VITAMIN D3) 1000 units tablet, Take 1,000 Units by mouth Daily., Disp: , Rfl:   •  ezetimibe (ZETIA) 10 MG tablet, Take 10 mg by mouth Daily., Disp: , Rfl:   •  glipiZIDE-metFORMIN (METAGLIP) 2.5-500 MG per tablet, Take 1 tablet by mouth 2 (Two) Times a Day Before Meals., Disp: 60 tablet, Rfl: 2  •  memantine (NAMENDA) 10 MG tablet, Take 1 tablet by mouth 2 (Two) Times a Day. Titrate as directed., Disp: 60 tablet, Rfl: 6  •  nitroglycerin (NITROSTAT) 0.4 MG SL tablet, Place 0.4 mg under the tongue Every 5 (Five) Minutes As Needed for chest pain. Take no more than 3 doses in 15 minutes., Disp: , Rfl:   •  Omega-3 Fatty Acids (FISH OIL) 1200 MG capsule capsule, Take 1,200 mg by mouth Daily., Disp: , Rfl:   •  sertraline (ZOLOFT) 50 MG tablet, TAKE ONE TABLET BY MOUTH DAILY, Disp: 90  "tablet, Rfl: 3  •  zolpidem (AMBIEN) 5 MG tablet, Take 1 tablet by mouth At Night As Needed for Sleep., Disp: 30 tablet, Rfl: 1    PHYSICAL EXAMINATION:   /79   Pulse 78   Temp 98.7 °F (37.1 °C) (Temporal)   Resp 18   Ht 170.2 cm (67.01\")   Wt 85.7 kg (189 lb)   SpO2 95%   BMI 29.59 kg/m²    Pain Score    06/16/21 1101   PainSc: 0-No pain       ECOG Performance Status: 1 - Symptomatic but completely ambulatory  General Appearance:  alert, cooperative, no apparent distress and appears stated age   Neurologic/Psychiatric: A&O x 3, gait steady, appropriate affect, strength 5/5 in all muscle groups   HEENT:  Normocephalic, without obvious abnormality, mucous membranes moist   Neck: Supple, symmetrical, trachea midline, no adenopathy;  No thyromegaly, masses, or tenderness   Lungs:   Clear to auscultation bilaterally; respirations regular, even, and unlabored bilaterally   Heart:  Regular rate and rhythm, no murmurs appreciated   Abdomen:   Soft, non-tender, non-distended and no organomegaly   Lymph nodes: No cervical, supraclavicular, inguinal or axillary adenopathy noted   Extremities: Normal, atraumatic; no clubbing, cyanosis, or edema    Skin: No rashes, ulcers, or suspicious lesions noted     No visits with results within 2 Week(s) from this visit.   Latest known visit with results is:   Lab on 04/21/2021   Component Date Value Ref Range Status   • Glucose 04/21/2021 142* 65 - 99 mg/dL Final   • BUN 04/21/2021 21  8 - 23 mg/dL Final   • Creatinine 04/21/2021 1.47* 0.76 - 1.27 mg/dL Final   • Sodium 04/21/2021 140  136 - 145 mmol/L Final   • Potassium 04/21/2021 4.7  3.5 - 5.2 mmol/L Final   • Chloride 04/21/2021 107  98 - 107 mmol/L Final   • CO2 04/21/2021 22.0  22.0 - 29.0 mmol/L Final   • Calcium 04/21/2021 9.2  8.6 - 10.5 mg/dL Final   • Total Protein 04/21/2021 6.7  6.0 - 8.5 g/dL Final   • Albumin 04/21/2021 4.60  3.50 - 5.20 g/dL Final   • ALT (SGPT) 04/21/2021 33  1 - 41 U/L Final   • AST (SGOT) " 04/21/2021 20  1 - 40 U/L Final   • Alkaline Phosphatase 04/21/2021 97  39 - 117 U/L Final   • Total Bilirubin 04/21/2021 0.6  0.0 - 1.2 mg/dL Final   • eGFR Non African Amer 04/21/2021 47* >60 mL/min/1.73 Final   • Globulin 04/21/2021 2.1  gm/dL Final   • A/G Ratio 04/21/2021 2.2  g/dL Final   • BUN/Creatinine Ratio 04/21/2021 14.3  7.0 - 25.0 Final   • Anion Gap 04/21/2021 11.0  5.0 - 15.0 mmol/L Final   • PSA 04/21/2021 <0.014  0.000 - 4.000 ng/mL Final        CT Chest Without Contrast Diagnostic, CT Abdomen Pelvis Without Contrast    Result Date: 6/14/2021  Narrative: EXAMINATION: CT CHEST WO CONTRAST, DIAGNOSTIC-, CT ABDOMEN AND PELVIS WO CONTRAST-06/14/2021:  INDICATION: F/U scans; C34.31-Malignant neoplasm of lower lobe, right bronchus or lung.  TECHNIQUE: 5 mm unenhanced images through the chest, abdomen and pelvis.  The radiation dose reduction device was turned on for each scan per the ALARA (As Low as Reasonably Achievable) protocol.  COMPARISON: 03/09/2021 chest, abdomen and pelvis CT scan.  FINDINGS: Previous exam reports from 03/09/2021 indicate small residual scarlike area in the right lower lobe with near complete resolution compared to prior study. Resolution of hilar adenopathy. No evidence of metastatic disease.  CHEST CT SCAN WITHOUT CONTRAST: Today's study shows increased, mostly linear scarlike opacity in the right lower lobe, which may represent postirradiation change. Similar and milder changes are seen in the anterior right upper lobe. No well-defined pulmonary parenchymal mass is appreciated. There is mildly increased subpleural thickening associated with the areas of greater scarring. The left lung is clear except for stable subpleural scarring in the anterior left upper lobe. There is no pleural effusion. Mediastinal window images show no evidence of mass or adenopathy and no pericardial effusion. The bony structures appear to be grossly intact. There is moderately advanced mid thoracic  spine degenerative disc disease.       Impression: Increased right lung scarring compared to 06/14/2021 exam whether postinflammatory or posttreatment change. No findings particularly suspicious for recurrent malignancy or other acute disease is seen.  ABDOMEN AND PELVIS CT SCAN WITHOUT CONTRAST: No hepatic masses are identified. The spleen is not enlarged. No significant abnormalities are seen of the pancreas, adrenal glands, or left kidney for a non-IV contrast enhanced exam. Large exophytic right lower pole renal cyst is unchanged. There are multiple small gallstones in the otherwise normal-appearing gallbladder. No upper abdominal free air, ascites, adenopathy, or acute inflammatory change is appreciated. The bowel loops are normal in caliber and normal in appearance. There is a normal-appearing terminal ileum, cecum and appendix in the right mid abdomen.  Regarding the lower abdomen and pelvis, no mass, adenopathy, ascites, or acute inflammatory change is seen. There is a potential left inguinal hernia, currently reduced, but with colon bulging beneath the lax appearing left inguinal ring. The bony structures appear to be intact. Grade 1 anterior subluxation of L4 on L5 is stable. The bladder is normally distended. The prostate has apparently been resected.  IMPRESSION: No evidence of intra-abdominal or intrapelvic metastatic disease or other acute disease. Gallstones again noted. Possible early development of left inguinal hernia.  D:  06/14/2021 E:  06/14/2021  This report was finalized on 6/14/2021 9:04 PM by Dr. Chucho Faith MD.        ASSESSMENT: The patient is a very pleasant 71 y.o. male  with limited stage small cell lung cancer    PROBLEM LIST:   1.  Limited stage small cell lung cancer [id number] stage IIIa:  A.  Presented with abnormal screening CT chest  B.  Diagnosed after bronchoscopy with biopsy done by  November 19, 2020 + for small cell from level 7 level 4R and level 10 R lymph  nodes.  C.  Whole-body PET scan did not reveal any other sites of disease.  D.  Started definitive concurrent chemotherapy with radiation using cisplatin etoposide December 2020 1 status post 4 cycles completed February 23, 2021  2.  Isolated 6 mm right cerebellar lesion:  A.  Evident MRI brain done on December 1, 2020  B.  The patient will receive whole brain radiation after completion of treatment. Completed 4/23/2021.   3.  Chemotherapy-induced nausea  4. Insomnia   5.  Treatment induced anemia  6.  Chronic kidney disease  7.  Type 2 diabetes    PLAN:  1.  I reviewed the CAT scan results from 6/14/2021 with the patient. I reviewed the images myself. I told the patient that he has some mild increased right lung scarring that likely represents post-treatment scarring, with no evidence of new or progressive disease. The CT of abdomen and pelvis did not reveal any distant metastatic sites.   2.  We will continue watchful waiting for now with plan to repeat imaging studies in 3 months that will be due September 14, 2021 and ordered for prior to return.   3. I reviewed the lab results from 6/1/2021.  He had normal white blood cell count with ongoing mild anemia with hemoglobin of 11.8 and thrombocytopenia with platelet count of 113,000.  His creatinine was stable at 1.6 with normal alkaline phosphatase and normal electrolytes. We will also repeat labs on return including CBC and CMP.   4. The patient has completed prophylactic whole brain radiation under the care of Dr. Urias. He will continue follow up with their office with plan to repeat MRI 6/21/2021.  He continues on Namenda 10 mg twice a day for planned 6 months duration following whole brain radiation.  5. He will continue Zoloft 50 mg daily for anxiety and depression. He is also taking Ambien 5 mg at bedtime as needed for sleep.   6.  The patient will continue follow-up with Dr. Webber with nephrology regarding chronic kidney disease.  We discussed that this  is likely secondary to type 2 diabetes, previous frequent NSAID use, as well as cisplatin therapy.  I encouraged him to continue to stay hydrated and maintain good control of his diabetes.  We will order future scans without contrast to help avoid further insult from contrast dye.    Gaby Pandya, APRN  6/16/2021

## 2021-06-21 ENCOUNTER — OFFICE VISIT (OUTPATIENT)
Dept: FAMILY MEDICINE CLINIC | Facility: CLINIC | Age: 71
End: 2021-06-21

## 2021-06-21 ENCOUNTER — HOSPITAL ENCOUNTER (OUTPATIENT)
Dept: RADIATION ONCOLOGY | Facility: HOSPITAL | Age: 71
Setting detail: RADIATION/ONCOLOGY SERIES
Discharge: HOME | End: 2021-06-21

## 2021-06-21 ENCOUNTER — OFFICE VISIT (OUTPATIENT)
Dept: RADIATION ONCOLOGY | Facility: HOSPITAL | Age: 71
End: 2021-06-21

## 2021-06-21 ENCOUNTER — HOSPITAL ENCOUNTER (OUTPATIENT)
Dept: MRI IMAGING | Facility: HOSPITAL | Age: 71
Discharge: HOME | End: 2021-06-21
Admitting: NURSE PRACTITIONER

## 2021-06-21 VITALS
HEART RATE: 83 BPM | OXYGEN SATURATION: 98 % | WEIGHT: 189 LBS | HEIGHT: 67 IN | SYSTOLIC BLOOD PRESSURE: 124 MMHG | DIASTOLIC BLOOD PRESSURE: 66 MMHG | BODY MASS INDEX: 29.66 KG/M2

## 2021-06-21 VITALS
HEIGHT: 67 IN | TEMPERATURE: 98.4 F | OXYGEN SATURATION: 94 % | DIASTOLIC BLOOD PRESSURE: 78 MMHG | SYSTOLIC BLOOD PRESSURE: 126 MMHG | WEIGHT: 181.6 LBS | RESPIRATION RATE: 16 BRPM | BODY MASS INDEX: 28.5 KG/M2 | HEART RATE: 81 BPM

## 2021-06-21 DIAGNOSIS — C34.31 SMALL CELL LUNG CANCER, RIGHT LOWER LOBE (HCC): ICD-10-CM

## 2021-06-21 DIAGNOSIS — Z85.46 HISTORY OF PROSTATE CANCER: Primary | ICD-10-CM

## 2021-06-21 DIAGNOSIS — N18.31 CKD STAGE G3A/A2, GFR 45-59 AND ALBUMIN CREATININE RATIO 30-299 MG/G (HCC): ICD-10-CM

## 2021-06-21 DIAGNOSIS — I10 ESSENTIAL HYPERTENSION: ICD-10-CM

## 2021-06-21 DIAGNOSIS — E11.9 TYPE 2 DIABETES MELLITUS WITHOUT COMPLICATION, WITHOUT LONG-TERM CURRENT USE OF INSULIN (HCC): ICD-10-CM

## 2021-06-21 DIAGNOSIS — E78.5 HYPERLIPIDEMIA, UNSPECIFIED HYPERLIPIDEMIA TYPE: ICD-10-CM

## 2021-06-21 DIAGNOSIS — J44.9 COPD MIXED TYPE (HCC): ICD-10-CM

## 2021-06-21 DIAGNOSIS — C34.31 SMALL CELL LUNG CANCER, RIGHT LOWER LOBE (HCC): Primary | ICD-10-CM

## 2021-06-21 DIAGNOSIS — E11.9 TYPE 2 DIABETES MELLITUS WITHOUT COMPLICATION, WITHOUT LONG-TERM CURRENT USE OF INSULIN (HCC): Primary | ICD-10-CM

## 2021-06-21 DIAGNOSIS — I25.10 CORONARY ARTERY DISEASE INVOLVING NATIVE HEART WITHOUT ANGINA PECTORIS, UNSPECIFIED VESSEL OR LESION TYPE: ICD-10-CM

## 2021-06-21 LAB
CREAT BLDA-MCNC: 2 MG/DL (ref 0.6–1.3)
HBA1C MFR BLD: 6.1 %

## 2021-06-21 PROCEDURE — 83036 HEMOGLOBIN GLYCOSYLATED A1C: CPT | Performed by: INTERNAL MEDICINE

## 2021-06-21 PROCEDURE — 82565 ASSAY OF CREATININE: CPT

## 2021-06-21 PROCEDURE — 0 GADOBENATE DIMEGLUMINE 529 MG/ML SOLUTION: Performed by: NURSE PRACTITIONER

## 2021-06-21 PROCEDURE — 99214 OFFICE O/P EST MOD 30 MIN: CPT | Performed by: INTERNAL MEDICINE

## 2021-06-21 PROCEDURE — A9577 INJ MULTIHANCE: HCPCS | Performed by: NURSE PRACTITIONER

## 2021-06-21 PROCEDURE — 70553 MRI BRAIN STEM W/O & W/DYE: CPT

## 2021-06-21 RX ADMIN — GADOBENATE DIMEGLUMINE 12 ML: 529 INJECTION, SOLUTION INTRAVENOUS at 08:47

## 2021-06-21 NOTE — PROGRESS NOTES
Chief Complaint   Patient presents with   • Diabetes     A1C check       HPI:  Luis Elliott is a 71 y.o. male who presents today for follow-up chronic medical conditions.  Today.  No episodes of CP.  Taking medication as prescribed.    ROS:  Constitutional: no fevers, night sweats or unexplained weight loss  Eyes: no vision changes  ENT: no runny nose, ear pain, sore throat  Cardio: no chest pain, palpitations  Pulm: no shortness of breath, wheezing, or cough  GI: no abdominal pain or changes in bowel movements  : no difficulty urinating  MSK: no difficulty ambulating, no joint pain  Neuro: no weakness, dizziness or headache  Psych: no trouble sleeping  Endo: no change in appetite      Past Medical History:   Diagnosis Date   • Cancer (CMS/[facility])     PROSTATE   • CKD stage G3a/A2, GFR 45-59 and albumin creatinine ratio  mg/g (CMS/HCC) 6/21/2021   • COPD (chronic obstructive pulmonary disease) (CMS/HCC)     dr valente thinks pt has this    • Coronary artery disease    • DDD (degenerative disc disease), cervical    • Depression    • Diabetes mellitus (CMS/[facility])     let  check sugar    • Erectile dysfunction    • Glaucoma    • Headache    • History of radiation therapy 02/23/2021    RUL/mediastinum   • History of radiation therapy 04/23/2021    PCI brain   • Hyperlipidemia    • Hypertension    • Impingement syndrome of left shoulder    • Infection, bacterial     sees ID -  42 days of antibiotics    • Lung cancer (CMS/[facility])    • MI, old     89   • Mitral valve vegetation    • Osteoarthritis    • Prostate cancer (CMS/[facility])     2003   • SOBOE (shortness of breath on exertion)    • Varicose veins of lower limb     RIGHT   • Wears glasses    • Wears partial dentures     bottom       Family History   Problem Relation Age of Onset   • Hypertension Mother    • Atrial fibrillation Mother    • Alcohol abuse Father    • Heart attack Father    • Diabetes Father    • No Known Problems Sister    • Prostate cancer Brother      "  Social History     Socioeconomic History   • Marital status:      Spouse name: Sabina   • Number of children: 2   • Years of education: Not on file   • Highest education level: Not on file   Tobacco Use   • Smoking status: Former Smoker     Packs/day: 1.50     Years: 36.00     Pack years: 54.00     Types: Cigarettes     Quit date: 4/12/2011     Years since quitting: 10.2   • Smokeless tobacco: Former User     Types: Chew     Quit date: 2011   • Tobacco comment: quit smoking for 12 years then started again but then quit a final time    Vaping Use   • Vaping Use: Never used   Substance and Sexual Activity   • Alcohol use: Not Currently   • Drug use: Yes     Types: Marijuana     Comment: a month ago   • Sexual activity: Defer      Allergies   Allergen Reactions   • Xarelto [Rivaroxaban] Other (See Comments)     MADE ME FEEL LIKE \" I WAS HAVING A HEART ATTACK.\"      Immunization History   Administered Date(s) Administered   • FLUAD TRI 65YR+ 09/16/2019   • Flu Vaccine Quad PF >36MO 11/25/2015, 12/12/2016, 10/24/2017   • Fluad Quad 65+ 09/17/2020   • Fluzone High Dose =>65 Years ([employer] ONLY) 11/01/2017, 09/12/2018, 09/16/2019   • Hepatitis A 05/31/2019   • PEDS-Pneumococcal Conjugate (PCV7) 06/03/2015   • Pneumococcal Conjugate 13-Valent (PCV13) 06/03/2015, 10/02/2020   • Pneumococcal Polysaccharide (PPSV23) 06/03/2013   • Shingrix 10/02/2020        PE:  Vitals:    06/21/21 0950   BP: 124/66   Pulse: 83   SpO2: 98%      Body mass index is 29.59 kg/m².    Gen Appearance: NAD  HEENT: Normocephalic, PERRLA, no thyromegaly, trache midline  Heart: RRR, normal S1 and S2, no murmur  Lungs: CTA b/l, no wheezing, no crackles  Abdomen: Soft, non-tender, non-distended, no guarding and BSx4  MSK: Moves all extremities well, normal gait, no peripheral edema  Pulses: Palpable and equal b/l  Lymph nodes: No palpable lymphadenopathy   Neuro: No focal deficits      Current Outpatient Medications   Medication Sig Dispense " Refill   • aspirin 81 MG EC tablet Take 81 mg by mouth Every Night.     • atorvastatin (LIPITOR) 40 MG tablet Take 40 mg by mouth Every Night.     • calcium carbonate-cholecalciferol 500-400 MG-UNIT tablet tablet Take 1 tablet by mouth Daily.     • cholecalciferol (VITAMIN D3) 1000 units tablet Take 1,000 Units by mouth Daily.     • ezetimibe (ZETIA) 10 MG tablet Take 10 mg by mouth Daily.     • glipiZIDE-metFORMIN (METAGLIP) 2.5-500 MG per tablet Take 1 tablet by mouth 2 (Two) Times a Day Before Meals. 60 tablet 2   • memantine (NAMENDA) 10 MG tablet Take 1 tablet by mouth 2 (Two) Times a Day. Titrate as directed. 60 tablet 6   • nitroglycerin (NITROSTAT) 0.4 MG SL tablet Place 0.4 mg under the tongue Every 5 (Five) Minutes As Needed for chest pain. Take no more than 3 doses in 15 minutes.     • Omega-3 Fatty Acids (FISH OIL) 1200 MG capsule capsule Take 1,200 mg by mouth Daily.     • sertraline (ZOLOFT) 50 MG tablet TAKE ONE TABLET BY MOUTH DAILY 90 tablet 3   • zolpidem (AMBIEN) 5 MG tablet Take 1 tablet by mouth At Night As Needed for Sleep. 30 tablet 1     No current facility-administered medications for this visit.        Diagnoses and all orders for this visit:    1. Type 2 diabetes mellitus without complication, without long-term current use of insulin (CMS/[facility]) (Primary)  -     POC Glycosylated Hemoglobin (Hb A1C)  A1c well controlled.  Continue current medication.  2. CKD stage G3a/A2, GFR 45-59 and albumin creatinine ratio  mg/g (CMS/[facility])  Follow-up with nephrology as scheduled.  3. Essential hypertension  Stable.  Continue current medication.  4. Small cell lung cancer, right lower lobe (CMS/[facility])  Follow-up with oncology scheduled.  Other orders  -     SCANNED - LABS         Return in about 3 months (around 9/21/2021) for Medicare Wellness.     Please note that portions of this document were completed with a voice recognition program. Efforts were made to edit the dictations, but occasionally words  are mis-transcribed.

## 2021-06-21 NOTE — PROGRESS NOTES
FOLLOW UP NOTE    PATIENT:                                                      Luis Elliott  MEDICAL RECORD #:                        [id number]  :                                                          1950  COMPLETION DATE:   2021 (RLL); 2021 (PCI)  DIAGNOSIS:     Small cell lung cancer, right lower lobe (CMS/HCC)  - Stage IIIA (cT1b, cN2, cM0)        BRIEF HISTORY:    Luis Elliott is a 71 y.o. gentleman found to have an enlarging right lower lobe nodule on screening CT scan of the chest.  He underwent staging PET/CT scan in 2020 that showed the pulmonary nodule to be hypermetabolic and increased in size.  There was some hilar and subcarinal lymphadenopathy which was also hypermetabolic, but otherwise no evidence of distant disease.  He then underwent bronchoscopy and EBUS that confirmed diagnosis of small cell lung cancer.  Staging MRI brain 2020 showed a very small indeterminate enhancing right cerebellar lesion with some weak diffusion restriction but no edema.  It was unclear whether this intracranial abnormality represented metastasis or was related to his history of mitral valve vegetation and possible endocarditis with positive blood cultures.  He underwent definitive concurrent chemotherapy with radiation using cisplatin etoposide, which he completed in 2021.  He had a complete response to treatment with no evidence of residual disease in the chest.  Repeat MRI brain showed resolution of the suspicious brain lesion.  He therefore was recommended to undergo prophylactic cranial irradiation.  The area initially involved with a suspected malignancy received 30 Gy in 10 fractions, while the remainder of the brain received 25 Gy in 10 fractions with hippocampal avoidance utilizing IMRT and IGRT technique.  He tolerated treatment well.  The patient recently had restaging scans and returns today to discuss those.  Patient reports that he has some fatigue and lingering  "tiredness.  The patient reports that his cognition is good and he continues to take memantine.  He does feel like he leans to the left sometimes when he walks.  His wife feels that he does not have his usual energy back.    MEDICATIONS: Medication reconciliation for the patient was reviewed and confirmed in the electronic medical record.    Review of Systems:   Review of Systems   Constitutional: Positive for appetite change.   Neurological: Positive for dizziness and headaches.       KPS 80%    Physical Exam:   Physical Exam  Constitutional:       General: He is not in acute distress.     Appearance: He is well-developed.   HENT:      Head: Normocephalic and atraumatic.      Mouth/Throat:      Mouth: Mucous membranes are moist.   Eyes:      Conjunctiva/sclera: Conjunctivae normal.      Pupils: Pupils are equal, round, and reactive to light.   Neck:      Trachea: No tracheal deviation.   Cardiovascular:      Comments: Well-perfused  Noncyanotic sign no pedal edema  Pulmonary:      Effort: Pulmonary effort is normal. No respiratory distress.      Breath sounds: No wheezing.   Abdominal:      General: There is no distension.      Palpations: Abdomen is soft.   Musculoskeletal:         General: Normal range of motion.      Cervical back: Normal range of motion.   Skin:     General: Skin is warm and dry.   Neurological:      General: No focal deficit present.      Mental Status: He is alert.      Cranial Nerves: No cranial nerve deficit.      Coordination: Coordination normal.      Comments: Fatigue   Psychiatric:         Mood and Affect: Mood normal.         Behavior: Behavior normal.         Thought Content: Thought content normal.         Judgment: Judgment normal.         VITAL SIGNS:   Vitals:    06/21/21 1112   BP: 126/78   Pulse: 81   Resp: 16   Temp: 98.4 °F (36.9 °C)   SpO2: 94%  Comment: RA   Weight: 82.4 kg (181 lb 9.6 oz)   Height: 170.2 cm (67\")   PainSc: 0-No pain                   The following portions " of the patient's history were reviewed and updated as appropriate: allergies, current medications, past family history, past medical history, past social history, past surgical history and problem list.  I have personally requested reviewed and interpreted the patient's images and radiology reports and pathology reports listed below:     EXAMINATION: CT CHEST WO CONTRAST, DIAGNOSTIC-, CT ABDOMEN AND PELVIS WO  CONTRAST-06/14/2021:      INDICATION: F/U scans; C34.31-Malignant neoplasm of lower lobe, right  bronchus or lung.     TECHNIQUE: 5 mm unenhanced images through the chest, abdomen and pelvis.     The radiation dose reduction device was turned on for each scan per the  ALARA (As Low as Reasonably Achievable) protocol.     COMPARISON: 03/09/2021 chest, abdomen and pelvis CT scan.     FINDINGS: Previous exam reports from 03/09/2021 indicate small residual  scarlike area in the right lower lobe with near complete resolution  compared to prior study. Resolution of hilar adenopathy. No evidence of  metastatic disease.     CHEST CT SCAN WITHOUT CONTRAST: Today's study shows increased, mostly  linear scarlike opacity in the right lower lobe, which may represent  postirradiation change. Similar and milder changes are seen in the  anterior right upper lobe. No well-defined pulmonary parenchymal mass is  appreciated. There is mildly increased subpleural thickening associated  with the areas of greater scarring. The left lung is clear except for  stable subpleural scarring in the anterior left upper lobe. There is no  pleural effusion. Mediastinal window images show no evidence of mass or  adenopathy and no pericardial effusion. The bony structures appear to be  grossly intact. There is moderately advanced mid thoracic spine  degenerative disc disease.        IMPRESSION:  Increased right lung scarring compared to 06/14/2021 exam  whether postinflammatory or posttreatment change. No findings  particularly suspicious for  recurrent malignancy or other acute disease  is seen.     ABDOMEN AND PELVIS CT SCAN WITHOUT CONTRAST: No hepatic masses are  identified. The spleen is not enlarged. No significant abnormalities are  seen of the pancreas, adrenal glands, or left kidney for a non-IV  contrast enhanced exam. Large exophytic right lower pole renal cyst is  unchanged. There are multiple small gallstones in the otherwise  normal-appearing gallbladder. No upper abdominal free air, ascites,  adenopathy, or acute inflammatory change is appreciated. The bowel loops  are normal in caliber and normal in appearance. There is a  normal-appearing terminal ileum, cecum and appendix in the right mid  abdomen.     Regarding the lower abdomen and pelvis, no mass, adenopathy, ascites, or  acute inflammatory change is seen. There is a potential left inguinal  hernia, currently reduced, but with colon bulging beneath the lax  appearing left inguinal ring. The bony structures appear to be intact.  Grade 1 anterior subluxation of L4 on L5 is stable. The bladder is  normally distended. The prostate has apparently been resected.     IMPRESSION: No evidence of intra-abdominal or intrapelvic metastatic  disease or other acute disease. Gallstones again noted. Possible early  development of left inguinal hernia.     D:  06/14/2021  E:  06/14/2021     This report was finalized on 6/14/2021 9:04 PM by Dr. Chucho Faith MD.   EXAMINATION: CT CHEST WO CONTRAST, DIAGNOSTIC-, CT ABDOMEN AND PELVIS WO  CONTRAST-06/14/2021:      INDICATION: F/U scans; C34.31-Malignant neoplasm of lower lobe, right  bronchus or lung.     TECHNIQUE: 5 mm unenhanced images through the chest, abdomen and pelvis.     The radiation dose reduction device was turned on for each scan per the  ALARA (As Low as Reasonably Achievable) protocol.     COMPARISON: 03/09/2021 chest, abdomen and pelvis CT scan.     FINDINGS: Previous exam reports from 03/09/2021 indicate small residual  scarlike area in  the right lower lobe with near complete resolution  compared to prior study. Resolution of hilar adenopathy. No evidence of  metastatic disease.     CHEST CT SCAN WITHOUT CONTRAST: Today's study shows increased, mostly  linear scarlike opacity in the right lower lobe, which may represent  postirradiation change. Similar and milder changes are seen in the  anterior right upper lobe. No well-defined pulmonary parenchymal mass is  appreciated. There is mildly increased subpleural thickening associated  with the areas of greater scarring. The left lung is clear except for  stable subpleural scarring in the anterior left upper lobe. There is no  pleural effusion. Mediastinal window images show no evidence of mass or  adenopathy and no pericardial effusion. The bony structures appear to be  grossly intact. There is moderately advanced mid thoracic spine  degenerative disc disease.        IMPRESSION:  Increased right lung scarring compared to 06/14/2021 exam  whether postinflammatory or posttreatment change. No findings  particularly suspicious for recurrent malignancy or other acute disease  is seen.     ABDOMEN AND PELVIS CT SCAN WITHOUT CONTRAST: No hepatic masses are  identified. The spleen is not enlarged. No significant abnormalities are  seen of the pancreas, adrenal glands, or left kidney for a non-IV  contrast enhanced exam. Large exophytic right lower pole renal cyst is  unchanged. There are multiple small gallstones in the otherwise  normal-appearing gallbladder. No upper abdominal free air, ascites,  adenopathy, or acute inflammatory change is appreciated. The bowel loops  are normal in caliber and normal in appearance. There is a  normal-appearing terminal ileum, cecum and appendix in the right mid  abdomen.     Regarding the lower abdomen and pelvis, no mass, adenopathy, ascites, or  acute inflammatory change is seen. There is a potential left inguinal  hernia, currently reduced, but with colon bulging  beneath the lax  appearing left inguinal ring. The bony structures appear to be intact.  Grade 1 anterior subluxation of L4 on L5 is stable. The bladder is  normally distended. The prostate has apparently been resected.     IMPRESSION: No evidence of intra-abdominal or intrapelvic metastatic  disease or other acute disease. Gallstones again noted. Possible early  development of left inguinal hernia.     D:  06/14/2021  E:  06/14/2021     This report was finalized on 6/14/2021 9:04 PM by Dr. Chucho Faith MD.    EXAMINATION: MRI BRAIN W WO CONTRAST- 06/21/2021     INDICATION: SCLC s/p chemoXRT 2/2021, s/p PCI 4/2021; C34.31-Malignant  neoplasm of lower lobe, right bronchus or lung      TECHNIQUE: Multiplanar MRI of the brain with and without intravenous  contrast administration     COMPARISON: MRI brain dated 03/09/2021     FINDINGS: No restriction on diffusion-weighted sequences to suggest  acute ischemia. Midline structures are symmetric without midline shift  or hydrocephalus. Moderate amount increase in supratentorial white  matter chronic small vessel ischemic disease. No abnormal enhancement of  the parenchyma. Specifically no soft tissue or mass occupying focus of  abnormal enhancement. Globes and orbits unremarkable. Paranasal sinuses  and mastoid air cells are grossly clear and well pneumatized. Pituitary  and sella within normal limits. Cervicomedullary junction widely patent.     IMPRESSION:  Stable appearance resolved abnormal cerebellar enhancement  as noted on prior comparison 03/09/2021 without development of new  lesion. No acute pathology otherwise.     D:  06/21/2021  E:  06/21/2021  IMPRESSION:      Luis Elliott is a 71 y.o. gentleman diagnosed last fall with limited stage small cell lung cancer, status post definitive concurrent chemoradiation in February 2021 with complete response.  Repeat MRI brain 3/9/2021 showed apparent interval resolution of the small right cerebellar enhancing lesion and no  evidence of intracranial metastatic disease.  He therefore underwent hippocampal sparing PCI, which he completed 1 month ago.  He tolerated treatment well.  Clinically, he feels well and and denies new or progressive neurologic deficits.  He will continue memantine with taper as previously instructed over the next several months until around September.  The patient will need to continue restaging scans for his brain metastasis and thoracic small cell lung cancer.  But is concerned about his overall contrast load given his CKD 2.    RECOMMENDATIONS:    cT1 cN2 M0 stage IIIA RLL small cell lung cancer  -MRI 12/14/2020 shows very small indeterminate lesion in the right cerebellum  -s/p concurrent chemo + radiotherapy to PET avid disease 66 Gy in 33 fractions, completed 2/23/2021  -Restaging scans middle of June 2021 showed excellent response in the chest and repeat MRI brain without evidence of metastatic disease  -Underwent PCI 25 Gy in 10 fractions with hippocampal sparing as the patient has expected life span greater than 4 months in effort to decrease neurocognitive toxicity  -Continues memantine  -Repeat CT C/A/P scheduled 6/14/2021 per Dr. Cartagena with follow-up  -Will repeat MRI brain in 4 months for continued surveillance and recommend no CyberKnife protocol          COPD  -Continue present regimen     Diabetes  -Continue present regimen per primary  -Being managed to lower HbA1c    CKD 3  -GFR mid 50s  -Follows with Dr. Webber  -Recommend minimizing dye load     Headache, unchanged  -Continue present regimen  -No obvious parenchymal disease in the brain     Hyperlipidemia  -Continue present regimen     Mitral valve prolapse/vegetation  -Possibly contributing to lesion in right cerebellum formerly identified on MRI brain  -Continue to monitor  -Status post IV antibiotics in 2019     Prostate cancer  -Status post treatment           Jay Jay Urias MD    Errors in dictation may reflect use of voice recognition  software and not all errors in transcription may have been detected prior to signing.

## 2021-06-23 DIAGNOSIS — Z85.46 HISTORY OF PROSTATE CANCER: Primary | ICD-10-CM

## 2021-08-09 ENCOUNTER — TELEPHONE (OUTPATIENT)
Dept: FAMILY MEDICINE CLINIC | Facility: CLINIC | Age: 71
End: 2021-08-09

## 2021-08-09 DIAGNOSIS — E11.9 TYPE 2 DIABETES MELLITUS WITHOUT COMPLICATION, WITHOUT LONG-TERM CURRENT USE OF INSULIN (HCC): Primary | ICD-10-CM

## 2021-08-09 NOTE — TELEPHONE ENCOUNTER
Caller: Mario Elliott    Relationship: Emergency Contact    Best call back number: [phone]     What was the call regarding: PATIENT'S WIFE MARIO CALLED STATING THAT THE PATIENT'S BLOOD SUGAR IS LOW.  MARIO WOULD LIKE TO KNOW WHAT SHE SHOULD DO TO BRING HIS LEVEL TO NORMAL.     Do you require a callback: YES

## 2021-08-09 NOTE — TELEPHONE ENCOUNTER
It's likely the glipizide causing hypoglycemic events, sent in new script to pharmacy. Start with BID metformin (without glipizide) and can reduce to once daily if still symptomatic and having low sugars.

## 2021-08-09 NOTE — TELEPHONE ENCOUNTER
Called back and number when dead.  BS - 6.21.21 was seen last time here.  Stopped doing am blood sticks unless feeling sick then can start up again.    All chemo and radiation treatment are complete.  Blood work last Monday was 97 following oatmeal.    Some days he does not feel good so today he did finger stick 245pm 86. Has had 1 meal.  He took 1 med today.    He usually does 2 pills today. I encouraged her to have him test bid for now and do them randomly.      She would like a call back once you review this note.

## 2021-08-11 ENCOUNTER — OFFICE VISIT (OUTPATIENT)
Dept: PULMONOLOGY | Facility: CLINIC | Age: 71
End: 2021-08-11

## 2021-08-11 VITALS
SYSTOLIC BLOOD PRESSURE: 118 MMHG | DIASTOLIC BLOOD PRESSURE: 80 MMHG | OXYGEN SATURATION: 95 % | HEART RATE: 77 BPM | WEIGHT: 181 LBS | BODY MASS INDEX: 28.41 KG/M2 | TEMPERATURE: 97.1 F | HEIGHT: 67 IN

## 2021-08-11 DIAGNOSIS — Z87.891 FORMER SMOKER: ICD-10-CM

## 2021-08-11 DIAGNOSIS — Z86.79: Chronic | ICD-10-CM

## 2021-08-11 DIAGNOSIS — C34.31 SMALL CELL LUNG CANCER, RIGHT LOWER LOBE (HCC): Primary | ICD-10-CM

## 2021-08-11 DIAGNOSIS — J44.9 COPD MIXED TYPE (HCC): ICD-10-CM

## 2021-08-11 DIAGNOSIS — N18.31 CKD STAGE G3A/A2, GFR 45-59 AND ALBUMIN CREATININE RATIO 30-299 MG/G (HCC): ICD-10-CM

## 2021-08-11 PROCEDURE — 99214 OFFICE O/P EST MOD 30 MIN: CPT | Performed by: INTERNAL MEDICINE

## 2021-08-11 NOTE — PROGRESS NOTES
"  PULMONARY  NOTE    Chief Complaint     Small cell lung cancer extensive stage, COPD, former smoker, mitral valve disease    History of Present Illness     71-year-old male returns today for follow-up  I last saw him on 2/10/2021    He has extensive stage small cell carcinoma diagnosed on bronchoscopy  He had cerebral metastasis and has been on systemic therapy  He continues to follow with radiation oncology, medical oncology and has restaging scans scheduled for later on this fall    He has had issues with chronic renal insufficiency and most recent creatinine was 1.2 which is down a little bit from 1.4 but greater than his baseline of less than 1.    His appetite's been stable  He is had no exacerbation of respiratory symptoms    Patient Active Problem List   Diagnosis   • Type 2 diabetes mellitus without complication (CMS/HCC)   • Hyperlipidemia   • Essential hypertension   • Erectile dysfunction   • History of prostate cancer   • Osteoarthritis of multiple joints   • Coronary artery disease involving native heart without angina pectoris   • Colon polyps   • Glaucoma   • Low back pain   • Streptococcal bacteremia   • H/O subacute bacterial endocarditis   • Pulmonary nodule (2.6cm RLL)   • Severe mitral regurgitation   • Former smoker (Stopped 2012)   • COPD   • Small cell lung cancer, right lower lobe (CMS/HCC)   • CKD stage G3a/A2, GFR 45-59 and albumin creatinine ratio  mg/g (CMS/HCC)     Allergies   Allergen Reactions   • Xarelto [Rivaroxaban] Other (See Comments)     MADE ME FEEL LIKE \" I WAS HAVING A HEART ATTACK.\"       Current Outpatient Medications:   •  aspirin 81 MG EC tablet, Take 81 mg by mouth Every Night., Disp: , Rfl:   •  atorvastatin (LIPITOR) 40 MG tablet, Take 40 mg by mouth Every Night., Disp: , Rfl:   •  calcium carbonate-cholecalciferol 500-400 MG-UNIT tablet tablet, Take 1 tablet by mouth Daily., Disp: , Rfl:   •  cholecalciferol (VITAMIN D3) 1000 units tablet, Take 1,000 Units by " mouth Daily., Disp: , Rfl:   •  ezetimibe (ZETIA) 10 MG tablet, Take 10 mg by mouth Daily., Disp: , Rfl:   •  memantine (NAMENDA) 10 MG tablet, Take 1 tablet by mouth 2 (Two) Times a Day. Titrate as directed., Disp: 60 tablet, Rfl: 6  •  metFORMIN (Glucophage) 500 MG tablet, Take 1 tablet by mouth 2 (Two) Times a Day With Meals., Disp: 60 tablet, Rfl: 5  •  nitroglycerin (NITROSTAT) 0.4 MG SL tablet, Place 0.4 mg under the tongue Every 5 (Five) Minutes As Needed for chest pain. Take no more than 3 doses in 15 minutes., Disp: , Rfl:   •  Omega-3 Fatty Acids (FISH OIL) 1200 MG capsule capsule, Take 1,200 mg by mouth Daily., Disp: , Rfl:   •  sertraline (ZOLOFT) 50 MG tablet, TAKE ONE TABLET BY MOUTH DAILY, Disp: 90 tablet, Rfl: 3  •  zolpidem (AMBIEN) 5 MG tablet, Take 1 tablet by mouth At Night As Needed for Sleep., Disp: 30 tablet, Rfl: 1  MEDICATION LIST AND ALLERGIES REVIEWED.    Family History   Problem Relation Age of Onset   • Hypertension Mother    • Atrial fibrillation Mother    • Alcohol abuse Father    • Heart attack Father    • Diabetes Father    • No Known Problems Sister    • Prostate cancer Brother      Social History     Tobacco Use   • Smoking status: Former Smoker     Packs/day: 1.50     Years: 36.00     Pack years: 54.00     Types: Cigarettes     Quit date: 4/12/2011     Years since quitting: 10.3   • Smokeless tobacco: Former User     Types: Chew     Quit date: 2011   • Tobacco comment: quit smoking for 12 years then started again but then quit a final time    Vaping Use   • Vaping Use: Never used   Substance Use Topics   • Alcohol use: Not Currently   • Drug use: Yes     Types: Marijuana     Comment: a month ago     Social History     Social History Narrative    9/18:     to Sabina x 35yrs    2 children from previous marriage    2 gk.    Retired - / [employer] -[employer] exposure    Exercise:10acre farm - vegetables.    Dental: utd    Eye: utd    Previously smoked up to 2  "packs of cigarettes per day, starting at age 16.    Stop smoking for good in April 2012    No history of IVDU     FAMILY AND SOCIAL HISTORY REVIEWED.    Review of Systems  ALSO REFER TO SCANNED ROS SHEET FROM SAME DATE.    /80   Pulse 77   Temp 97.1 °F (36.2 °C)   Ht 170.2 cm (67\")   Wt 82.1 kg (181 lb)   SpO2 95% Comment: resting at room air  BMI 28.35 kg/m²   Physical Exam  Vitals and nursing note reviewed.   Constitutional:       General: He is not in acute distress.     Appearance: He is well-developed. He is not diaphoretic.   HENT:      Head: Normocephalic and atraumatic.   Neck:      Thyroid: No thyromegaly.   Cardiovascular:      Rate and Rhythm: Normal rate and regular rhythm.      Heart sounds: Normal heart sounds. No murmur heard.     Pulmonary:      Effort: Pulmonary effort is normal.      Breath sounds: Normal breath sounds. No stridor.   Abdominal:      General: Bowel sounds are normal.      Palpations: Abdomen is soft.   Musculoskeletal:         General: Normal range of motion.   Lymphadenopathy:      Cervical: No cervical adenopathy.      Upper Body:      Right upper body: No supraclavicular or epitrochlear adenopathy.      Left upper body: No supraclavicular or epitrochlear adenopathy.   Skin:     General: Skin is warm and dry.   Neurological:      Mental Status: He is alert and oriented to person, place, and time.   Psychiatric:         Behavior: Behavior normal.         Results     CT scan of chest from 6/14/2021 revealed some fibrotic changes in the right lower lobe otherwise no suspicious intrathoracic lesions    Immunization History   Administered Date(s) Administered   • COVID-19 (MODERNA) 04/28/2021, 05/26/2021   • FLUAD TRI 65YR+ 09/16/2019   • Flu Vaccine Quad PF >36MO 11/25/2015, 12/12/2016, 10/24/2017   • Fluad Quad 65+ 09/17/2020   • Fluzone High Dose =>65 Years ([facility] ONLY) 11/01/2017, 09/12/2018, 09/16/2019   • Hepatitis A 05/31/2019   • PEDS-Pneumococcal Conjugate (PCV7) " 06/03/2015   • Pneumococcal Conjugate 13-Valent (PCV13) 06/03/2015, 10/02/2020   • Pneumococcal Polysaccharide (PPSV23) 06/03/2013   • Shingrix 10/02/2020     Problem List       ICD-10-CM ICD-9-CM   1. Small cell lung cancer, right lower lobe (CMS/[facility])  C34.31 162.5   2. H/O subacute bacterial endocarditis  Z86.79 V12.59   3. Former smoker (Stopped 2012)  Z87.891 V15.82   4. COPD  J44.9 496   5. CKD stage G3a/A2, GFR 45-59 and albumin creatinine ratio  mg/g (CMS/[facility])  N18.31 585.3       Discussion     He appears to be doing fairly well from his advanced stage lung cancer.  He continues to have issues with chronic kidney disease and dizziness but overall is tolerating his therapy fairly well.    He has a follow-up visit with reimaging later on this month  He has an MRI of the brain scheduled for October for restaging.    He will have albuterol to use on an as-needed basis for cough or congestion, dyspnea or wheezing    I will plan to see him back in 6 months or earlier if there are any problems in the meantime    Moderate level of Medical Decision Making complexity based on 2 or more chronic stable illnesses and prescription drug management.    Clint Thompson MD  Note electronically signed    CC: Silvestre Coleman, DO

## 2021-08-30 ENCOUNTER — TELEPHONE (OUTPATIENT)
Dept: RADIATION ONCOLOGY | Facility: HOSPITAL | Age: 71
End: 2021-08-30

## 2021-08-30 NOTE — TELEPHONE ENCOUNTER
Pt called stating nematicide makes him dizzy and very tired requesting to stop medication, per Dr. Urias pt can discontinue medication

## 2021-09-07 ENCOUNTER — LAB (OUTPATIENT)
Dept: LAB | Facility: HOSPITAL | Age: 71
End: 2021-09-07

## 2021-09-07 ENCOUNTER — HOSPITAL ENCOUNTER (OUTPATIENT)
Dept: CT IMAGING | Facility: HOSPITAL | Age: 71
Discharge: HOME | End: 2021-09-07

## 2021-09-07 DIAGNOSIS — C34.31 SMALL CELL LUNG CANCER, RIGHT LOWER LOBE (HCC): ICD-10-CM

## 2021-09-07 LAB
ALBUMIN SERPL-MCNC: 4.5 G/DL (ref 3.5–5.2)
ALBUMIN/GLOB SERPL: 1.7 G/DL
ALP SERPL-CCNC: 105 U/L (ref 39–117)
ALT SERPL W P-5'-P-CCNC: 54 U/L (ref 1–41)
ANION GAP SERPL CALCULATED.3IONS-SCNC: 11 MMOL/L (ref 5–15)
AST SERPL-CCNC: 27 U/L (ref 1–40)
BASOPHILS # BLD AUTO: 0.03 10*3/MM3 (ref 0–0.2)
BASOPHILS NFR BLD AUTO: 0.5 % (ref 0–1.5)
BILIRUB SERPL-MCNC: 0.7 MG/DL (ref 0–1.2)
BUN SERPL-MCNC: 21 MG/DL (ref 8–23)
BUN/CREAT SERPL: 14.4 (ref 7–25)
CALCIUM SPEC-SCNC: 9.7 MG/DL (ref 8.6–10.5)
CHLORIDE SERPL-SCNC: 107 MMOL/L (ref 98–107)
CO2 SERPL-SCNC: 24 MMOL/L (ref 22–29)
CREAT SERPL-MCNC: 1.46 MG/DL (ref 0.76–1.27)
DEPRECATED RDW RBC AUTO: 48 FL (ref 37–54)
EOSINOPHIL # BLD AUTO: 0.2 10*3/MM3 (ref 0–0.4)
EOSINOPHIL NFR BLD AUTO: 3.3 % (ref 0.3–6.2)
ERYTHROCYTE [DISTWIDTH] IN BLOOD BY AUTOMATED COUNT: 13.6 % (ref 12.3–15.4)
GFR SERPL CREATININE-BSD FRML MDRD: 48 ML/MIN/1.73
GLOBULIN UR ELPH-MCNC: 2.6 GM/DL
GLUCOSE SERPL-MCNC: 145 MG/DL (ref 65–99)
HCT VFR BLD AUTO: 37.6 % (ref 37.5–51)
HGB BLD-MCNC: 12.1 G/DL (ref 13–17.7)
IMM GRANULOCYTES # BLD AUTO: 0.02 10*3/MM3 (ref 0–0.05)
IMM GRANULOCYTES NFR BLD AUTO: 0.3 % (ref 0–0.5)
LYMPHOCYTES # BLD AUTO: 0.8 10*3/MM3 (ref 0.7–3.1)
LYMPHOCYTES NFR BLD AUTO: 13.1 % (ref 19.6–45.3)
MCH RBC QN AUTO: 30.9 PG (ref 26.6–33)
MCHC RBC AUTO-ENTMCNC: 32.2 G/DL (ref 31.5–35.7)
MCV RBC AUTO: 96.2 FL (ref 79–97)
MONOCYTES # BLD AUTO: 0.53 10*3/MM3 (ref 0.1–0.9)
MONOCYTES NFR BLD AUTO: 8.7 % (ref 5–12)
NEUTROPHILS NFR BLD AUTO: 4.54 10*3/MM3 (ref 1.7–7)
NEUTROPHILS NFR BLD AUTO: 74.1 % (ref 42.7–76)
NRBC BLD AUTO-RTO: 0 /100 WBC (ref 0–0.2)
PLATELET # BLD AUTO: 129 10*3/MM3 (ref 140–450)
PMV BLD AUTO: 11.6 FL (ref 6–12)
POTASSIUM SERPL-SCNC: 4.3 MMOL/L (ref 3.5–5.2)
PROT SERPL-MCNC: 7.1 G/DL (ref 6–8.5)
RBC # BLD AUTO: 3.91 10*6/MM3 (ref 4.14–5.8)
SODIUM SERPL-SCNC: 142 MMOL/L (ref 136–145)
WBC # BLD AUTO: 6.12 10*3/MM3 (ref 3.4–10.8)

## 2021-09-07 PROCEDURE — 85025 COMPLETE CBC W/AUTO DIFF WBC: CPT

## 2021-09-07 PROCEDURE — 71250 CT THORAX DX C-: CPT

## 2021-09-07 PROCEDURE — 74176 CT ABD & PELVIS W/O CONTRAST: CPT

## 2021-09-07 PROCEDURE — 36415 COLL VENOUS BLD VENIPUNCTURE: CPT

## 2021-09-07 PROCEDURE — 80053 COMPREHEN METABOLIC PANEL: CPT

## 2021-09-13 ENCOUNTER — OFFICE VISIT (OUTPATIENT)
Dept: ONCOLOGY | Facility: CLINIC | Age: 71
End: 2021-09-13

## 2021-09-13 VITALS
BODY MASS INDEX: 27.62 KG/M2 | SYSTOLIC BLOOD PRESSURE: 118 MMHG | WEIGHT: 176 LBS | HEIGHT: 67 IN | OXYGEN SATURATION: 98 % | TEMPERATURE: 96.2 F | RESPIRATION RATE: 16 BRPM | DIASTOLIC BLOOD PRESSURE: 74 MMHG | HEART RATE: 71 BPM

## 2021-09-13 DIAGNOSIS — C34.31 SMALL CELL LUNG CANCER, RIGHT LOWER LOBE (HCC): Primary | ICD-10-CM

## 2021-09-13 PROCEDURE — 99214 OFFICE O/P EST MOD 30 MIN: CPT | Performed by: INTERNAL MEDICINE

## 2021-09-13 NOTE — PROGRESS NOTES
DATE OF VISIT: 9/13/2021    REASON FOR VISIT: Followup for limited stage small cell lung cancer     HISTORY OF PRESENT ILLNESS: The patient is a very pleasant 71 y.o. male  with past medical history significant for SCLCA diagnosed November 2020. The patient was started on concurrent chemotherapy with XRT using Cisplatin/ 16 December 2020.  He completed his treatment course with 4 cycles of chemotherapy February 23, 2021.  Repeated scans revealed complete response to treatment.  The patient is here today for scheduled follow up visit.    SUBJECTIVE: The patient is here today with his wife.  He is complaining of mild dizziness when he change position no headaches he is asked about the scan results.    PAST MEDICAL HISTORY/SOCIAL HISTORY/FAMILY HISTORY: Reviewed by me and unchanged from my documentation done on 09/13/21.    Review of Systems   Constitutional: Positive for appetite change and fatigue. Negative for activity change, chills, fever and unexpected weight change.   HENT: Negative for hearing loss, mouth sores, nosebleeds, sore throat and trouble swallowing.    Eyes: Negative for visual disturbance.   Respiratory: Negative for cough, chest tightness, shortness of breath and wheezing.    Cardiovascular: Negative for chest pain, palpitations and leg swelling.   Gastrointestinal: Positive for constipation. Negative for abdominal distention, abdominal pain, blood in stool, diarrhea, nausea, rectal pain and vomiting.   Endocrine: Negative for cold intolerance and heat intolerance.   Genitourinary: Negative for difficulty urinating, dysuria, frequency and urgency.   Musculoskeletal: Negative for arthralgias, back pain, gait problem, joint swelling and myalgias.   Skin: Negative for rash.   Neurological: Positive for dizziness and numbness. Negative for tremors, syncope, weakness, light-headedness and headaches.   Hematological: Negative for adenopathy. Does not bruise/bleed easily.   Psychiatric/Behavioral:  "Negative for confusion, sleep disturbance and suicidal ideas. The patient is not nervous/anxious.          Current Outpatient Medications:   •  aspirin 81 MG EC tablet, Take 81 mg by mouth Every Night., Disp: , Rfl:   •  atorvastatin (LIPITOR) 40 MG tablet, Take 40 mg by mouth Every Night., Disp: , Rfl:   •  calcium carbonate-cholecalciferol 500-400 MG-UNIT tablet tablet, Take 1 tablet by mouth Daily., Disp: , Rfl:   •  cholecalciferol (VITAMIN D3) 1000 units tablet, Take 1,000 Units by mouth Daily., Disp: , Rfl:   •  ezetimibe (ZETIA) 10 MG tablet, Take 10 mg by mouth Daily., Disp: , Rfl:   •  metFORMIN (Glucophage) 500 MG tablet, Take 1 tablet by mouth 2 (Two) Times a Day With Meals., Disp: 60 tablet, Rfl: 5  •  nitroglycerin (NITROSTAT) 0.4 MG SL tablet, Place 0.4 mg under the tongue Every 5 (Five) Minutes As Needed for chest pain. Take no more than 3 doses in 15 minutes., Disp: , Rfl:   •  Omega-3 Fatty Acids (FISH OIL) 1200 MG capsule capsule, Take 1,200 mg by mouth Daily., Disp: , Rfl:   •  sertraline (ZOLOFT) 50 MG tablet, TAKE ONE TABLET BY MOUTH DAILY, Disp: 90 tablet, Rfl: 3    PHYSICAL EXAMINATION:   /74   Pulse 71   Temp 96.2 °F (35.7 °C) (Temporal)   Resp 16   Ht 170.2 cm (67.01\")   Wt 79.8 kg (176 lb)   SpO2 98%   BMI 27.56 kg/m²    Pain Score    09/13/21 1026   PainSc: 0-No pain       ECOG Performance Status: 1 - Symptomatic but completely ambulatory  General Appearance:  alert, cooperative, no apparent distress and appears stated age   Neurologic/Psychiatric: A&O x 3, gait steady, appropriate affect, strength 5/5 in all muscle groups   HEENT:  Normocephalic, without obvious abnormality, mucous membranes moist   Neck: Supple, symmetrical, trachea midline, no adenopathy;  No thyromegaly, masses, or tenderness   Lungs:   Clear to auscultation bilaterally; respirations regular, even, and unlabored bilaterally   Heart:  Regular rate and rhythm, no murmurs appreciated   Abdomen:   Soft, " non-tender, non-distended and no organomegaly   Lymph nodes: No cervical, supraclavicular, inguinal or axillary adenopathy noted   Extremities: Normal, atraumatic; no clubbing, cyanosis, or edema    Skin: No rashes, ulcers, or suspicious lesions noted     Lab on 09/07/2021   Component Date Value Ref Range Status   • Glucose 09/07/2021 145* 65 - 99 mg/dL Final   • BUN 09/07/2021 21  8 - 23 mg/dL Final   • Creatinine 09/07/2021 1.46* 0.76 - 1.27 mg/dL Final   • Sodium 09/07/2021 142  136 - 145 mmol/L Final   • Potassium 09/07/2021 4.3  3.5 - 5.2 mmol/L Final   • Chloride 09/07/2021 107  98 - 107 mmol/L Final   • CO2 09/07/2021 24.0  22.0 - 29.0 mmol/L Final   • Calcium 09/07/2021 9.7  8.6 - 10.5 mg/dL Final   • Total Protein 09/07/2021 7.1  6.0 - 8.5 g/dL Final   • Albumin 09/07/2021 4.50  3.50 - 5.20 g/dL Final   • ALT (SGPT) 09/07/2021 54* 1 - 41 U/L Final   • AST (SGOT) 09/07/2021 27  1 - 40 U/L Final   • Alkaline Phosphatase 09/07/2021 105  39 - 117 U/L Final   • Total Bilirubin 09/07/2021 0.7  0.0 - 1.2 mg/dL Final   • eGFR Non  Amer 09/07/2021 48* >60 mL/min/1.73 Final   • Globulin 09/07/2021 2.6  gm/dL Final   • A/G Ratio 09/07/2021 1.7  g/dL Final   • BUN/Creatinine Ratio 09/07/2021 14.4  7.0 - 25.0 Final   • Anion Gap 09/07/2021 11.0  5.0 - 15.0 mmol/L Final   • WBC 09/07/2021 6.12  3.40 - 10.80 10*3/mm3 Final   • RBC 09/07/2021 3.91* 4.14 - 5.80 10*6/mm3 Final   • Hemoglobin 09/07/2021 12.1* 13.0 - 17.7 g/dL Final   • Hematocrit 09/07/2021 37.6  37.5 - 51.0 % Final   • MCV 09/07/2021 96.2  79.0 - 97.0 fL Final   • MCH 09/07/2021 30.9  26.6 - 33.0 pg Final   • MCHC 09/07/2021 32.2  31.5 - 35.7 g/dL Final   • RDW 09/07/2021 13.6  12.3 - 15.4 % Final   • RDW-SD 09/07/2021 48.0  37.0 - 54.0 fl Final   • MPV 09/07/2021 11.6  6.0 - 12.0 fL Final   • Platelets 09/07/2021 129* 140 - 450 10*3/mm3 Final   • Neutrophil % 09/07/2021 74.1  42.7 - 76.0 % Final   • Lymphocyte % 09/07/2021 13.1* 19.6 - 45.3 % Final    • Monocyte % 09/07/2021 8.7  5.0 - 12.0 % Final   • Eosinophil % 09/07/2021 3.3  0.3 - 6.2 % Final   • Basophil % 09/07/2021 0.5  0.0 - 1.5 % Final   • Immature Grans % 09/07/2021 0.3  0.0 - 0.5 % Final   • Neutrophils, Absolute 09/07/2021 4.54  1.70 - 7.00 10*3/mm3 Final   • Lymphocytes, Absolute 09/07/2021 0.80  0.70 - 3.10 10*3/mm3 Final   • Monocytes, Absolute 09/07/2021 0.53  0.10 - 0.90 10*3/mm3 Final   • Eosinophils, Absolute 09/07/2021 0.20  0.00 - 0.40 10*3/mm3 Final   • Basophils, Absolute 09/07/2021 0.03  0.00 - 0.20 10*3/mm3 Final   • Immature Grans, Absolute 09/07/2021 0.02  0.00 - 0.05 10*3/mm3 Final   • nRBC 09/07/2021 0.0  0.0 - 0.2 /100 WBC Final        CT Chest Without Contrast Diagnostic, CT Abdomen Pelvis Without Contrast    Result Date: 9/9/2021  Narrative: EXAMINATION: CT CHEST WO CONTRAST DIAGNOSTIC-, CT ABDOMEN PELVIS WO CONTRAST- 09/07/2021  INDICATION: Restaging small cell lung cancer; C34.31-Malignant neoplasm of lower lobe, right bronchus or lung  TECHNIQUE: 3 mm unenhanced images through the chest, abdomen and pelvis.  The radiation dose reduction device was turned on for each scan per the ALARA (As Low as Reasonably Achievable) protocol.  COMPARISON: Chest, abdomen and pelvis CT scan 06/14/2021.  FINDINGS: Previous exam report indicated increased right lung scarring compared to 06/14/2021 exam. No evidence of intra-abdominal or intrapelvic metastatic disease. Gallstones noted.  Today's chest CT scan shows mildly increased linear and reticular scarring in the anterior right upper lobe, and slightly denser focal subpleural disease the right lower lobe, including a rounded 2.2 cm area adjacent the pleura previously approximately 19 mm, again more typical in appearance for posttreatment scarring than recurrent disease. There is mildly increased curvilinear area of subpleural scarring immediately cephalad of this area along the major fissure, but no particularly nodular disease to suggest  that this is pleural spread of malignancy. No discrete, new pulmonary parenchymal mass is identified. Left lung remains practically clear. There is no pleural effusion.  Mediastinal window images show no evidence of adenopathy and no pericardial or pleural effusion. Bony structures appear to be intact. Incidental note is again made of mid thoracic spine degenerative disc disease.      Impression: 1. Mildly increased scarlike opacities of the right upper lobe, in a uniform fashion again favoring post treatment scarring, rather than recurrent malignancy. No asymmetrically increasing disease or clearly new area of disease to suggest recurrent malignancy. 2. No other new chest disease is seen.  ABDOMEN AND PELVIS CT SCAN WITHOUT CONTRAST: There is diffuse fatty liver change. No hepatic masses are identified. There are multiple gallstones in the otherwise normal-appearing gallbladder. Spleen is not enlarged. No significant abnormalities are seen of the pancreas, adrenal glands, or left kidney. Large right lower renal pole parapelvic cyst appears stable. The collecting system itself does not appear to be dilated. No upper abdominal adenopathy, ascites, or acute inflammatory change is seen. Large and small bowel loops are normal in caliber and normal in appearance. Regarding the lower abdomen and pelvis, no retroperitoneal, pelvic sidewall or inguinal adenopathy or mass is seen. Bladder is normally distended and normal in appearance. Bony structures appear grossly intact.  IMPRESSION: Stable CT scan of the abdomen and pelvis with no evidence of metastatic disease. Gallstones and large exophytic right lower renal pole cyst again noted.  D:  09/07/2021 E:  09/08/2021  This report was finalized on 9/9/2021 8:14 AM by Dr. Chucho Faith MD.        ASSESSMENT: The patient is a very pleasant 71 y.o. male  with limited stage small cell lung cancer    PROBLEM LIST:   1.  Limited stage small cell lung cancer [id number] stage IIIa:  A.   Presented with abnormal screening CT chest  B.  Diagnosed after bronchoscopy with biopsy done by  November 19, 2020 + for small cell from level 7 level 4R and level 10 R lymph nodes.  C.  Whole-body PET scan did not reveal any other sites of disease.  D.  Started definitive concurrent chemotherapy with radiation using cisplatin etoposide December 2020 1 status post 4 cycles completed February 23, 2021  2.  Isolated 6 mm right cerebellar lesion:  A.  Evident MRI brain done on December 1, 2020  B.  Completed prophylactic whole brain radiation 4/23/2021.   3.  Chemotherapy-induced nausea  4. Insomnia   5.  Treatment induced anemia  6.  Chronic kidney disease  7.  Type 2 diabetes    PLAN:  1.  I reviewed the CAT scan results from 9/7/2021 with the patient. I reviewed the images myself. I told the patient that he has some mild increased right lung scarring that likely represents post-treatment scarring, with no evidence of new or progressive disease. The CT of abdomen and pelvis did not reveal any distant metastatic sites.   2.  We will continue watchful waiting for now with plan to repeat imaging studies in 3 months that will be due mid December , 2021 and ordered for prior to return.   3. I reviewed the lab results from 9/7/2021.  He had mild anemia hemoglobin 12.1 and mild thrombocytopenia platelets 129.  Creatinine stable around 1.5. We will also repeat labs on return including CBC and CMP.   4. The patient has completed prophylactic whole brain radiation under the care of Dr. Urias. He will continue follow up with their office with plan to repeat MRI 6/21/2021.  He could not tolerate Namenda secondary to multiple side effects.  5. He will continue Zoloft 50 mg daily for anxiety and depression. He is also taking Ambien 5 mg at bedtime as needed for sleep.   6.  The patient will continue follow-up with Dr. Webber with nephrology regarding chronic kidney disease.  I explained the patient that his  creatinine has improved to 1.5.  Daniel Cartagena MD  9/13/2021

## 2021-10-01 ENCOUNTER — LAB (OUTPATIENT)
Dept: LAB | Facility: HOSPITAL | Age: 71
End: 2021-10-01

## 2021-10-01 ENCOUNTER — OFFICE VISIT (OUTPATIENT)
Dept: FAMILY MEDICINE CLINIC | Facility: CLINIC | Age: 71
End: 2021-10-01

## 2021-10-01 VITALS
OXYGEN SATURATION: 94 % | DIASTOLIC BLOOD PRESSURE: 75 MMHG | HEART RATE: 68 BPM | TEMPERATURE: 97.3 F | BODY MASS INDEX: 27 KG/M2 | SYSTOLIC BLOOD PRESSURE: 120 MMHG | HEIGHT: 67 IN | WEIGHT: 172 LBS

## 2021-10-01 DIAGNOSIS — D64.9 ANEMIA, UNSPECIFIED TYPE: ICD-10-CM

## 2021-10-01 DIAGNOSIS — Z00.00 MEDICARE ANNUAL WELLNESS VISIT, SUBSEQUENT: Primary | ICD-10-CM

## 2021-10-01 DIAGNOSIS — E11.9 TYPE 2 DIABETES MELLITUS WITHOUT COMPLICATION, WITHOUT LONG-TERM CURRENT USE OF INSULIN (HCC): ICD-10-CM

## 2021-10-01 DIAGNOSIS — E55.9 VITAMIN D DEFICIENCY: ICD-10-CM

## 2021-10-01 DIAGNOSIS — Z12.11 COLON CANCER SCREENING: ICD-10-CM

## 2021-10-01 DIAGNOSIS — C34.31 SMALL CELL LUNG CANCER, RIGHT LOWER LOBE (HCC): ICD-10-CM

## 2021-10-01 DIAGNOSIS — Z12.5 ENCOUNTER FOR PROSTATE CANCER SCREENING: ICD-10-CM

## 2021-10-01 DIAGNOSIS — Z23 NEEDS FLU SHOT: ICD-10-CM

## 2021-10-01 LAB
BILIRUB UR QL STRIP: NEGATIVE
CLARITY UR: CLEAR
COLOR UR: YELLOW
GLUCOSE BLDC GLUCOMTR-MCNC: 150 MG/DL (ref 70–130)
GLUCOSE UR STRIP-MCNC: NEGATIVE MG/DL
HBA1C MFR BLD: 6.1 %
HGB UR QL STRIP.AUTO: NEGATIVE
KETONES UR QL STRIP: NEGATIVE
LEUKOCYTE ESTERASE UR QL STRIP.AUTO: NEGATIVE
NITRITE UR QL STRIP: NEGATIVE
PH UR STRIP.AUTO: 5.5 [PH] (ref 5–8)
PROT UR QL STRIP: NEGATIVE
SP GR UR STRIP: 1.02 (ref 1–1.03)
UROBILINOGEN UR QL STRIP: NORMAL

## 2021-10-01 PROCEDURE — G0439 PPPS, SUBSEQ VISIT: HCPCS | Performed by: INTERNAL MEDICINE

## 2021-10-01 PROCEDURE — 83036 HEMOGLOBIN GLYCOSYLATED A1C: CPT | Performed by: INTERNAL MEDICINE

## 2021-10-01 PROCEDURE — 81003 URINALYSIS AUTO W/O SCOPE: CPT

## 2021-10-01 PROCEDURE — 90662 IIV NO PRSV INCREASED AG IM: CPT | Performed by: INTERNAL MEDICINE

## 2021-10-01 PROCEDURE — 3044F HG A1C LEVEL LT 7.0%: CPT | Performed by: INTERNAL MEDICINE

## 2021-10-01 PROCEDURE — 82570 ASSAY OF URINE CREATININE: CPT

## 2021-10-01 PROCEDURE — G0008 ADMIN INFLUENZA VIRUS VAC: HCPCS | Performed by: INTERNAL MEDICINE

## 2021-10-01 PROCEDURE — 82043 UR ALBUMIN QUANTITATIVE: CPT

## 2021-10-01 NOTE — PROGRESS NOTES
QUICK REFERENCE INFORMATION:  The ABCs of the Annual Wellness Visit  Luis Elliott is a 71 [facility]. [facility] and  Medicare Wellness-subsequent and Hypertension  .     Medicare Subsequent Wellness Visit    Chief Complaint   Patient presents with   • Medicare Wellness-subsequent   • Hypertension        HPI   Here for annual wellness visit. No acute concerns today.  ROS:  Constitutional: no fevers, night sweats or unexplained weight loss  Eyes: no vision changes  ENT: no runny nose, ear pain, sore throat  Cardio: no chest pain, palpitations  Pulm: no shortness of breath, wheezing, or cough  GI: no abdominal pain or changes in bowel movements  : no difficulty urinating  MSK: no difficulty ambulating, no joint pain  Neuro: no weakness, dizziness or headache  Psych: no trouble sleeping  Endo: no change in appetite     Past Medical History:   Diagnosis Date   • Cancer ([facility])     PROSTATE   • CKD stage G3a/A2, GFR 45-59 and albumin creatinine ratio  mg/g ([facility]) 6/21/2021   • COPD (chronic obstructive pulmonary disease) ([facility])     dr valente thinks pt has this    • Coronary artery disease    • DDD (degenerative disc disease), cervical    • Depression    • Diabetes mellitus ([facility])     let dr check sugar    • Erectile dysfunction    • Glaucoma    • Headache    • History of radiation therapy 02/23/2021    RUL/mediastinum   • History of radiation therapy 04/23/2021    PCI brain   • Hyperlipidemia    • Hypertension    • Impingement syndrome of left shoulder    • Infection, bacterial     sees ID -  42 days of antibiotics    • Lung cancer ([facility])    • MI, old     89   • Mitral valve vegetation    • Osteoarthritis    • Prostate cancer ([facility])     2003   • SOBOE (shortness of breath on exertion)    • Varicose veins of lower limb     RIGHT   • Wears glasses    • Wears partial dentures     bottom         Past Surgical History:   Procedure Laterality Date   • BRONCHOSCOPY N/A 11/19/2020     Procedure: BRONCHOSCOPY WITH ENDOBRONCHIAL ULTRASOUND WITH FLUOROSCOPY;  Surgeon: Clint Thompson MD;  Location:  [facility];  Service: Pulmonary;  Laterality: N/A;   • CARDIAC CATHETERIZATION N/A 12/20/2016    Procedure: Left Heart Cath;  Surgeon: Kan Blackwell MD;  Location:  [facility];  Service:    • CATARACT EXTRACTION      BILATERAL CATARACTS REMOVED.   • COLONOSCOPY     • COLONOSCOPY W/ POLYPECTOMY     • CORONARY ANGIOPLASTY     • CORONARY ANGIOPLASTY WITH STENT PLACEMENT      x3    • EYE SURGERY      right- stent for drainage   • HERNIA REPAIR      RIGHT   • KNEE ARTHROSCOPY      LEFT   • KNEE SURGERY      LEFT TOTAL KNEE REPLACEMENT 12/2012   • LUMBAR EPIDURAL INJECTION     • ID RT/LT HEART CATHETERS N/A 12/27/2016    Procedure: Percutaneous Coronary Intervention;  Surgeon: Kan Blackwell MD;  Location:  [facility];  Service: Cardiovascular   • PROSTATECTOMY      2003   • TENDON TRANSFER ELBOW      TENDON REPAIR-  right    • TONSILLECTOMY     • TRABECULECTOMY      FOR GLAUCOMA       Family History   Problem Relation Age of Onset   • Hypertension Mother    • Atrial fibrillation Mother    • Alcohol abuse Father    • Heart attack Father    • Diabetes Father    • No Known Problems Sister    • Prostate cancer Brother         Social History     Socioeconomic History   • Marital status:      Spouse name: Sabina   • Number of children: 2   • Years of education: Not on file   • Highest education level: Not on file   Tobacco Use   • Smoking status: Former Smoker     Packs/day: 1.50     Years: 36.00     Pack years: 54.00     Types: Cigarettes     Quit date: 4/12/2011     Years since quitting: 10.4   • Smokeless tobacco: Former User     Types: Chew     Quit date: 2011   • Tobacco comment: quit smoking for 12 years then started again but then quit a final time    Vaping Use   • Vaping Use: Never used   Substance and Sexual Activity   • Alcohol use:  "Not Currently   • Drug use: Yes     Types: Marijuana     Comment: a month ago   • Sexual activity: Defer        Current Outpatient Medications   Medication Sig Dispense Refill   • aspirin 81 MG EC tablet Take 81 mg by mouth Every Night.     • atorvastatin (LIPITOR) 40 MG tablet Take 40 mg by mouth Every Night.     • calcium carbonate-cholecalciferol 500-400 MG-UNIT tablet tablet Take 1 tablet by mouth Daily.     • cholecalciferol (VITAMIN D3) 1000 units tablet Take 1,000 Units by mouth Daily.     • ezetimibe (ZETIA) 10 MG tablet Take 10 mg by mouth Daily.     • metFORMIN (Glucophage) 500 MG tablet Take 1 tablet by mouth 2 (Two) Times a Day With Meals. 60 tablet 5   • nitroglycerin (NITROSTAT) 0.4 MG SL tablet Place 0.4 mg under the tongue Every 5 (Five) Minutes As Needed for chest pain. Take no more than 3 doses in 15 minutes.     • Omega-3 Fatty Acids (FISH OIL) 1200 MG capsule capsule Take 1,200 mg by mouth Daily.     • sertraline (ZOLOFT) 50 MG tablet TAKE ONE TABLET BY MOUTH DAILY 90 tablet 3     No current facility-administered medications for this visit.        Allergies   Allergen Reactions   • Xarelto [Rivaroxaban] Other (See Comments)     MADE ME FEEL LIKE \" I WAS HAVING A HEART ATTACK.\"        Immunization History   Administered Date(s) Administered   • COVID-19 (MODERNA) 04/28/2021, 05/26/2021   • FLUAD TRI 65YR+ 09/16/2019   • Flu Vaccine Quad PF >36MO 11/25/2015, 12/12/2016, 10/24/2017   • Fluad Quad 65+ 09/17/2020   • Fluzone High Dose =>65 Years ([employer] ONLY) 11/01/2017, 09/12/2018, 09/16/2019   • Fluzone High-Dose 65+yrs 10/01/2021   • Hepatitis A 05/31/2019   • PEDS-Pneumococcal Conjugate (PCV7) 06/03/2015   • Pneumococcal Conjugate 13-Valent (PCV13) 06/03/2015, 10/02/2020   • Pneumococcal Polysaccharide (PPSV23) 06/03/2013   • Shingrix 10/02/2020        The following portions of the patient's history were reviewed and updated as appropriate: allergies, current medications, past family history, " "past medical history, past social history, past surgical history and problem list.      Objective    Visit Vitals  /75   Pulse 68   Temp 97.3 °F (36.3 °C)   Ht 170.2 cm (67.01\")   Wt 78 kg (172 lb)   SpO2 94%   BMI 26.93 kg/m²        Physical Exam  Gen Appearance: NAD  HEENT: Normocephalic, PERRLA, no thyromegaly, trache midline  Heart: RRR, normal S1 and S2, no murmur  Lungs: CTA b/l, no wheezing, no crackles  Abdomen: Soft, non-tender, non-distended, no guarding and BSx4  MSK: Moves all extremities well, normal gait, no peripheral edema  Pulses: Palpable and equal b/l  Lymph nodes: No palpable lymphadenopathy   Neuro: No focal deficits       HEALTH RISK ASSESSMENT    1950    Recent Hospitalizations:  No hospitalization(s) within the last year..      Current Medical Providers:  Patient Care Team:  Silvestre Coleman DO as PCP - General (Internal Medicine)  Kan Blackwell MD as Consulting Physician (Cardiology)  Mark Camacho MD as Consulting Physician (Ophthalmology)  Daniel Cartagena MD as Referring Physician (Hematology and Oncology)  Jay Jay Urias MD as Consulting Physician (Radiation Oncology)  Clint Thompson MD as Emergency Attending (Pulmonary Disease)  Tom Webber MD as Consulting Physician (Nephrology)      Smoking Status:  Social History     Tobacco Use   Smoking Status Former Smoker   • Packs/day: 1.50   • Years: 36.00   • Pack years: 54.00   • Types: Cigarettes   • Quit date: 4/12/2011   • Years since quitting: 10.4   Smokeless Tobacco Former User   • Types: Chew   • Quit date: 2011   Tobacco Comment    quit smoking for 12 years then started again but then quit a final time        Alcohol Consumption:  Social History     Substance and Sexual Activity   Alcohol Use Not Currently       Depression Screen:   PHQ-2/PHQ-9 Depression Screening 10/1/2021   Little interest or pleasure in doing things 0   Feeling down, depressed, or hopeless 0   Trouble falling or " staying asleep, or sleeping too much -   Feeling tired or having little energy -   Poor appetite or overeating -   Feeling bad about yourself - or that you are a failure or have let yourself or your family down -   Trouble concentrating on things, such as reading the newspaper or watching television -   Moving or speaking so slowly that other people could have noticed. Or the opposite - being so fidgety or restless that you have been moving around a lot more than usual -   Thoughts that you would be better off dead, or of hurting yourself in some way -   Total Score 0   If you checked off any problems, how difficult have these problems made it for you to do your work, take care of things at home, or get along with other people? -       Health Habits and Functional and Cognitive Screening:  Functional & Cognitive Status 10/1/2021   Do you have difficulty preparing food and eating? No   Do you have difficulty bathing yourself, getting dressed or grooming yourself? No   Do you have difficulty using the toilet? No   Do you have difficulty moving around from place to place? No   Do you have trouble with steps or getting out of a bed or a chair? No   Current Diet Well Balanced Diet   Dental Exam Up to date   Eye Exam Up to date   Exercise (times per week) 0 times per week   Current Exercises Include No Regular Exercise   Current Exercise Activities Include -   Do you need help using the phone?  No   Are you deaf or do you have serious difficulty hearing?  No   Do you need help with transportation? No   Do you need help shopping? No   Do you need help preparing meals?  No   Do you need help with housework?  No   Do you need help with laundry? No   Do you need help taking your medications? No   Do you need help managing money? No   Do you ever drive or ride in a car without wearing a seat belt? No   Have you felt unusual stress, anger or loneliness in the last month? No   Who do you live with? Spouse   If you need help, do  you have trouble finding someone available to you? No   Have you been bothered in the last four weeks by sexual problems? No   Do you have difficulty concentrating, remembering or making decisions? No         Does the patient have evidence of cognitive impairment? No    Aspirin use counseling? Taking ASA appropriately as indicated      Recent Lab Results:  CMP:  Lab Results   Component Value Date    BUN 21 09/07/2021    CREATININE 1.46 (H) 09/07/2021    EGFRIFNONA 48 (L) 09/07/2021    BCR 14.4 09/07/2021     09/07/2021    K 4.3 09/07/2021    CO2 24.0 09/07/2021    CALCIUM 9.7 09/07/2021    ALBUMIN 4.50 09/07/2021    BILITOT 0.7 09/07/2021    ALKPHOS 105 09/07/2021    AST 27 09/07/2021    ALT 54 (H) 09/07/2021     Lipid Panel:  Lab Results   Component Value Date    CHOL 105 09/17/2020    TRIG 124 09/17/2020    HDL 41 09/17/2020    VLDL 24.8 09/17/2020    LDLHDL 0.96 09/17/2020     HbA1c:  Lab Results   Component Value Date    HGBA1C 6.1 10/01/2021       Visual Acuity:  No exam data present    Age-appropriate Screening Schedule:  Refer to the list below for future screening recommendations based on patient's age, sex and/or medical conditions. Orders for these recommended tests are listed in the plan section. The patient has been provided with a written plan.    Health Maintenance   Topic Date Due   • DIABETIC FOOT EXAM  06/09/2018   • URINE MICROALBUMIN  09/23/2020   • DIABETIC EYE EXAM  03/09/2021   • LIPID PANEL  03/18/2022   • HEMOGLOBIN A1C  04/01/2022   • TDAP/TD VACCINES (2 - Td or Tdap) 01/01/2023   • INFLUENZA VACCINE  Completed   • ZOSTER VACCINE  Completed          Advance Care Planning:  ACP discussion was declined by the patient. Patient does not have an advance directive, declines further assistance.    Identification of Risk Factors:  Risk factors include: Advance Directive Discussion  Cardiovascular risk  Colon Cancer Screening.    Compared to one year ago, the patient feels his physical health  is the same.  Compared to one year ago, the patient feels his mental health is the same.  Reviewed use of high risk medication in the elderly: yes  Reviewed for potential of harmful drug interactions in the elderly: yes    Diagnoses and all orders for this visit:    1. Medicare annual wellness visit, subsequent (Primary)  Counseled on healthy weight, nutrition, physical activity, cancer screening, and immunizations.    2. Type 2 diabetes mellitus without complication, without long-term current use of insulin (HCC)  -     POC Glycosylated Hemoglobin (Hb A1C)  -     POCT Glucose  -     CBC & Differential; Future  -     Comprehensive Metabolic Panel; Future  -     Lipid Panel; Future  -     TSH+Free T4; Future  -     Urinalysis With Culture If Indicated - Urine, Clean Catch; Future  -     Vitamin D 25 Hydroxy; Future    3. Vitamin D deficiency  -     Vitamin D 25 Hydroxy; Future    4. Colon cancer screening  -     Ambulatory Referral For Screening Colonoscopy    5. Anemia, unspecified type  -     CBC & Differential; Future  -     Comprehensive Metabolic Panel; Future  -     Lipid Panel; Future  -     TSH+Free T4; Future  -     Urinalysis With Culture If Indicated - Urine, Clean Catch; Future  -     Vitamin D 25 Hydroxy; Future    6. Encounter for prostate cancer screening    7. Needs flu shot  -     Fluzone High-Dose 65+yrs ([id number])          Patient Self-Management and Personalized Health Advice  The patient has been provided with information about: diet, exercise and weight management and preventive services including:   · Annual Wellness Visit (AWV)  · Cardiovascular Disease Screening Tests (may do this order every 5 years in beneficiaries without signs or symptoms of cardiovascular disease).      Follow Up:  Return in about 6 months (around 4/1/2022).     An After Visit Summary and PPPS with all of these plans were given to the patient.           Please note that portions of this document were completed with a  voice recognition program. Efforts were made to edit the dictations, but occasionally words are mis-transcribed.

## 2021-10-02 LAB
ALBUMIN UR-MCNC: 1.8 MG/DL
CREAT UR-MCNC: 236.9 MG/DL
MICROALBUMIN/CREAT UR: 7.6 MG/G

## 2021-10-05 ENCOUNTER — LAB (OUTPATIENT)
Dept: LAB | Facility: HOSPITAL | Age: 71
End: 2021-10-05

## 2021-10-05 ENCOUNTER — HOSPITAL ENCOUNTER (OUTPATIENT)
Dept: MRI IMAGING | Facility: HOSPITAL | Age: 71
Discharge: HOME | End: 2021-10-05

## 2021-10-05 DIAGNOSIS — D64.9 ANEMIA, UNSPECIFIED TYPE: ICD-10-CM

## 2021-10-05 DIAGNOSIS — Z85.46 HISTORY OF PROSTATE CANCER: ICD-10-CM

## 2021-10-05 DIAGNOSIS — Z12.11 COLON CANCER SCREENING: ICD-10-CM

## 2021-10-05 DIAGNOSIS — E11.9 TYPE 2 DIABETES MELLITUS WITHOUT COMPLICATION, WITHOUT LONG-TERM CURRENT USE OF INSULIN (HCC): ICD-10-CM

## 2021-10-05 DIAGNOSIS — E55.9 VITAMIN D DEFICIENCY: ICD-10-CM

## 2021-10-05 DIAGNOSIS — Z12.5 ENCOUNTER FOR PROSTATE CANCER SCREENING: ICD-10-CM

## 2021-10-05 LAB
25(OH)D3 SERPL-MCNC: 69.4 NG/ML
ALBUMIN SERPL-MCNC: 4.5 G/DL (ref 3.5–5.2)
ALBUMIN/GLOB SERPL: 1.7 G/DL
ALP SERPL-CCNC: 103 U/L (ref 39–117)
ALT SERPL W P-5'-P-CCNC: 44 U/L (ref 1–41)
ANION GAP SERPL CALCULATED.3IONS-SCNC: 10 MMOL/L (ref 5–15)
AST SERPL-CCNC: 27 U/L (ref 1–40)
BASOPHILS # BLD AUTO: 0.03 10*3/MM3 (ref 0–0.2)
BASOPHILS NFR BLD AUTO: 0.5 % (ref 0–1.5)
BILIRUB SERPL-MCNC: 0.8 MG/DL (ref 0–1.2)
BUN SERPL-MCNC: 20 MG/DL (ref 8–23)
BUN/CREAT SERPL: 13.6 (ref 7–25)
CALCIUM SPEC-SCNC: 9.6 MG/DL (ref 8.6–10.5)
CHLORIDE SERPL-SCNC: 107 MMOL/L (ref 98–107)
CHOLEST SERPL-MCNC: 89 MG/DL (ref 0–200)
CO2 SERPL-SCNC: 24 MMOL/L (ref 22–29)
CREAT SERPL-MCNC: 1.47 MG/DL (ref 0.76–1.27)
DEPRECATED RDW RBC AUTO: 49 FL (ref 37–54)
EOSINOPHIL # BLD AUTO: 0.23 10*3/MM3 (ref 0–0.4)
EOSINOPHIL NFR BLD AUTO: 3.8 % (ref 0.3–6.2)
ERYTHROCYTE [DISTWIDTH] IN BLOOD BY AUTOMATED COUNT: 13.4 % (ref 12.3–15.4)
GFR SERPL CREATININE-BSD FRML MDRD: 47 ML/MIN/1.73
GLOBULIN UR ELPH-MCNC: 2.7 GM/DL
GLUCOSE SERPL-MCNC: 118 MG/DL (ref 65–99)
HCT VFR BLD AUTO: 37.2 % (ref 37.5–51)
HDLC SERPL-MCNC: 35 MG/DL (ref 40–60)
HGB BLD-MCNC: 11.8 G/DL (ref 13–17.7)
IMM GRANULOCYTES # BLD AUTO: 0.01 10*3/MM3 (ref 0–0.05)
IMM GRANULOCYTES NFR BLD AUTO: 0.2 % (ref 0–0.5)
LDLC SERPL CALC-MCNC: 33 MG/DL (ref 0–100)
LDLC/HDLC SERPL: 0.87 {RATIO}
LYMPHOCYTES # BLD AUTO: 0.74 10*3/MM3 (ref 0.7–3.1)
LYMPHOCYTES NFR BLD AUTO: 12.2 % (ref 19.6–45.3)
MCH RBC QN AUTO: 31 PG (ref 26.6–33)
MCHC RBC AUTO-ENTMCNC: 31.7 G/DL (ref 31.5–35.7)
MCV RBC AUTO: 97.6 FL (ref 79–97)
MONOCYTES # BLD AUTO: 0.54 10*3/MM3 (ref 0.1–0.9)
MONOCYTES NFR BLD AUTO: 8.9 % (ref 5–12)
NEUTROPHILS NFR BLD AUTO: 4.51 10*3/MM3 (ref 1.7–7)
NEUTROPHILS NFR BLD AUTO: 74.4 % (ref 42.7–76)
NRBC BLD AUTO-RTO: 0 /100 WBC (ref 0–0.2)
PLATELET # BLD AUTO: 148 10*3/MM3 (ref 140–450)
PMV BLD AUTO: 11.6 FL (ref 6–12)
POTASSIUM SERPL-SCNC: 4.4 MMOL/L (ref 3.5–5.2)
PROT SERPL-MCNC: 7.2 G/DL (ref 6–8.5)
RBC # BLD AUTO: 3.81 10*6/MM3 (ref 4.14–5.8)
SODIUM SERPL-SCNC: 141 MMOL/L (ref 136–145)
T4 FREE SERPL-MCNC: 1 NG/DL (ref 0.93–1.7)
TRIGL SERPL-MCNC: 117 MG/DL (ref 0–150)
TSH SERPL DL<=0.05 MIU/L-ACNC: 1.13 UIU/ML (ref 0.27–4.2)
VLDLC SERPL-MCNC: 21 MG/DL (ref 5–40)
WBC # BLD AUTO: 6.06 10*3/MM3 (ref 3.4–10.8)

## 2021-10-05 PROCEDURE — 70551 MRI BRAIN STEM W/O DYE: CPT

## 2021-10-05 PROCEDURE — 84443 ASSAY THYROID STIM HORMONE: CPT

## 2021-10-05 PROCEDURE — 84439 ASSAY OF FREE THYROXINE: CPT

## 2021-10-05 PROCEDURE — 82306 VITAMIN D 25 HYDROXY: CPT

## 2021-10-05 PROCEDURE — 80061 LIPID PANEL: CPT

## 2021-10-05 PROCEDURE — 80053 COMPREHEN METABOLIC PANEL: CPT

## 2021-10-05 PROCEDURE — 85025 COMPLETE CBC W/AUTO DIFF WBC: CPT

## 2021-10-06 ENCOUNTER — HOSPITAL ENCOUNTER (OUTPATIENT)
Dept: RADIATION ONCOLOGY | Facility: HOSPITAL | Age: 71
Setting detail: RADIATION/ONCOLOGY SERIES
Discharge: HOME | End: 2021-10-06

## 2021-10-06 ENCOUNTER — OFFICE VISIT (OUTPATIENT)
Dept: RADIATION ONCOLOGY | Facility: HOSPITAL | Age: 71
End: 2021-10-06

## 2021-10-06 VITALS
DIASTOLIC BLOOD PRESSURE: 80 MMHG | RESPIRATION RATE: 18 BRPM | HEART RATE: 67 BPM | TEMPERATURE: 97.6 F | OXYGEN SATURATION: 95 % | SYSTOLIC BLOOD PRESSURE: 151 MMHG | WEIGHT: 172.4 LBS | BODY MASS INDEX: 27 KG/M2

## 2021-10-06 DIAGNOSIS — C34.31 SMALL CELL LUNG CANCER, RIGHT LOWER LOBE (HCC): Primary | ICD-10-CM

## 2021-10-06 PROCEDURE — G0463 HOSPITAL OUTPT CLINIC VISIT: HCPCS

## 2021-10-06 NOTE — PROGRESS NOTES
RE-EVALUATION    PATIENT:                                                      Luis Elliott  :                                                          1950  DATE:                          10/6/2021   DIAGNOSIS:     Small cell lung cancer, right lower lobe (HCC)  - Stage IIIA (cT1b, cN2, cM0)         BRIEF HISTORY:  Luis Elliott is a 71 y.o. gentleman found to have an enlarging right lower lobe nodule on screening CT scan of the chest.  He underwent staging PET/CT scan in 2020 that showed the pulmonary nodule to be hypermetabolic and increased in size.  There was some hilar and subcarinal lymphadenopathy which was also hypermetabolic, but otherwise no evidence of distant disease.  He then underwent bronchoscopy and EBUS that confirmed diagnosis of small cell lung cancer.  Staging MRI brain 2020 showed a very small indeterminate enhancing right cerebellar lesion with some weak diffusion restriction but no edema.  It was unclear whether this intracranial abnormality represented metastasis or was related to his history of mitral valve vegetation and possible endocarditis with positive blood cultures.  He underwent definitive concurrent chemotherapy with radiation using cisplatin etoposide, which he completed in 2021.  He had a complete response to treatment with no evidence of residual disease in the chest.  Repeat MRI brain showed resolution of the suspicious brain lesion.  He therefore was recommended to undergo prophylactic cranial irradiation.  The area initially involved with a suspected malignancy received 30 Gy in 10 fractions, while the remainder of the brain received 25 Gy in 10 fractions with hippocampal avoidance utilizing IMRT and IGRT technique.  He tolerated treatment well.    The patient has recently had restaging scans and returns today for reevaluation.  Patient ports that overall he feels generally very well.  Reports that he has some left leg weakness and neuropathy.   "Reports that he is no longer taking memantine due to the way it made him feel.  Overall he feels very well and is in good spirits.    Allergies   Allergen Reactions   • Xarelto [Rivaroxaban] Other (See Comments)     MADE ME FEEL LIKE \" I WAS HAVING A HEART ATTACK.\"       Review of Systems   Neurological: Positive for extremity weakness and light-headedness.    A full 14 point review of systems was performed and was negative except as noted in the HPI.         Objective   VITAL SIGNS:   Vitals:    10/06/21 0921   BP: 151/80   Pulse: 67   Resp: 18   Temp: 97.6 °F (36.4 °C)   SpO2: 95%   Weight: 78.2 kg (172 lb 6.4 oz)   PainSc: 0-No pain      Kps 90          Physical Exam  Constitutional:       General: He is not in acute distress.     Appearance: He is well-developed.   HENT:      Head: Normocephalic and atraumatic.   Eyes:      Conjunctiva/sclera: Conjunctivae normal.      Pupils: Pupils are equal, round, and reactive to light.   Neck:      Trachea: No tracheal deviation.   Pulmonary:      Effort: Pulmonary effort is normal. No respiratory distress.      Breath sounds: No wheezing.   Abdominal:      General: There is no distension.      Palpations: Abdomen is soft.   Musculoskeletal:         General: Normal range of motion.      Cervical back: Normal range of motion.   Skin:     General: Skin is warm and dry.   Neurological:      Mental Status: He is alert.      Cranial Nerves: No cranial nerve deficit.      Coordination: Coordination normal.   Psychiatric:         Behavior: Behavior normal.         Judgment: Judgment normal.              The following portions of the patient's history were reviewed and updated as appropriate: allergies, current medications, past family history, past medical history, past social history, past surgical history and problem list.  I have personally requested reviewed and interpreted the patient's images and radiology reports and pathology reports listed below:  CT Abdomen Pelvis " Without Contrast    Result Date: 9/9/2021  1. Mildly increased scarlike opacities of the right upper lobe, in a uniform fashion again favoring post treatment scarring, rather than recurrent malignancy. No asymmetrically increasing disease or clearly new area of disease to suggest recurrent malignancy. 2. No other new chest disease is seen.  ABDOMEN AND PELVIS CT SCAN WITHOUT CONTRAST: There is diffuse fatty liver change. No hepatic masses are identified. There are multiple gallstones in the otherwise normal-appearing gallbladder. Spleen is not enlarged. No significant abnormalities are seen of the pancreas, adrenal glands, or left kidney. Large right lower renal pole parapelvic cyst appears stable. The collecting system itself does not appear to be dilated. No upper abdominal adenopathy, ascites, or acute inflammatory change is seen. Large and small bowel loops are normal in caliber and normal in appearance. Regarding the lower abdomen and pelvis, no retroperitoneal, pelvic sidewall or inguinal adenopathy or mass is seen. Bladder is normally distended and normal in appearance. Bony structures appear grossly intact.  IMPRESSION: Stable CT scan of the abdomen and pelvis with no evidence of metastatic disease. Gallstones and large exophytic right lower renal pole cyst again noted.  D:  09/07/2021 E:  09/08/2021  This report was finalized on 9/9/2021 8:14 AM by Dr. Chucho Faith MD.        CT Chest Without Contrast Diagnostic    Result Date: 9/9/2021  1. Mildly increased scarlike opacities of the right upper lobe, in a uniform fashion again favoring post treatment scarring, rather than recurrent malignancy. No asymmetrically increasing disease or clearly new area of disease to suggest recurrent malignancy. 2. No other new chest disease is seen.  ABDOMEN AND PELVIS CT SCAN WITHOUT CONTRAST: There is diffuse fatty liver change. No hepatic masses are identified. There are multiple gallstones in the otherwise normal-appearing  gallbladder. Spleen is not enlarged. No significant abnormalities are seen of the pancreas, adrenal glands, or left kidney. Large right lower renal pole parapelvic cyst appears stable. The collecting system itself does not appear to be dilated. No upper abdominal adenopathy, ascites, or acute inflammatory change is seen. Large and small bowel loops are normal in caliber and normal in appearance. Regarding the lower abdomen and pelvis, no retroperitoneal, pelvic sidewall or inguinal adenopathy or mass is seen. Bladder is normally distended and normal in appearance. Bony structures appear grossly intact.  IMPRESSION: Stable CT scan of the abdomen and pelvis with no evidence of metastatic disease. Gallstones and large exophytic right lower renal pole cyst again noted.  D:  09/07/2021 E:  09/08/2021  This report was finalized on 9/9/2021 8:14 AM by Dr. Chucho Faith MD.        MRI Brain Without Contrast    Result Date: 10/5/2021  Stable exam. There is atrophy and chronic changes seen within the brain with no acute intracranial abnormality. Examination is suboptimal given the lack of intravenous contrast for evaluation for metastatic disease.  D: 10/05/2021 E: 10/05/2021        MRI Brain With & Without Contrast    Result Date: 6/21/2021  Stable appearance resolved abnormal cerebellar enhancement as noted on prior comparison 03/09/2021 without development of new lesion. No acute pathology otherwise.  D:  06/21/2021 E:  06/21/2021    This report was finalized on 6/21/2021 6:33 PM by Dr. Ren Malone.      I reviewed the above images.    WBC   Date Value Ref Range Status   10/05/2021 6.06 3.40 - 10.80 10*3/mm3 Final     RBC   Date Value Ref Range Status   10/05/2021 3.81 (L) 4.14 - 5.80 10*6/mm3 Final     Hemoglobin   Date Value Ref Range Status   10/05/2021 11.8 (L) 13.0 - 17.7 g/dL Final     Hematocrit   Date Value Ref Range Status   10/05/2021 37.2 (L) 37.5 - 51.0 % Final     MCV   Date Value Ref Range Status   10/05/2021  97.6 (H) 79.0 - 97.0 fL Final     MCH   Date Value Ref Range Status   10/05/2021 31.0 26.6 - 33.0 pg Final     MCHC   Date Value Ref Range Status   10/05/2021 31.7 31.5 - 35.7 g/dL Final     RDW   Date Value Ref Range Status   10/05/2021 13.4 12.3 - 15.4 % Final     RDW-SD   Date Value Ref Range Status   10/05/2021 49.0 37.0 - 54.0 fl Final     MPV   Date Value Ref Range Status   10/05/2021 11.6 6.0 - 12.0 fL Final     Platelets   Date Value Ref Range Status   10/05/2021 148 140 - 450 10*3/mm3 Final     Neutrophil %   Date Value Ref Range Status   10/05/2021 74.4 42.7 - 76.0 % Final     Lymphocyte %   Date Value Ref Range Status   10/05/2021 12.2 (L) 19.6 - 45.3 % Final     Monocyte %   Date Value Ref Range Status   10/05/2021 8.9 5.0 - 12.0 % Final     Eosinophil %   Date Value Ref Range Status   10/05/2021 3.8 0.3 - 6.2 % Final     Basophil %   Date Value Ref Range Status   10/05/2021 0.5 0.0 - 1.5 % Final     Immature Grans %   Date Value Ref Range Status   10/05/2021 0.2 0.0 - 0.5 % Final     Neutrophils, Absolute   Date Value Ref Range Status   10/05/2021 4.51 1.70 - 7.00 10*3/mm3 Final     Lymphocytes, Absolute   Date Value Ref Range Status   10/05/2021 0.74 0.70 - 3.10 10*3/mm3 Final     Monocytes, Absolute   Date Value Ref Range Status   10/05/2021 0.54 0.10 - 0.90 10*3/mm3 Final     Eosinophils, Absolute   Date Value Ref Range Status   10/05/2021 0.23 0.00 - 0.40 10*3/mm3 Final     Basophils, Absolute   Date Value Ref Range Status   10/05/2021 0.03 0.00 - 0.20 10*3/mm3 Final     Immature Grans, Absolute   Date Value Ref Range Status   10/05/2021 0.01 0.00 - 0.05 10*3/mm3 Final     nRBC   Date Value Ref Range Status   10/05/2021 0.0 0.0 - 0.2 /100 WBC Final     Lab Results   Component Value Date    GLUCOSE 118 (H) 10/05/2021    BUN 20 10/05/2021    CREATININE 1.47 (H) 10/05/2021    EGFRIFNONA 47 (L) 10/05/2021    BCR 13.6 10/05/2021    K 4.4 10/05/2021    CO2 24.0 10/05/2021    CALCIUM 9.6 10/05/2021     ALBUMIN 4.50 10/05/2021    AST 27 10/05/2021    ALT 44 (H) 10/05/2021         Diagnoses and all orders for this visit:    Small cell lung cancer, right lower lobe (HCC)      IMPRESSION:    Luis Elliott is a 71 y.o. gentleman diagnosed last fall with limited stage small cell lung cancer, status post definitive concurrent chemoradiation in February 2021 with complete response.  Repeat MRI brain 3/9/2021 showed apparent interval resolution of the small right cerebellar enhancing lesion and no evidence of intracranial metastatic disease.  He therefore underwent hippocampal sparing PCI, which he completed 1 month ago.  He tolerated treatment well.   He is doing well at this point.  He is now her taking amantadine due to the side effects.  The patient reports that he is very happy with his care.  He is still following with Dr. Cartagena which is very good.  Recommend restaging his brain in 4 months.  Patient's nephrologist recommends against the use of contrast so we will proceed without contrast.    Greater than 0.5 hour was spent preparing for and coordinating this visit. >50% of the time was spent in direct face to face conversation with the patient teaching, answering question, and providing explanations regarding the patient's case.  The patient's malignancy represents a complicated life threatening condition that requires complex multidisciplinary management for treatment and followup.    RECOMMENDATIONS:    RECOMMENDATIONS:    cT1 cN2 M0 stage IIIA RLL small cell lung cancer  -MRI 12/14/2020 shows very small indeterminate lesion in the right cerebellum  -s/p concurrent chemo + radiotherapy to PET avid disease 66 Gy in 33 fractions, completed 2/23/2021  -Restaging scans middle of June 2021 showed excellent response in the chest and repeat MRI brain without evidence of metastatic disease  -Underwent PCI 25 Gy in 10 fractions with hippocampal sparing as the patient has expected life span greater than 4 months in effort to  decrease neurocognitive toxicity  -Has discontinued memantine  -Repeat CT C/A/P with Dr. Cartagena  -Will repeat MRI brain in 4 months for continued surveillance and recommend no CyberKnife protocol given the patient's renal function        COPD  -Continue present regimen     Diabetes  -Continue present regimen per primary  -Being managed to lower HbA1c     CKD 3  -GFR mid 50s  -Follows with Dr. eWbber  -Recommend minimizing dye load     Headache, unchanged  -Continue present regimen  -No obvious parenchymal disease in the brain     Hyperlipidemia  -Continue present regimen     Mitral valve prolapse/vegetation  -Possibly contributing to lesion in right cerebellum formerly identified on MRI brain  -Continue to monitor  -Status post IV antibiotics in 2019     Prostate cancer  -Status post treatment          Jay Jay Urias MD

## 2021-10-19 DIAGNOSIS — Z12.11 SCREENING FOR COLON CANCER: Primary | ICD-10-CM

## 2021-11-04 ENCOUNTER — OUTSIDE FACILITY SERVICE (OUTPATIENT)
Dept: GASTROENTEROLOGY | Facility: CLINIC | Age: 71
End: 2021-11-04

## 2021-11-04 PROCEDURE — 88305 TISSUE EXAM BY PATHOLOGIST: CPT | Performed by: INTERNAL MEDICINE

## 2021-11-04 PROCEDURE — G0500 MOD SEDAT ENDO SERVICE >5YRS: HCPCS | Performed by: INTERNAL MEDICINE

## 2021-11-04 PROCEDURE — 45385 COLONOSCOPY W/LESION REMOVAL: CPT | Performed by: INTERNAL MEDICINE

## 2021-11-05 ENCOUNTER — LAB REQUISITION (OUTPATIENT)
Dept: LAB | Facility: HOSPITAL | Age: 71
End: 2021-11-05

## 2021-11-05 DIAGNOSIS — Z12.11 ENCOUNTER FOR SCREENING FOR MALIGNANT NEOPLASM OF COLON: ICD-10-CM

## 2021-11-08 ENCOUNTER — TELEPHONE (OUTPATIENT)
Dept: GASTROENTEROLOGY | Facility: CLINIC | Age: 71
End: 2021-11-08

## 2021-11-08 LAB
CYTO UR: NORMAL
LAB AP CASE REPORT: NORMAL
LAB AP CLINICAL INFORMATION: NORMAL
PATH REPORT.FINAL DX SPEC: NORMAL
PATH REPORT.GROSS SPEC: NORMAL

## 2021-11-08 NOTE — TELEPHONE ENCOUNTER
----- Message from Reynaldo Drummond MD sent at 11/8/2021 11:10 AM EST -----  Please let Lalito know he had an adenoma. Follow up in 5 years is recommended.

## 2021-11-15 ENCOUNTER — TELEPHONE (OUTPATIENT)
Dept: ONCOLOGY | Facility: CLINIC | Age: 71
End: 2021-11-15

## 2021-11-15 ENCOUNTER — TELEPHONE (OUTPATIENT)
Dept: FAMILY MEDICINE CLINIC | Facility: CLINIC | Age: 71
End: 2021-11-15

## 2021-11-15 NOTE — TELEPHONE ENCOUNTER
Caller: Sabina Elliott    Relationship: Emergency Contact    Best call back number: [phone]    What is the best time to reach you: ANYTIME    Who are you requesting to speak with (clinical staff, provider,  specific staff member): CLINICAL      What was the call regarding: PATIENTS SPOUSE CALLED ADVISED HE HIS HAVING LABS DONE NEXT WEEK AND THEY HAVE A HARD TIME GETTING BLOODWORK FROM PATIENT. SPOUSE WANTED TO KNOW IF THEY COULD BRING LAB RESULTS FROM THEN TO APPT 12-13-21 WHEN THEY SEE MINNA WHARTON        Do you require a callback: YES PLEASE CALL TO ADVISE

## 2021-11-15 NOTE — TELEPHONE ENCOUNTER
Caller: Sabina Elliott    Relationship: Emergency Contact    Best call back number: [phone]    What medications are you currently taking:   Current Outpatient Medications on File Prior to Visit   Medication Sig Dispense Refill   • aspirin 81 MG EC tablet Take 81 mg by mouth Every Night.     • atorvastatin (LIPITOR) 40 MG tablet Take 40 mg by mouth Every Night.     • calcium carbonate-cholecalciferol 500-400 MG-UNIT tablet tablet Take 1 tablet by mouth Daily.     • cholecalciferol (VITAMIN D3) 1000 units tablet Take 1,000 Units by mouth Daily.     • ezetimibe (ZETIA) 10 MG tablet Take 10 mg by mouth Daily.     • metFORMIN (Glucophage) 500 MG tablet Take 1 tablet by mouth 2 (Two) Times a Day With Meals. 60 tablet 5   • nitroglycerin (NITROSTAT) 0.4 MG SL tablet Place 0.4 mg under the tongue Every 5 (Five) Minutes As Needed for chest pain. Take no more than 3 doses in 15 minutes.     • Omega-3 Fatty Acids (FISH OIL) 1200 MG capsule capsule Take 1,200 mg by mouth Daily.     • sertraline (ZOLOFT) 50 MG tablet TAKE ONE TABLET BY MOUTH DAILY 90 tablet 3   • Sod Picosulfate-Mag Ox-Cit Acd 10-3.5-12 MG-GM -GM/160ML solution Take 1 kit by mouth Take As Directed. Follow instructions mailed to your home. If you did not receive instructions; call [phone]. 320 mL 0     No current facility-administered medications on file prior to visit.      Which medication are you concerned about: AMOXICILLIN    Who prescribed you this medication: [facility]     What are your concerns: PATIENT'S WIFE STATED THAT THE PATIENT NEEDS AMOXICILLIN BEFORE DENTAL APPOINTMENTS AND STATED THAT THE PATIENT NEEDS AMOXICILLIN.

## 2021-11-15 NOTE — TELEPHONE ENCOUNTER
Returned Sabina's call and advised that it is fine for him to bring those labs with him and if we need to we can always check on way out of his appt that day if something were abnormal on those.

## 2021-11-16 DIAGNOSIS — Z98.818 OTHER DENTAL PROCEDURE STATUS: Primary | ICD-10-CM

## 2021-11-16 RX ORDER — AMOXICILLIN 500 MG/1
CAPSULE ORAL
Qty: 4 CAPSULE | Refills: 0 | Status: SHIPPED | OUTPATIENT
Start: 2021-11-16 | End: 2021-12-13

## 2021-11-16 NOTE — TELEPHONE ENCOUNTER
Called and spoke to pt informed that medication requested had been sent in to pharmacy.Voiced understanding. Had no further questions/concerns.

## 2021-12-06 ENCOUNTER — APPOINTMENT (OUTPATIENT)
Dept: CT IMAGING | Facility: HOSPITAL | Age: 71
End: 2021-12-06

## 2021-12-07 DIAGNOSIS — C34.31 SMALL CELL LUNG CANCER, RIGHT LOWER LOBE (HCC): Primary | ICD-10-CM

## 2021-12-08 ENCOUNTER — HOSPITAL ENCOUNTER (OUTPATIENT)
Dept: CT IMAGING | Facility: HOSPITAL | Age: 71
Discharge: HOME | End: 2021-12-08
Admitting: INTERNAL MEDICINE

## 2021-12-08 DIAGNOSIS — C34.31 SMALL CELL LUNG CANCER, RIGHT LOWER LOBE (HCC): ICD-10-CM

## 2021-12-08 PROCEDURE — 74176 CT ABD & PELVIS W/O CONTRAST: CPT

## 2021-12-08 PROCEDURE — 71250 CT THORAX DX C-: CPT

## 2021-12-13 ENCOUNTER — OFFICE VISIT (OUTPATIENT)
Dept: ONCOLOGY | Facility: CLINIC | Age: 71
End: 2021-12-13

## 2021-12-13 VITALS
TEMPERATURE: 96.6 F | WEIGHT: 170 LBS | OXYGEN SATURATION: 98 % | DIASTOLIC BLOOD PRESSURE: 71 MMHG | RESPIRATION RATE: 18 BRPM | HEART RATE: 74 BPM | BODY MASS INDEX: 26.68 KG/M2 | HEIGHT: 67 IN | SYSTOLIC BLOOD PRESSURE: 118 MMHG

## 2021-12-13 DIAGNOSIS — C34.31 SMALL CELL LUNG CANCER, RIGHT LOWER LOBE (HCC): Primary | ICD-10-CM

## 2021-12-13 PROCEDURE — 99214 OFFICE O/P EST MOD 30 MIN: CPT | Performed by: NURSE PRACTITIONER

## 2021-12-13 RX ORDER — POLYETHYLENE GLYCOL-3350 AND ELECTROLYTES WITH FLAVOR PACK 240; 5.84; 2.98; 6.72; 22.72 G/278.26G; G/278.26G; G/278.26G; G/278.26G; G/278.26G
POWDER, FOR SOLUTION ORAL
COMMUNITY
Start: 2021-10-19 | End: 2022-03-21

## 2021-12-13 NOTE — PROGRESS NOTES
DATE OF VISIT: 12/13/2021    REASON FOR VISIT: Followup for limited stage small cell lung cancer     HISTORY OF PRESENT ILLNESS: The patient is a very pleasant 71 y.o. male  with past medical history significant for SCLCA diagnosed November 2020. The patient was started on concurrent chemotherapy with XRT using Cisplatin/ 16 December 2020.  He completed his treatment course with 4 cycles of chemotherapy February 23, 2021.  Repeated scans revealed complete response to treatment.  The patient is here today for scheduled follow up visit.    SUBJECTIVE: The patient is here today with his wife. He has been doing well since his last visit. He denies any residual shortness of breath. He has an occasional cough related to drainage. His dizziness and headaches have continued to improve. His balance is better. He has lost some weight since his last visit. He thinks this is related to changing his diet due to diabetes as well as due to Metformin. He does not have a very strong appetite, however he eats three meals per day and denies nausea or abdominal discomfort. He has cut out most of his sweets and junk food. He is being followed by nephrology and his creatinine has been stable.     PAST MEDICAL HISTORY/SOCIAL HISTORY/FAMILY HISTORY: Reviewed by me and unchanged from my documentation done on 12/13/21.    Review of Systems   Constitutional: Positive for fatigue. Negative for activity change, appetite change, chills, fever and unexpected weight change.        Weight loss   HENT: Positive for postnasal drip. Negative for hearing loss, mouth sores, nosebleeds, sore throat and trouble swallowing.    Eyes: Negative for visual disturbance.   Respiratory: Positive for cough. Negative for chest tightness, shortness of breath and wheezing.    Cardiovascular: Negative for chest pain, palpitations and leg swelling.   Gastrointestinal: Negative for abdominal distention, abdominal pain, blood in stool, constipation, diarrhea, nausea,  "rectal pain and vomiting.   Endocrine: Negative for cold intolerance and heat intolerance.   Genitourinary: Negative for difficulty urinating, dysuria, frequency and urgency.   Musculoskeletal: Negative for arthralgias, back pain, gait problem, joint swelling and myalgias.   Skin: Negative for rash.   Neurological: Positive for numbness. Negative for dizziness, tremors, syncope, weakness, light-headedness and headaches.   Hematological: Negative for adenopathy. Does not bruise/bleed easily.   Psychiatric/Behavioral: Negative for confusion, sleep disturbance and suicidal ideas. The patient is not nervous/anxious.          Current Outpatient Medications:   •  aspirin 81 MG EC tablet, Take 81 mg by mouth Every Night., Disp: , Rfl:   •  atorvastatin (LIPITOR) 40 MG tablet, Take 40 mg by mouth Every Night., Disp: , Rfl:   •  calcium carbonate-cholecalciferol 500-400 MG-UNIT tablet tablet, Take 1 tablet by mouth Daily., Disp: , Rfl:   •  cholecalciferol (VITAMIN D3) 1000 units tablet, Take 1,000 Units by mouth Daily., Disp: , Rfl:   •  ezetimibe (ZETIA) 10 MG tablet, Take 10 mg by mouth Daily., Disp: , Rfl:   •  GaviLyte-C 240 g solution, , Disp: , Rfl:   •  metFORMIN (Glucophage) 500 MG tablet, Take 1 tablet by mouth 2 (Two) Times a Day With Meals., Disp: 60 tablet, Rfl: 5  •  nitroglycerin (NITROSTAT) 0.4 MG SL tablet, Place 0.4 mg under the tongue Every 5 (Five) Minutes As Needed for chest pain. Take no more than 3 doses in 15 minutes., Disp: , Rfl:   •  Omega-3 Fatty Acids (FISH OIL) 1200 MG capsule capsule, Take 1,200 mg by mouth Daily., Disp: , Rfl:   •  sertraline (ZOLOFT) 50 MG tablet, TAKE ONE TABLET BY MOUTH DAILY, Disp: 90 tablet, Rfl: 3    PHYSICAL EXAMINATION:   /71   Pulse 74   Temp 96.6 °F (35.9 °C) (Temporal)   Resp 18   Ht 170.2 cm (67.01\")   Wt 77.1 kg (170 lb)   SpO2 98%   BMI 26.62 kg/m²    Pain Score    12/13/21 1052   PainSc: 0-No pain       ECOG Performance Status: 1 - Symptomatic but " completely ambulatory  General Appearance:  alert, cooperative, no apparent distress and appears stated age   Neurologic/Psychiatric: A&O x 3, gait steady, appropriate affect, strength 5/5 in all muscle groups   HEENT:  Normocephalic, without obvious abnormality, mucous membranes moist   Neck: Supple, symmetrical, trachea midline, no adenopathy;  No thyromegaly, masses, or tenderness   Lungs:   Clear to auscultation bilaterally; respirations regular, even, and unlabored bilaterally   Heart:  Regular rate and rhythm, no murmurs appreciated   Abdomen:   Soft, non-tender, non-distended and no organomegaly   Lymph nodes: No cervical, supraclavicular, inguinal or axillary adenopathy noted   Extremities: Normal, atraumatic; no clubbing, cyanosis, or edema    Skin: No rashes, ulcers, or suspicious lesions noted     No visits with results within 2 Week(s) from this visit.   Latest known visit with results is:   Lab Requisition on 11/04/2021   Component Date Value Ref Range Status   • Case Report 11/04/2021    Final                    Value:Surgical Pathology Report                         Case: [id number]                                  Authorizing Provider:  Reynaldo Drummond MD        Collected:           11/04/2021 11:42 AM          Ordering Location:     [facility]   Received:            11/05/2021 09:43 AM                                 LABORATORY                                                                   Pathologist:           Aditya Reilly MD                                                            Specimen:    Large Intestine, Left / Descending Colon                                                  • Clinical Information 11/04/2021    Final                    Value:This result contains rich text formatting which cannot be displayed here.   • Final Diagnosis 11/04/2021    Final                    Value:This result contains rich text formatting which cannot be displayed here.   • Gross  Description 11/04/2021    Final                    Value:This result contains rich text formatting which cannot be displayed here.   • Microscopic Description 11/04/2021    Final                    Value:This result contains rich text formatting which cannot be displayed here.        CT Chest Without Contrast Diagnostic, CT Abdomen Pelvis Without Contrast    Result Date: 12/10/2021  Narrative: EXAMINATION: CT CHEST WO CONTRAST, DIAGNOSTIC-, CT ABDOMEN AND PELVIS WO CONTRAST- 12/08/2021  INDICATION:  C34.31-Malignant neoplasm of lower lobe, right bronchus or lung, lung cancer, restaging.  TECHNIQUE: Multiple axial CT imaging was obtained of the chest, abdomen and pelvis without the administration of oral or intravenous contrast.  The radiation dose reduction device was turned on for each scan per the ALARA (As Low as Reasonably Achievable) protocol.  COMPARISON: 09/07/2021.  FINDINGS:  CHEST: The thyroid is homogeneous in appearance. There are some increased markings identified within the right hilar region extending into the right upper lobe in the periphery and posterior aspect of the right lower lobe. Findings most suggestive of postirradiation change. The areas of more dense subpleural disease are improving when compared to the prior study. There is no new area of density at this time to suggest local recurrence. No pleural effusion or pneumothorax. The left lung is grossly clear. Underlying chronic and emphysematous changes are present. The findings are stable when compared to the prior study. No definite signs of recurrence. No pleural effusion or pneumothorax. No acute superimposed infectious process. Degenerative changes seen within the spine. The visualized upper abdomen reveals stool seen scattered diffusely throughout the colon.  ABDOMEN: The liver is homogeneous in appearance. Stones seen layering in the gallbladder. The spleen is unremarkable. The pancreas is homogeneous. Large cyst seen on the right  kidney. Nodularity identified in the adrenal gland on the right which is stable and unchanged. No abdominal or retroperitoneal lymphadenopathy. Stool seen scattered diffusely throughout the colon. The appendix is normal. Vascular calcification seen throughout the abdominal aorta and iliac vessels.  PELVIS: Small left inguinal hernia containing bowel and fat with no evidence of obstruction. No pelvic adenopathy. No free fluid or free air. No abnormal mass or fluid collection is identified. Vascular calcification seen within the iliac vessels. Degenerative changes seen within the spine and pelvis.      Impression: Scarlike and subpleural nodular densities seen in the right upper and superior aspect of the lower lobes and perihilar region on the right all suggesting postirradiation change. No definite evidence of recurrence or significant change in the interval. No superimposed acute infectious process. Abdomen and pelvis is stable and unremarkable.  D:  12/09/2021 E:  12/09/2021  This report was finalized on 12/10/2021 4:37 PM by Dr. Sherry Peña MD.        ASSESSMENT: The patient is a very pleasant 71 y.o. male  with limited stage small cell lung cancer    PROBLEM LIST:   1.  Limited stage small cell lung cancer [id number] stage IIIa:  A.  Presented with abnormal screening CT chest  B.  Diagnosed after bronchoscopy with biopsy done by  November 19, 2020 + for small cell from level 7 level 4R and level 10 R lymph nodes.  C.  Whole-body PET scan did not reveal any other sites of disease.  D.  Started definitive concurrent chemotherapy with radiation using cisplatin etoposide December 2020 1 status post 4 cycles completed February 23, 2021  2.  Isolated 6 mm right cerebellar lesion:  A.  Evident MRI brain done on December 1, 2020  B.  Completed prophylactic whole brain radiation 4/23/2021.   3.  Chemotherapy-induced nausea  4. Insomnia   5.  Treatment induced anemia  6.  Chronic kidney disease  7.  Type  2 diabetes    PLAN:  1.  I reviewed the CAT scan results from 12/8/2021 with the patient. I reviewed the images myself. I told the patient he has stable scarlike densities in the right upper lobe and perihilar region consistent with post-radiation change. He has no evidence of recurrent cancer or distant metastasis.    2.  We will continue watchful waiting for now with plan to repeat imaging studies in 3 months t with repeat labs including CBC and CMP as well as repeat CAT scans of chest, abdomen, and pelvis done without contrast. If his scan are stable at his next visit, we will change his follow up to every 4-6 months.   3. I reviewed the lab results from 11/24/2021. His anemia has improved some to 12.5, with normal WBC. He has mild thrombocytopenia at 135,000. His creatinine is stable at 1.5. He sodium and potassium were normal, with normal alkaline phosphatase, normal AST and mildly elevated ALT at 54.   4. The patient has completed prophylactic whole brain radiation under the care of Dr. Urias. He has repeat MRI and follow up with radiation on 2/7/2021.   5. He will continue Zoloft 50 mg daily for anxiety and depression. He is also taking Ambien 5 mg at bedtime as needed for sleep.   6.  The patient will continue follow-up with Dr. Webber with nephrology regarding chronic kidney disease.  He will continue to stay well hydrated and avoid use of ibuprofen.   7. I advised he monitor his weight. He is going to try to add some between meal snacks to help. He has a decent appetite, and has made some changes that could account for his weight loss, however we discussed that unexplained weight loss is a concern for malignancy.   8. He will continue to monitor his blood sugar at home. He is currently on metformin 500 mg twice a day for mangement.     Gaby Pandya, APRN  12/13/2021

## 2022-01-19 ENCOUNTER — LAB (OUTPATIENT)
Dept: LAB | Facility: HOSPITAL | Age: 72
End: 2022-01-19

## 2022-01-19 DIAGNOSIS — C34.31 SMALL CELL LUNG CANCER, RIGHT LOWER LOBE: ICD-10-CM

## 2022-01-19 DIAGNOSIS — E11.9 TYPE 2 DIABETES MELLITUS WITHOUT COMPLICATION, WITHOUT LONG-TERM CURRENT USE OF INSULIN: Primary | ICD-10-CM

## 2022-01-19 DIAGNOSIS — E11.9 TYPE 2 DIABETES MELLITUS WITHOUT COMPLICATION, WITHOUT LONG-TERM CURRENT USE OF INSULIN: ICD-10-CM

## 2022-01-19 LAB
ALBUMIN SERPL-MCNC: 4.7 G/DL (ref 3.5–5.2)
ALBUMIN/GLOB SERPL: 2 G/DL
ALP SERPL-CCNC: 82 U/L (ref 39–117)
ALT SERPL W P-5'-P-CCNC: 32 U/L (ref 1–41)
ANION GAP SERPL CALCULATED.3IONS-SCNC: 10 MMOL/L (ref 5–15)
AST SERPL-CCNC: 25 U/L (ref 1–40)
BASOPHILS # BLD AUTO: 0.03 10*3/MM3 (ref 0–0.2)
BASOPHILS NFR BLD AUTO: 0.4 % (ref 0–1.5)
BILIRUB SERPL-MCNC: 0.8 MG/DL (ref 0–1.2)
BUN SERPL-MCNC: 21 MG/DL (ref 8–23)
BUN/CREAT SERPL: 14.6 (ref 7–25)
CALCIUM SPEC-SCNC: 10.1 MG/DL (ref 8.6–10.5)
CHLORIDE SERPL-SCNC: 103 MMOL/L (ref 98–107)
CO2 SERPL-SCNC: 26 MMOL/L (ref 22–29)
CREAT SERPL-MCNC: 1.44 MG/DL (ref 0.76–1.27)
DEPRECATED RDW RBC AUTO: 45.9 FL (ref 37–54)
EOSINOPHIL # BLD AUTO: 0.19 10*3/MM3 (ref 0–0.4)
EOSINOPHIL NFR BLD AUTO: 2.6 % (ref 0.3–6.2)
ERYTHROCYTE [DISTWIDTH] IN BLOOD BY AUTOMATED COUNT: 13.2 % (ref 12.3–15.4)
GFR SERPL CREATININE-BSD FRML MDRD: 48 ML/MIN/1.73
GLOBULIN UR ELPH-MCNC: 2.4 GM/DL
GLUCOSE SERPL-MCNC: 103 MG/DL (ref 65–99)
HBA1C MFR BLD: 6.72 % (ref 4.8–5.6)
HCT VFR BLD AUTO: 40.7 % (ref 37.5–51)
HGB BLD-MCNC: 13.6 G/DL (ref 13–17.7)
IMM GRANULOCYTES # BLD AUTO: 0.03 10*3/MM3 (ref 0–0.05)
IMM GRANULOCYTES NFR BLD AUTO: 0.4 % (ref 0–0.5)
LYMPHOCYTES # BLD AUTO: 1.15 10*3/MM3 (ref 0.7–3.1)
LYMPHOCYTES NFR BLD AUTO: 15.7 % (ref 19.6–45.3)
MCH RBC QN AUTO: 31.6 PG (ref 26.6–33)
MCHC RBC AUTO-ENTMCNC: 33.4 G/DL (ref 31.5–35.7)
MCV RBC AUTO: 94.7 FL (ref 79–97)
MONOCYTES # BLD AUTO: 0.58 10*3/MM3 (ref 0.1–0.9)
MONOCYTES NFR BLD AUTO: 7.9 % (ref 5–12)
NEUTROPHILS NFR BLD AUTO: 5.36 10*3/MM3 (ref 1.7–7)
NEUTROPHILS NFR BLD AUTO: 73 % (ref 42.7–76)
NRBC BLD AUTO-RTO: 0 /100 WBC (ref 0–0.2)
PLATELET # BLD AUTO: 143 10*3/MM3 (ref 140–450)
PMV BLD AUTO: 11.3 FL (ref 6–12)
POTASSIUM SERPL-SCNC: 4.9 MMOL/L (ref 3.5–5.2)
PROT SERPL-MCNC: 7.1 G/DL (ref 6–8.5)
RBC # BLD AUTO: 4.3 10*6/MM3 (ref 4.14–5.8)
SODIUM SERPL-SCNC: 139 MMOL/L (ref 136–145)
WBC NRBC COR # BLD: 7.34 10*3/MM3 (ref 3.4–10.8)

## 2022-01-19 PROCEDURE — 83036 HEMOGLOBIN GLYCOSYLATED A1C: CPT

## 2022-01-19 PROCEDURE — 36415 COLL VENOUS BLD VENIPUNCTURE: CPT

## 2022-01-19 PROCEDURE — 85025 COMPLETE CBC W/AUTO DIFF WBC: CPT

## 2022-01-19 PROCEDURE — 80053 COMPREHEN METABOLIC PANEL: CPT

## 2022-02-07 ENCOUNTER — HOSPITAL ENCOUNTER (OUTPATIENT)
Dept: MRI IMAGING | Facility: HOSPITAL | Age: 72
Discharge: HOME | End: 2022-02-07
Admitting: RADIOLOGY

## 2022-02-07 ENCOUNTER — HOSPITAL ENCOUNTER (OUTPATIENT)
Dept: RADIATION ONCOLOGY | Facility: HOSPITAL | Age: 72
Setting detail: RADIATION/ONCOLOGY SERIES
Discharge: HOME | End: 2022-02-07

## 2022-02-07 ENCOUNTER — OFFICE VISIT (OUTPATIENT)
Dept: RADIATION ONCOLOGY | Facility: HOSPITAL | Age: 72
End: 2022-02-07

## 2022-02-07 VITALS
BODY MASS INDEX: 26.54 KG/M2 | SYSTOLIC BLOOD PRESSURE: 115 MMHG | DIASTOLIC BLOOD PRESSURE: 73 MMHG | TEMPERATURE: 96.4 F | RESPIRATION RATE: 16 BRPM | WEIGHT: 169.1 LBS | HEART RATE: 85 BPM | OXYGEN SATURATION: 96 % | HEIGHT: 67 IN

## 2022-02-07 DIAGNOSIS — E11.9 TYPE 2 DIABETES MELLITUS WITHOUT COMPLICATION, WITHOUT LONG-TERM CURRENT USE OF INSULIN: ICD-10-CM

## 2022-02-07 DIAGNOSIS — C34.31 SMALL CELL LUNG CANCER, RIGHT LOWER LOBE: Primary | ICD-10-CM

## 2022-02-07 DIAGNOSIS — C34.31 SMALL CELL LUNG CANCER, RIGHT LOWER LOBE: ICD-10-CM

## 2022-02-07 DIAGNOSIS — N18.31 CKD STAGE G3A/A2, GFR 45-59 AND ALBUMIN CREATININE RATIO 30-299 MG/G: ICD-10-CM

## 2022-02-07 PROCEDURE — 70551 MRI BRAIN STEM W/O DYE: CPT

## 2022-02-07 PROCEDURE — G0463 HOSPITAL OUTPT CLINIC VISIT: HCPCS

## 2022-02-07 NOTE — PROGRESS NOTES
FOLLOW UP NOTE    PATIENT:                                                      Luis Elliott  MEDICAL RECORD #:                        [id number]  :                                                          1950  COMPLETION DATE:   21  DIAGNOSIS:     Small cell lung cancer, right lower lobe (HCC)  - Stage IIIA (cT1b, cN2, cM0)        BRIEF HISTORY:    Luis Elliott is a 71 y.o. gentleman found to have an enlarging right lower lobe nodule on screening CT scan of the chest.  He underwent staging PET/CT scan in 2020 that showed the pulmonary nodule to be hypermetabolic and increased in size.  There was some hilar and subcarinal lymphadenopathy which was also hypermetabolic, but otherwise no evidence of distant disease.  He then underwent bronchoscopy and EBUS that confirmed diagnosis of small cell lung cancer.  Staging MRI brain 2020 showed a very small indeterminate enhancing right cerebellar lesion with some weak diffusion restriction but no edema.  It was unclear whether this intracranial abnormality represented metastasis or was related to his history of mitral valve vegetation and possible endocarditis with positive blood cultures.  He underwent definitive concurrent chemotherapy with radiation using cisplatin etoposide, which he completed in 2021.  He had a complete response to treatment with no evidence of residual disease in the chest.  Repeat MRI brain showed resolution of the suspicious brain lesion.  He therefore was recommended to undergo prophylactic cranial irradiation.  The area initially involved with a suspected malignancy received 30 Gy in 10 fractions, while the remainder of the brain received 25 Gy in 10 fractions with hippocampal avoidance utilizing IMRT and IGRT technique which the patient completed 2021.    The patient reports that he is doing well.  He reports that he stays busy.  The patient reports that he has no difficulty with cognition.  He reports  that he likes to continue to read.  He continues to stay active.  Patient denies any fevers, chills, nausea, vomiting.  Patient does have increasing hearing loss but he denies any changes to his vision.    Past Medical History:   Diagnosis Date   • Cancer (HCC)     PROSTATE   • CKD stage G3a/A2, GFR 45-59 and albumin creatinine ratio  mg/g ([facility]) 6/21/2021   • COPD (chronic obstructive pulmonary disease) ([facility])      thinks pt has this    • Coronary artery disease    • DDD (degenerative disc disease), cervical    • Depression    • Diabetes mellitus ([facility])     let dr check sugar    • Erectile dysfunction    • Glaucoma    • Headache    • History of radiation therapy 02/23/2021    RUL/mediastinum   • History of radiation therapy 04/23/2021    PCI brain   • Hyperlipidemia    • Hypertension    • Impingement syndrome of left shoulder    • Infection, bacterial     sees ID -  42 days of antibiotics    • Lung cancer ([facility])    • MI, old     89   • Mitral valve vegetation    • Osteoarthritis    • Prostate cancer ([facility])     2003   • SOBOE (shortness of breath on exertion)    • Varicose veins of lower limb     RIGHT   • Wears glasses    • Wears partial dentures     bottom      Past Surgical History:   Procedure Laterality Date   • BRONCHOSCOPY N/A 11/19/2020    Procedure: BRONCHOSCOPY WITH ENDOBRONCHIAL ULTRASOUND WITH FLUOROSCOPY;  Surgeon: Clint Thompson MD;  Location:  [facility] ENDOSCOPY;  Service: Pulmonary;  Laterality: N/A;   • CARDIAC CATHETERIZATION N/A 12/20/2016    Procedure: Left Heart Cath;  Surgeon: Kan Blackwell MD;  Location:  [facility] INVASIVE LOCATION;  Service:    • CATARACT EXTRACTION      BILATERAL CATARACTS REMOVED.   • COLONOSCOPY     • COLONOSCOPY W/ POLYPECTOMY     • CORONARY ANGIOPLASTY     • CORONARY ANGIOPLASTY WITH STENT PLACEMENT      x3    • EYE SURGERY      right- stent for drainage   • HERNIA REPAIR      RIGHT   • KNEE ARTHROSCOPY      LEFT   • KNEE SURGERY      LEFT  TOTAL KNEE REPLACEMENT 12/2012   • LUMBAR EPIDURAL INJECTION     • OK RT/LT HEART CATHETERS N/A 12/27/2016    Procedure: Percutaneous Coronary Intervention;  Surgeon: Kan Blackwell MD;  Location: [facility] INVASIVE LOCATION;  Service: Cardiovascular   • PROSTATECTOMY      2003   • TENDON TRANSFER ELBOW      TENDON REPAIR-  right    • TONSILLECTOMY     • TRABECULECTOMY      FOR GLAUCOMA     Family History   Problem Relation Age of Onset   • Hypertension Mother    • Atrial fibrillation Mother    • Alcohol abuse Father    • Heart attack Father    • Diabetes Father    • No Known Problems Sister    • Prostate cancer Brother      Social History     Socioeconomic History   • Marital status:      Spouse name: Sabina   • Number of children: 2   Tobacco Use   • Smoking status: Former Smoker     Packs/day: 1.50     Years: 36.00     Pack years: 54.00     Types: Cigarettes     Quit date: 4/12/2011     Years since quitting: 10.8   • Smokeless tobacco: Former User     Types: Chew     Quit date: 2011   • Tobacco comment: quit smoking for 12 years then started again but then quit a final time    Vaping Use   • Vaping Use: Never used   Substance and Sexual Activity   • Alcohol use: Not Currently   • Drug use: Yes     Types: Marijuana     Comment: a month ago   • Sexual activity: Defer         Current Outpatient Medications:   •  aspirin 81 MG EC tablet, Take 81 mg by mouth Every Night., Disp: , Rfl:   •  atorvastatin (LIPITOR) 40 MG tablet, Take 40 mg by mouth Every Night., Disp: , Rfl:   •  calcium carbonate-cholecalciferol 500-400 MG-UNIT tablet tablet, Take 1 tablet by mouth Daily., Disp: , Rfl:   •  cholecalciferol (VITAMIN D3) 1000 units tablet, Take 1,000 Units by mouth Daily., Disp: , Rfl:   •  ezetimibe (ZETIA) 10 MG tablet, Take 10 mg by mouth Daily., Disp: , Rfl:   •  GaviLyte-C 240 g solution, , Disp: , Rfl:   •  metFORMIN (Glucophage) 500 MG tablet, Take 1 tablet by mouth 2 (Two) Times a Day With Meals.,  "Disp: 60 tablet, Rfl: 5  •  nitroglycerin (NITROSTAT) 0.4 MG SL tablet, Place 0.4 mg under the tongue Every 5 (Five) Minutes As Needed for chest pain. Take no more than 3 doses in 15 minutes., Disp: , Rfl:   •  Omega-3 Fatty Acids (FISH OIL) 1200 MG capsule capsule, Take 1,200 mg by mouth Daily., Disp: , Rfl:   •  sertraline (ZOLOFT) 50 MG tablet, TAKE ONE TABLET BY MOUTH DAILY, Disp: 90 tablet, Rfl: 3      Review of Systems:   Review of Systems   Psychiatric/Behavioral: The patient is nervous/anxious.    A full 14 point review of systems was performed and was negative except as noted in the HPI.      Physical Exam:   Physical Exam  Constitutional:       General: He is not in acute distress.     Appearance: He is well-developed.   HENT:      Head: Normocephalic and atraumatic.   Eyes:      Conjunctiva/sclera: Conjunctivae normal.      Pupils: Pupils are equal, round, and reactive to light.   Neck:      Trachea: No tracheal deviation.   Pulmonary:      Effort: Pulmonary effort is normal. No respiratory distress.      Breath sounds: No wheezing.   Abdominal:      General: There is no distension.      Palpations: Abdomen is soft.   Musculoskeletal:         General: Normal range of motion.      Cervical back: Normal range of motion.   Skin:     General: Skin is warm and dry.   Neurological:      Mental Status: He is alert.      Cranial Nerves: No cranial nerve deficit.      Coordination: Coordination normal.   Psychiatric:         Behavior: Behavior normal.         Judgment: Judgment normal.            VITAL SIGNS:   Vitals:    02/07/22 1303   BP: 115/73   Pulse: 85   Resp: 16   Temp: 96.4 °F (35.8 °C)   SpO2: 96%  Comment: RA   Weight: 76.7 kg (169 lb 1.6 oz)   Height: 170.2 cm (67\")   PainSc: 0-No pain           Karnofsky score: 90       The following portions of the patient's history were reviewed and updated as appropriate: allergies, current medications, past family history, past medical history, past social " history, past surgical history and problem list.    I have personally requested reviewed and interpreted the patient's images and radiology reports and pathology reports listed below:  CT Abdomen Pelvis Without Contrast    Result Date: 12/10/2021  Scarlike and subpleural nodular densities seen in the right upper and superior aspect of the lower lobes and perihilar region on the right all suggesting postirradiation change. No definite evidence of recurrence or significant change in the interval. No superimposed acute infectious process. Abdomen and pelvis is stable and unremarkable.  D:  12/09/2021 E:  12/09/2021  This report was finalized on 12/10/2021 4:37 PM by Dr. Sherry Peña MD.      CT Chest Without Contrast Diagnostic    Result Date: 12/10/2021  Scarlike and subpleural nodular densities seen in the right upper and superior aspect of the lower lobes and perihilar region on the right all suggesting postirradiation change. No definite evidence of recurrence or significant change in the interval. No superimposed acute infectious process. Abdomen and pelvis is stable and unremarkable.  D:  12/09/2021 E:  12/09/2021  This report was finalized on 12/10/2021 4:37 PM by Dr. Sherry Peña MD.      MRI Brain Without Contrast    Result Date: 2/7/2022  Within limitations of noncontrast exam, no evidence of intracranial mass or mass effect to suggest metastatic involvement. Age-related changes are again present, stable from prior. No acute findings.   This report was finalized on 2/7/2022 10:57 AM by Kan Webber.    I personally reviewed the above images.  I agree with the reads.  I reviewed Gaby Otero's most recent note.     WBC   Date Value Ref Range Status   01/19/2022 7.34 3.40 - 10.80 10*3/mm3 Final     RBC   Date Value Ref Range Status   01/19/2022 4.30 4.14 - 5.80 10*6/mm3 Final     Hemoglobin   Date Value Ref Range Status   01/19/2022 13.6 13.0 - 17.7 g/dL Final     Hematocrit   Date Value Ref  Range Status   01/19/2022 40.7 37.5 - 51.0 % Final     MCV   Date Value Ref Range Status   01/19/2022 94.7 79.0 - 97.0 fL Final     MCH   Date Value Ref Range Status   01/19/2022 31.6 26.6 - 33.0 pg Final     MCHC   Date Value Ref Range Status   01/19/2022 33.4 31.5 - 35.7 g/dL Final     RDW   Date Value Ref Range Status   01/19/2022 13.2 12.3 - 15.4 % Final     RDW-SD   Date Value Ref Range Status   01/19/2022 45.9 37.0 - 54.0 fl Final     MPV   Date Value Ref Range Status   01/19/2022 11.3 6.0 - 12.0 fL Final     Platelets   Date Value Ref Range Status   01/19/2022 143 140 - 450 10*3/mm3 Final     Neutrophil %   Date Value Ref Range Status   01/19/2022 73.0 42.7 - 76.0 % Final     Lymphocyte %   Date Value Ref Range Status   01/19/2022 15.7 (L) 19.6 - 45.3 % Final     Monocyte %   Date Value Ref Range Status   01/19/2022 7.9 5.0 - 12.0 % Final     Eosinophil %   Date Value Ref Range Status   01/19/2022 2.6 0.3 - 6.2 % Final     Basophil %   Date Value Ref Range Status   01/19/2022 0.4 0.0 - 1.5 % Final     Immature Grans %   Date Value Ref Range Status   01/19/2022 0.4 0.0 - 0.5 % Final     Neutrophils, Absolute   Date Value Ref Range Status   01/19/2022 5.36 1.70 - 7.00 10*3/mm3 Final     Lymphocytes, Absolute   Date Value Ref Range Status   01/19/2022 1.15 0.70 - 3.10 10*3/mm3 Final     Monocytes, Absolute   Date Value Ref Range Status   01/19/2022 0.58 0.10 - 0.90 10*3/mm3 Final     Eosinophils, Absolute   Date Value Ref Range Status   01/19/2022 0.19 0.00 - 0.40 10*3/mm3 Final     Basophils, Absolute   Date Value Ref Range Status   01/19/2022 0.03 0.00 - 0.20 10*3/mm3 Final     Immature Grans, Absolute   Date Value Ref Range Status   01/19/2022 0.03 0.00 - 0.05 10*3/mm3 Final     nRBC   Date Value Ref Range Status   01/19/2022 0.0 0.0 - 0.2 /100 WBC Final     Lab Results   Component Value Date    GLUCOSE 103 (H) 01/19/2022    BUN 21 01/19/2022    CREATININE 1.44 (H) 01/19/2022    EGFRIFNONA 48 (L) 01/19/2022     BCR 14.6 01/19/2022    K 4.9 01/19/2022    CO2 26.0 01/19/2022    CALCIUM 10.1 01/19/2022    ALBUMIN 4.70 01/19/2022    AST 25 01/19/2022    ALT 32 01/19/2022          IMPRESSION:  Luis Elliott is a 71 y.o. gentleman diagnosed last fall with limited stage small cell lung cancer, status post definitive concurrent chemoradiation in February 2021 with complete response.  Repeat MRI brain 3/9/2021 showed apparent interval resolution of the small right cerebellar enhancing lesion and no evidence of intracranial metastatic disease.  He therefore underwent hippocampal sparing PCI which she completed 4/23/2021.  The patient is doing well with no evidence of recurrence at this time.  His MRI scan from today shows no evidence of recurrent disease in the brain.  Patient has intact cognition and is staying active.  He will continue to follow-up with us and with medical oncology upstairs.  I recommend the patient receive an MRI scan of the brain in 6 months per the NCCN guidelines.  I have personally reviewed the most updated version of the available NCCN guidelines in preparing to recommend care for this patient, and have discussed this with the patient personally.    I spent 30 minutes on the case today.    RECOMMENDATIONS:   cT1 cN2 M0 stage IIIA RLL small cell lung cancer  -MRI 12/14/2020 shows very small indeterminate lesion in the right cerebellum  -s/p concurrent chemo + radiotherapy to PET avid disease 66 Gy in 33 fractions, completed 2/23/2021  -Restaging scans middle of June 2021 showed excellent response in the chest and repeat MRI brain without evidence of metastatic disease  -Underwent PCI 25 Gy in 10 fractions with hippocampal sparing completed 4/23/2021  -Has discontinued memantine  -Repeat CT C/A/P with medical oncology  -Will repeat MRI brain in 6 months for continued surveillance and recommend no CyberKnife protocol given the patient's renal function   -Without contrast due to CKD       COPD  -Continue  present regimen     Diabetes  -Continue present regimen per primary  -Being managed to lower HbA1c     CKD 3  -GFR mid 50s  -Follows with Dr. Webber  -Recommend minimizing dye load     Mitral valve prolapse/vegetation  -Possibly contributing to lesion in right cerebellum formerly identified on MRI brain  -Continue to monitor  -Status post IV antibiotics in 2019     Prostate cancer  -Status post treatment    Jay Jay Urias MD    Errors in dictation may reflect use of voice recognition software and not all errors in transcription may have been detected prior to signing.

## 2022-02-10 DIAGNOSIS — E11.9 TYPE 2 DIABETES MELLITUS WITHOUT COMPLICATION, WITHOUT LONG-TERM CURRENT USE OF INSULIN: ICD-10-CM

## 2022-02-10 NOTE — TELEPHONE ENCOUNTER
Rx Refill Note  Requested Prescriptions     Pending Prescriptions Disp Refills   • sertraline (ZOLOFT) 50 MG tablet [Pharmacy Med Name: SERTRALINE HCL 50 MG TABLET] 90 tablet 3     Sig: TAKE ONE TABLET BY MOUTH DAILY   • metFORMIN (GLUCOPHAGE) 500 MG tablet [Pharmacy Med Name: metFORMIN  MG TABLET] 180 tablet      Sig: TAKE ONE TABLET BY MOUTH TWICE A DAY WITH MEALS      Last office visit with prescribing clinician: 10/1/2021      Next office visit with prescribing clinician: 4/1/2022            Deanna Bennett MA  02/10/22, 13:29 EST     LAST A1C 1/19/2022

## 2022-02-11 ENCOUNTER — OFFICE VISIT (OUTPATIENT)
Dept: PULMONOLOGY | Facility: CLINIC | Age: 72
End: 2022-02-11

## 2022-02-11 VITALS
BODY MASS INDEX: 26.92 KG/M2 | HEIGHT: 67 IN | TEMPERATURE: 98.4 F | SYSTOLIC BLOOD PRESSURE: 132 MMHG | OXYGEN SATURATION: 97 % | RESPIRATION RATE: 16 BRPM | HEART RATE: 62 BPM | WEIGHT: 171.5 LBS | DIASTOLIC BLOOD PRESSURE: 70 MMHG

## 2022-02-11 DIAGNOSIS — I34.0 NONRHEUMATIC MITRAL VALVE REGURGITATION: ICD-10-CM

## 2022-02-11 DIAGNOSIS — J44.9 COPD MIXED TYPE: ICD-10-CM

## 2022-02-11 DIAGNOSIS — C34.31 SMALL CELL LUNG CANCER, RIGHT LOWER LOBE: Primary | ICD-10-CM

## 2022-02-11 DIAGNOSIS — Z87.891 FORMER SMOKER: ICD-10-CM

## 2022-02-11 PROCEDURE — 99214 OFFICE O/P EST MOD 30 MIN: CPT | Performed by: INTERNAL MEDICINE

## 2022-02-11 NOTE — PROGRESS NOTES
"  PULMONARY  NOTE    Chief Complaint     Small cell lung cancer extensive stage, COPD, former smoker, mitral valve disease    History of Present Illness     71-year-old male returns today for follow-up  I last saw him on 8/11/2021    Has a history of tobacco abuse, resolved in 2012    He had extensive stage small cell carcinoma with cerebral metastasis  He received systemic therapy  Most recent chest imaging and MRI of the brain revealed no evidence of persistent disease    Despite his history of COPD he does not feel limited by shortness of breath  He exercises on his bike and does not feel limited by shortness of breath with activity    He has had no exacerbation of cough or sputum production    He has had a couple nights where he has been noted to have snoring  He feels rested when he wakes up in the morning but has daytime hypersomnolence    Patient Active Problem List   Diagnosis   • Type 2 diabetes mellitus without complication (HCC)   • Hyperlipidemia   • Essential hypertension   • Erectile dysfunction   • History of prostate cancer   • Osteoarthritis of multiple joints   • Coronary artery disease involving native heart without angina pectoris   • Colon polyps   • Glaucoma   • Low back pain   • Streptococcal bacteremia   • H/O subacute bacterial endocarditis   • Pulmonary nodule (2.6cm RLL)   • Severe mitral regurgitation   • Former smoker (Stopped 2012)   • COPD   • Small cell lung cancer, right lower lobe (HCC)   • CKD stage G3a/A2, GFR 45-59 and albumin creatinine ratio  mg/g (HCC)     Allergies   Allergen Reactions   • Xarelto [Rivaroxaban] Other (See Comments)     MADE ME FEEL LIKE \" I WAS HAVING A HEART ATTACK.\"       Current Outpatient Medications:   •  aspirin 81 MG EC tablet, Take 81 mg by mouth Every Night., Disp: , Rfl:   •  atorvastatin (LIPITOR) 40 MG tablet, Take 40 mg by mouth Every Night., Disp: , Rfl:   •  calcium carbonate-cholecalciferol 500-400 MG-UNIT tablet tablet, Take 1 tablet by " mouth Daily., Disp: , Rfl:   •  cholecalciferol (VITAMIN D3) 1000 units tablet, Take 1,000 Units by mouth Daily., Disp: , Rfl:   •  ezetimibe (ZETIA) 10 MG tablet, Take 10 mg by mouth Daily., Disp: , Rfl:   •  GaviLyte-C 240 g solution, , Disp: , Rfl:   •  metFORMIN (GLUCOPHAGE) 500 MG tablet, TAKE ONE TABLET BY MOUTH TWICE A DAY WITH MEALS, Disp: 180 tablet, Rfl: 1  •  nitroglycerin (NITROSTAT) 0.4 MG SL tablet, Place 0.4 mg under the tongue Every 5 (Five) Minutes As Needed for chest pain. Take no more than 3 doses in 15 minutes., Disp: , Rfl:   •  Omega-3 Fatty Acids (FISH OIL) 1200 MG capsule capsule, Take 1,200 mg by mouth Daily., Disp: , Rfl:   •  sertraline (ZOLOFT) 50 MG tablet, TAKE ONE TABLET BY MOUTH DAILY, Disp: 90 tablet, Rfl: 3  MEDICATION LIST AND ALLERGIES REVIEWED.    Family History   Problem Relation Age of Onset   • Hypertension Mother    • Atrial fibrillation Mother    • Alcohol abuse Father    • Heart attack Father    • Diabetes Father    • No Known Problems Sister    • Prostate cancer Brother      Social History     Tobacco Use   • Smoking status: Former Smoker     Packs/day: 1.50     Years: 36.00     Pack years: 54.00     Types: Cigarettes     Quit date: 4/12/2011     Years since quitting: 10.8   • Smokeless tobacco: Former User     Types: Chew     Quit date: 2011   • Tobacco comment: quit smoking for 12 years then started again but then quit a final time    Vaping Use   • Vaping Use: Never used   Substance Use Topics   • Alcohol use: Not Currently   • Drug use: Yes     Types: Marijuana     Comment: a month ago     Social History     Social History Narrative    9/18:     to Sabina x 35yrs    2 children from previous marriage    2 gk.    Retired - / [employer] -[employer] exposure    Exercise:10acre farm - vegetables.    Dental: utd    Eye: utd    Previously smoked up to 2 packs of cigarettes per day, starting at age 16.    Stop smoking for good in April 2012    No  "history of IVDU     FAMILY AND SOCIAL HISTORY REVIEWED.    Review of Systems  ALSO REFER TO SCANNED ROS SHEET FROM SAME DATE.    /70 (BP Location: Left arm, Patient Position: Sitting, Cuff Size: Adult)   Pulse 62   Temp 98.4 °F (36.9 °C)   Resp 16   Ht 170.2 cm (67\")   Wt 77.8 kg (171 lb 8 oz)   SpO2 97% Comment: room air at rest  BMI 26.86 kg/m²   Physical Exam  Vitals and nursing note reviewed.   Constitutional:       General: He is not in acute distress.     Appearance: He is well-developed. He is not diaphoretic.   HENT:      Head: Normocephalic and atraumatic.   Neck:      Thyroid: No thyromegaly.   Cardiovascular:      Rate and Rhythm: Normal rate and regular rhythm.      Heart sounds: Normal heart sounds. No murmur heard.      Pulmonary:      Effort: Pulmonary effort is normal.      Breath sounds: Normal breath sounds. No stridor.   Chest:   Breasts:      Right: No supraclavicular adenopathy.      Left: No supraclavicular adenopathy.       Abdominal:      General: Bowel sounds are normal.   Lymphadenopathy:      Cervical: No cervical adenopathy.      Upper Body:      Right upper body: No supraclavicular or epitrochlear adenopathy.      Left upper body: No supraclavicular or epitrochlear adenopathy.   Skin:     General: Skin is warm and dry.   Neurological:      Mental Status: He is alert and oriented to person, place, and time.   Psychiatric:         Behavior: Behavior normal.         Results     CT scan of the chest from 12/9/2021 reviewed on PACS.  Posttreatment scarring in the right upper lobe otherwise no suspicious intrathoracic findings    Immunization History   Administered Date(s) Administered   • COVID-19 (MODERNA) 1st, 2nd, 3rd Dose Only 04/28/2021, 05/26/2021, 11/29/2021   • FLUAD TRI 65YR+ 09/16/2019   • Flu Vaccine Quad PF >36MO 11/25/2015, 12/12/2016, 10/24/2017   • Fluad Quad 65+ 09/17/2020   • Fluzone High Dose =>65 Years ([employer] ONLY) 11/01/2017, 09/12/2018, 09/16/2019   • " Fluzone High-Dose 65+yrs 10/01/2021   • Hepatitis A 05/31/2019   • PEDS-Pneumococcal Conjugate (PCV7) 06/03/2015   • Pneumococcal Conjugate 13-Valent (PCV13) 06/03/2015, 10/02/2020   • Pneumococcal Polysaccharide (PPSV23) 06/03/2013   • Shingrix 10/02/2020     Problem List       ICD-10-CM ICD-9-CM   1. Small cell lung cancer, right lower lobe (HCC)  C34.31 162.5   2. Severe mitral regurgitation  I34.0 424.0   3. Former smoker (Stopped 2012)  Z87.891 V15.82   4. COPD  J44.9 496       Discussion     Overall he is doing well  He does not feel the need to use any bronchodilators for his history of COPD    At this point, no evidence of recurrent or residual disease on his most recent CT scan of the chest    He does have some symptoms suggestive of sleep apnea  He feels rested in the morning but has daytime hypersomnolence  He also has had some snoring at night.  We talked about doing a home sleep study but he wants to defer that at the current time    I will plan to see him back in 6 months or earlier if there are any problems in the meantime    Moderate level of Medical Decision Making complexity based on 2 or more chronic stable illnesses and independent review of test.    Clint Thompson MD  Note electronically signed    CC: Silvestre Coleman,

## 2022-03-14 ENCOUNTER — HOSPITAL ENCOUNTER (OUTPATIENT)
Dept: CT IMAGING | Facility: HOSPITAL | Age: 72
Discharge: HOME | End: 2022-03-14
Admitting: INTERNAL MEDICINE

## 2022-03-14 DIAGNOSIS — C34.31 SMALL CELL LUNG CANCER, RIGHT LOWER LOBE: ICD-10-CM

## 2022-03-14 LAB — CREAT BLDA-MCNC: 1.4 MG/DL (ref 0.6–1.3)

## 2022-03-14 PROCEDURE — 82565 ASSAY OF CREATININE: CPT

## 2022-03-14 PROCEDURE — 74176 CT ABD & PELVIS W/O CONTRAST: CPT

## 2022-03-14 PROCEDURE — 25010000002 IOPAMIDOL 61 % SOLUTION: Performed by: INTERNAL MEDICINE

## 2022-03-14 PROCEDURE — 71260 CT THORAX DX C+: CPT

## 2022-03-14 RX ADMIN — IOPAMIDOL 80 ML: 612 INJECTION, SOLUTION INTRAVENOUS at 09:41

## 2022-03-21 ENCOUNTER — LAB (OUTPATIENT)
Dept: LAB | Facility: HOSPITAL | Age: 72
End: 2022-03-21

## 2022-03-21 ENCOUNTER — OFFICE VISIT (OUTPATIENT)
Dept: ONCOLOGY | Facility: CLINIC | Age: 72
End: 2022-03-21

## 2022-03-21 VITALS
HEART RATE: 59 BPM | DIASTOLIC BLOOD PRESSURE: 73 MMHG | HEIGHT: 67 IN | OXYGEN SATURATION: 95 % | BODY MASS INDEX: 27.31 KG/M2 | TEMPERATURE: 96.4 F | WEIGHT: 174 LBS | RESPIRATION RATE: 18 BRPM | SYSTOLIC BLOOD PRESSURE: 132 MMHG

## 2022-03-21 DIAGNOSIS — C34.31 SMALL CELL LUNG CANCER, RIGHT LOWER LOBE: ICD-10-CM

## 2022-03-21 DIAGNOSIS — C34.31 SMALL CELL LUNG CANCER, RIGHT LOWER LOBE: Primary | ICD-10-CM

## 2022-03-21 LAB
ALBUMIN SERPL-MCNC: 4.6 G/DL (ref 3.5–5.2)
ALBUMIN/GLOB SERPL: 2 G/DL
ALP SERPL-CCNC: 85 U/L (ref 39–117)
ALT SERPL W P-5'-P-CCNC: 38 U/L (ref 1–41)
ANION GAP SERPL CALCULATED.3IONS-SCNC: 10 MMOL/L (ref 5–15)
AST SERPL-CCNC: 28 U/L (ref 1–40)
BILIRUB SERPL-MCNC: 0.9 MG/DL (ref 0–1.2)
BUN SERPL-MCNC: 22 MG/DL (ref 8–23)
BUN/CREAT SERPL: 15.3 (ref 7–25)
CALCIUM SPEC-SCNC: 9.8 MG/DL (ref 8.6–10.5)
CHLORIDE SERPL-SCNC: 107 MMOL/L (ref 98–107)
CO2 SERPL-SCNC: 27 MMOL/L (ref 22–29)
CREAT SERPL-MCNC: 1.44 MG/DL (ref 0.76–1.27)
EGFRCR SERPLBLD CKD-EPI 2021: 51.9 ML/MIN/1.73
ERYTHROCYTE [DISTWIDTH] IN BLOOD BY AUTOMATED COUNT: 13.9 % (ref 12.3–15.4)
GLOBULIN UR ELPH-MCNC: 2.3 GM/DL
GLUCOSE SERPL-MCNC: 104 MG/DL (ref 65–99)
HCT VFR BLD AUTO: 38.5 % (ref 37.5–51)
HGB BLD-MCNC: 12.6 G/DL (ref 13–17.7)
LYMPHOCYTES # BLD AUTO: 1.4 10*3/MM3 (ref 0.7–3.1)
LYMPHOCYTES NFR BLD AUTO: 21.6 % (ref 19.6–45.3)
MCH RBC QN AUTO: 30.6 PG (ref 26.6–33)
MCHC RBC AUTO-ENTMCNC: 32.6 G/DL (ref 31.5–35.7)
MCV RBC AUTO: 93.8 FL (ref 79–97)
MONOCYTES # BLD AUTO: 0.3 10*3/MM3 (ref 0.1–0.9)
MONOCYTES NFR BLD AUTO: 5.3 % (ref 5–12)
NEUTROPHILS NFR BLD AUTO: 4.7 10*3/MM3 (ref 1.7–7)
NEUTROPHILS NFR BLD AUTO: 73.1 % (ref 42.7–76)
PLATELET # BLD AUTO: 144 10*3/MM3 (ref 140–450)
PMV BLD AUTO: 7.4 FL (ref 6–12)
POTASSIUM SERPL-SCNC: 5.3 MMOL/L (ref 3.5–5.2)
PROT SERPL-MCNC: 6.9 G/DL (ref 6–8.5)
RBC # BLD AUTO: 4.11 10*6/MM3 (ref 4.14–5.8)
SODIUM SERPL-SCNC: 144 MMOL/L (ref 136–145)
WBC NRBC COR # BLD: 6.4 10*3/MM3 (ref 3.4–10.8)

## 2022-03-21 PROCEDURE — 80053 COMPREHEN METABOLIC PANEL: CPT

## 2022-03-21 PROCEDURE — 99214 OFFICE O/P EST MOD 30 MIN: CPT | Performed by: INTERNAL MEDICINE

## 2022-03-21 PROCEDURE — 85025 COMPLETE CBC W/AUTO DIFF WBC: CPT

## 2022-03-21 PROCEDURE — 36415 COLL VENOUS BLD VENIPUNCTURE: CPT

## 2022-03-21 NOTE — PROGRESS NOTES
DATE OF VISIT: 3/21/2022    REASON FOR VISIT: Followup for right lower lobe small cell lung cancer     PROBLEM LIST:  1. Limited stage small cell lung cancer [id number] stage IIIa:  A.  Presented with abnormal screening CT chest  B.  Diagnosed after bronchoscopy with biopsy done by  November 19, 2020 + for small cell from level 7 level 4R and level 10 R lymph nodes.  C.  Whole-body PET scan did not reveal any other sites of disease.  D.  Started definitive concurrent chemotherapy with radiation using cisplatin etoposide December 2020 1 status post 4 cycles completed February 23, 2021  2.  Isolated 6 mm right cerebellar lesion:  A.  Evident MRI brain done on December 1, 2020  B.  Completed prophylactic whole brain radiation 4/23/2021.   3.  Chemotherapy-induced nausea  4. Insomnia   5.  Treatment induced anemia  6.  Chronic kidney disease  7.  Type 2 diabetes      HISTORY OF PRESENT ILLNESS: The patient is a very pleasant 72 y.o. male  with past medical history significant for right lower lobe lung cancer diagnosed April 19, 2020.  Patient status post definitive concurrent chemotherapy with radiation completed February 23, 2021. The patient is here today for scheduled follow-up visit.    SUBJECTIVE: The patient has been doing fairly well. he was able to tolerate  his treatment without any serious side effects. he denied any fever or  chills, no night sweats, denied any headaches    Past History:  Medical History: has a past medical history of Cancer (HCC), CKD stage G3a/A2, GFR 45-59 and albumin creatinine ratio  mg/g (HCC) (6/21/2021), COPD (chronic obstructive pulmonary disease) (HCC), Coronary artery disease, DDD (degenerative disc disease), cervical, Depression, Diabetes mellitus (HCC), Erectile dysfunction, Glaucoma, Headache, History of radiation therapy (02/23/2021), History of radiation therapy (04/23/2021), Hyperlipidemia, Hypertension, Impingement syndrome of left shoulder, Infection,  bacterial, Lung cancer (HCC), MI, old, Mitral valve vegetation, Osteoarthritis, Prostate cancer (HCC), SOBOE (shortness of breath on exertion), Varicose veins of lower limb, Wears glasses, and Wears partial dentures.   Surgical History: has a past surgical history that includes Prostatectomy; Colonoscopy w/ polypectomy; Knee surgery; Knee Arthroscopy; Lumbar epidural injection; Hernia repair; Tonsillectomy; Coronary angioplasty; Tendon transfer elbow; Trabeculectomy; pr rt/lt heart catheters (N/A, 12/27/2016); Cardiac catheterization (N/A, 12/20/2016); Coronary angioplasty with stent; Cataract extraction; Eye surgery; Colonoscopy; and Bronchoscopy (N/A, 11/19/2020).   Family History: family history includes Alcohol abuse in his father; Atrial fibrillation in his mother; Diabetes in his father; Heart attack in his father; Hypertension in his mother; No Known Problems in his sister; Prostate cancer in his brother.   Social History: reports that he quit smoking about 10 years ago. His smoking use included cigarettes. He has a 54.00 pack-year smoking history. He quit smokeless tobacco use about 11 years ago.  His smokeless tobacco use included chew. He reports previous alcohol use. He reports current drug use. Drug: Marijuana.    (Not in a hospital admission)     Allergies: Xarelto [rivaroxaban]     Review of Systems   All other systems reviewed and are negative.        Current Outpatient Medications:   •  aspirin 81 MG EC tablet, Take 81 mg by mouth Every Night., Disp: , Rfl:   •  atorvastatin (LIPITOR) 40 MG tablet, Take 40 mg by mouth Every Night., Disp: , Rfl:   •  calcium carbonate-cholecalciferol 500-400 MG-UNIT tablet tablet, Take 1 tablet by mouth Daily., Disp: , Rfl:   •  cholecalciferol (VITAMIN D3) 1000 units tablet, Take 1,000 Units by mouth Daily., Disp: , Rfl:   •  ezetimibe (ZETIA) 10 MG tablet, Take 10 mg by mouth Daily., Disp: , Rfl:   •  metFORMIN (GLUCOPHAGE) 500 MG tablet, TAKE ONE TABLET BY MOUTH  "TWICE A DAY WITH MEALS, Disp: 180 tablet, Rfl: 1  •  nitroglycerin (NITROSTAT) 0.4 MG SL tablet, Place 0.4 mg under the tongue Every 5 (Five) Minutes As Needed for chest pain. Take no more than 3 doses in 15 minutes., Disp: , Rfl:   •  Omega-3 Fatty Acids (FISH OIL) 1200 MG capsule capsule, Take 1,200 mg by mouth Daily., Disp: , Rfl:   •  sertraline (ZOLOFT) 50 MG tablet, TAKE ONE TABLET BY MOUTH DAILY, Disp: 90 tablet, Rfl: 3    PHYSICAL EXAMINATION:   /73   Pulse 59   Temp 96.4 °F (35.8 °C) (Temporal)   Resp 18   Ht 170.2 cm (67.01\")   Wt 78.9 kg (174 lb)   SpO2 95%   BMI 27.25 kg/m²    Pain Score    03/21/22 1127   PainSc: 0-No pain        ECOG score: 0            ECOG Performance Status: 1 - Symptomatic but completely ambulatory  General Appearance:  alert, cooperative, no apparent distress and appears stated age   Neurologic/Psychiatric: A&O x 3, gait steady, appropriate affect, strength 5/5 in all muscle groups   HEENT:  Normocephalic, without obvious abnormality, mucous membranes moist   Neck: Supple, symmetrical, trachea midline, no adenopathy;  No thyromegaly, masses, or tenderness   Lungs:   Clear to auscultation bilaterally; respirations regular, even, and unlabored bilaterally   Heart:  Regular rate and rhythm, no murmurs appreciated   Abdomen:   Soft, non-tender, non-distended and no organomegaly   Lymph nodes: No cervical, supraclavicular, inguinal or axillary adenopathy noted   Extremities: Normal, atraumatic; no clubbing, cyanosis, or edema    Skin: No rashes, ulcers, or suspicious lesions noted     Lab on 03/21/2022   Component Date Value Ref Range Status   • WBC 03/21/2022 6.40  3.40 - 10.80 10*3/mm3 Final   • RBC 03/21/2022 4.11 (A) 4.14 - 5.80 10*6/mm3 Final   • Hemoglobin 03/21/2022 12.6 (A) 13.0 - 17.7 g/dL Final   • Hematocrit 03/21/2022 38.5  37.5 - 51.0 % Final   • RDW 03/21/2022 13.9  12.3 - 15.4 % Final   • MCV 03/21/2022 93.8  79.0 - 97.0 fL Final   • MCH 03/21/2022 30.6  " 26.6 - 33.0 pg Final   • MCHC 03/21/2022 32.6  31.5 - 35.7 g/dL Final   • MPV 03/21/2022 7.4  6.0 - 12.0 fL Final   • Platelets 03/21/2022 144  140 - 450 10*3/mm3 Final   • Neutrophil % 03/21/2022 73.1  42.7 - 76.0 % Final   • Lymphocyte % 03/21/2022 21.6  19.6 - 45.3 % Final   • Monocyte % 03/21/2022 5.3  5.0 - 12.0 % Final   • Neutrophils, Absolute 03/21/2022 4.70  1.70 - 7.00 10*3/mm3 Final   • Lymphocytes, Absolute 03/21/2022 1.40  0.70 - 3.10 10*3/mm3 Final   • Monocytes, Absolute 03/21/2022 0.30  0.10 - 0.90 10*3/mm3 Final   Hospital Outpatient Visit on 03/14/2022   Component Date Value Ref Range Status   • Creatinine 03/14/2022 1.40 (A) 0.60 - 1.30 mg/dL Final    Serial Number: [id number]:  [id number]        CT Chest With Contrast Diagnostic, CT Abdomen Pelvis Without Contrast    Result Date: 3/14/2022  Narrative: DATE OF EXAM: 3/14/2022 9:31 AM  PROCEDURE: CT ABDOMEN PELVIS WO CONTRAST-, CT CHEST W CONTRAST DIAGNOSTIC-  INDICATIONS: restaging small cell lung cancer; C34.31-Malignant neoplasm of lower lobe, right bronchus or lung  COMPARISON: 12/8/2021  TECHNIQUE: Routine transaxial slices were obtained through the abdomen and pelvis without the administration of intravenous contrast. Reconstructed coronal and sagittal images were also obtained. Axial CT images were also obtained through the chest following the intravenous administration of 80 mL of Isovue 300 contrast. Automated exposure control and iterative construction methods were used.  The radiation dose reduction device was turned on for each scan per the ALARA (As Low as Reasonably Achievable) protocol.  FINDINGS: Abnormal attenuation is again noted throughout the central perihilar aspect of the right lung. This finding is stable. There are densities which extend from the hilum towards the peripheral aspect of the lung fields throughout the right upper lobe and superior right lower lobe indicating rating areas of scarring. These findings are stable.  No new consolidations or pleural effusions are seen. No dominant pulmonary nodules or abnormal pulmonary masses are seen.  No significant hilar, mediastinal, or axillary lymphadenopathy is observed. A normal aortic arch branching pattern is identified. Atherosclerosis is noted. Severe coronary artery calcified effusions are noted. The thyroid gland is stable. The esophagus is unremarkable.  The liver, spleen, and pancreas are stable without contrast. There is a tiny hypodense focus in the dome of liver suggesting a small cyst or hemangioma. This finding is stable. Gallstones are observed in the gallbladder. There is no biliary dilatation. There are no signs of cholecystitis. The adrenal glands are stable. The bilateral kidneys are stable on this noncontrast exam. A large right renal cyst is again noted.  There is no bowel dilatation or obstruction. There is no evidence for acute appendicitis. No significant free fluid is observed. No abnormal fluid collections are identified. No significant lymphadenopathy is seen throughout the abdomen or pelvis. The bladder is partially decompressed. There is mild atherosclerosis.  No acute osseous abnormalities are observed. There is no evidence for new aggressive lytic or sclerotic lesion. Fracture or malalignment.      Impression: 1.  Stable CT of the chest, abdomen, and pelvis. 2.  Stable areas of scarring/post therapeutic changes are again seen throughout the right lung. There is stable residual abnormal attenuation surrounding the central perihilar aspect of the right lung. 3.  No evidence for metastatic disease. 4.  No evidence for acute abnormality throughout the chest, abdomen, or pelvis.  This report was finalized on 3/14/2022 10:25 AM by Mark Walter MD.        ASSESSMENT: The patient is a very pleasant 72 y.o. male  with right small cell lung cancer      PLAN:    1.  Right lower lobe small cell lung cancer:  A.  I did go over the scan results with the patient from  March 14, 2022.  I reviewed the films myself.  Reassured the patient no evidence of relapsed disease.  He does have stable scarring like tissue right hilar which we will watch for now.  B.  The patient will follow up with us in 6 months with repeat CT chest pain  C.  I did go over the CBC result with the patient from today.  Did reveal mild anemia hemoglobin 12.6.  I will follow-up on the [facility].    2.  Isolated brain lesion:  A.  Patient completed whole radiation April 2021.  B.  Last MRI brain done February 7, 2022 no evidence of metastatic disease.  C.  Scheduled for follow-up MRI brain with Dr. Urias.    3.  Depression:  A.  The patient will continue Zoloft 50 mg daily.    4.  Insomnia:  A.  The patient will continue Ambien 5 mg nightly as needed    5.  Type 2 diabetes:  A.  Continue Metformin 5 mg twice a day    6.  Normocytic anemia:  A.  I did go over the CBC result from today.  B.  Most likely induced by previous chemotherapy.  C.  We will repeat CBC on return.    7.  Chronic kidney disease stage IIIa:  A.  I explained to the patient, his creatinine was 1.4 on March 14, 2022.  B.  He will continue to follow-up with his nephrologist at [facility].    FOLLOW UP: 6-month repeated scans and labs.    Daniel Cartagena MD  3/21/2022

## 2022-04-01 ENCOUNTER — OFFICE VISIT (OUTPATIENT)
Dept: FAMILY MEDICINE CLINIC | Facility: CLINIC | Age: 72
End: 2022-04-01

## 2022-04-01 VITALS
DIASTOLIC BLOOD PRESSURE: 84 MMHG | BODY MASS INDEX: 27.62 KG/M2 | OXYGEN SATURATION: 98 % | WEIGHT: 176 LBS | SYSTOLIC BLOOD PRESSURE: 122 MMHG | HEIGHT: 67 IN | HEART RATE: 76 BPM

## 2022-04-01 DIAGNOSIS — E11.9 TYPE 2 DIABETES MELLITUS WITHOUT COMPLICATION, WITHOUT LONG-TERM CURRENT USE OF INSULIN: Primary | ICD-10-CM

## 2022-04-01 DIAGNOSIS — I10 ESSENTIAL HYPERTENSION: ICD-10-CM

## 2022-04-01 DIAGNOSIS — N18.31 CKD STAGE G3A/A2, GFR 45-59 AND ALBUMIN CREATININE RATIO 30-299 MG/G: ICD-10-CM

## 2022-04-01 PROCEDURE — 99214 OFFICE O/P EST MOD 30 MIN: CPT | Performed by: INTERNAL MEDICINE

## 2022-04-01 NOTE — PROGRESS NOTES
Chief Complaint   Patient presents with   • Diabetes     6 month f/u        • Dizziness     Has seen oncology regarding chemo and radiation side effects        HPI:  Luis Elliott is a 72 y.o. male who presents today for follow-up diabetes.  No acute concerns today.    ROS:  Constitutional: no fevers, night sweats or unexplained weight loss  Eyes: no vision changes  ENT: no runny nose, ear pain, sore throat  Cardio: no chest pain, palpitations  Pulm: no shortness of breath, wheezing, or cough  GI: no abdominal pain or changes in bowel movements  : no difficulty urinating  MSK: no difficulty ambulating, no joint pain  Neuro: no weakness, dizziness or headache  Psych: no trouble sleeping  Endo: no change in appetite      Past Medical History:   Diagnosis Date   • Cancer ([facility])     PROSTATE   • CKD stage G3a/A2, GFR 45-59 and albumin creatinine ratio  mg/g ([facility]) 6/21/2021   • COPD (chronic obstructive pulmonary disease) ([facility])     dr valente thinks pt has this    • Coronary artery disease    • DDD (degenerative disc disease), cervical    • Depression    • Diabetes mellitus ([facility])     let  check sugar    • Erectile dysfunction    • Glaucoma    • Headache    • History of radiation therapy 02/23/2021    RUL/mediastinum   • History of radiation therapy 04/23/2021    PCI brain   • Hyperlipidemia    • Hypertension    • Impingement syndrome of left shoulder    • Infection, bacterial     sees ID -  42 days of antibiotics    • Lung cancer ([facility])    • MI, old     89   • Mitral valve vegetation    • Osteoarthritis    • Prostate cancer ([facility])     2003   • SOBOE (shortness of breath on exertion)    • Varicose veins of lower limb     RIGHT   • Wears glasses    • Wears partial dentures     bottom       Family History   Problem Relation Age of Onset   • Hypertension Mother    • Atrial fibrillation Mother    • Alcohol abuse Father    • Heart attack Father    • Diabetes Father    • No Known Problems Sister    • Prostate cancer  "Brother       Social History     Socioeconomic History   • Marital status:      Spouse name: Sabina   • Number of children: 2   Tobacco Use   • Smoking status: Former Smoker     Packs/day: 1.50     Years: 36.00     Pack years: 54.00     Types: Cigarettes     Quit date: 4/12/2011     Years since quitting: 10.9   • Smokeless tobacco: Former User     Types: Chew     Quit date: 2011   • Tobacco comment: quit smoking for 12 years then started again but then quit a final time    Vaping Use   • Vaping Use: Never used   Substance and Sexual Activity   • Alcohol use: Not Currently   • Drug use: Yes     Types: Marijuana     Comment: a month ago   • Sexual activity: Defer      Allergies   Allergen Reactions   • Xarelto [Rivaroxaban] Other (See Comments)     MADE ME FEEL LIKE \" I WAS HAVING A HEART ATTACK.\"      Immunization History   Administered Date(s) Administered   • COVID-19 (MODERNA) 1st, 2nd, 3rd Dose Only 04/28/2021, 05/26/2021, 11/29/2021   • FLUAD TRI 65YR+ 09/16/2019   • Flu Vaccine Quad PF >36MO 11/25/2015, 12/12/2016, 10/24/2017   • Fluad Quad 65+ 09/17/2020   • Fluzone High Dose =>65 Years ([employer] ONLY) 11/01/2017, 09/12/2018, 09/16/2019   • Fluzone High-Dose 65+yrs 10/01/2021   • Hepatitis A 05/31/2019   • PEDS-Pneumococcal Conjugate (PCV7) 06/03/2015   • Pneumococcal Conjugate 13-Valent (PCV13) 06/03/2015, 10/02/2020   • Pneumococcal Polysaccharide (PPSV23) 06/03/2013   • Shingrix 10/02/2020        PE:  Vitals:    04/01/22 0948   BP: 122/84   Pulse: 76   SpO2: 98%      Body mass index is 27.56 kg/m².    Gen Appearance: NAD  HEENT: Normocephalic, PERRLA, no thyromegaly, trache midline  Heart: RRR, normal S1 and S2, no murmur  Lungs: CTA b/l, no wheezing, no crackles  Abdomen: Soft, non-tender, non-distended, no guarding and BSx4  MSK: Moves all extremities well, normal gait, no peripheral edema  Pulses: Palpable and equal b/l  Lymph nodes: No palpable lymphadenopathy   Neuro: No focal " deficits      Current Outpatient Medications   Medication Sig Dispense Refill   • aspirin 81 MG EC tablet Take 81 mg by mouth Every Night.     • atorvastatin (LIPITOR) 40 MG tablet Take 40 mg by mouth Every Night.     • calcium carbonate-cholecalciferol 500-400 MG-UNIT tablet tablet Take 1 tablet by mouth Daily.     • cholecalciferol (VITAMIN D3) 1000 units tablet Take 1,000 Units by mouth Daily.     • ezetimibe (ZETIA) 10 MG tablet Take 10 mg by mouth Daily.     • metFORMIN (GLUCOPHAGE) 500 MG tablet TAKE ONE TABLET BY MOUTH TWICE A DAY WITH MEALS 180 tablet 1   • nitroglycerin (NITROSTAT) 0.4 MG SL tablet Place 0.4 mg under the tongue Every 5 (Five) Minutes As Needed for chest pain. Take no more than 3 doses in 15 minutes.     • Omega-3 Fatty Acids (FISH OIL) 1200 MG capsule capsule Take 1,200 mg by mouth Daily.     • sertraline (ZOLOFT) 50 MG tablet TAKE ONE TABLET BY MOUTH DAILY 90 tablet 3     No current facility-administered medications for this visit.        Diagnoses and all orders for this visit:    1. Type 2 diabetes mellitus without complication, without long-term current use of insulin (HCC) (Primary)  -     Hemoglobin A1c; Future  Repeat A1c in 2 weeks.  Continue current medication for now.  2. CKD stage G3a/A2, GFR 45-59 and albumin creatinine ratio  mg/g ([facility])  Stable labs on recent blood work.  3. Essential hypertension  Well-controlled blood pressure, continue current medication.       Return in about 6 months (around 10/1/2022) for Medicare Wellness.     Dictated Utilizing Dragon Dictation    Please note that portions of this note were completed with a voice recognition program.    Part of this note may be an electronic transcription/translation of spoken language to printed text using the Dragon Dictation System.

## 2022-04-20 ENCOUNTER — OFFICE VISIT (OUTPATIENT)
Dept: FAMILY MEDICINE CLINIC | Facility: CLINIC | Age: 72
End: 2022-04-20

## 2022-04-20 ENCOUNTER — HOSPITAL ENCOUNTER (OUTPATIENT)
Dept: GENERAL RADIOLOGY | Facility: HOSPITAL | Age: 72
Discharge: HOME | End: 2022-04-20
Admitting: INTERNAL MEDICINE

## 2022-04-20 VITALS
HEIGHT: 67 IN | DIASTOLIC BLOOD PRESSURE: 72 MMHG | SYSTOLIC BLOOD PRESSURE: 120 MMHG | HEART RATE: 78 BPM | BODY MASS INDEX: 27.62 KG/M2 | OXYGEN SATURATION: 94 % | WEIGHT: 176 LBS

## 2022-04-20 DIAGNOSIS — J06.9 UPPER RESPIRATORY TRACT INFECTION, UNSPECIFIED TYPE: ICD-10-CM

## 2022-04-20 DIAGNOSIS — J40 BRONCHITIS: ICD-10-CM

## 2022-04-20 DIAGNOSIS — E11.9 TYPE 2 DIABETES MELLITUS WITHOUT COMPLICATION, WITHOUT LONG-TERM CURRENT USE OF INSULIN: ICD-10-CM

## 2022-04-20 DIAGNOSIS — J06.9 UPPER RESPIRATORY TRACT INFECTION, UNSPECIFIED TYPE: Primary | ICD-10-CM

## 2022-04-20 LAB
EXPIRATION DATE: NORMAL
EXPIRATION DATE: NORMAL
FLUAV AG NPH QL: NEGATIVE
FLUBV AG NPH QL: NEGATIVE
HBA1C MFR BLD: 5.8 %
INTERNAL CONTROL: NORMAL
Lab: NORMAL
Lab: NORMAL

## 2022-04-20 PROCEDURE — U0005 INFEC AGEN DETEC AMPLI PROBE: HCPCS | Performed by: INTERNAL MEDICINE

## 2022-04-20 PROCEDURE — 87804 INFLUENZA ASSAY W/OPTIC: CPT | Performed by: INTERNAL MEDICINE

## 2022-04-20 PROCEDURE — 3044F HG A1C LEVEL LT 7.0%: CPT | Performed by: INTERNAL MEDICINE

## 2022-04-20 PROCEDURE — 71046 X-RAY EXAM CHEST 2 VIEWS: CPT

## 2022-04-20 PROCEDURE — 99214 OFFICE O/P EST MOD 30 MIN: CPT | Performed by: INTERNAL MEDICINE

## 2022-04-20 PROCEDURE — 83036 HEMOGLOBIN GLYCOSYLATED A1C: CPT | Performed by: INTERNAL MEDICINE

## 2022-04-20 PROCEDURE — U0004 COV-19 TEST NON-CDC HGH THRU: HCPCS | Performed by: INTERNAL MEDICINE

## 2022-04-20 RX ORDER — CEFDINIR 300 MG/1
300 CAPSULE ORAL 2 TIMES DAILY
Qty: 14 CAPSULE | Refills: 0 | Status: SHIPPED | OUTPATIENT
Start: 2022-04-20 | End: 2022-04-27

## 2022-04-20 NOTE — PROGRESS NOTES
Chief Complaint   Patient presents with   • Cough     Nasal congestion, headache x 1 week        HPI:  Luis Elliott is a 72 y.o. male who presents today for 1 week of cough, shortness of breath, sore throat, fatigue.    ROS:  Constitutional: no fevers, night sweats or unexplained weight loss  Eyes: no vision changes  ENT: no runny nose, ear pain, sore throat  Cardio: no chest pain, palpitations  Pulm: no shortness of breath, wheezing, or cough  GI: no abdominal pain or changes in bowel movements  : no difficulty urinating  MSK: no difficulty ambulating, no joint pain  Neuro: no weakness, dizziness or headache  Psych: no trouble sleeping  Endo: no change in appetite      Past Medical History:   Diagnosis Date   • Cancer ([facility])     PROSTATE   • CKD stage G3a/A2, GFR 45-59 and albumin creatinine ratio  mg/g ([facility]) 6/21/2021   • COPD (chronic obstructive pulmonary disease) ([facility])     dr valente thinks pt has this    • Coronary artery disease    • DDD (degenerative disc disease), cervical    • Depression    • Diabetes mellitus ([facility])     let  check sugar    • Erectile dysfunction    • Glaucoma    • Headache    • History of radiation therapy 02/23/2021    RUL/mediastinum   • History of radiation therapy 04/23/2021    PCI brain   • Hyperlipidemia    • Hypertension    • Impingement syndrome of left shoulder    • Infection, bacterial     sees ID -  42 days of antibiotics    • Lung cancer ([facility])    • MI, old     89   • Mitral valve vegetation    • Osteoarthritis    • Prostate cancer ([facility])     2003   • SOBOE (shortness of breath on exertion)    • Varicose veins of lower limb     RIGHT   • Wears glasses    • Wears partial dentures     bottom       Family History   Problem Relation Age of Onset   • Hypertension Mother    • Atrial fibrillation Mother    • Alcohol abuse Father    • Heart attack Father    • Diabetes Father    • No Known Problems Sister    • Prostate cancer Brother       Social History     Socioeconomic  "History   • Marital status:      Spouse name: Sabina   • Number of children: 2   Tobacco Use   • Smoking status: Former Smoker     Packs/day: 1.50     Years: 36.00     Pack years: 54.00     Types: Cigarettes     Quit date: 2011     Years since quittin.0   • Smokeless tobacco: Former User     Types: Chew     Quit date:    • Tobacco comment: quit smoking for 12 years then started again but then quit a final time    Vaping Use   • Vaping Use: Never used   Substance and Sexual Activity   • Alcohol use: Not Currently   • Drug use: Yes     Types: Marijuana     Comment: a month ago   • Sexual activity: Defer      Allergies   Allergen Reactions   • Xarelto [Rivaroxaban] Other (See Comments)     MADE ME FEEL LIKE \" I WAS HAVING A HEART ATTACK.\"      Immunization History   Administered Date(s) Administered   • COVID-19 (MODERNA) 1st, 2nd, 3rd Dose Only 2021, 2021, 2021   • FLUAD TRI 65YR+ 2019   • Flu Vaccine Quad PF >36MO 2015, 2016, 10/24/2017   • Fluad Quad 65+ 2020   • Fluzone High Dose =>65 Years ([employer] ONLY) 2017, 2018, 2019   • Fluzone High-Dose 65+yrs 10/01/2021   • Hepatitis A 2019   • PEDS-Pneumococcal Conjugate (PCV7) 2015   • Pneumococcal Conjugate 13-Valent (PCV13) 2015, 10/02/2020   • Pneumococcal Polysaccharide (PPSV23) 2013   • Shingrix 10/02/2020        PE:  Vitals:    22 0856   BP: 120/72   Pulse: 78   SpO2: 94%      Body mass index is 27.56 kg/m².    Gen Appearance: NAD  HEENT: Normocephalic, PERRLA, no thyromegaly, trache midline  Heart: RRR, normal S1 and S2, no murmur  Lungs: CTA b/l, no wheezing, no crackles  Abdomen: Soft, non-tender, non-distended, no guarding and BSx4  MSK: Moves all extremities well, normal gait, no peripheral edema  Pulses: Palpable and equal b/l  Lymph nodes: No palpable lymphadenopathy   Neuro: No focal deficits      Current Outpatient Medications   Medication Sig " Dispense Refill   • aspirin 81 MG EC tablet Take 81 mg by mouth Every Night.     • atorvastatin (LIPITOR) 40 MG tablet Take 40 mg by mouth Every Night.     • calcium carbonate-cholecalciferol 500-400 MG-UNIT tablet tablet Take 1 tablet by mouth Daily.     • cholecalciferol (VITAMIN D3) 1000 units tablet Take 1,000 Units by mouth Daily.     • ezetimibe (ZETIA) 10 MG tablet Take 10 mg by mouth Daily.     • metFORMIN (GLUCOPHAGE) 500 MG tablet TAKE ONE TABLET BY MOUTH TWICE A DAY WITH MEALS 180 tablet 1   • nitroglycerin (NITROSTAT) 0.4 MG SL tablet Place 0.4 mg under the tongue Every 5 (Five) Minutes As Needed for Chest Pain. Take no more than 3 doses in 15 minutes.     • Omega-3 Fatty Acids (FISH OIL) 1200 MG capsule capsule Take 1,200 mg by mouth Daily.     • sertraline (ZOLOFT) 50 MG tablet TAKE ONE TABLET BY MOUTH DAILY 90 tablet 3   • cefdinir (OMNICEF) 300 MG capsule Take 1 capsule by mouth 2 (Two) Times a Day for 7 days. 14 capsule 0     No current facility-administered medications for this visit.        Diagnoses and all orders for this visit:    1. Upper respiratory tract infection, unspecified type (Primary)  -     XR Chest PA & Lateral; Future  -     cefdinir (OMNICEF) 300 MG capsule; Take 1 capsule by mouth 2 (Two) Times a Day for 7 days.  Dispense: 14 capsule; Refill: 0  -     COVID-19 PCR, [facility], NP SWAB IN [facility] TRANSPORT MEDIA/ORAL SWISH 24-30 HR TAT - Swab, Nasopharynx; Future  -     POCT Influenza A/B  -     COVID-19 PCR, [facility], NP SWAB IN [facility] TRANSPORT MEDIA/ORAL SWISH 24-30 HR TAT - Swab, Nasopharynx  Rule out COVID-19.  Recommend checking x-ray.  Wheezing and rhonchi left lung.  Starting on antibiotics.  Flu negative.  2. Bronchitis    3. Type 2 diabetes mellitus without complication, without long-term current use of insulin (HCC)  -     POC Glycosylated Hemoglobin (Hb A1C)  A1c well controlled at 5.8.  Continue current medication.       No follow-ups on file.     Dictated  Utilizing Dragon Dictation    Please note that portions of this note were completed with a voice recognition program.    Part of this note may be an electronic transcription/translation of spoken language to printed text using the Dragon Dictation System.

## 2022-04-21 LAB — SARS-COV-2 RNA NOSE QL NAA+PROBE: NOT DETECTED

## 2022-05-16 ENCOUNTER — TELEPHONE (OUTPATIENT)
Dept: FAMILY MEDICINE CLINIC | Facility: CLINIC | Age: 72
End: 2022-05-16

## 2022-05-16 NOTE — TELEPHONE ENCOUNTER
Caller: Sabina Elliott    Relationship: Emergency Contact    Best call back number: [phone]    What medication are you requestinMG AMOXICILLIN 4 TABLETS     What are your current symptoms: DENTAL APPOINTMENT    Have you had these symptoms before:    [x] Yes  [] No    Have you been treated for these symptoms before:   [x] Yes  [] No    If a prescription is needed, what is your preferred pharmacy and phone number:    [facility] [phone]  Additional notes:  PATIENT HAS DENTAL APPOINTMENT ON 2022 AND DR GARCIA USUALLY CALLS THIS IN EVERY 6 MONTHS FOR HIS APPOINTMENTS.

## 2022-05-18 RX ORDER — AMOXICILLIN 500 MG/1
2000 CAPSULE ORAL ONCE
Qty: 4 CAPSULE | Refills: 0 | Status: SHIPPED | OUTPATIENT
Start: 2022-05-18 | End: 2022-05-18

## 2022-08-01 ENCOUNTER — HOSPITAL ENCOUNTER (OUTPATIENT)
Dept: RADIATION ONCOLOGY | Facility: HOSPITAL | Age: 72
Setting detail: RADIATION/ONCOLOGY SERIES
Discharge: HOME | End: 2022-08-01

## 2022-08-01 ENCOUNTER — OFFICE VISIT (OUTPATIENT)
Dept: RADIATION ONCOLOGY | Facility: HOSPITAL | Age: 72
End: 2022-08-01

## 2022-08-01 ENCOUNTER — HOSPITAL ENCOUNTER (OUTPATIENT)
Dept: MRI IMAGING | Facility: HOSPITAL | Age: 72
Discharge: HOME | End: 2022-08-01

## 2022-08-01 VITALS
RESPIRATION RATE: 16 BRPM | DIASTOLIC BLOOD PRESSURE: 76 MMHG | BODY MASS INDEX: 27.8 KG/M2 | WEIGHT: 173 LBS | HEIGHT: 66 IN | HEART RATE: 63 BPM | SYSTOLIC BLOOD PRESSURE: 140 MMHG | TEMPERATURE: 97.6 F | OXYGEN SATURATION: 98 %

## 2022-08-01 DIAGNOSIS — C34.31 SMALL CELL LUNG CANCER, RIGHT LOWER LOBE: ICD-10-CM

## 2022-08-01 DIAGNOSIS — C34.31 SMALL CELL LUNG CANCER, RIGHT LOWER LOBE: Primary | ICD-10-CM

## 2022-08-01 PROCEDURE — G0463 HOSPITAL OUTPT CLINIC VISIT: HCPCS

## 2022-08-01 PROCEDURE — 70551 MRI BRAIN STEM W/O DYE: CPT

## 2022-08-01 RX ORDER — LANOLIN ALCOHOL/MO/W.PET/CERES
1000 CREAM (GRAM) TOPICAL DAILY
COMMUNITY

## 2022-08-01 NOTE — PROGRESS NOTES
FOLLOW UP NOTE    PATIENT:                                                      Luis Elliott  MEDICAL RECORD #:                        [id number]  :                                                          1950  COMPLETION DATE:    21  DIAGNOSIS:     Small cell lung cancer, right lower lobe (HCC)  - Stage IIIA (cT1b, cN2, cM0)        BRIEF HISTORY:    Luis Elliott is a 72 y.o. gentleman found to have an enlarging right lower lobe nodule on screening CT scan of the chest.  He underwent staging PET/CT scan in 2020 that showed the pulmonary nodule to be hypermetabolic and increased in size.  There was some hilar and subcarinal lymphadenopathy which was also hypermetabolic, but otherwise no evidence of distant disease.  He then underwent bronchoscopy and EBUS that confirmed diagnosis of small cell lung cancer.  Staging MRI brain 2020 showed a very small indeterminate enhancing right cerebellar lesion with some weak diffusion restriction but no edema.  It was unclear whether this intracranial abnormality represented metastasis or was related to his history of mitral valve vegetation and possible endocarditis with positive blood cultures.  He underwent definitive concurrent chemotherapy with radiation using cisplatin etoposide, which he completed in 2021.  He had a complete response to treatment with no evidence of residual disease in the chest.  Repeat MRI brain showed resolution of the suspicious brain lesion.  He therefore was recommended to undergo prophylactic cranial irradiation.  The area initially involved with a suspected malignancy received 30 Gy in 10 fractions, while the remainder of the brain received 25 Gy in 10 fractions with hippocampal avoidance utilizing IMRT and IGRT technique which the patient completed 2021.     The patient denies any new soa or fevers or chills. No HA or changes in vision.    MEDICATIONS: Medication reconciliation for the patient was reviewed  "and confirmed in the electronic medical record.    Review of Systems:   Review of Systems - Oncology   A full 14 point review of systems was performed and was negative except as noted in the HPI.      Physical Exam:   Physical Exam  Vitals and nursing note reviewed.   Constitutional:       Appearance: He is well-developed.   HENT:      Head: Normocephalic and atraumatic.   Eyes:      Conjunctiva/sclera: Conjunctivae normal.      Pupils: Pupils are equal, round, and reactive to light.   Neck:      Comments: No obviously enlarged cervical or supraclavicular LAD.  Cardiovascular:      Comments: Patient well perfused. Non cyanotic. No prominent JVD. No pedal edema  Pulmonary:      Effort: Pulmonary effort is normal.      Breath sounds: Normal breath sounds. No stridor. No wheezing.   Abdominal:      General: There is no distension.      Palpations: Abdomen is soft.   Musculoskeletal:         General: Normal range of motion.      Cervical back: Normal range of motion and neck supple.      Comments: Patient moves all extremities spontaneously.    Skin:     General: Skin is warm and dry.   Neurological:      Mental Status: He is alert and oriented to person, place, and time.      Comments: Coordination intact.   Psychiatric:         Behavior: Behavior normal.         Thought Content: Thought content normal.         Judgment: Judgment normal.         VITAL SIGNS:   Vitals:    08/01/22 1437   BP: 140/76   Pulse: 63   Resp: 16   Temp: 97.6 °F (36.4 °C)   SpO2: 98%  Comment: RA   Weight: 78.5 kg (173 lb)   Height: 167.6 cm (66\")   PainSc: 0-No pain             Karnofsky score: 90       The following portions of the patient's history were reviewed and updated as appropriate: allergies, current medications, past family history, past medical history, past social history, past surgical history and problem list.  I have personally requested reviewed and interpreted the patient's images and radiology reports and pathology reports " listed below:  MRI Brain Without Contrast    Result Date: 8/1/2022  1.  No definite interval change with respect to the head as compared to prior study. The lack of contrast does make complete assessment for metastatic disease limited. 2.  There are T2 signal changes involving both cerebral hemispheres which has been noted and could reflect more chronic small vessel ischemic change.  This report was finalized on 8/1/2022 4:08 PM by Judah Hopkins MD.      XR Chest PA & Lateral    Result Date: 4/20/2022  Right perihilar opacities and volume loss are again noted, grossly similar to recent CT. There is no new focal consolidation elsewhere. There is no enlarging pleural effusion or distinct pneumothorax. Unchanged heart and mediastinal contours.  This report was finalized on 4/20/2022 10:32 AM by Kan Webber.         I reviewed Gaby Otero's most recent note.       IMPRESSION:    The patient is doing very well from a disease standpoint.  He has no disease in the brain that is measurable at this time.  Patient has plans to follow-up with Dr. Cartagena's clinic in September with repeat scans.  I would like to see the patient back after the scans with a telehealth visit.  Patient would benefit from 1 last MRI scan in about 6 months from September and then he could probably forego them unless he develops new disease systemically.    Spent 25 minutes on the case today.  RECOMMENDATIONS:      cT1 cN2 M0 stage IIIA RLL small cell lung cancer  -MRI 12/14/2020 shows very small indeterminate lesion in the right cerebellum  -s/p concurrent chemo + radiotherapy to PET avid disease 66 Gy in 33 fractions, completed 2/23/2021  -Restaging scans middle of June 2021 showed excellent response in the chest and repeat MRI brain without evidence of metastatic disease  -Underwent PCI 25 Gy in 10 fractions with hippocampal sparing completed 4/23/2021  -Has discontinued memantine  -Repeat CT C/A/P with medical oncology in September  -Recommend  follow-up after neck CT scan via telehealth this patient has many medical visits and is looking to cut back on the number of doctor visits that they are making.  -Could consider 1 last 6-month MRI scan for routine surveillance from September            COPD  -Continue present regimen     Diabetes  -Continue present regimen per primary  -Being managed to lower HbA1c     CKD 3  -GFR mid 50s  -Follows with Dr. Webber  -Recommend minimizing dye load     Mitral valve prolapse/vegetation  -Possibly contributing to lesion in right cerebellum formerly identified on MRI brain  -Continue to monitor  -Status post IV antibiotics in 2019           Jay Jay Urias MD    Errors in dictation may reflect use of voice recognition software and not all errors in transcription may have been detected prior to signing.

## 2022-08-12 DIAGNOSIS — E11.9 TYPE 2 DIABETES MELLITUS WITHOUT COMPLICATION, WITHOUT LONG-TERM CURRENT USE OF INSULIN: ICD-10-CM

## 2022-08-12 NOTE — TELEPHONE ENCOUNTER
Rx Refill Note  Requested Prescriptions     Pending Prescriptions Disp Refills   • metFORMIN (GLUCOPHAGE) 500 MG tablet [Pharmacy Med Name: metFORMIN  MG TABLET] 180 tablet 1     Sig: TAKE ONE TABLET BY MOUTH TWICE A DAY WITH MEALS      Last office visit with prescribing clinician: 4/20/2022      Next office visit with prescribing clinician: 10/4/2022            Little Levine MA  08/12/22, 14:21 EDT

## 2022-09-19 ENCOUNTER — HOSPITAL ENCOUNTER (OUTPATIENT)
Dept: CT IMAGING | Facility: HOSPITAL | Age: 72
Discharge: HOME | End: 2022-09-19

## 2022-09-19 ENCOUNTER — LAB (OUTPATIENT)
Dept: LAB | Facility: HOSPITAL | Age: 72
End: 2022-09-19

## 2022-09-19 DIAGNOSIS — C34.31 SMALL CELL LUNG CANCER, RIGHT LOWER LOBE: ICD-10-CM

## 2022-09-19 LAB
ALBUMIN SERPL-MCNC: 4.6 G/DL (ref 3.5–5.2)
ALBUMIN/GLOB SERPL: 2 G/DL
ALP SERPL-CCNC: 89 U/L (ref 39–117)
ALT SERPL W P-5'-P-CCNC: 38 U/L (ref 1–41)
ANION GAP SERPL CALCULATED.3IONS-SCNC: 7 MMOL/L (ref 5–15)
AST SERPL-CCNC: 30 U/L (ref 1–40)
BASOPHILS # BLD AUTO: 0.03 10*3/MM3 (ref 0–0.2)
BASOPHILS NFR BLD AUTO: 0.5 % (ref 0–1.5)
BILIRUB SERPL-MCNC: 0.8 MG/DL (ref 0–1.2)
BUN SERPL-MCNC: 22 MG/DL (ref 8–23)
BUN/CREAT SERPL: 16.1 (ref 7–25)
CALCIUM SPEC-SCNC: 9.6 MG/DL (ref 8.6–10.5)
CHLORIDE SERPL-SCNC: 105 MMOL/L (ref 98–107)
CO2 SERPL-SCNC: 29 MMOL/L (ref 22–29)
CREAT BLDA-MCNC: 1.6 MG/DL (ref 0.6–1.3)
CREAT SERPL-MCNC: 1.37 MG/DL (ref 0.76–1.27)
DEPRECATED RDW RBC AUTO: 46.5 FL (ref 37–54)
EGFRCR SERPLBLD CKD-EPI 2021: 55.1 ML/MIN/1.73
EOSINOPHIL # BLD AUTO: 0.11 10*3/MM3 (ref 0–0.4)
EOSINOPHIL NFR BLD AUTO: 1.9 % (ref 0.3–6.2)
ERYTHROCYTE [DISTWIDTH] IN BLOOD BY AUTOMATED COUNT: 13.1 % (ref 12.3–15.4)
GLOBULIN UR ELPH-MCNC: 2.3 GM/DL
GLUCOSE SERPL-MCNC: 114 MG/DL (ref 65–99)
HCT VFR BLD AUTO: 40.6 % (ref 37.5–51)
HGB BLD-MCNC: 13.1 G/DL (ref 13–17.7)
IMM GRANULOCYTES # BLD AUTO: 0.01 10*3/MM3 (ref 0–0.05)
IMM GRANULOCYTES NFR BLD AUTO: 0.2 % (ref 0–0.5)
LYMPHOCYTES # BLD AUTO: 1.11 10*3/MM3 (ref 0.7–3.1)
LYMPHOCYTES NFR BLD AUTO: 19.2 % (ref 19.6–45.3)
MCH RBC QN AUTO: 31.3 PG (ref 26.6–33)
MCHC RBC AUTO-ENTMCNC: 32.3 G/DL (ref 31.5–35.7)
MCV RBC AUTO: 96.9 FL (ref 79–97)
MONOCYTES # BLD AUTO: 0.52 10*3/MM3 (ref 0.1–0.9)
MONOCYTES NFR BLD AUTO: 9 % (ref 5–12)
NEUTROPHILS NFR BLD AUTO: 4.01 10*3/MM3 (ref 1.7–7)
NEUTROPHILS NFR BLD AUTO: 69.2 % (ref 42.7–76)
NRBC BLD AUTO-RTO: 0 /100 WBC (ref 0–0.2)
PLATELET # BLD AUTO: 132 10*3/MM3 (ref 140–450)
PMV BLD AUTO: 11.5 FL (ref 6–12)
POTASSIUM SERPL-SCNC: 5.1 MMOL/L (ref 3.5–5.2)
PROT SERPL-MCNC: 6.9 G/DL (ref 6–8.5)
RBC # BLD AUTO: 4.19 10*6/MM3 (ref 4.14–5.8)
SODIUM SERPL-SCNC: 141 MMOL/L (ref 136–145)
WBC NRBC COR # BLD: 5.79 10*3/MM3 (ref 3.4–10.8)

## 2022-09-19 PROCEDURE — 80053 COMPREHEN METABOLIC PANEL: CPT

## 2022-09-19 PROCEDURE — 82565 ASSAY OF CREATININE: CPT

## 2022-09-19 PROCEDURE — 85025 COMPLETE CBC W/AUTO DIFF WBC: CPT

## 2022-09-19 PROCEDURE — 25010000002 IOPAMIDOL 61 % SOLUTION: Performed by: INTERNAL MEDICINE

## 2022-09-19 PROCEDURE — 36415 COLL VENOUS BLD VENIPUNCTURE: CPT

## 2022-09-19 PROCEDURE — 71260 CT THORAX DX C+: CPT

## 2022-09-19 RX ADMIN — IOPAMIDOL 75 ML: 612 INJECTION, SOLUTION INTRAVENOUS at 10:51

## 2022-09-20 ENCOUNTER — HOSPITAL ENCOUNTER (OUTPATIENT)
Dept: RADIATION ONCOLOGY | Facility: HOSPITAL | Age: 72
Setting detail: RADIATION/ONCOLOGY SERIES
Discharge: HOME | End: 2022-09-20

## 2022-09-20 ENCOUNTER — OFFICE VISIT (OUTPATIENT)
Dept: RADIATION ONCOLOGY | Facility: HOSPITAL | Age: 72
End: 2022-09-20

## 2022-09-20 DIAGNOSIS — C34.31 SMALL CELL LUNG CANCER, RIGHT LOWER LOBE: Primary | ICD-10-CM

## 2022-09-20 NOTE — PROGRESS NOTES
FOLLOW UP NOTE    PATIENT:                                                      Luis Elliott  MEDICAL RECORD #:                        [id number]  :                                                          1950  COMPLETION DATE:     DIAGNOSIS:     Small cell lung cancer, right lower lobe (HCC)  - Stage IIIA (cT1b, cN2, cM0)      The patient requested follow-up today via telephone call.    BRIEF HISTORY:    Luis Elliott is a 72 y.o. gentleman found to have an enlarging right lower lobe nodule on screening CT scan of the chest.  He underwent staging PET/CT scan in 2020 that showed the pulmonary nodule to be hypermetabolic and increased in size.  There was some hilar and subcarinal lymphadenopathy which was also hypermetabolic, but otherwise no evidence of distant disease.  He then underwent bronchoscopy and EBUS that confirmed diagnosis of small cell lung cancer.  Staging MRI brain 2020 showed a very small indeterminate enhancing right cerebellar lesion with some weak diffusion restriction but no edema.  It was unclear whether this intracranial abnormality represented metastasis or was related to his history of mitral valve vegetation and possible endocarditis with positive blood cultures.  He underwent definitive concurrent chemotherapy with radiation using cisplatin etoposide, which he completed in 2021.  He had a complete response to treatment with no evidence of residual disease in the chest.  Repeat MRI brain showed resolution of the suspicious brain lesion.  He therefore was recommended to undergo prophylactic cranial irradiation.  The area initially involved with a suspected malignancy received 30 Gy in 10 fractions, while the remainder of the brain received 25 Gy in 10 fractions with hippocampal avoidance utilizing IMRT and IGRT technique which the patient completed 2021.    The patient reports that he is feeling well.  He denies recent fevers, chills, nausea, vomiting,  changes in his vision.    MEDICATIONS: Medication reconciliation for the patient was reviewed and confirmed in the electronic medical record.    Review of Systems:   Review of Systems - Oncology   A full 14 point review of systems was performed and was negative except as noted in the HPI.      Physical Exam:   Physical Exam  Constitutional:       General: He is not in acute distress.     Appearance: He is well-developed.   HENT:      Head: Normocephalic and atraumatic.   Eyes:      Conjunctiva/sclera: Conjunctivae normal.      Pupils: Pupils are equal, round, and reactive to light.   Neck:      Trachea: No tracheal deviation.   Pulmonary:      Effort: Pulmonary effort is normal. No respiratory distress.      Breath sounds: No wheezing.   Abdominal:      General: There is no distension.      Palpations: Abdomen is soft.   Musculoskeletal:         General: Normal range of motion.      Cervical back: Normal range of motion.   Skin:     General: Skin is warm and dry.   Neurological:      Mental Status: He is alert.      Cranial Nerves: No cranial nerve deficit.      Coordination: Coordination normal.   Psychiatric:         Behavior: Behavior normal.         Judgment: Judgment normal.            VITAL SIGNS: There were no vitals filed for this visit.              Kps: 90    The following portions of the patient's history were reviewed and updated as appropriate: allergies, current medications, past family history, past medical history, past social history, past surgical history and problem list.  I have personally requested reviewed and interpreted the patient's images and radiology reports and pathology reports listed below:  CT Chest With Contrast Diagnostic    Result Date: 9/19/2022  1. Stable treatment related changes involving the right hilum, right upper, and right lower lobes as detailed above. No definite recurrent disease or metastatic disease seen within the chest. 2. Cholelithiasis. Electronically Signed:  Aly Cuevas MD 9/19/2022 21:22 EDT    MRI Brain Without Contrast    Result Date: 8/1/2022  1.  No definite interval change with respect to the head as compared to prior study. The lack of contrast does make complete assessment for metastatic disease limited. 2.  There are T2 signal changes involving both cerebral hemispheres which has been noted and could reflect more chronic small vessel ischemic change.  This report was finalized on 8/1/2022 4:08 PM by Judah Hopkins MD.      I personally reviewed the patient's imaging with the patient today.       I spent a total of 20 minutes on the case today.     IMPRESSION:   Patient is doing very well from a disease standpoint.  He will continue to follow with Dr. Cartagena's clinic.  So the patient would like to continue to follow Dr. Cartagena.  We will sign off at this time.    RECOMMENDATIONS:    cT1 cN2 M0 stage IIIA RLL small cell lung cancer  -MRI 12/14/2020 shows very small indeterminate lesion in the right cerebellum  -s/p concurrent chemo + radiotherapy to PET avid disease 66 Gy in 33 fractions, completed 2/23/2021  -Restaging scans middle of June 2021 showed excellent response in the chest and repeat MRI brain without evidence of metastatic disease  -Underwent PCI 25 Gy in 10 fractions with hippocampal sparing completed 4/23/2021  -Has discontinued memantine  -CT scan 9/19/2022 shows no evidence of disease in the chest.  -Patient would like to continue to follow with Dr. Cartagena and his clinic.               Jay Jay Urias MD    Errors in dictation may reflect use of voice recognition software and not all errors in transcription may have been detected prior to signing.

## 2022-09-20 NOTE — PROGRESS NOTES
DATE OF VISIT: 9/21/2022    REASON FOR VISIT: Followup for right lower lobe small cell lung cancer     PROBLEM LIST:  1. Limited stage small cell lung cancer [id number] stage IIIa:  A.  Presented with abnormal screening CT chest  B.  Diagnosed after bronchoscopy with biopsy done by  November 19, 2020 + for small cell from level 7 level 4R and level 10 R lymph nodes.  C.  Whole-body PET scan did not reveal any other sites of disease.  D.  Started definitive concurrent chemotherapy with radiation using cisplatin etoposide December 2020 1 status post 4 cycles completed February 23, 2021  2.  Isolated 6 mm right cerebellar lesion:  A.  Evident MRI brain done on December 1, 2020  B.  Completed prophylactic whole brain radiation 4/23/2021.   3.  Chemotherapy-induced nausea  4. Insomnia   5.  Treatment induced anemia  6.  Chronic kidney disease  7.  Type 2 diabetes      HISTORY OF PRESENT ILLNESS: The patient is a very pleasant 72 y.o. male  with past medical history significant for right lower lobe lung cancer diagnosed April 19, 2020.  Patient status post definitive concurrent chemotherapy with radiation completed February 23, 2021. The patient is here today for scheduled follow-up visit.    SUBJECTIVE: The patient is here today with his wife.  He has been doing well since his last visit.  He is active and feeling well with no complaints of cough or shortness of breath.  He was seen by Dr. Urias yesterday who released him from routine follow-up visits.  He denies headaches, or visual changes.  He does have some occasional episodes of dizziness, but no loss of balance or falls.  He has been eating and drinking well.  He denies chest pain or new complaints of bone pain.    Past History:  Medical History: has a past medical history of Cancer (HCC), CKD stage G3a/A2, GFR 45-59 and albumin creatinine ratio  mg/g (HCC) (6/21/2021), COPD (chronic obstructive pulmonary disease) (HCC), Coronary artery disease,  "DDD (degenerative disc disease), cervical, Depression, Diabetes mellitus (HCC), Erectile dysfunction, Glaucoma, Headache, History of radiation therapy (02/23/2021), History of radiation therapy (04/23/2021), Hyperlipidemia, Hypertension, Impingement syndrome of left shoulder, Infection, bacterial, Lung cancer (HCC), MI, old, Mitral valve vegetation, Osteoarthritis, Prostate cancer (HCC), SOBOE (shortness of breath on exertion), Varicose veins of lower limb, Wears glasses, and Wears partial dentures.   Surgical History: has a past surgical history that includes Prostatectomy; Colonoscopy w/ polypectomy; Knee surgery; Knee Arthroscopy; Lumbar epidural injection; Hernia repair; Tonsillectomy; Coronary angioplasty; Tendon transfer elbow; Trabeculectomy; pr rt/lt heart catheters (N/A, 12/27/2016); Cardiac catheterization (N/A, 12/20/2016); Coronary angioplasty with stent; Cataract extraction; Eye surgery; Colonoscopy; and Bronchoscopy (N/A, 11/19/2020).   Family History: family history includes Alcohol abuse in his father; Atrial fibrillation in his mother; Diabetes in his father; Heart attack in his father; Hypertension in his mother; No Known Problems in his sister; Prostate cancer in his brother.   Social History: reports that he quit smoking about 11 years ago. His smoking use included cigarettes. He has a 54.00 pack-year smoking history. He quit smokeless tobacco use about 11 years ago.  His smokeless tobacco use included chew. He reports previous alcohol use. He reports current drug use. Drug: Marijuana.    (Not in a hospital admission)     Allergies: Xarelto [rivaroxaban]     Review of Systems   Musculoskeletal: Positive for arthralgias.   Neurological: Positive for dizziness.   All other systems reviewed and are negative.      PHYSICAL EXAMINATION:   /75   Pulse 59   Temp 97.1 °F (36.2 °C) (Temporal)   Resp 16   Ht 167.6 cm (65.98\")   Wt 77.6 kg (171 lb)   SpO2 97%   BMI 27.61 kg/m²    Pain Score    " 09/21/22 1057   PainSc: 0-No pain      ECOG score: 0        ECOG Performance Status: 0 - Asymptomatic  General Appearance:  alert, cooperative, no apparent distress and appears stated age   Lungs:   Clear to auscultation bilaterally; respirations regular, even, and unlabored bilaterally   Heart:  Regular rate and rhythm, no murmurs appreciated   Abdomen:   Soft, non-tender, non-distended and no organomegaly     Hospital Outpatient Visit on 09/19/2022   Component Date Value Ref Range Status   • Creatinine 09/19/2022 1.60 (A) 0.60 - 1.30 mg/dL Final    Serial Number: [id number]:  [id number]   Lab on 09/19/2022   Component Date Value Ref Range Status   • Glucose 09/19/2022 114 (A) 65 - 99 mg/dL Final   • BUN 09/19/2022 22  8 - 23 mg/dL Final   • Creatinine 09/19/2022 1.37 (A) 0.76 - 1.27 mg/dL Final   • Sodium 09/19/2022 141  136 - 145 mmol/L Final   • Potassium 09/19/2022 5.1  3.5 - 5.2 mmol/L Final   • Chloride 09/19/2022 105  98 - 107 mmol/L Final   • CO2 09/19/2022 29.0  22.0 - 29.0 mmol/L Final   • Calcium 09/19/2022 9.6  8.6 - 10.5 mg/dL Final   • Total Protein 09/19/2022 6.9  6.0 - 8.5 g/dL Final   • Albumin 09/19/2022 4.60  3.50 - 5.20 g/dL Final   • ALT (SGPT) 09/19/2022 38  1 - 41 U/L Final   • AST (SGOT) 09/19/2022 30  1 - 40 U/L Final   • Alkaline Phosphatase 09/19/2022 89  39 - 117 U/L Final   • Total Bilirubin 09/19/2022 0.8  0.0 - 1.2 mg/dL Final   • Globulin 09/19/2022 2.3  gm/dL Final    Calculated Result   • A/G Ratio 09/19/2022 2.0  g/dL Final   • BUN/Creatinine Ratio 09/19/2022 16.1  7.0 - 25.0 Final   • Anion Gap 09/19/2022 7.0  5.0 - 15.0 mmol/L Final   • eGFR 09/19/2022 55.1 (A) >60.0 mL/min/1.73 Final    National Kidney Foundation and American Society of Nephrology (ASN) Task Force recommended calculation based on the Chronic Kidney Disease Epidemiology Collaboration (CKD-EPI) equation refit without adjustment for race.   • WBC 09/19/2022 5.79  3.40 - 10.80 10*3/mm3 Final   • RBC 09/19/2022 4.19   4.14 - 5.80 10*6/mm3 Final   • Hemoglobin 09/19/2022 13.1  13.0 - 17.7 g/dL Final   • Hematocrit 09/19/2022 40.6  37.5 - 51.0 % Final   • MCV 09/19/2022 96.9  79.0 - 97.0 fL Final   • MCH 09/19/2022 31.3  26.6 - 33.0 pg Final   • MCHC 09/19/2022 32.3  31.5 - 35.7 g/dL Final   • RDW 09/19/2022 13.1  12.3 - 15.4 % Final   • RDW-SD 09/19/2022 46.5  37.0 - 54.0 fl Final   • MPV 09/19/2022 11.5  6.0 - 12.0 fL Final   • Platelets 09/19/2022 132 (A) 140 - 450 10*3/mm3 Final   • Neutrophil % 09/19/2022 69.2  42.7 - 76.0 % Final   • Lymphocyte % 09/19/2022 19.2 (A) 19.6 - 45.3 % Final   • Monocyte % 09/19/2022 9.0  5.0 - 12.0 % Final   • Eosinophil % 09/19/2022 1.9  0.3 - 6.2 % Final   • Basophil % 09/19/2022 0.5  0.0 - 1.5 % Final   • Immature Grans % 09/19/2022 0.2  0.0 - 0.5 % Final   • Neutrophils, Absolute 09/19/2022 4.01  1.70 - 7.00 10*3/mm3 Final   • Lymphocytes, Absolute 09/19/2022 1.11  0.70 - 3.10 10*3/mm3 Final   • Monocytes, Absolute 09/19/2022 0.52  0.10 - 0.90 10*3/mm3 Final   • Eosinophils, Absolute 09/19/2022 0.11  0.00 - 0.40 10*3/mm3 Final   • Basophils, Absolute 09/19/2022 0.03  0.00 - 0.20 10*3/mm3 Final   • Immature Grans, Absolute 09/19/2022 0.01  0.00 - 0.05 10*3/mm3 Final   • nRBC 09/19/2022 0.0  0.0 - 0.2 /100 WBC Final        CT Chest With Contrast Diagnostic    Result Date: 9/19/2022  Narrative: CT CHEST W CONTRAST DIAGNOSTIC Date of Exam: 9/19/2022 10:56 EDT Indication: f/u scans.  Small cell lung cancer Comparison Exams: 3/14/2022. Technique: Multiple axial images were obtained from the thoracic inlet through the adrenal glands after the administration of Isovue IV contrast. The axial data was used to generate reformatted images in the coronal and sagittal planes. Automated exposure control and iterative reconstruction methods were used FINDINGS: There is no mediastinal or axillary adenopathy. There is persistent abnormal soft tissue surrounding the right upper lobe bronchus as well as parts of  the right upper and lower lobe pulmonary arteries. These findings appear similar to prior exam. There is airspace consolidation predominantly within the right upper and right lower lobe centrally compatible treatment related change. This is also similar appearance to the study of 3/14/2022 no definite hilar adenopathy identified. No pleural effusions are  seen. No suspicious pulmonary nodules are seen. Calcified granuloma seen within the left lower lobe. Bilateral adrenal glands are within normal limits. There are calcified stones within the gallbladder. Upper abdomen is otherwise unremarkable. There are degenerative changes throughout spine. There are no lytic or sclerotic bony lesions identified.     Impression: 1. Stable treatment related changes involving the right hilum, right upper, and right lower lobes as detailed above. No definite recurrent disease or metastatic disease seen within the chest. 2. Cholelithiasis. Electronically Signed: Aly Cuevas MD 9/19/2022 21:22 EDT      ASSESSMENT: The patient is a very pleasant 72 y.o. male  with right small cell lung cancer      PLAN:    1.  Right lower lobe small cell lung cancer:  A.  I reviewed the CAT scan results from 9/19/2022 with the patient. I reviewed the images myself. I reassured him he has stable findings without evidence of new or progressive disease.   B.  I reviewed the lab results from 9/19/2022 with the patient. I reassured him that his WBC and hemoglobin are both normal. He has mild thrombocytopenia with platelet count of 132,000. His creatinine is slightly improved 1.37 with normal calcium and liver enzymes.   C. We will continue watchful waiting at this time with plan to repeat his CAT scan of chest in 6 months with repeat labs including CBC and CMP.    2.  Isolated brain lesion:  A.  The patient completed whole radiation April 2021.  B.  The last MRI brain done August 1, 2022 showed no evidence of metastatic disease.  C.  He will continue  follow up with Dr. Urias.    3.  Depression:  A.  The patient will continue Zoloft 50 mg daily.    4.  Insomnia:  A.  The patient will continue Ambien 5 mg nightly as needed    5.  Type 2 diabetes:  A.  He will continue Metformin 5 mg twice a day    6.  Chronic kidney disease stage IIIa:  A.  I explained to the patient, his creatinine was 1.37 on 9/19/2022.  B.  He will continue to follow-up with his nephrologist, Dr. Webber, at [facility].  C. We will order his next CT scan without contrast to help limit impact on his kidneys from contrast dye.     FOLLOW UP: 6-month repeated scans and labs.    Gaby Pandya, APRN  9/21/2022

## 2022-09-21 ENCOUNTER — OFFICE VISIT (OUTPATIENT)
Dept: PULMONOLOGY | Facility: CLINIC | Age: 72
End: 2022-09-21

## 2022-09-21 ENCOUNTER — OFFICE VISIT (OUTPATIENT)
Dept: ONCOLOGY | Facility: CLINIC | Age: 72
End: 2022-09-21

## 2022-09-21 VITALS
TEMPERATURE: 97.1 F | DIASTOLIC BLOOD PRESSURE: 75 MMHG | OXYGEN SATURATION: 97 % | SYSTOLIC BLOOD PRESSURE: 127 MMHG | BODY MASS INDEX: 27.48 KG/M2 | WEIGHT: 171 LBS | HEART RATE: 59 BPM | RESPIRATION RATE: 16 BRPM | HEIGHT: 66 IN

## 2022-09-21 VITALS
OXYGEN SATURATION: 96 % | BODY MASS INDEX: 27.55 KG/M2 | HEART RATE: 74 BPM | DIASTOLIC BLOOD PRESSURE: 72 MMHG | SYSTOLIC BLOOD PRESSURE: 118 MMHG | TEMPERATURE: 98 F | HEIGHT: 66 IN | WEIGHT: 171.4 LBS

## 2022-09-21 DIAGNOSIS — J44.9 COPD MIXED TYPE: Primary | ICD-10-CM

## 2022-09-21 DIAGNOSIS — R91.1 PULMONARY NODULE: ICD-10-CM

## 2022-09-21 DIAGNOSIS — C34.31 SMALL CELL LUNG CANCER, RIGHT LOWER LOBE: Primary | ICD-10-CM

## 2022-09-21 DIAGNOSIS — Z87.891 FORMER SMOKER: ICD-10-CM

## 2022-09-21 PROCEDURE — 99214 OFFICE O/P EST MOD 30 MIN: CPT | Performed by: NURSE PRACTITIONER

## 2022-09-21 PROCEDURE — 99213 OFFICE O/P EST LOW 20 MIN: CPT | Performed by: NURSE PRACTITIONER

## 2022-09-21 NOTE — PROGRESS NOTES
"[facility] Pulmonary Follow up      Chief Complaint  Lung Cancer (F/U)    Subjective     {CC  Problem List  Visit Diagnosis   Encounters  Notes  Medications  Labs  Result Review Imaging  Media :23}     Luis Elliott presents to [facility] PULMONARY [facility] CRITICAL CARE MEDICINE for routine follow-up.  Usually follows up with  for his COPD.  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  He did recently have his follow-up CT scan which did not show any reoccurrence.    He says he is doing very well currently.  He does not use any inhalers.  He denies any shortness of breath.  He has no cough or sputum production.      Objective     Vital Signs:   /72 (BP Location: Right arm, Patient Position: Sitting, Cuff Size: Adult)   Pulse 74   Temp 98 °F (36.7 °C) (Infrared)   Ht 167.6 cm (65.98\")   Wt 77.7 kg (171 lb 6.4 oz)   SpO2 96% Comment: resting, room air  BMI 27.68 kg/m²       Immunization History   Administered Date(s) Administered   • COVID-19 (MODERNA) 1st, 2nd, 3rd Dose Only 04/28/2021, 05/26/2021, 11/29/2021   • FLUAD TRI 65YR+ 09/16/2019   • Flu Vaccine Quad PF >36MO 11/25/2015, 12/12/2016, 10/24/2017   • Fluad Quad 65+ 09/17/2020   • Fluzone High Dose =>65 Years (Vaxcare ONLY) 11/01/2017, 09/12/2018, 09/16/2019   • Fluzone High-Dose 65+yrs 10/01/2021   • Hepatitis A 05/31/2019   • PEDS-Pneumococcal Conjugate (PCV7) 06/03/2015   • Pneumococcal Conjugate 13-Valent (PCV13) 06/03/2015, 10/02/2020   • Pneumococcal Polysaccharide (PPSV23) 06/03/2013   • Shingrix 10/02/2020       Physical Exam  Vitals reviewed.   Constitutional:       Appearance: He is well-developed.   HENT:      Head: Normocephalic and atraumatic.   Eyes:      Pupils: Pupils are equal, round, and reactive to light.   Cardiovascular:      Rate and Rhythm: Normal rate and regular rhythm.      Heart sounds: No murmur heard.  Pulmonary:      Effort: Pulmonary effort is normal. " No respiratory distress.      Breath sounds: Normal breath sounds. No wheezing or rales.   Abdominal:      General: Bowel sounds are normal. There is no distension.      Palpations: Abdomen is soft.   Musculoskeletal:         General: Normal range of motion.      Cervical back: Normal range of motion and neck supple.   Skin:     General: Skin is warm and dry.      Findings: No erythema.   Neurological:      Mental Status: He is alert and oriented to person, place, and time.   Psychiatric:         Behavior: Behavior normal.          Result Review :                       Assessment and Plan    Problem List Items Addressed This Visit        Pulmonary and Pneumonias    Pulmonary nodule (2.6cm RLL)    COPD - Primary       Tobacco    Former smoker (Stopped 2012)          Mr. Elliott is doing very well currently.  He has had no recent issues.  He denies any shortness of breath or recent exacerbations of his COPD.    He did have some questionable sleep apnea episodes COVID where he was waking up startled and short of breath.  He says those episodes have resolved.    Continue follow-up with  every 6 months and Dr. Cartagena with his serial CT follow-ups as scheduled.    Follow Up     No follow-ups on file.  Patient was given instructions and counseling regarding his condition or for health maintenance advice. Please see specific information pulled into the AVS if appropriate.     I spent 25 minutes caring for Luis on this date of service. This time includes time spent by me in the following activities:preparing for the visit, reviewing tests, obtaining and/or reviewing a separately obtained history, performing a medically appropriate examination and/or evaluation , counseling and educating the patient/family/caregiver and documenting information in the medical record        ORNNY Anguiano, ACNP  [facility]  Electronically signed

## 2022-10-04 ENCOUNTER — OFFICE VISIT (OUTPATIENT)
Dept: FAMILY MEDICINE CLINIC | Facility: CLINIC | Age: 72
End: 2022-10-04

## 2022-10-04 VITALS
SYSTOLIC BLOOD PRESSURE: 122 MMHG | BODY MASS INDEX: 27.97 KG/M2 | OXYGEN SATURATION: 98 % | HEART RATE: 68 BPM | WEIGHT: 174 LBS | HEIGHT: 66 IN | DIASTOLIC BLOOD PRESSURE: 82 MMHG

## 2022-10-04 DIAGNOSIS — I10 PRIMARY HYPERTENSION: ICD-10-CM

## 2022-10-04 DIAGNOSIS — Z12.5 ENCOUNTER FOR PROSTATE CANCER SCREENING: ICD-10-CM

## 2022-10-04 DIAGNOSIS — E11.9 TYPE 2 DIABETES MELLITUS WITHOUT COMPLICATION, WITHOUT LONG-TERM CURRENT USE OF INSULIN: ICD-10-CM

## 2022-10-04 DIAGNOSIS — E55.9 VITAMIN D DEFICIENCY: ICD-10-CM

## 2022-10-04 DIAGNOSIS — Z23 IMMUNIZATION DUE: ICD-10-CM

## 2022-10-04 DIAGNOSIS — Z00.00 MEDICARE ANNUAL WELLNESS VISIT, SUBSEQUENT: Primary | ICD-10-CM

## 2022-10-04 LAB
EXPIRATION DATE: NORMAL
HBA1C MFR BLD: 5.7 %
Lab: NORMAL

## 2022-10-04 PROCEDURE — 83036 HEMOGLOBIN GLYCOSYLATED A1C: CPT | Performed by: INTERNAL MEDICINE

## 2022-10-04 PROCEDURE — G0009 ADMIN PNEUMOCOCCAL VACCINE: HCPCS | Performed by: INTERNAL MEDICINE

## 2022-10-04 PROCEDURE — 1170F FXNL STATUS ASSESSED: CPT | Performed by: INTERNAL MEDICINE

## 2022-10-04 PROCEDURE — G0439 PPPS, SUBSEQ VISIT: HCPCS | Performed by: INTERNAL MEDICINE

## 2022-10-04 PROCEDURE — 1159F MED LIST DOCD IN RCRD: CPT | Performed by: INTERNAL MEDICINE

## 2022-10-04 PROCEDURE — 3044F HG A1C LEVEL LT 7.0%: CPT | Performed by: INTERNAL MEDICINE

## 2022-10-04 PROCEDURE — 90677 PCV20 VACCINE IM: CPT | Performed by: INTERNAL MEDICINE

## 2022-10-05 ENCOUNTER — LAB (OUTPATIENT)
Dept: LAB | Facility: HOSPITAL | Age: 72
End: 2022-10-05

## 2022-10-05 DIAGNOSIS — C34.31 SMALL CELL LUNG CANCER, RIGHT LOWER LOBE: ICD-10-CM

## 2022-10-05 DIAGNOSIS — E55.9 VITAMIN D DEFICIENCY: ICD-10-CM

## 2022-10-05 DIAGNOSIS — I10 PRIMARY HYPERTENSION: ICD-10-CM

## 2022-10-05 DIAGNOSIS — E11.9 TYPE 2 DIABETES MELLITUS WITHOUT COMPLICATION, WITHOUT LONG-TERM CURRENT USE OF INSULIN: ICD-10-CM

## 2022-10-05 LAB
25(OH)D3 SERPL-MCNC: 74.5 NG/ML (ref 30–100)
ALBUMIN SERPL-MCNC: 4.8 G/DL (ref 3.5–5.2)
ALBUMIN UR-MCNC: <1.2 MG/DL
ALBUMIN/GLOB SERPL: 1.9 G/DL
ALP SERPL-CCNC: 99 U/L (ref 39–117)
ALT SERPL W P-5'-P-CCNC: 29 U/L (ref 1–41)
ANION GAP SERPL CALCULATED.3IONS-SCNC: 9 MMOL/L (ref 5–15)
AST SERPL-CCNC: 23 U/L (ref 1–40)
BASOPHILS # BLD AUTO: 0.03 10*3/MM3 (ref 0–0.2)
BASOPHILS NFR BLD AUTO: 0.4 % (ref 0–1.5)
BILIRUB SERPL-MCNC: 0.8 MG/DL (ref 0–1.2)
BUN SERPL-MCNC: 17 MG/DL (ref 8–23)
BUN/CREAT SERPL: 12.6 (ref 7–25)
CALCIUM SPEC-SCNC: 9.8 MG/DL (ref 8.6–10.5)
CHLORIDE SERPL-SCNC: 105 MMOL/L (ref 98–107)
CHOLEST SERPL-MCNC: 104 MG/DL (ref 0–200)
CO2 SERPL-SCNC: 28 MMOL/L (ref 22–29)
CREAT SERPL-MCNC: 1.35 MG/DL (ref 0.76–1.27)
CREAT UR-MCNC: 96.7 MG/DL
DEPRECATED RDW RBC AUTO: 46.9 FL (ref 37–54)
EGFRCR SERPLBLD CKD-EPI 2021: 56.1 ML/MIN/1.73
EOSINOPHIL # BLD AUTO: 0.2 10*3/MM3 (ref 0–0.4)
EOSINOPHIL NFR BLD AUTO: 2.4 % (ref 0.3–6.2)
ERYTHROCYTE [DISTWIDTH] IN BLOOD BY AUTOMATED COUNT: 13.2 % (ref 12.3–15.4)
GLOBULIN UR ELPH-MCNC: 2.5 GM/DL
GLUCOSE SERPL-MCNC: 120 MG/DL (ref 65–99)
HCT VFR BLD AUTO: 39.7 % (ref 37.5–51)
HDLC SERPL-MCNC: 47 MG/DL (ref 40–60)
HGB BLD-MCNC: 13 G/DL (ref 13–17.7)
IMM GRANULOCYTES # BLD AUTO: 0.03 10*3/MM3 (ref 0–0.05)
IMM GRANULOCYTES NFR BLD AUTO: 0.4 % (ref 0–0.5)
LDLC SERPL CALC-MCNC: 41 MG/DL (ref 0–100)
LDLC/HDLC SERPL: 0.87 {RATIO}
LYMPHOCYTES # BLD AUTO: 1.16 10*3/MM3 (ref 0.7–3.1)
LYMPHOCYTES NFR BLD AUTO: 13.8 % (ref 19.6–45.3)
MCH RBC QN AUTO: 31.7 PG (ref 26.6–33)
MCHC RBC AUTO-ENTMCNC: 32.7 G/DL (ref 31.5–35.7)
MCV RBC AUTO: 96.8 FL (ref 79–97)
MICROALBUMIN/CREAT UR: NORMAL MG/G{CREAT}
MONOCYTES # BLD AUTO: 0.77 10*3/MM3 (ref 0.1–0.9)
MONOCYTES NFR BLD AUTO: 9.2 % (ref 5–12)
NEUTROPHILS NFR BLD AUTO: 6.21 10*3/MM3 (ref 1.7–7)
NEUTROPHILS NFR BLD AUTO: 73.8 % (ref 42.7–76)
NRBC BLD AUTO-RTO: 0 /100 WBC (ref 0–0.2)
PLATELET # BLD AUTO: 130 10*3/MM3 (ref 140–450)
PMV BLD AUTO: 11.4 FL (ref 6–12)
POTASSIUM SERPL-SCNC: 5 MMOL/L (ref 3.5–5.2)
PROT SERPL-MCNC: 7.3 G/DL (ref 6–8.5)
RBC # BLD AUTO: 4.1 10*6/MM3 (ref 4.14–5.8)
SODIUM SERPL-SCNC: 142 MMOL/L (ref 136–145)
T4 FREE SERPL-MCNC: 1.03 NG/DL (ref 0.93–1.7)
TRIGL SERPL-MCNC: 80 MG/DL (ref 0–150)
TSH SERPL DL<=0.05 MIU/L-ACNC: 1.78 UIU/ML (ref 0.27–4.2)
VLDLC SERPL-MCNC: 16 MG/DL (ref 5–40)
WBC NRBC COR # BLD: 8.4 10*3/MM3 (ref 3.4–10.8)

## 2022-10-05 PROCEDURE — 80053 COMPREHEN METABOLIC PANEL: CPT

## 2022-10-05 PROCEDURE — 82043 UR ALBUMIN QUANTITATIVE: CPT

## 2022-10-05 PROCEDURE — 84439 ASSAY OF FREE THYROXINE: CPT

## 2022-10-05 PROCEDURE — 82570 ASSAY OF URINE CREATININE: CPT

## 2022-10-05 PROCEDURE — 36415 COLL VENOUS BLD VENIPUNCTURE: CPT

## 2022-10-05 PROCEDURE — 80061 LIPID PANEL: CPT

## 2022-10-05 PROCEDURE — 84443 ASSAY THYROID STIM HORMONE: CPT

## 2022-10-05 PROCEDURE — 82306 VITAMIN D 25 HYDROXY: CPT

## 2022-10-05 PROCEDURE — 85025 COMPLETE CBC W/AUTO DIFF WBC: CPT

## 2022-10-05 NOTE — PROGRESS NOTES
QUICK REFERENCE INFORMATION:  The ABCs of the Annual Wellness Visit  Luis Elliott [facility] a 72 [facility]  Medicare Wellness-subsequent and Diabetes  .     Medicare Subsequent Wellness Visit    Chief Complaint   Patient presents with   • Medicare Wellness-subsequent   • Diabetes        HPI   Pt here for medicare wellness visit.  ROS:  Constitutional: no fevers, night sweats or unexplained weight loss  Eyes: no vision changes  ENT: no runny nose, ear pain, sore throat  Cardio: no chest pain, palpitations  Pulm: no shortness of breath, wheezing, or cough  GI: no abdominal pain or changes in bowel movements  : no difficulty urinating  MSK: no difficulty ambulating, no joint pain  Neuro: no weakness, dizziness or headache  Psych: no trouble sleeping  Endo: no change in appetite     Past Medical History:   Diagnosis Date   • Cancer ([facility])     PROSTATE   • CKD stage G3a/A2, GFR 45-59 and albumin creatinine ratio  mg/g ([facility]) 6/21/2021   • COPD (chronic obstructive pulmonary disease) ([facility])     dr valente thinks pt has this    • Coronary artery disease    • DDD (degenerative disc disease), cervical    • Depression    • Diabetes mellitus ([facility])     let dr check sugar    • Erectile dysfunction    • Glaucoma    • Headache    • History of radiation therapy 02/23/2021    RUL/mediastinum   • History of radiation therapy 04/23/2021    PCI brain   • Hyperlipidemia    • Hypertension    • Impingement syndrome of left shoulder    • Infection, bacterial     sees ID -  42 days of antibiotics    • Lung cancer ([facility])    • MI, old     89   • Mitral valve vegetation    • Osteoarthritis    • Prostate cancer ([facility])     2003   • SOBOE (shortness of breath on exertion)    • Varicose veins of lower limb     RIGHT   • Wears glasses    • Wears partial dentures     bottom         Past Surgical History:   Procedure Laterality Date   • BRONCHOSCOPY N/A 11/19/2020    Procedure: BRONCHOSCOPY WITH  ENDOBRONCHIAL ULTRASOUND WITH FLUOROSCOPY;  Surgeon: Clint Thompson MD;  Location:  [facility];  Service: Pulmonary;  Laterality: N/A;   • CARDIAC CATHETERIZATION N/A 2016    Procedure: Left Heart Cath;  Surgeon: Kna Blackwell MD;  Location:  [facility];  Service:    • CATARACT EXTRACTION      BILATERAL CATARACTS REMOVED.   • COLONOSCOPY     • COLONOSCOPY W/ POLYPECTOMY     • CORONARY ANGIOPLASTY     • CORONARY ANGIOPLASTY WITH STENT PLACEMENT      x3    • EYE SURGERY      right- stent for drainage   • HERNIA REPAIR      RIGHT   • KNEE ARTHROSCOPY      LEFT   • KNEE SURGERY      LEFT TOTAL KNEE REPLACEMENT 2012   • LUMBAR EPIDURAL INJECTION     • RI RT/LT HEART CATHETERS N/A 2016    Procedure: Percutaneous Coronary Intervention;  Surgeon: Kan Blackwell MD;  Location:  [facility];  Service: Cardiovascular   • PROSTATECTOMY         • TENDON TRANSFER ELBOW      TENDON REPAIR-  right    • TONSILLECTOMY     • TRABECULECTOMY      FOR GLAUCOMA       Family History   Problem Relation Age of Onset   • Hypertension Mother    • Atrial fibrillation Mother    • Alcohol abuse Father    • Heart attack Father    • Diabetes Father    • No Known Problems Sister    • Prostate cancer Brother         Social History     Socioeconomic History   • Marital status:      Spouse name: Sabina   • Number of children: 2   Tobacco Use   • Smoking status: Former Smoker     Packs/day: 1.50     Years: 36.00     Pack years: 54.00     Types: Cigarettes     Quit date: 2011     Years since quittin.4   • Smokeless tobacco: Former User     Types: Chew     Quit date:    • Tobacco comment: quit smoking for 12 years then started again but then quit a final time    Vaping Use   • Vaping Use: Never used   Substance and Sexual Activity   • Alcohol use: Not Currently   • Drug use: Yes     Types: Marijuana     Comment: a month ago   • Sexual activity: Defer     "    Current Outpatient Medications   Medication Sig Dispense Refill   • aspirin 81 MG EC tablet Take 81 mg by mouth Every Night.     • atorvastatin (LIPITOR) 40 MG tablet Take 40 mg by mouth Every Night.     • calcium carbonate-cholecalciferol 500-400 MG-UNIT tablet tablet Take 1 tablet by mouth Daily.     • cholecalciferol (VITAMIN D3) 1000 units tablet Take 1,000 Units by mouth Daily.     • ezetimibe (ZETIA) 10 MG tablet Take 10 mg by mouth Daily.     • metFORMIN (GLUCOPHAGE) 500 MG tablet TAKE ONE TABLET BY MOUTH TWICE A DAY WITH MEALS 180 tablet 1   • nitroglycerin (NITROSTAT) 0.4 MG SL tablet Place 0.4 mg under the tongue Every 5 (Five) Minutes As Needed for Chest Pain. Take no more than 3 doses in 15 minutes.     • Omega-3 Fatty Acids (FISH OIL) 1200 MG capsule capsule Take 1,200 mg by mouth Daily.     • sertraline (ZOLOFT) 50 MG tablet TAKE ONE TABLET BY MOUTH DAILY 90 tablet 3   • vitamin B-12 (CYANOCOBALAMIN) 1000 MCG tablet Take 1,000 mcg by mouth Daily.       No current facility-administered medications for this visit.        Allergies   Allergen Reactions   • Xarelto [Rivaroxaban] Other (See Comments)     MADE ME FEEL LIKE \" I WAS HAVING A HEART ATTACK.\"        Immunization History   Administered Date(s) Administered   • COVID-19 (MODERNA) 1st, 2nd, 3rd Dose Only 04/28/2021, 05/26/2021, 11/29/2021   • FLUAD TRI 65YR+ 09/16/2019   • Flu Vaccine Quad PF >36MO 11/25/2015, 12/12/2016, 10/24/2017   • Fluad Quad 65+ 09/17/2020   • Fluzone High Dose =>65 Years (Vaxcare ONLY) 11/01/2017, 09/12/2018, 09/16/2019   • Fluzone High-Dose 65+yrs 10/01/2021   • Hepatitis A 05/31/2019   • PEDS-Pneumococcal Conjugate (PCV7) 06/03/2015   • Pneumococcal Conjugate 13-Valent (PCV13) 06/03/2015, 10/02/2020   • Pneumococcal Conjugate 20-Valent (PCV20) 10/04/2022   • Pneumococcal Polysaccharide (PPSV23) 06/03/2013   • Shingrix 10/02/2020        The following portions of the patient's history were reviewed and updated as " "appropriate: allergies, current medications, past family history, past medical history, past social history, past surgical history and problem list.      Objective    Visit Vitals  /82   Pulse 68   Ht 167.6 cm (65.98\")   Wt 78.9 kg (174 lb)   SpO2 98%   BMI 28.10 kg/m²        Physical Exam  Gen Appearance: NAD  HEENT: Normocephalic, PERRLA, no thyromegaly, trache midline  Heart: RRR, normal S1 and S2, no murmur  Lungs: CTA b/l, no wheezing, no crackles  Abdomen: Soft, non-tender, non-distended, no guarding and BSx4  MSK: Moves all extremities well, normal gait, no peripheral edema  Pulses: Palpable and equal b/l  Lymph nodes: No palpable lymphadenopathy   Neuro: No focal deficits       HEALTH RISK ASSESSMENT    1950    Recent Hospitalizations:  No hospitalization(s) within the last year..      Current Medical Providers:  Patient Care Team:  Silvestre Coleman DO as PCP - General (Internal Medicine)  Kan Blackwell MD as Consulting Physician (Cardiology)  Mark Camacho MD as Consulting Physician (Ophthalmology)  Daniel Cartagena MD as Referring Physician (Hematology and Oncology)  Jay Jay Urias MD as Consulting Physician (Radiation Oncology)  Clint Thompson MD as Emergency Attending (Pulmonary Disease)  Tom Webber MD as Consulting Physician (Nephrology)  Gaby Pandya APRN as Nurse Practitioner (Hematology and Oncology)  Hannah Chairez APRN as Nurse Practitioner (Pulmonary Disease)      Smoking Status:  Social History     Tobacco Use   Smoking Status Former Smoker   • Packs/day: 1.50   • Years: 36.00   • Pack years: 54.00   • Types: Cigarettes   • Quit date: 2011   • Years since quittin.4   Smokeless Tobacco Former User   • Types: Chew   • Quit date:    Tobacco Comment    quit smoking for 12 years then started again but then quit a final time        Alcohol Consumption:  Social History     Substance and Sexual Activity   Alcohol Use Not Currently "       Depression Screen:   PHQ-2/PHQ-9 Depression Screening 10/4/2022   Retired PHQ-9 Total Score -   Retired Total Score -   Little Interest or Pleasure in Doing Things 0-->not at all   Feeling Down, Depressed or Hopeless 0-->not at all   PHQ-9: Brief Depression Severity Measure Score 0       Health Habits and Functional and Cognitive Screening:  Functional & Cognitive Status 10/4/2022   Do you have difficulty preparing food and eating? No   Do you have difficulty bathing yourself, getting dressed or grooming yourself? No   Do you have difficulty using the toilet? No   Do you have difficulty moving around from place to place? No   Do you have trouble with steps or getting out of a bed or a chair? No   Current Diet Well Balanced Diet   Dental Exam Up to date   Eye Exam Up to date   Exercise (times per week) 7 times per week   Current Exercises Include Stationary Bicycling/Spin Class   Current Exercise Activities Include -   Do you need help using the phone?  No   Are you deaf or do you have serious difficulty hearing?  Yes   Do you need help with transportation? No   Do you need help shopping? No   Do you need help preparing meals?  No   Do you need help with housework?  No   Do you need help with laundry? No   Do you need help taking your medications? No   Do you need help managing money? No   Do you ever drive or ride in a car without wearing a seat belt? No   Have you felt unusual stress, anger or loneliness in the last month? No   Who do you live with? Spouse   If you need help, do you have trouble finding someone available to you? No   Have you been bothered in the last four weeks by sexual problems? No   Do you have difficulty concentrating, remembering or making decisions? No         Does the patient have evidence of cognitive impairment? No    Aspirin use counseling? Taking ASA appropriately as indicated      Recent Lab Results:  CMP:  Lab Results   Component Value Date    BUN 17 10/05/2022    CREATININE  1.35 (H) 10/05/2022    EGFRIFNONA 48 (L) 01/19/2022    BCR 12.6 10/05/2022     10/05/2022    K 5.0 10/05/2022    CO2 28.0 10/05/2022    CALCIUM 9.8 10/05/2022    ALBUMIN 4.80 10/05/2022    BILITOT 0.8 10/05/2022    ALKPHOS 99 10/05/2022    AST 23 10/05/2022    ALT 29 10/05/2022     Lipid Panel:  Lab Results   Component Value Date    CHOL 89 10/05/2021    TRIG 117 10/05/2021    HDL 35 (L) 10/05/2021    VLDL 21 10/05/2021    LDLHDL 0.87 10/05/2021     HbA1c:  Lab Results   Component Value Date    HGBA1C 5.7 10/04/2022       Visual Acuity:  No exam data present    Age-appropriate Screening Schedule:  Refer to the list below for future screening recommendations based on patient's age, sex and/or medical conditions. Orders for these recommended tests are listed in the plan section. The patient has been provided with a written plan.    Health Maintenance   Topic Date Due   • DIABETIC FOOT EXAM  06/09/2018   • DIABETIC EYE EXAM  03/09/2021   • INFLUENZA VACCINE  08/01/2022   • URINE MICROALBUMIN  10/01/2022   • LIPID PANEL  10/05/2022   • TDAP/TD VACCINES (2 - Td or Tdap) 01/01/2023   • HEMOGLOBIN A1C  04/04/2023   • ZOSTER VACCINE  Completed          Advance Care Planning:  ACP discussion was declined by the patient. Patient does not have an advance directive, declines further assistance.    Identification of Risk Factors:  Risk factors include: Advance Directive Discussion  Cardiovascular risk  Colon Cancer Screening  Prostate Cancer Screening .    Compared to one year ago, the patient feels his physical health is the same.  Compared to one year ago, the patient feels his mental health is the same.  Reviewed use of high risk medication in the elderly: yes  Reviewed for potential of harmful drug interactions in the elderly: yes    Diagnoses and all orders for this visit:    1. Medicare annual wellness visit, subsequent (Primary)  Counseled on healthy weight, nutrition, physical activity, cancer screening, and  immunizations.    2. Type 2 diabetes mellitus without complication, without long-term current use of insulin (HCC)  -     POC Glycosylated Hemoglobin (Hb A1C)  -     CBC & Differential; Future  -     Comprehensive Metabolic Panel; Future  -     Lipid Panel; Future  -     TSH+Free T4; Future  -     Vitamin D 25 Hydroxy; Future  -     Microalbumin / Creatinine Urine Ratio - Urine, Clean Catch; Future    3. Primary hypertension  -     CBC & Differential; Future  -     Comprehensive Metabolic Panel; Future  -     Lipid Panel; Future  -     TSH+Free T4; Future  -     Vitamin D 25 Hydroxy; Future    4. Vitamin D deficiency  -     CBC & Differential; Future  -     Comprehensive Metabolic Panel; Future  -     Lipid Panel; Future  -     TSH+Free T4; Future  -     Vitamin D 25 Hydroxy; Future    5. Encounter for prostate cancer screening    6. Immunization due    Other orders  -     Pneumococcal Conjugate Vaccine 20-Valent (PCV20)          Patient Self-Management and Personalized Health Advice  The patient has been provided with information about: diet, exercise and designing advance directives and preventive services including:   · Annual Wellness Visit (AWV)  · Cardiovascular Disease Screening Tests (may do this order every 5 years in beneficiaries without signs or symptoms of cardiovascular disease).      Follow Up:  No follow-ups on file.     An After Visit Summary and PPPS with all of these plans were given to the patient.           Dictated Utilizing Dragon Dictation    Please note that portions of this note were completed with a voice recognition program.    Part of this note may be an electronic transcription/translation of spoken language to printed text using the Dragon Dictation System.

## 2022-12-21 ENCOUNTER — TELEPHONE (OUTPATIENT)
Dept: FAMILY MEDICINE CLINIC | Facility: CLINIC | Age: 72
End: 2022-12-21

## 2022-12-21 NOTE — TELEPHONE ENCOUNTER
Caller: EarlSabina    Relationship: Emergency Contact    Best call back number: [phone]     What is the best time to reach you: ANY    Who are you requesting to speak with (clinical staff, provider,  specific staff member): CLINICAL    What was the call regarding: THE PATIENT'S SPOUSE CALLING TO ASK IF THE LAB TESTS THAT THE PATIENT HAD PERFORMED BY HIS KIDNEY SPECIALIST ON 12.12.22 WOULD BE OK TO USE IN PLACE OF POSSIBLE NEW LAB WORK FROM DR. GARCIA ON 1.4.23.     THE PATIENT'S SPOUSE HAS A COPY OF THESE RESULTS IF DR. GARCIA WOULD LIKE HER TO BRING THEM BY THE OFFICE FOR HIM TO REVIEW.      Do you require a callback: YES, PLEASE CALL BACK TO ADVISE.     THANK YOU.

## 2022-12-21 NOTE — TELEPHONE ENCOUNTER
Advised patient to bring labs to his upcoming appt to review with PCP and if anything additional is needed it will be ordered during visit.

## 2022-12-21 NOTE — TELEPHONE ENCOUNTER
Provider: MARYANA GARCIA DO    Caller: MARIO MARTINES    Relationship to Patient: WIFE    Phone Number: [phone]    Reason for Call: PATIENTS WIFE WANTED TO KNOW IF HE CAN SKIP THE APPOINTMENT ON 1/4/23 ALL TOGETHER. PATIENT HAS OTHER APPOINTMENTS SCHEDULED IN MARCH WITH LABS AS WELL.

## 2022-12-21 NOTE — TELEPHONE ENCOUNTER
Patient wife reports he had his A1c checked with nephrology and they will bring a copy of results. I advised her that it is best to be seen every 6 months. She will call to scheduled as needed

## 2023-02-08 DIAGNOSIS — E11.9 TYPE 2 DIABETES MELLITUS WITHOUT COMPLICATION, WITHOUT LONG-TERM CURRENT USE OF INSULIN: ICD-10-CM

## 2023-03-15 DIAGNOSIS — C34.31 SMALL CELL LUNG CANCER, RIGHT LOWER LOBE: Primary | ICD-10-CM

## 2023-03-16 ENCOUNTER — HOSPITAL ENCOUNTER (OUTPATIENT)
Dept: CT IMAGING | Facility: HOSPITAL | Age: 73
Discharge: HOME | End: 2023-03-16
Admitting: NURSE PRACTITIONER
Payer: MEDICARE

## 2023-03-16 DIAGNOSIS — C34.31 SMALL CELL LUNG CANCER, RIGHT LOWER LOBE: ICD-10-CM

## 2023-03-16 PROCEDURE — 71250 CT THORAX DX C-: CPT

## 2023-03-21 ENCOUNTER — OFFICE VISIT (OUTPATIENT)
Dept: FAMILY MEDICINE CLINIC | Facility: CLINIC | Age: 73
End: 2023-03-21
Payer: MEDICARE

## 2023-03-21 ENCOUNTER — LAB (OUTPATIENT)
Dept: LAB | Facility: HOSPITAL | Age: 73
End: 2023-03-21
Payer: MEDICARE

## 2023-03-21 ENCOUNTER — OFFICE VISIT (OUTPATIENT)
Dept: ONCOLOGY | Facility: CLINIC | Age: 73
End: 2023-03-21
Payer: MEDICARE

## 2023-03-21 VITALS
SYSTOLIC BLOOD PRESSURE: 146 MMHG | WEIGHT: 173 LBS | BODY MASS INDEX: 27.8 KG/M2 | TEMPERATURE: 97.5 F | OXYGEN SATURATION: 96 % | HEIGHT: 66 IN | HEART RATE: 69 BPM | RESPIRATION RATE: 16 BRPM | DIASTOLIC BLOOD PRESSURE: 77 MMHG

## 2023-03-21 VITALS
HEIGHT: 66 IN | WEIGHT: 172 LBS | OXYGEN SATURATION: 98 % | SYSTOLIC BLOOD PRESSURE: 128 MMHG | DIASTOLIC BLOOD PRESSURE: 70 MMHG | BODY MASS INDEX: 27.64 KG/M2 | HEART RATE: 69 BPM

## 2023-03-21 DIAGNOSIS — E78.5 HYPERLIPIDEMIA, UNSPECIFIED HYPERLIPIDEMIA TYPE: ICD-10-CM

## 2023-03-21 DIAGNOSIS — E11.9 TYPE 2 DIABETES MELLITUS WITHOUT COMPLICATION, WITHOUT LONG-TERM CURRENT USE OF INSULIN: Primary | ICD-10-CM

## 2023-03-21 DIAGNOSIS — C34.31 SMALL CELL LUNG CANCER, RIGHT LOWER LOBE: ICD-10-CM

## 2023-03-21 DIAGNOSIS — C34.31 SMALL CELL LUNG CANCER, RIGHT LOWER LOBE: Primary | ICD-10-CM

## 2023-03-21 LAB
ALBUMIN SERPL-MCNC: 5 G/DL (ref 3.5–5.2)
ALBUMIN/GLOB SERPL: 2.2 G/DL
ALP SERPL-CCNC: 91 U/L (ref 39–117)
ALT SERPL W P-5'-P-CCNC: 39 U/L (ref 1–41)
ANION GAP SERPL CALCULATED.3IONS-SCNC: 11 MMOL/L (ref 5–15)
AST SERPL-CCNC: 29 U/L (ref 1–40)
BASOPHILS # BLD AUTO: 0.01 10*3/MM3 (ref 0–0.2)
BASOPHILS NFR BLD AUTO: 0.2 % (ref 0–1.5)
BILIRUB SERPL-MCNC: 1.2 MG/DL (ref 0–1.2)
BUN SERPL-MCNC: 23 MG/DL (ref 8–23)
BUN/CREAT SERPL: 15.6 (ref 7–25)
CALCIUM SPEC-SCNC: 10.2 MG/DL (ref 8.6–10.5)
CHLORIDE SERPL-SCNC: 101 MMOL/L (ref 98–107)
CO2 SERPL-SCNC: 27 MMOL/L (ref 22–29)
CREAT SERPL-MCNC: 1.47 MG/DL (ref 0.76–1.27)
DEPRECATED RDW RBC AUTO: 44.5 FL (ref 37–54)
EGFRCR SERPLBLD CKD-EPI 2021: 50.4 ML/MIN/1.73
EOSINOPHIL # BLD AUTO: 0.11 10*3/MM3 (ref 0–0.4)
EOSINOPHIL NFR BLD AUTO: 1.7 % (ref 0.3–6.2)
ERYTHROCYTE [DISTWIDTH] IN BLOOD BY AUTOMATED COUNT: 12.6 % (ref 12.3–15.4)
EXPIRATION DATE: NORMAL
GLOBULIN UR ELPH-MCNC: 2.3 GM/DL
GLUCOSE SERPL-MCNC: 175 MG/DL (ref 65–99)
HBA1C MFR BLD: 5.7 %
HCT VFR BLD AUTO: 42.3 % (ref 37.5–51)
HGB BLD-MCNC: 14 G/DL (ref 13–17.7)
IMM GRANULOCYTES # BLD AUTO: 0.01 10*3/MM3 (ref 0–0.05)
IMM GRANULOCYTES NFR BLD AUTO: 0.2 % (ref 0–0.5)
LYMPHOCYTES # BLD AUTO: 1.21 10*3/MM3 (ref 0.7–3.1)
LYMPHOCYTES NFR BLD AUTO: 18.7 % (ref 19.6–45.3)
Lab: NORMAL
MCH RBC QN AUTO: 31.4 PG (ref 26.6–33)
MCHC RBC AUTO-ENTMCNC: 33.1 G/DL (ref 31.5–35.7)
MCV RBC AUTO: 94.8 FL (ref 79–97)
MONOCYTES # BLD AUTO: 0.59 10*3/MM3 (ref 0.1–0.9)
MONOCYTES NFR BLD AUTO: 9.1 % (ref 5–12)
NEUTROPHILS NFR BLD AUTO: 4.54 10*3/MM3 (ref 1.7–7)
NEUTROPHILS NFR BLD AUTO: 70.1 % (ref 42.7–76)
PLATELET # BLD AUTO: 125 10*3/MM3 (ref 140–450)
PMV BLD AUTO: 10.6 FL (ref 6–12)
POTASSIUM SERPL-SCNC: 5 MMOL/L (ref 3.5–5.2)
PROT SERPL-MCNC: 7.3 G/DL (ref 6–8.5)
RBC # BLD AUTO: 4.46 10*6/MM3 (ref 4.14–5.8)
SODIUM SERPL-SCNC: 139 MMOL/L (ref 136–145)
WBC NRBC COR # BLD: 6.47 10*3/MM3 (ref 3.4–10.8)

## 2023-03-21 PROCEDURE — 36415 COLL VENOUS BLD VENIPUNCTURE: CPT

## 2023-03-21 PROCEDURE — 3077F SYST BP >= 140 MM HG: CPT | Performed by: INTERNAL MEDICINE

## 2023-03-21 PROCEDURE — 1126F AMNT PAIN NOTED NONE PRSNT: CPT | Performed by: INTERNAL MEDICINE

## 2023-03-21 PROCEDURE — 80053 COMPREHEN METABOLIC PANEL: CPT

## 2023-03-21 PROCEDURE — 85025 COMPLETE CBC W/AUTO DIFF WBC: CPT

## 2023-03-21 PROCEDURE — 3078F DIAST BP <80 MM HG: CPT | Performed by: INTERNAL MEDICINE

## 2023-03-21 PROCEDURE — 99214 OFFICE O/P EST MOD 30 MIN: CPT | Performed by: INTERNAL MEDICINE

## 2023-03-21 RX ORDER — HYDROCODONE BITARTRATE AND ACETAMINOPHEN 5; 325 MG/1; MG/1
TABLET ORAL
COMMUNITY
Start: 2023-01-02

## 2023-03-21 NOTE — PROGRESS NOTES
DATE OF VISIT: 3/21/2023    REASON FOR VISIT: Followup for right lower lobe small cell lung cancer     PROBLEM LIST:  1. Limited stage small cell lung cancer [id number] stage IIIa:  A.  Presented with abnormal screening CT chest  B.  Diagnosed after bronchoscopy with biopsy done by  November 19, 2020 + for small cell from level 7 level 4R and level 10 R lymph nodes.  C.  Whole-body PET scan did not reveal any other sites of disease.  D.  Started definitive concurrent chemotherapy with radiation using cisplatin etoposide December 2020 1 status post 4 cycles completed February 23, 2021  2.  Isolated 6 mm right cerebellar lesion:  A.  Evident MRI brain done on December 1, 2020  B.  Completed prophylactic whole brain radiation 4/23/2021.   3.  Chemotherapy-induced nausea  4. Insomnia   5.  Treatment induced anemia  6.  Chronic kidney disease  7.  Type 2 diabetes      HISTORY OF PRESENT ILLNESS: The patient is a very pleasant 73 y.o. male  with past medical history significant for right lower lobe lung cancer diagnosed April 19, 2020.  Patient status post definitive concurrent chemotherapy with radiation completed February 23, 2021. The patient is here today for scheduled follow-up visit.    SUBJECTIVE: Lalito is here today with his wife.  He is doing fairly well but his breathing is stable.  He is anxious about the scan results.    Past History:  Medical History: has a past medical history of Cancer (HCC), CKD stage G3a/A2, GFR 45-59 and albumin creatinine ratio  mg/g (HCC) (6/21/2021), COPD (chronic obstructive pulmonary disease) (HCC), Coronary artery disease, DDD (degenerative disc disease), cervical, Depression, Diabetes mellitus (HCC), Erectile dysfunction, Glaucoma, Headache, History of radiation therapy (02/23/2021), History of radiation therapy (04/23/2021), Hyperlipidemia, Hypertension, Impingement syndrome of left shoulder, Infection, bacterial, Lung cancer (HCC), MI, old, Mitral valve  "vegetation, Osteoarthritis, Prostate cancer (HCC), SOBOE (shortness of breath on exertion), Varicose veins of lower limb, Wears glasses, and Wears partial dentures.   Surgical History: has a past surgical history that includes Prostatectomy; Colonoscopy w/ polypectomy; Knee surgery; Knee Arthroscopy; Lumbar epidural injection; Hernia repair; Tonsillectomy; Coronary angioplasty; Tendon transfer elbow; Trabeculectomy; pr rt/lt heart catheters (N/A, 12/27/2016); Cardiac catheterization (N/A, 12/20/2016); Coronary angioplasty with stent; Cataract extraction; Eye surgery; Colonoscopy; and Bronchoscopy (N/A, 11/19/2020).   Family History: family history includes Alcohol abuse in his father; Atrial fibrillation in his mother; Diabetes in his father; Heart attack in his father; Hypertension in his mother; No Known Problems in his sister; Prostate cancer in his brother.   Social History: reports that he quit smoking about 11 years ago. His smoking use included cigarettes. He has a 54.00 pack-year smoking history. He quit smokeless tobacco use about 12 years ago.  His smokeless tobacco use included chew. He reports that he does not currently use alcohol. He reports current drug use. Drug: Marijuana.    (Not in a hospital admission)     Allergies: Xarelto [rivaroxaban]     Review of Systems   Constitutional: Positive for fatigue.   Musculoskeletal: Positive for arthralgias.   Psychiatric/Behavioral: The patient is nervous/anxious.        PHYSICAL EXAMINATION:   /77   Pulse 69   Temp 97.5 °F (36.4 °C) (Infrared)   Resp 16   Ht 167.6 cm (65.98\")   Wt 78.5 kg (173 lb)   SpO2 96%   BMI 27.94 kg/m²    Pain Score    03/21/23 1035   PainSc: 0-No pain      ECOG score: 0        ECOG Performance Status: 1 - Symptomatic but completely ambulatory  General Appearance:  alert, cooperative, no apparent distress and appears stated age   Lungs:   Clear to auscultation bilaterally; respirations regular, even, and unlabored " bilaterally   Heart:  Regular rate and rhythm, no murmurs appreciated   Abdomen:   Soft, non-tender, non-distended and no organomegaly     Lab on 03/21/2023   Component Date Value Ref Range Status   • WBC 03/21/2023 6.47  3.40 - 10.80 10*3/mm3 Final   • RBC 03/21/2023 4.46  4.14 - 5.80 10*6/mm3 Final   • Hemoglobin 03/21/2023 14.0  13.0 - 17.7 g/dL Final   • Hematocrit 03/21/2023 42.3  37.5 - 51.0 % Final   • MCV 03/21/2023 94.8  79.0 - 97.0 fL Final   • MCH 03/21/2023 31.4  26.6 - 33.0 pg Final   • MCHC 03/21/2023 33.1  31.5 - 35.7 g/dL Final   • RDW 03/21/2023 12.6  12.3 - 15.4 % Final   • RDW-SD 03/21/2023 44.5  37.0 - 54.0 fl Final   • MPV 03/21/2023 10.6  6.0 - 12.0 fL Final   • Platelets 03/21/2023 125 (L)  140 - 450 10*3/mm3 Final   • Neutrophil % 03/21/2023 70.1  42.7 - 76.0 % Final   • Lymphocyte % 03/21/2023 18.7 (L)  19.6 - 45.3 % Final   • Monocyte % 03/21/2023 9.1  5.0 - 12.0 % Final   • Eosinophil % 03/21/2023 1.7  0.3 - 6.2 % Final   • Basophil % 03/21/2023 0.2  0.0 - 1.5 % Final   • Immature Grans % 03/21/2023 0.2  0.0 - 0.5 % Final   • Neutrophils, Absolute 03/21/2023 4.54  1.70 - 7.00 10*3/mm3 Final   • Lymphocytes, Absolute 03/21/2023 1.21  0.70 - 3.10 10*3/mm3 Final   • Monocytes, Absolute 03/21/2023 0.59  0.10 - 0.90 10*3/mm3 Final   • Eosinophils, Absolute 03/21/2023 0.11  0.00 - 0.40 10*3/mm3 Final   • Basophils, Absolute 03/21/2023 0.01  0.00 - 0.20 10*3/mm3 Final   • Immature Grans, Absolute 03/21/2023 0.01  0.00 - 0.05 10*3/mm3 Final        CT Chest Without Contrast Diagnostic    Result Date: 3/17/2023  Narrative: CT CHEST WO CONTRAST DIAGNOSTIC Date of Exam: 3/16/2023 11:20 AM EDT Indication: restaging small cell lung cancer. Comparison: September 19, 2022 Technique: Axial CT images were obtained of the chest without contrast administration.  Reconstructed coronal and sagittal images were also obtained. Automated exposure control and iterative construction methods were used. Findings:  There is again noted to be soft tissue changes around the right upper lobe bronchi. There are some groundglass changes in the right perihilar area which have been noted. The findings could be treatment related. There is some atelectasis in the left upper  lobe. There are no pleural effusions. There is a small nodular area along the posterior tracheal airway that could reflect some debris There is no pathology appearing mediastinal lymphadenopathy. There is coronary artery calcification. There are degenerative changes involving the dorsal spine. Lower slices through the upper abdomen reveal cholelithiasis.     Impression: Impression: 1.No significant change in appearance of the right lung with perihilar findings that could be reflective of posttreatment change. 2.Atelectasis left upper lobe. 3.Coronary artery calcification. 4.It looks like there is probably some debris along the posterior wall the tracheal airway. 5.Cholelithiasis Electronically Signed: Judah Hopkins  3/17/2023 11:56 PM EDT  Workstation ID: [id number]      ASSESSMENT: The patient is a very pleasant 73 y.o. male  with right small cell lung cancer      PLAN:    1.  Right lower lobe small cell lung cancer:  A.  I did go over the scan results with the patient from March 17, 2023.  I reassured the patient no evidence of new or progressive disease.  He does have stable posttreatment related changes.  B.  I did go over the blood results with the patient from today.  His CBC is normal.  I will follow-up on the CMP.  C. We will continue watchful waiting at this time with plan to repeat his CAT scan of chest in 6 months with repeat labs including CBC and CMP.    2.  Isolated brain lesion:  A.  The patient completed whole radiation April 2021.  B.  The last MRI brain done August 1, 2022 showed no evidence of metastatic disease.  C.  He will continue follow up with Dr. Urias.    3.  Depression:  A.  The patient will continue Zoloft 50 mg daily.    4.  Insomnia:  A.   The patient will continue Ambien 5 mg nightly as needed    5.  Type 2 diabetes:  A.  He will continue Metformin 5 mg twice a day    6.  Chronic kidney disease stage IIIa:  A.  I will follow-up on his creatinine from today.  B.  He will continue to follow-up with his nephrologist, Dr. Webber, at [facility].  C. We will order his next CT scan without contrast to help limit impact on his kidneys from contrast dye.     FOLLOW UP: 6-month repeated scans and labs.    Daniel Cartagena MD  3/21/2023

## 2023-03-22 NOTE — PROGRESS NOTES
Chief Complaint   Patient presents with   • Diabetes       HPI:  Luis Elliott is a 73 y.o. male who presents today for follow-up diabetes, no acute concerns today.    ROS:  Constitutional: no fevers, night sweats or unexplained weight loss  Eyes: no vision changes  ENT: no runny nose, ear pain, sore throat  Cardio: no chest pain, palpitations  Pulm: no shortness of breath, wheezing, or cough  GI: no abdominal pain or changes in bowel movements  : no difficulty urinating  MSK: no difficulty ambulating, no joint pain  Neuro: no weakness, dizziness or headache  Psych: no trouble sleeping  Endo: no change in appetite      Past Medical History:   Diagnosis Date   • Cancer ([facility])     PROSTATE   • CKD stage G3a/A2, GFR 45-59 and albumin creatinine ratio  mg/g ([facility]) 6/21/2021   • COPD (chronic obstructive pulmonary disease) ([facility])     dr valente thinks pt has this    • Coronary artery disease    • DDD (degenerative disc disease), cervical    • Depression    • Diabetes mellitus ([facility])     let  check sugar    • Erectile dysfunction    • Glaucoma    • Headache    • History of radiation therapy 02/23/2021    RUL/mediastinum   • History of radiation therapy 04/23/2021    PCI brain   • Hyperlipidemia    • Hypertension    • Impingement syndrome of left shoulder    • Infection, bacterial     sees ID -  42 days of antibiotics    • Lung cancer ([facility])    • MI, old     89   • Mitral valve vegetation    • Osteoarthritis    • Prostate cancer ([facility])     2003   • SOBOE (shortness of breath on exertion)    • Varicose veins of lower limb     RIGHT   • Wears glasses    • Wears partial dentures     bottom       Family History   Problem Relation Age of Onset   • Hypertension Mother    • Atrial fibrillation Mother    • Alcohol abuse Father    • Heart attack Father    • Diabetes Father    • No Known Problems Sister    • Prostate cancer Brother       Social History     Socioeconomic History   • Marital status:      Spouse name:  "Sabina   • Number of children: 2   Tobacco Use   • Smoking status: Former     Packs/day: 1.50     Years: 36.00     Pack years: 54.00     Types: Cigarettes     Quit date: 2011     Years since quittin.9   • Smokeless tobacco: Former     Types: Chew     Quit date:    • Tobacco comments:     quit smoking for 12 years then started again but then quit a final time    Vaping Use   • Vaping Use: Never used   Substance and Sexual Activity   • Alcohol use: Not Currently   • Drug use: Yes     Types: Marijuana     Comment: a month ago   • Sexual activity: Defer      Allergies   Allergen Reactions   • Xarelto [Rivaroxaban] Other (See Comments)     MADE ME FEEL LIKE \" I WAS HAVING A HEART ATTACK.\"      Immunization History   Administered Date(s) Administered   • COVID-19 (MODERNA) 1st, 2nd, 3rd Dose Only 2021, 2021, 2021   • FLUAD TRI 65YR+ 2019   • Flu Vaccine Quad PF >36MO 2015, 2016, 10/24/2017   • Fluad Quad 65+ 2020   • Fluzone High Dose =>65 Years (Vaxcare ONLY) 2017, 2018, 2019   • Fluzone High-Dose 65+yrs 10/01/2021   • Hepatitis A 2019   • PEDS-Pneumococcal Conjugate (PCV7) 2015   • Pneumococcal Conjugate 13-Valent (PCV13) 2015, 10/02/2020   • Pneumococcal Conjugate 20-Valent (PCV20) 10/04/2022   • Pneumococcal Polysaccharide (PPSV23) 2013   • Shingrix 10/02/2020        PE:  Vitals:    23 1250   BP: 128/70   Pulse: 69   SpO2: 98%      Body mass index is 27.78 kg/m².    Gen Appearance: NAD  HEENT: Normocephalic, PERRLA, no thyromegaly, trache midline  Heart: RRR, normal S1 and S2, no murmur  Lungs: CTA b/l, no wheezing, no crackles  Abdomen: Soft, non-tender, non-distended, no guarding and BSx4  MSK: Moves all extremities well, normal gait, no peripheral edema  Pulses: Palpable and equal b/l  Lymph nodes: No palpable lymphadenopathy   Neuro: No focal deficits      Current Outpatient Medications   Medication Sig " Dispense Refill   • aspirin 81 MG EC tablet Take 1 tablet by mouth Every Night.     • atorvastatin (LIPITOR) 40 MG tablet Take 1 tablet by mouth Every Night.     • calcium carbonate-cholecalciferol 500-400 MG-UNIT tablet tablet Take 1 tablet by mouth Daily.     • cholecalciferol (VITAMIN D3) 1000 units tablet Take 1 tablet by mouth Daily.     • ezetimibe (ZETIA) 10 MG tablet Take 1 tablet by mouth Daily.     • HYDROcodone-acetaminophen (NORCO) 5-325 MG per tablet      • metFORMIN (GLUCOPHAGE) 500 MG tablet TAKE ONE TABLET BY MOUTH TWICE A DAY WITH MEALS 180 tablet 1   • nitroglycerin (NITROSTAT) 0.4 MG SL tablet Place 1 tablet under the tongue Every 5 (Five) Minutes As Needed for Chest Pain. Take no more than 3 doses in 15 minutes.     • Omega-3 Fatty Acids (FISH OIL) 1200 MG capsule capsule Take 1 capsule by mouth Daily.     • sertraline (ZOLOFT) 50 MG tablet TAKE ONE TABLET BY MOUTH DAILY 90 tablet 3   • vitamin B-12 (CYANOCOBALAMIN) 1000 MCG tablet Take 1 tablet by mouth Daily.       No current facility-administered medications for this visit.        Diagnoses and all orders for this visit:    1. Type 2 diabetes mellitus without complication, without long-term current use of insulin (HCC) (Primary)  -     POC Glycosylated Hemoglobin (Hb A1C)  A1c stable at 5.7%.  No change in medication.,  Recheck in 6 months.  2. Hyperlipidemia, unspecified hyperlipidemia type  Continue current cholesterol medication.       Return in about 7 months (around 10/21/2023) for Medicare Wellness.     Dictated Utilizing Dragon Dictation    Please note that portions of this note were completed with a voice recognition program.    Part of this note may be an electronic transcription/translation of spoken language to printed text using the Dragon Dictation System.

## 2023-03-23 ENCOUNTER — OFFICE VISIT (OUTPATIENT)
Dept: PULMONOLOGY | Facility: CLINIC | Age: 73
End: 2023-03-23
Payer: MEDICARE

## 2023-03-23 VITALS
OXYGEN SATURATION: 98 % | DIASTOLIC BLOOD PRESSURE: 80 MMHG | TEMPERATURE: 97.6 F | SYSTOLIC BLOOD PRESSURE: 122 MMHG | HEIGHT: 66 IN | WEIGHT: 174 LBS | BODY MASS INDEX: 27.97 KG/M2 | HEART RATE: 80 BPM

## 2023-03-23 DIAGNOSIS — Z87.891 FORMER SMOKER: ICD-10-CM

## 2023-03-23 DIAGNOSIS — Z86.79: Chronic | ICD-10-CM

## 2023-03-23 DIAGNOSIS — C34.31 SMALL CELL LUNG CANCER, RIGHT LOWER LOBE: Primary | ICD-10-CM

## 2023-03-23 DIAGNOSIS — J44.9 COPD MIXED TYPE: ICD-10-CM

## 2023-03-23 PROCEDURE — 99214 OFFICE O/P EST MOD 30 MIN: CPT | Performed by: INTERNAL MEDICINE

## 2023-03-23 PROCEDURE — 1159F MED LIST DOCD IN RCRD: CPT | Performed by: INTERNAL MEDICINE

## 2023-03-23 PROCEDURE — 1160F RVW MEDS BY RX/DR IN RCRD: CPT | Performed by: INTERNAL MEDICINE

## 2023-03-23 PROCEDURE — 3074F SYST BP LT 130 MM HG: CPT | Performed by: INTERNAL MEDICINE

## 2023-03-23 PROCEDURE — 3079F DIAST BP 80-89 MM HG: CPT | Performed by: INTERNAL MEDICINE

## 2023-03-23 RX ORDER — ALBUTEROL SULFATE 90 UG/1
2 AEROSOL, METERED RESPIRATORY (INHALATION) EVERY 4 HOURS PRN
Qty: 18 G | Refills: 11 | Status: SHIPPED | OUTPATIENT
Start: 2023-03-23

## 2023-03-23 RX ORDER — TIOTROPIUM BROMIDE AND OLODATEROL 3.124; 2.736 UG/1; UG/1
2 SPRAY, METERED RESPIRATORY (INHALATION)
Qty: 1 EACH | Refills: 11 | Status: SHIPPED | OUTPATIENT
Start: 2023-03-23 | End: 2023-03-24

## 2023-03-23 NOTE — PROGRESS NOTES
"  PULMONARY  NOTE    Chief Complaint     Small cell lung cancer extensive stage, COPD, former smoker, mitral valve disease    History of Present Illness     73-year-old male returns today for follow-up  I last saw him 2/11/2022    He has a history of tobacco abuse, resolved in 2012    He has a past history of extensive stage small cell carcinoma with cerebral metastasis  He has received systemic therapy and at this point there is no evidence of recurrence    He has a history of COPD  In the past he has not been that interested in being on any inhalers  Today he is asking about COPD treatment    He has had no acute exacerbation of cough or sputum production    Patient Active Problem List   Diagnosis   • Type 2 diabetes mellitus without complication (HCC)   • Hyperlipidemia   • Essential hypertension   • Erectile dysfunction   • History of prostate cancer   • Osteoarthritis of multiple joints   • Coronary artery disease involving native heart without angina pectoris   • Colon polyps   • Glaucoma   • Low back pain   • Streptococcal bacteremia   • H/O subacute bacterial endocarditis   • Pulmonary nodule (2.6cm RLL)   • Severe mitral regurgitation   • Former smoker (Stopped 2012)   • COPD   • Small cell lung cancer, right lower lobe (HCC)   • CKD stage G3a/A2, GFR 45-59 and albumin creatinine ratio  mg/g (HCC)     Allergies   Allergen Reactions   • Xarelto [Rivaroxaban] Other (See Comments)     MADE ME FEEL LIKE \" I WAS HAVING A HEART ATTACK.\"       Current Outpatient Medications:   •  aspirin 81 MG EC tablet, Take 1 tablet by mouth Every Night., Disp: , Rfl:   •  atorvastatin (LIPITOR) 40 MG tablet, Take 1 tablet by mouth Every Night., Disp: , Rfl:   •  calcium carbonate-cholecalciferol 500-400 MG-UNIT tablet tablet, Take 1 tablet by mouth Daily., Disp: , Rfl:   •  cholecalciferol (VITAMIN D3) 1000 units tablet, Take 1 tablet by mouth Daily., Disp: , Rfl:   •  ezetimibe (ZETIA) 10 MG tablet, Take 1 tablet by mouth " Daily., Disp: , Rfl:   •  HYDROcodone-acetaminophen (NORCO) 5-325 MG per tablet, , Disp: , Rfl:   •  metFORMIN (GLUCOPHAGE) 500 MG tablet, TAKE ONE TABLET BY MOUTH TWICE A DAY WITH MEALS, Disp: 180 tablet, Rfl: 1  •  nitroglycerin (NITROSTAT) 0.4 MG SL tablet, Place 1 tablet under the tongue Every 5 (Five) Minutes As Needed for Chest Pain. Take no more than 3 doses in 15 minutes., Disp: , Rfl:   •  Omega-3 Fatty Acids (FISH OIL) 1200 MG capsule capsule, Take 1 capsule by mouth Daily., Disp: , Rfl:   •  sertraline (ZOLOFT) 50 MG tablet, TAKE ONE TABLET BY MOUTH DAILY, Disp: 90 tablet, Rfl: 3  •  vitamin B-12 (CYANOCOBALAMIN) 1000 MCG tablet, Take 1 tablet by mouth Daily., Disp: , Rfl:   •  albuterol sulfate  (90 Base) MCG/ACT inhaler, Inhale 2 puffs Every 4 (Four) Hours As Needed for Wheezing., Disp: 18 g, Rfl: 11  •  tiotropium bromide-olodaterol (Stiolto Respimat) 2.5-2.5 MCG/ACT aerosol solution inhaler, Inhale 2 puffs Daily., Disp: 1 each, Rfl: 11  MEDICATION LIST AND ALLERGIES REVIEWED.    Family History   Problem Relation Age of Onset   • Hypertension Mother    • Atrial fibrillation Mother    • Alcohol abuse Father    • Heart attack Father    • Diabetes Father    • No Known Problems Sister    • Prostate cancer Brother      Social History     Tobacco Use   • Smoking status: Former     Packs/day: 1.50     Years: 36.00     Pack years: 54.00     Types: Cigarettes     Quit date: 2011     Years since quittin.9   • Smokeless tobacco: Former     Types: Chew     Quit date:    • Tobacco comments:     quit smoking for 12 years then started again but then quit a final time    Vaping Use   • Vaping Use: Never used   Substance Use Topics   • Alcohol use: Not Currently   • Drug use: Yes     Types: Marijuana     Comment: a month ago     Social History     Social History Narrative    :     to Sabina x 35yrs    2 children from previous marriage    2 gk.    Retired - / [employer]  "-fiberglass exposure    Exercise:10acre farm - vegetables.    Dental: utd    Eye: utd    Previously smoked up to 2 packs of cigarettes per day, starting at age 16.    Stop smoking for good in April 2012    No history of IVDU     FAMILY AND SOCIAL HISTORY REVIEWED.    Review of Systems  ALSO REFER TO SCANNED ROS SHEET FROM SAME DATE.    /80   Pulse 80   Temp 97.6 °F (36.4 °C)   Ht 167.6 cm (65.98\")   Wt 78.9 kg (174 lb)   SpO2 98% Comment: resting, room air  BMI 28.10 kg/m²   Physical Exam  Vitals and nursing note reviewed.   Constitutional:       General: He is not in acute distress.     Appearance: He is well-developed. He is not diaphoretic.   HENT:      Head: Normocephalic and atraumatic.   Neck:      Thyroid: No thyromegaly.   Cardiovascular:      Rate and Rhythm: Normal rate and regular rhythm.      Heart sounds: Normal heart sounds. No murmur heard.  Pulmonary:      Effort: Pulmonary effort is normal.      Breath sounds: Normal breath sounds. No stridor.   Abdominal:      General: Bowel sounds are normal.   Lymphadenopathy:      Cervical: No cervical adenopathy.      Upper Body:      Right upper body: No supraclavicular or epitrochlear adenopathy.      Left upper body: No supraclavicular or epitrochlear adenopathy.   Skin:     General: Skin is warm and dry.   Neurological:      Mental Status: He is alert and oriented to person, place, and time.   Psychiatric:         Behavior: Behavior normal.         Results     CT scan of the chest from 3/16/2023 reviewed on PACS  No new or suspicious intrathoracic findings    Immunization History   Administered Date(s) Administered   • COVID-19 (MODERNA) 1st, 2nd, 3rd Dose Only 04/28/2021, 05/26/2021, 11/29/2021   • FLUAD TRI 65YR+ 09/16/2019   • Flu Vaccine Quad PF >36MO 11/25/2015, 12/12/2016, 10/24/2017   • Fluad Quad 65+ 09/17/2020   • Fluzone High Dose =>65 Years (Vaxcare ONLY) 11/01/2017, 09/12/2018, 09/16/2019   • Fluzone High-Dose 65+yrs 10/01/2021, " 10/19/2022   • Hepatitis A 05/31/2019   • PEDS-Pneumococcal Conjugate (PCV7) 06/03/2015   • Pneumococcal Conjugate 13-Valent (PCV13) 06/03/2015, 10/02/2020   • Pneumococcal Conjugate 20-Valent (PCV20) 10/04/2022   • Pneumococcal Polysaccharide (PPSV23) 06/03/2013   • Shingrix 10/02/2020     Problem List       ICD-10-CM ICD-9-CM   1. Small cell lung cancer, right lower lobe (HCC)  C34.31 162.5   2. COPD  J44.9 496   3. Former smoker (Stopped 2012)  Z87.891 V15.82   4. H/O subacute bacterial endocarditis  Z86.79 V12.59       Discussion     We reviewed his CT scan  Nothing to suggest recurrent malignancy  Neck CT scan will be in about September    We discussed treatment for COPD  I am going to put him on a maintenance and as needed inhaler  It looks like Stiolto may be covered by his insurance  I gave him Stiolto samples, written instructions, and a demonstration of use  He will also have albuterol to use as needed    I will plan to see him back after his follow-up CT scan    Moderate level of Medical Decision Making complexity based on 2 or more chronic stable illnesses and an independent review of test results and/or prescription drug management.    Clint Thompson MD  Note electronically signed    CC: Silvestre Coleman, DO

## 2023-03-24 ENCOUNTER — TELEPHONE (OUTPATIENT)
Dept: PULMONOLOGY | Facility: CLINIC | Age: 73
End: 2023-03-24

## 2023-03-24 DIAGNOSIS — J44.9 COPD MIXED TYPE: Primary | ICD-10-CM

## 2023-03-24 NOTE — TELEPHONE ENCOUNTER
Patient called regarding medicine prescribed to him yesterday Stiolto Respimat 2.5-2.5 MCG/ACT stated his out of pocket was $475.14, would like something else prescribed please.  Or call him if a savings plan card is available.  Pt states he is going to return medication to [facility] for a refund and would like prescription for something else.  Thank you.

## 2023-08-10 DIAGNOSIS — E11.9 TYPE 2 DIABETES MELLITUS WITHOUT COMPLICATION, WITHOUT LONG-TERM CURRENT USE OF INSULIN: ICD-10-CM

## 2023-08-10 NOTE — TELEPHONE ENCOUNTER
Rx Refill Note  Requested Prescriptions     Pending Prescriptions Disp Refills    metFORMIN (GLUCOPHAGE) 500 MG tablet [Pharmacy Med Name: metFORMIN  MG TABLET] 180 tablet 1     Sig: TAKE ONE TABLET BY MOUTH TWICE A DAY WITH MEALS      Last office visit with prescribing clinician: 3/21/2023   Last telemedicine visit with prescribing clinician: Visit date not found   Next office visit with prescribing clinician: 10/6/2023                         Would you like a call back once the refill request has been completed: [] Yes [] No    If the office needs to give you a call back, can they leave a voicemail: [] Yes [] No    Jelena Haas MA  08/10/23, 16:18 EDT   No

## 2023-09-13 ENCOUNTER — TELEPHONE (OUTPATIENT)
Dept: FAMILY MEDICINE CLINIC | Facility: CLINIC | Age: 73
End: 2023-09-13
Payer: MEDICARE

## 2023-09-13 NOTE — TELEPHONE ENCOUNTER
Silvestre Coleman,   [facility] Clinical Pool59 minutes ago (2:40 PM)       Flu shot anytime in sept or early october   Attempted to contact patient to relay information to get flu shot. No answer.    Hub may relay message and document

## 2023-09-13 NOTE — TELEPHONE ENCOUNTER
Caller: Sabina Elliott    Relationship: Emergency Contact    Best call back number: [phone]     What is the best time to reach you: ANYTIME    Who are you requesting to speak with (clinical staff, provider,  specific staff member): DR GARCIA, CLINICAL STAFF    What was the call regarding: PATIENT'S WIFE WOULD LIKE TO KNOW IF THE PATIENT NEEDS VACCINES, BEFORE HIS YEARLY APPOINTMENT IN DECEMBER?

## 2023-09-13 NOTE — TELEPHONE ENCOUNTER
HUB TO READ WAS RELAYED TO THE PATIENT. PATIENT'S WIFE MARIO EXPRESSED UNDERSTANDING. SHE STATED SHE WAS CONCERNED ABOUT ALL VACCINATIONS INCLUDING SHINGLES, NOT JUST FLU. STATED SHE WOULD FOLLOW UP WITH THE PHARMACY.

## 2023-09-14 ENCOUNTER — OFFICE VISIT (OUTPATIENT)
Dept: PULMONOLOGY | Facility: CLINIC | Age: 73
End: 2023-09-14
Payer: MEDICARE

## 2023-09-14 ENCOUNTER — HOSPITAL ENCOUNTER (OUTPATIENT)
Dept: CT IMAGING | Facility: HOSPITAL | Age: 73
Discharge: HOME | End: 2023-09-14
Admitting: INTERNAL MEDICINE
Payer: MEDICARE

## 2023-09-14 VITALS
WEIGHT: 177 LBS | SYSTOLIC BLOOD PRESSURE: 138 MMHG | TEMPERATURE: 98 F | HEIGHT: 65 IN | OXYGEN SATURATION: 98 % | HEART RATE: 80 BPM | DIASTOLIC BLOOD PRESSURE: 84 MMHG | BODY MASS INDEX: 29.49 KG/M2

## 2023-09-14 DIAGNOSIS — C34.31 SMALL CELL LUNG CANCER, RIGHT LOWER LOBE: ICD-10-CM

## 2023-09-14 DIAGNOSIS — J44.9 COPD MIXED TYPE: Primary | ICD-10-CM

## 2023-09-14 DIAGNOSIS — R91.1 PULMONARY NODULE: ICD-10-CM

## 2023-09-14 DIAGNOSIS — Z87.891 FORMER SMOKER: ICD-10-CM

## 2023-09-14 PROCEDURE — 71250 CT THORAX DX C-: CPT

## 2023-09-14 NOTE — PROGRESS NOTES
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  He is received systemic therapy and at this point no evidence of recurrence to date  He just had a repeat CT scan of the chest today as noted below    He carries a diagnosis of COPD on his last visit he was interested in trying an inhaler  I gave him some Stiolto samples and he is also had albuterol to use as needed  Currently not using any inhalers on a regular basis and no exacerbation of respiratory symptoms    Patient Active Problem List   Diagnosis    Type 2 diabetes mellitus without complication    Hyperlipidemia    Essential hypertension    Erectile dysfunction    History of prostate cancer    Osteoarthritis of multiple joints    Coronary artery disease involving native heart without angina pectoris    Colon polyps    Glaucoma    Low back pain    Streptococcal bacteremia    H/O subacute bacterial endocarditis    Pulmonary nodule (2.6cm RLL)    Severe mitral regurgitation    Former smoker (Stopped 2012)    COPD    Small cell lung cancer, right lower lobe    CKD stage G3a/A2, GFR 45-59 and albumin creatinine ratio  mg/g      Allergies   Allergen Reactions    Xarelto [Rivaroxaban] Other (See Comments)     MADE ME FEEL LIKE \" I WAS HAVING A HEART ATTACK.\"       Current Outpatient Medications:     albuterol sulfate  (90 Base) MCG/ACT inhaler, Inhale 2 puffs Every 4 (Four) Hours As Needed for Wheezing., Disp: 18 g, Rfl: 11    aspirin 81 MG EC tablet, Take 1 tablet by mouth Every Night., Disp: , Rfl:     atorvastatin (LIPITOR) 40 MG tablet, Take 1 tablet by mouth Every Night., Disp: , Rfl:     calcium carbonate-cholecalciferol 500-400 " MG-UNIT tablet tablet, Take 1 tablet by mouth Daily., Disp: , Rfl:     cholecalciferol (VITAMIN D3) 1000 units tablet, Take 1 tablet by mouth Daily., Disp: , Rfl:     ezetimibe (ZETIA) 10 MG tablet, Take 1 tablet by mouth Daily., Disp: , Rfl:     HYDROcodone-acetaminophen (NORCO) 5-325 MG per tablet, , Disp: , Rfl:     metFORMIN (GLUCOPHAGE) 500 MG tablet, TAKE ONE TABLET BY MOUTH TWICE A DAY WITH MEALS, Disp: 180 tablet, Rfl: 1    nitroglycerin (NITROSTAT) 0.4 MG SL tablet, Place 1 tablet under the tongue Every 5 (Five) Minutes As Needed for Chest Pain. Take no more than 3 doses in 15 minutes., Disp: , Rfl:     Omega-3 Fatty Acids (FISH OIL) 1200 MG capsule capsule, Take 1 capsule by mouth Daily., Disp: , Rfl:     sertraline (ZOLOFT) 50 MG tablet, TAKE ONE TABLET BY MOUTH DAILY, Disp: 90 tablet, Rfl: 3    Umeclidinium-Vilanterol (ANORO ELLIPTA) 62.5-25 MCG/ACT aerosol powder  inhaler, Inhale 1 puff Daily., Disp: 60 each, Rfl: 11    vitamin B-12 (CYANOCOBALAMIN) 1000 MCG tablet, Take 1 tablet by mouth Daily., Disp: , Rfl:   MEDICATION LIST AND ALLERGIES REVIEWED.    Family History   Problem Relation Age of Onset    Hypertension Mother     Atrial fibrillation Mother     Alcohol abuse Father     Heart attack Father     Diabetes Father     No Known Problems Sister     Prostate cancer Brother      Social History     Tobacco Use    Smoking status: Former     Packs/day: 1.50     Years: 36.00     Pack years: 54.00     Types: Cigarettes     Quit date: 2011     Years since quittin.4    Smokeless tobacco: Former     Types: Chew     Quit date:     Tobacco comments:     quit smoking for 12 years then started again but then quit a final time    Vaping Use    Vaping Use: Never used   Substance Use Topics    Alcohol use: Not Currently    Drug use: Yes     Types: Marijuana     Comment: a month ago     Social History     Social History Narrative    :     to Sabina x 35yrs    2 children from previous  "marriage    2 gk.    Retired - / [employer] -fiberg[employer] exposure    Exercise:10acre farm - vegetables.    Dental: utd    Eye: utd    Previously smoked up to 2 packs of cigarettes per day, starting at age 16.    Stop smoking for good in April 2012    No history of IVDU     FAMILY AND SOCIAL HISTORY REVIEWED.    Review of Systems  IF PRESENT REFER TO SCANNED ROS SHEET FROM SAME DATE  OTHERWISE ROS OBTAINED AND NON-CONTRIBUTORY OVER HPI.    /84 (BP Location: Right arm, Patient Position: Sitting, Cuff Size: Adult)   Pulse 80   Temp 98 °F (36.7 °C)   Ht 165.1 cm (65\")   Wt 80.3 kg (177 lb)   SpO2 98% Comment: room air at rest  BMI 29.45 kg/m²   Physical Exam  Vitals and nursing note reviewed.   Constitutional:       General: He is not in acute distress.     Appearance: He is well-developed. He is not diaphoretic.   HENT:      Head: Normocephalic and atraumatic.   Neck:      Thyroid: No thyromegaly.   Cardiovascular:      Rate and Rhythm: Normal rate and regular rhythm.      Heart sounds: Normal heart sounds. No murmur heard.  Pulmonary:      Effort: Pulmonary effort is normal.      Breath sounds: Normal breath sounds. No stridor.   Lymphadenopathy:      Cervical: No cervical adenopathy.      Upper Body:      Right upper body: No supraclavicular or epitrochlear adenopathy.      Left upper body: No supraclavicular or epitrochlear adenopathy.   Skin:     General: Skin is warm and dry.   Neurological:      Mental Status: He is alert and oriented to person, place, and time.   Psychiatric:         Behavior: Behavior normal.       Results     CT scan of the chest done today which I reviewed on PACS  By my interpretation no change    PFTs reveal no airway obstruction, no restriction, normal diffusion capacity    Immunization History   Administered Date(s) Administered    COVID-19 (MODERNA) 1st,2nd,3rd Dose Monovalent 04/28/2021, 05/26/2021, 11/29/2021    FLUAD TRI 65YR+ 09/16/2019    Flu Vaccine Quad " PF >36MO 11/25/2015, 12/12/2016, 10/24/2017    Fluad Quad 65+ 09/17/2020    Fluzone High Dose =>65 Years ([employer] ONLY) 11/01/2017, 09/12/2018, 09/16/2019    Fluzone High-Dose 65+yrs 10/01/2021, 10/19/2022    Hepatitis A 05/31/2019    Influenza, Unspecified 09/12/2018    PEDS-Pneumococcal Conjugate (PCV7) 06/03/2015    Pneumococcal Conjugate 13-Valent (PCV13) 06/03/2015, 10/02/2020    Pneumococcal Conjugate 20-Valent (PCV20) 10/04/2022    Pneumococcal Conjugate Unspecified 06/03/2015    Pneumococcal Polysaccharide (PPSV23) 06/03/2013    Shingrix 10/02/2020     Problem List       ICD-10-CM ICD-9-CM   1. COPD mixed type  J44.9 496   2. Former smoker (Stopped 2012)  Z87.891 V15.82   3. Small cell lung cancer, right lower lobe  C34.31 162.5   4. Pulmonary nodule (2.6cm RLL)  R91.1 793.11       Discussion     We reviewed his test results  My interpretation of his CT scan is that there is no change  The radiologist and interpretation is still pending.  They understand that.  The also have a follow-up appointment with Dr. Cartagena next week    He carries a diagnosis of COPD  Spirometry today is normal so really he does not have COPD.  He could have asthma  He is not really interested in being on any regular inhaled medication  He has had albuterol to use on an as-needed basis    I will plan to see him back in 6 months or earlier if there are any problems in the meantime    Moderate level of Medical Decision Making complexity based on 2 or more chronic stable illnesses and an independent review of test results and/or prescription drug management.    Clint Thompson MD  Note electronically signed    CC: Silvestre Coleman,

## 2023-09-22 ENCOUNTER — OFFICE VISIT (OUTPATIENT)
Dept: ONCOLOGY | Facility: CLINIC | Age: 73
End: 2023-09-22
Payer: MEDICARE

## 2023-09-22 ENCOUNTER — LAB (OUTPATIENT)
Dept: LAB | Facility: HOSPITAL | Age: 73
End: 2023-09-22
Payer: MEDICARE

## 2023-09-22 VITALS
BODY MASS INDEX: 29.32 KG/M2 | HEART RATE: 59 BPM | DIASTOLIC BLOOD PRESSURE: 78 MMHG | TEMPERATURE: 97.3 F | OXYGEN SATURATION: 98 % | WEIGHT: 176 LBS | HEIGHT: 65 IN | SYSTOLIC BLOOD PRESSURE: 136 MMHG

## 2023-09-22 DIAGNOSIS — C34.31 SMALL CELL LUNG CANCER, RIGHT LOWER LOBE: Primary | ICD-10-CM

## 2023-09-22 DIAGNOSIS — C34.31 SMALL CELL LUNG CANCER, RIGHT LOWER LOBE: ICD-10-CM

## 2023-09-22 LAB
ALBUMIN SERPL-MCNC: 4.5 G/DL (ref 3.5–5.2)
ALBUMIN/GLOB SERPL: 1.7 G/DL
ALP SERPL-CCNC: 99 U/L (ref 39–117)
ALT SERPL W P-5'-P-CCNC: 45 U/L (ref 1–41)
ANION GAP SERPL CALCULATED.3IONS-SCNC: 7 MMOL/L (ref 5–15)
AST SERPL-CCNC: 33 U/L (ref 1–40)
BASOPHILS # BLD AUTO: 0.02 10*3/MM3 (ref 0–0.2)
BASOPHILS NFR BLD AUTO: 0.4 % (ref 0–1.5)
BILIRUB SERPL-MCNC: 0.9 MG/DL (ref 0–1.2)
BUN SERPL-MCNC: 18 MG/DL (ref 8–23)
BUN/CREAT SERPL: 12.7 (ref 7–25)
CALCIUM SPEC-SCNC: 9.2 MG/DL (ref 8.6–10.5)
CHLORIDE SERPL-SCNC: 106 MMOL/L (ref 98–107)
CO2 SERPL-SCNC: 29 MMOL/L (ref 22–29)
CREAT SERPL-MCNC: 1.42 MG/DL (ref 0.76–1.27)
DEPRECATED RDW RBC AUTO: 43.9 FL (ref 37–54)
EGFRCR SERPLBLD CKD-EPI 2021: 52.5 ML/MIN/1.73
EOSINOPHIL # BLD AUTO: 0.1 10*3/MM3 (ref 0–0.4)
EOSINOPHIL NFR BLD AUTO: 1.8 % (ref 0.3–6.2)
ERYTHROCYTE [DISTWIDTH] IN BLOOD BY AUTOMATED COUNT: 12.5 % (ref 12.3–15.4)
GLOBULIN UR ELPH-MCNC: 2.6 GM/DL
GLUCOSE SERPL-MCNC: 105 MG/DL (ref 65–99)
HCT VFR BLD AUTO: 39.8 % (ref 37.5–51)
HGB BLD-MCNC: 13.3 G/DL (ref 13–17.7)
IMM GRANULOCYTES # BLD AUTO: 0.01 10*3/MM3 (ref 0–0.05)
IMM GRANULOCYTES NFR BLD AUTO: 0.2 % (ref 0–0.5)
LYMPHOCYTES # BLD AUTO: 1.19 10*3/MM3 (ref 0.7–3.1)
LYMPHOCYTES NFR BLD AUTO: 21 % (ref 19.6–45.3)
MCH RBC QN AUTO: 31.6 PG (ref 26.6–33)
MCHC RBC AUTO-ENTMCNC: 33.4 G/DL (ref 31.5–35.7)
MCV RBC AUTO: 94.5 FL (ref 79–97)
MONOCYTES # BLD AUTO: 0.55 10*3/MM3 (ref 0.1–0.9)
MONOCYTES NFR BLD AUTO: 9.7 % (ref 5–12)
NEUTROPHILS NFR BLD AUTO: 3.8 10*3/MM3 (ref 1.7–7)
NEUTROPHILS NFR BLD AUTO: 66.9 % (ref 42.7–76)
PLATELET # BLD AUTO: 121 10*3/MM3 (ref 140–450)
PMV BLD AUTO: 10.5 FL (ref 6–12)
POTASSIUM SERPL-SCNC: 4.9 MMOL/L (ref 3.5–5.2)
PROT SERPL-MCNC: 7.1 G/DL (ref 6–8.5)
RBC # BLD AUTO: 4.21 10*6/MM3 (ref 4.14–5.8)
SODIUM SERPL-SCNC: 142 MMOL/L (ref 136–145)
WBC NRBC COR # BLD: 5.67 10*3/MM3 (ref 3.4–10.8)

## 2023-09-22 PROCEDURE — 80053 COMPREHEN METABOLIC PANEL: CPT

## 2023-09-22 PROCEDURE — 99214 OFFICE O/P EST MOD 30 MIN: CPT | Performed by: NURSE PRACTITIONER

## 2023-09-22 PROCEDURE — 36415 COLL VENOUS BLD VENIPUNCTURE: CPT

## 2023-09-22 PROCEDURE — 85025 COMPLETE CBC W/AUTO DIFF WBC: CPT

## 2023-09-22 NOTE — PROGRESS NOTES
DATE OF VISIT: 9/22/2023    REASON FOR VISIT: Followup for right lower lobe small cell lung cancer     PROBLEM LIST:  1. Limited stage small cell lung cancer [id number] stage IIIa:  A.  Presented with abnormal screening CT chest  B.  Diagnosed after bronchoscopy with biopsy done by  November 19, 2020 + for small cell from level 7 level 4R and level 10 R lymph nodes.  C.  Whole-body PET scan did not reveal any other sites of disease.  D.  Started definitive concurrent chemotherapy with radiation using cisplatin etoposide December 2020 1 status post 4 cycles completed February 23, 2021  2.  Isolated 6 mm right cerebellar lesion:  A.  Evident MRI brain done on December 1, 2020  B.  Completed prophylactic whole brain radiation 4/23/2021.   3.  Chemotherapy-induced nausea  4. Insomnia   5.  Treatment induced anemia  6.  Chronic kidney disease  7.  Type 2 diabetes      HISTORY OF PRESENT ILLNESS: The patient is a very pleasant 73 y.o. male  with past medical history significant for right lower lobe lung cancer diagnosed April 19, 2020.  Patient status post definitive concurrent chemotherapy with radiation completed February 23, 2021. The patient is here today for scheduled follow-up visit.    SUBJECTIVE: The patient is here today with his wife.  He has been doing well since his last visit.  He has had no significant changes in health histor  He continues to be followed by his primary care provider every 6 months for monitoring of blood work including his creatinine that has been slightly elevated since being on chemotherapy.  He denies increased cough or shortness of breath.  He has had no unexplained weight loss.  He has some ongoing and intermittent episodes of dizziness that have been present since having whole brain radiation.  He denies any headaches or new visual changes.    Past History:  Medical History: has a past medical history of Cancer, CKD stage G3a/A2, GFR 45-59 and albumin creatinine ratio  " mg/g (6/21/2021), COPD (chronic obstructive pulmonary disease), Coronary artery disease, DDD (degenerative disc disease), cervical, Depression, Diabetes mellitus, Erectile dysfunction, Glaucoma, Headache, History of radiation therapy (02/23/2021), History of radiation therapy (04/23/2021), Hyperlipidemia, Hypertension, Impingement syndrome of left shoulder, Infection, bacterial, Lung cancer, MI, old, Mitral valve vegetation, Osteoarthritis, Prostate cancer, SOBOE (shortness of breath on exertion), Varicose veins of lower limb, Wears glasses, and Wears partial dentures.   Surgical History: has a past surgical history that includes Prostatectomy; Colonoscopy w/ polypectomy; Knee surgery; Knee Arthroscopy; Lumbar epidural injection; Hernia repair; Tonsillectomy; Coronary angioplasty; Tendon transfer elbow; Trabeculectomy; pr rt/lt heart catheters (N/A, 12/27/2016); Cardiac catheterization (N/A, 12/20/2016); Coronary angioplasty with stent; Cataract extraction; Eye surgery; Colonoscopy; and Bronchoscopy (N/A, 11/19/2020).   Family History: family history includes Alcohol abuse in his father; Atrial fibrillation in his mother; Diabetes in his father; Heart attack in his father; Hypertension in his mother; No Known Problems in his sister; Prostate cancer in his brother.   Social History: reports that he quit smoking about 12 years ago. His smoking use included cigarettes. He has a 54.00 pack-year smoking history. He quit smokeless tobacco use about 12 years ago.  His smokeless tobacco use included chew. He reports that he does not currently use alcohol. He reports current drug use. Drug: Marijuana.    (Not in a hospital admission)     Allergies: Xarelto [rivaroxaban]     Review of Systems   Constitutional:  Positive for fatigue.   Neurological:  Positive for dizziness.     PHYSICAL EXAMINATION:   /78   Pulse 59   Temp 97.3 °F (36.3 °C) (Infrared)   Ht 165.1 cm (65\")   Wt 79.8 kg (176 lb)   SpO2 98%   " BMI 29.29 kg/m²    Pain Score    09/22/23 1032   PainSc: 0-No pain      ECOG score: 1        ECOG Performance Status: 1 - Symptomatic but completely ambulatory  General Appearance:  alert, cooperative, no apparent distress and appears stated age   Lungs:   Clear to auscultation bilaterally; respirations regular, even, and unlabored bilaterally   Heart:  Regular rate and rhythm, no murmurs appreciated   Abdomen:   Soft, non-tender, non-distended, and no organomegaly     Lab on 09/22/2023   Component Date Value Ref Range Status    WBC 09/22/2023 5.67  3.40 - 10.80 10*3/mm3 Final    RBC 09/22/2023 4.21  4.14 - 5.80 10*6/mm3 Final    Hemoglobin 09/22/2023 13.3  13.0 - 17.7 g/dL Final    Hematocrit 09/22/2023 39.8  37.5 - 51.0 % Final    MCV 09/22/2023 94.5  79.0 - 97.0 fL Final    MCH 09/22/2023 31.6  26.6 - 33.0 pg Final    MCHC 09/22/2023 33.4  31.5 - 35.7 g/dL Final    RDW 09/22/2023 12.5  12.3 - 15.4 % Final    RDW-SD 09/22/2023 43.9  37.0 - 54.0 fl Final    MPV 09/22/2023 10.5  6.0 - 12.0 fL Final    Platelets 09/22/2023 121 (L)  140 - 450 10*3/mm3 Final    Neutrophil % 09/22/2023 66.9  42.7 - 76.0 % Final    Lymphocyte % 09/22/2023 21.0  19.6 - 45.3 % Final    Monocyte % 09/22/2023 9.7  5.0 - 12.0 % Final    Eosinophil % 09/22/2023 1.8  0.3 - 6.2 % Final    Basophil % 09/22/2023 0.4  0.0 - 1.5 % Final    Immature Grans % 09/22/2023 0.2  0.0 - 0.5 % Final    Neutrophils, Absolute 09/22/2023 3.80  1.70 - 7.00 10*3/mm3 Final    Lymphocytes, Absolute 09/22/2023 1.19  0.70 - 3.10 10*3/mm3 Final    Monocytes, Absolute 09/22/2023 0.55  0.10 - 0.90 10*3/mm3 Final    Eosinophils, Absolute 09/22/2023 0.10  0.00 - 0.40 10*3/mm3 Final    Basophils, Absolute 09/22/2023 0.02  0.00 - 0.20 10*3/mm3 Final    Immature Grans, Absolute 09/22/2023 0.01  0.00 - 0.05 10*3/mm3 Final        CT Chest Without Contrast Diagnostic    Result Date: 9/15/2023  Narrative: CT CHEST WO CONTRAST DIAGNOSTIC Date of Exam: 9/14/2023 10:36 AM EDT  Indication: f/u scans. Small cell lung cancer of the right lower lobe Comparison: 3/16/2023. Technique: Axial CT images were obtained of the chest without contrast administration.  Reconstructed coronal and sagittal images were also obtained. Automated exposure control and iterative construction methods were used. Findings: There are chronic infiltrates of the right lung with soft tissue thickening of the right hilum, stable. A 2 mm nodule in the left lower lobe is stable. There is stable scar of the left upper lobe. There are no new infiltrates or new nodules. There are no  enlarged mediastinal nodes. There are coronary calcifications. There are no pleural effusions. There are no adrenal masses. There is cholelithiasis without acute cholecystitis.     Impression: Impression: Chronic findings of the right lung which presumably represents posttreatment change. No new abnormality. Cholelithiasis without acute cholecystitis. Electronically Signed: Kerline Gooden MD  9/15/2023 8:27 AM EDT  Workstation ID: [id number]       ASSESSMENT: The patient is a very pleasant 73 y.o. male  with right small cell lung cancer      PLAN:    1.  Right lower lobe small cell lung cancer:  A.  I reviewed the CT results from 9/14/2023 with the patient.  I reassured the patient he has chronic findings of the right lung indicating posttreatment change with no evidence of relapsed or progressive disease.  B.  I reviewed the lab results from today with the patient.  I reassured him that his white blood cell count and hemoglobin are both within normal limits.  He has mild thrombocytopenia with platelet count of 121,000.  We will follow-up on the CMP results.  C. We will continue watchful waiting at this time with plan to repeat his CAT scan of chest in 6 months with repeat labs including CBC and CMP.    2.  Isolated brain lesion:  A.  The patient completed whole radiation April 2021.  B.  The last MRI brain done August 1, 2022 showed no evidence  of metastatic disease.  C.  He has been released from care by Dr. Urias and less he has evidence of progressive disease.  D.  I advise he call and notify us if he has worsening symptoms of dizziness, headaches, falls, or visual changes so that we can repeat MRI for evaluation.    3.  Depression:  A.  The patient will continue Zoloft 50 mg daily.    4.  Type 2 diabetes:  A.  He will continue Metformin 5 mg twice a day    5.  Chronic kidney disease stage IIIa:  A.  I will follow-up on his creatinine from today.  His last creatinine done June 2023 was elevated some to 1.76.  B.  He will continue to follow-up with his nephrologist, Dr. Webber, at [facility].  C. We will order his next CT scan without contrast to help limit impact on his kidneys from contrast dye.     FOLLOW UP: 6-month repeated scans and labs.    Gaby Pandya, APRN  9/22/2023

## 2023-12-06 ENCOUNTER — OFFICE VISIT (OUTPATIENT)
Dept: FAMILY MEDICINE CLINIC | Facility: CLINIC | Age: 73
End: 2023-12-06
Payer: MEDICARE

## 2023-12-06 VITALS
HEART RATE: 62 BPM | HEIGHT: 65 IN | BODY MASS INDEX: 29.39 KG/M2 | OXYGEN SATURATION: 97 % | DIASTOLIC BLOOD PRESSURE: 78 MMHG | WEIGHT: 176.4 LBS | SYSTOLIC BLOOD PRESSURE: 118 MMHG

## 2023-12-06 DIAGNOSIS — E11.9 TYPE 2 DIABETES MELLITUS WITHOUT COMPLICATION, WITHOUT LONG-TERM CURRENT USE OF INSULIN: ICD-10-CM

## 2023-12-06 DIAGNOSIS — E55.9 VITAMIN D DEFICIENCY: ICD-10-CM

## 2023-12-06 DIAGNOSIS — Z12.5 ENCOUNTER FOR PROSTATE CANCER SCREENING: ICD-10-CM

## 2023-12-06 DIAGNOSIS — I10 ESSENTIAL HYPERTENSION: ICD-10-CM

## 2023-12-06 DIAGNOSIS — N18.31 CKD STAGE G3A/A2, GFR 45-59 AND ALBUMIN CREATININE RATIO 30-299 MG/G: ICD-10-CM

## 2023-12-06 DIAGNOSIS — I10 PRIMARY HYPERTENSION: ICD-10-CM

## 2023-12-06 DIAGNOSIS — Z00.00 MEDICARE ANNUAL WELLNESS VISIT, SUBSEQUENT: Primary | ICD-10-CM

## 2023-12-06 DIAGNOSIS — E78.5 HYPERLIPIDEMIA, UNSPECIFIED HYPERLIPIDEMIA TYPE: ICD-10-CM

## 2023-12-06 NOTE — PROGRESS NOTES
The ABCs of the Annual Wellness Visit  Subsequent Medicare Wellness Visit    Subjective      Luis Elliott is a 73 y.o. male who presents for a Subsequent Medicare Wellness Visit.    The following portions of the patient's history were reviewed and   updated as appropriate: allergies, current medications, past family history, past medical history, past social history, past surgical history, and problem list.    Compared to one year ago, the patient feels his physical   health is the same.    Compared to one year ago, the patient feels his mental   health is the same.    Recent Hospitalizations:  He was not admitted to the hospital during the last year.       Current Medical Providers:  Patient Care Team:  Silvestre Coleman DO as PCP - General (Internal Medicine)  Kan Blackwell MD as Consulting Physician (Cardiology)  Mark Camacho MD as Consulting Physician (Ophthalmology)  Daniel Cartagena MD as Referring Physician (Hematology and Oncology)  Jay Jay Urias MD as Consulting Physician (Radiation Oncology)  Clint Thompson MD as Emergency Attending (Pulmonary Disease)  Tom Webber MD as Consulting Physician (Nephrology)  Gaby Pandya APRN as Nurse Practitioner (Hematology and Oncology)  Hannah Chairez APRN as Nurse Practitioner (Pulmonary Disease)    Outpatient Medications Prior to Visit   Medication Sig Dispense Refill    albuterol sulfate  (90 Base) MCG/ACT inhaler Inhale 2 puffs Every 4 (Four) Hours As Needed for Wheezing. 18 g 11    aspirin 81 MG EC tablet Take 1 tablet by mouth Every Night.      atorvastatin (LIPITOR) 40 MG tablet Take 1 tablet by mouth Every Night.      calcium carbonate-cholecalciferol 500-400 MG-UNIT tablet tablet Take 1 tablet by mouth Daily.      cholecalciferol (VITAMIN D3) 1000 units tablet Take 1 tablet by mouth Daily.      ezetimibe (ZETIA) 10 MG tablet Take 1 tablet by mouth Daily.      metFORMIN (GLUCOPHAGE) 500 MG tablet TAKE ONE  TABLET BY MOUTH TWICE A DAY WITH MEALS 180 tablet 1    nitroglycerin (NITROSTAT) 0.4 MG SL tablet Place 1 tablet under the tongue Every 5 (Five) Minutes As Needed for Chest Pain. Take no more than 3 doses in 15 minutes.      Omega-3 Fatty Acids (FISH OIL) 1200 MG capsule capsule Take 1 capsule by mouth Daily.      sertraline (ZOLOFT) 50 MG tablet TAKE ONE TABLET BY MOUTH DAILY 90 tablet 3    vitamin B-12 (CYANOCOBALAMIN) 1000 MCG tablet Take 1 tablet by mouth Daily.      Umeclidinium-Vilanterol (ANORO ELLIPTA) 62.5-25 MCG/ACT aerosol powder  inhaler Inhale 1 puff Daily. 60 each 11    HYDROcodone-acetaminophen (NORCO) 5-325 MG per tablet  (Patient not taking: Reported on 9/22/2023)       No facility-administered medications prior to visit.       Opioid medication/s are on active medication list.  and I have evaluated his active treatment plan and pain score trends (see table).  Vitals:    12/06/23 1225   PainSc: 0-No pain     I have reviewed the chart for potential of high risk medication and harmful drug interactions in the elderly.          Aspirin is on active medication list. Aspirin use is indicated based on review of current medical condition/s. Pros and cons of this therapy have been discussed today. Benefits of this medication outweigh potential harm.  Patient has been encouraged to continue taking this medication.  .      Patient Active Problem List   Diagnosis    Type 2 diabetes mellitus without complication    Hyperlipidemia    Essential hypertension    Erectile dysfunction    History of prostate cancer    Osteoarthritis of multiple joints    Coronary artery disease involving native heart without angina pectoris    Colon polyps    Glaucoma    Low back pain    Streptococcal bacteremia    H/O subacute bacterial endocarditis    Pulmonary nodule (2.6cm RLL)    Severe mitral regurgitation    Former smoker (Stopped 2012)    COPD    Small cell lung cancer, right lower lobe    CKD stage G3a/A2, GFR 45-59 and  "albumin creatinine ratio  mg/g     Advance Care Planning   Advance Care Planning     Advance Directive is not on file.  ACP discussion was held with the patient during this visit. Patient does not have an advance directive, information provided.     Objective    Vitals:    23 1225   BP: 118/78   Pulse: 62   SpO2: 97%   Weight: 80 kg (176 lb 6.4 oz)   Height: 165.1 cm (65\")   PainSc: 0-No pain     Estimated body mass index is 29.35 kg/m² as calculated from the following:    Height as of this encounter: 165.1 cm (65\").    Weight as of this encounter: 80 kg (176 lb 6.4 oz).           Does the patient have evidence of cognitive impairment?   No            HEALTH RISK ASSESSMENT    Smoking Status:  Social History     Tobacco Use   Smoking Status Former    Packs/day: 1.50    Years: 36.00    Additional pack years: 0.00    Total pack years: 54.00    Types: Cigarettes    Quit date: 2011    Years since quittin.6   Smokeless Tobacco Former    Types: Chew    Quit date:    Tobacco Comments    quit smoking for 12 years then started again but then quit a final time      Alcohol Consumption:  Social History     Substance and Sexual Activity   Alcohol Use Not Currently     Fall Risk Screen:    TAN Fall Risk Assessment was completed, and patient is at MODERATE risk for falls. Assessment completed on:2023    Depression Screenin/6/2023    12:31 PM   PHQ-2/PHQ-9 Depression Screening   Little Interest or Pleasure in Doing Things 0-->not at all   Feeling Down, Depressed or Hopeless 0-->not at all   PHQ-9: Brief Depression Severity Measure Score 0       Health Habits and Functional and Cognitive Screenin/6/2023    12:29 PM   Functional & Cognitive Status   Do you have difficulty preparing food and eating? No   Do you have difficulty bathing yourself, getting dressed or grooming yourself? No   Do you have difficulty using the toilet? No   Do you have difficulty moving around from place " to place? No   Do you have trouble with steps or getting out of a bed or a chair? No   Current Diet Well Balanced Diet   Dental Exam Up to date   Eye Exam Up to date   Exercise (times per week) 1 times per week   Current Exercises Include Walking   Do you need help using the phone?  No   Are you deaf or do you have serious difficulty hearing?  Yes   Do you need help to go to places out of walking distance? No   Do you need help shopping? No   Do you need help preparing meals?  No   Do you need help with housework?  No   Do you need help with laundry? No   Do you need help taking your medications? No   Do you need help managing money? No   Do you ever drive or ride in a car without wearing a seat belt? No   Have you felt unusual stress, anger or loneliness in the last month? No   Who do you live with? Spouse   If you need help, do you have trouble finding someone available to you? No   Have you been bothered in the last four weeks by sexual problems? No   Do you have difficulty concentrating, remembering or making decisions? No       Age-appropriate Screening Schedule:  Refer to the list below for future screening recommendations based on patient's age, sex and/or medical conditions. Orders for these recommended tests are listed in the plan section. The patient has been provided with a written plan.    Health Maintenance   Topic Date Due    DIABETIC FOOT EXAM  06/09/2018    DIABETIC EYE EXAM  03/09/2021    TDAP/TD VACCINES (2 - Td or Tdap) 01/01/2023    COVID-19 Vaccine (4 - 2023-24 season) 09/01/2023    HEMOGLOBIN A1C  09/21/2023    ANNUAL WELLNESS VISIT  10/04/2023    URINE MICROALBUMIN  10/05/2023    LIPID PANEL  06/07/2024    BMI FOLLOWUP  12/06/2024    COLORECTAL CANCER SCREENING  11/04/2026    HEPATITIS C SCREENING  Completed    INFLUENZA VACCINE  Completed    Pneumococcal Vaccine 65+  Completed    AAA SCREEN (ONE-TIME)  Completed    ZOSTER VACCINE  Completed    LUNG CANCER SCREENING  Discontinued                 Physical Exam  Gen Appearance: NAD  HEENT: Normocephalic, PERRLA, no thyromegaly, trache midline  Heart: RRR, normal S1 and S2, no murmur  Lungs: CTA b/l, no wheezing, no crackles  Abdomen: Soft, non-tender, non-distended, no guarding and BSx4  MSK: Moves all extremities well, normal gait, no peripheral edema  Pulses: Palpable and equal b/l  Lymph nodes: No palpable lymphadenopathy   Neuro: No focal deficits     CMS Preventative Services Quick Reference  Risk Factors Identified During Encounter:    None Identified    The above risks/problems have been discussed with the patient.  Pertinent information has been shared with the patient in the After Visit Summary.    Diagnoses and all orders for this visit:    1. Medicare annual wellness visit, subsequent (Primary)  Counseled on healthy weight, nutrition, physical activity, cancer screening, and immunizations.    2. Type 2 diabetes mellitus without complication, without long-term current use of insulin  -     CBC & Differential; Future  -     Comprehensive Metabolic Panel; Future  -     Hemoglobin A1c; Future  -     Lipid Panel; Future  -     PSA Screen; Future  -     TSH+Free T4; Future  -     Urinalysis With Culture If Indicated - Urine, Clean Catch; Future  -     Vitamin D,25-Hydroxy; Future  -     Microalbumin / Creatinine Urine Ratio - Urine, Clean Catch; Future    3. Primary hypertension  -     CBC & Differential; Future  -     Comprehensive Metabolic Panel; Future  -     Hemoglobin A1c; Future  -     Lipid Panel; Future  -     PSA Screen; Future  -     TSH+Free T4; Future  -     Urinalysis With Culture If Indicated - Urine, Clean Catch; Future  -     Vitamin D,25-Hydroxy; Future    4. CKD stage G3a/A2, GFR 45-59 and albumin creatinine ratio  mg/g  -     CBC & Differential; Future  -     Comprehensive Metabolic Panel; Future  -     Hemoglobin A1c; Future  -     Lipid Panel; Future  -     PSA Screen; Future  -     TSH+Free T4; Future  -     Urinalysis  With Culture If Indicated - Urine, Clean Catch; Future  -     Vitamin D,25-Hydroxy; Future    5. Vitamin D deficiency  -     CBC & Differential; Future  -     Comprehensive Metabolic Panel; Future  -     Hemoglobin A1c; Future  -     Lipid Panel; Future  -     PSA Screen; Future  -     TSH+Free T4; Future  -     Urinalysis With Culture If Indicated - Urine, Clean Catch; Future  -     Vitamin D,25-Hydroxy; Future    6. Hyperlipidemia, unspecified hyperlipidemia type  -     CBC & Differential; Future  -     Comprehensive Metabolic Panel; Future  -     Hemoglobin A1c; Future  -     Lipid Panel; Future  -     PSA Screen; Future  -     TSH+Free T4; Future  -     Urinalysis With Culture If Indicated - Urine, Clean Catch; Future  -     Vitamin D,25-Hydroxy; Future    7. Encounter for prostate cancer screening  -     PSA Screen; Future    8. Essential hypertension  -     CBC & Differential; Future  -     Comprehensive Metabolic Panel; Future  -     Hemoglobin A1c; Future  -     Lipid Panel; Future  -     PSA Screen; Future  -     TSH+Free T4; Future  -     Urinalysis With Culture If Indicated - Urine, Clean Catch; Future  -     Vitamin D,25-Hydroxy; Future        Follow Up:   Next Medicare Wellness visit to be scheduled in 1 year.      An After Visit Summary and PPPS were made available to the patient.

## 2023-12-13 ENCOUNTER — LAB (OUTPATIENT)
Dept: LAB | Facility: HOSPITAL | Age: 73
End: 2023-12-13
Payer: MEDICARE

## 2023-12-13 DIAGNOSIS — I10 ESSENTIAL HYPERTENSION: ICD-10-CM

## 2023-12-13 DIAGNOSIS — Z12.5 ENCOUNTER FOR PROSTATE CANCER SCREENING: ICD-10-CM

## 2023-12-13 DIAGNOSIS — E55.9 VITAMIN D DEFICIENCY: ICD-10-CM

## 2023-12-13 DIAGNOSIS — E11.9 TYPE 2 DIABETES MELLITUS WITHOUT COMPLICATION, WITHOUT LONG-TERM CURRENT USE OF INSULIN: ICD-10-CM

## 2023-12-13 DIAGNOSIS — E78.5 HYPERLIPIDEMIA, UNSPECIFIED HYPERLIPIDEMIA TYPE: ICD-10-CM

## 2023-12-13 DIAGNOSIS — I10 PRIMARY HYPERTENSION: ICD-10-CM

## 2023-12-13 DIAGNOSIS — N18.31 CKD STAGE G3A/A2, GFR 45-59 AND ALBUMIN CREATININE RATIO 30-299 MG/G: ICD-10-CM

## 2023-12-13 LAB
25(OH)D3 SERPL-MCNC: 86.7 NG/ML (ref 30–100)
ALBUMIN SERPL-MCNC: 5.1 G/DL (ref 3.5–5.2)
ALBUMIN UR-MCNC: 1.4 MG/DL
ALBUMIN/GLOB SERPL: 2.3 G/DL
ALP SERPL-CCNC: 104 U/L (ref 39–117)
ALT SERPL W P-5'-P-CCNC: 47 U/L (ref 1–41)
ANION GAP SERPL CALCULATED.3IONS-SCNC: 11 MMOL/L (ref 5–15)
AST SERPL-CCNC: 33 U/L (ref 1–40)
BASOPHILS # BLD AUTO: 0.03 10*3/MM3 (ref 0–0.2)
BASOPHILS NFR BLD AUTO: 0.4 % (ref 0–1.5)
BILIRUB SERPL-MCNC: 1.1 MG/DL (ref 0–1.2)
BILIRUB UR QL STRIP: NEGATIVE
BUN SERPL-MCNC: 26 MG/DL (ref 8–23)
BUN/CREAT SERPL: 14.8 (ref 7–25)
CALCIUM SPEC-SCNC: 10.4 MG/DL (ref 8.6–10.5)
CHLORIDE SERPL-SCNC: 102 MMOL/L (ref 98–107)
CHOLEST SERPL-MCNC: 117 MG/DL (ref 0–200)
CLARITY UR: CLEAR
CO2 SERPL-SCNC: 28 MMOL/L (ref 22–29)
COLOR UR: YELLOW
CREAT SERPL-MCNC: 1.76 MG/DL (ref 0.76–1.27)
CREAT UR-MCNC: 124.9 MG/DL
DEPRECATED RDW RBC AUTO: 43.1 FL (ref 37–54)
EGFRCR SERPLBLD CKD-EPI 2021: 40.6 ML/MIN/1.73
EOSINOPHIL # BLD AUTO: 0.14 10*3/MM3 (ref 0–0.4)
EOSINOPHIL NFR BLD AUTO: 2 % (ref 0.3–6.2)
ERYTHROCYTE [DISTWIDTH] IN BLOOD BY AUTOMATED COUNT: 12.8 % (ref 12.3–15.4)
GLOBULIN UR ELPH-MCNC: 2.2 GM/DL
GLUCOSE SERPL-MCNC: 156 MG/DL (ref 65–99)
GLUCOSE UR STRIP-MCNC: NEGATIVE MG/DL
HBA1C MFR BLD: 5.9 % (ref 4.8–5.6)
HCT VFR BLD AUTO: 42.9 % (ref 37.5–51)
HDLC SERPL-MCNC: 37 MG/DL (ref 40–60)
HGB BLD-MCNC: 14.9 G/DL (ref 13–17.7)
HGB UR QL STRIP.AUTO: NEGATIVE
HOLD SPECIMEN: NORMAL
KETONES UR QL STRIP: NEGATIVE
LDLC SERPL CALC-MCNC: 46 MG/DL (ref 0–100)
LDLC/HDLC SERPL: 1.02 {RATIO}
LEUKOCYTE ESTERASE UR QL STRIP.AUTO: NEGATIVE
LYMPHOCYTES # BLD AUTO: 1.35 10*3/MM3 (ref 0.7–3.1)
LYMPHOCYTES NFR BLD AUTO: 19.6 % (ref 19.6–45.3)
MCH RBC QN AUTO: 32.2 PG (ref 26.6–33)
MCHC RBC AUTO-ENTMCNC: 34.7 G/DL (ref 31.5–35.7)
MCV RBC AUTO: 92.7 FL (ref 79–97)
MICROALBUMIN/CREAT UR: 11.2 MG/G (ref 0–29)
MONOCYTES # BLD AUTO: 0.58 10*3/MM3 (ref 0.1–0.9)
MONOCYTES NFR BLD AUTO: 8.4 % (ref 5–12)
NEUTROPHILS NFR BLD AUTO: 4.76 10*3/MM3 (ref 1.7–7)
NEUTROPHILS NFR BLD AUTO: 69.3 % (ref 42.7–76)
NITRITE UR QL STRIP: NEGATIVE
PH UR STRIP.AUTO: 6 [PH] (ref 5–8)
PLATELET # BLD AUTO: 139 10*3/MM3 (ref 140–450)
PMV BLD AUTO: 11.6 FL (ref 6–12)
POTASSIUM SERPL-SCNC: 4.6 MMOL/L (ref 3.5–5.2)
PROT SERPL-MCNC: 7.3 G/DL (ref 6–8.5)
PROT UR QL STRIP: NEGATIVE
PSA SERPL-MCNC: <0.014 NG/ML (ref 0–4)
RBC # BLD AUTO: 4.63 10*6/MM3 (ref 4.14–5.8)
SODIUM SERPL-SCNC: 141 MMOL/L (ref 136–145)
SP GR UR STRIP: 1.01 (ref 1–1.03)
T4 FREE SERPL-MCNC: 1.07 NG/DL (ref 0.93–1.7)
TRIGL SERPL-MCNC: 211 MG/DL (ref 0–150)
TSH SERPL DL<=0.05 MIU/L-ACNC: 1.79 UIU/ML (ref 0.27–4.2)
UROBILINOGEN UR QL STRIP: NORMAL
VLDLC SERPL-MCNC: 34 MG/DL (ref 5–40)
WBC NRBC COR # BLD AUTO: 6.88 10*3/MM3 (ref 3.4–10.8)

## 2023-12-13 PROCEDURE — 81003 URINALYSIS AUTO W/O SCOPE: CPT

## 2023-12-13 PROCEDURE — 82570 ASSAY OF URINE CREATININE: CPT

## 2023-12-13 PROCEDURE — 80053 COMPREHEN METABOLIC PANEL: CPT

## 2023-12-13 PROCEDURE — 85025 COMPLETE CBC W/AUTO DIFF WBC: CPT

## 2023-12-13 PROCEDURE — 82306 VITAMIN D 25 HYDROXY: CPT

## 2023-12-13 PROCEDURE — 84439 ASSAY OF FREE THYROXINE: CPT

## 2023-12-13 PROCEDURE — 82043 UR ALBUMIN QUANTITATIVE: CPT

## 2023-12-13 PROCEDURE — 84443 ASSAY THYROID STIM HORMONE: CPT

## 2023-12-13 PROCEDURE — G0103 PSA SCREENING: HCPCS

## 2023-12-13 PROCEDURE — 83036 HEMOGLOBIN GLYCOSYLATED A1C: CPT

## 2023-12-13 PROCEDURE — 80061 LIPID PANEL: CPT

## 2024-03-20 ENCOUNTER — HOSPITAL ENCOUNTER (OUTPATIENT)
Dept: CT IMAGING | Facility: HOSPITAL | Age: 74
Discharge: HOME | End: 2024-03-20
Payer: MEDICARE

## 2024-03-20 ENCOUNTER — OFFICE VISIT (OUTPATIENT)
Dept: PULMONOLOGY | Facility: CLINIC | Age: 74
End: 2024-03-20
Payer: MEDICARE

## 2024-03-20 ENCOUNTER — LAB (OUTPATIENT)
Dept: LAB | Facility: HOSPITAL | Age: 74
End: 2024-03-20
Payer: MEDICARE

## 2024-03-20 VITALS
RESPIRATION RATE: 16 BRPM | BODY MASS INDEX: 29.07 KG/M2 | WEIGHT: 174.5 LBS | OXYGEN SATURATION: 99 % | SYSTOLIC BLOOD PRESSURE: 142 MMHG | HEIGHT: 65 IN | HEART RATE: 68 BPM | DIASTOLIC BLOOD PRESSURE: 82 MMHG | TEMPERATURE: 98 F

## 2024-03-20 DIAGNOSIS — J44.9 COPD MIXED TYPE: ICD-10-CM

## 2024-03-20 DIAGNOSIS — I34.0 NONRHEUMATIC MITRAL VALVE REGURGITATION: ICD-10-CM

## 2024-03-20 DIAGNOSIS — C34.31 SMALL CELL LUNG CANCER, RIGHT LOWER LOBE: ICD-10-CM

## 2024-03-20 DIAGNOSIS — C34.31 SMALL CELL LUNG CANCER, RIGHT LOWER LOBE: Primary | ICD-10-CM

## 2024-03-20 DIAGNOSIS — Z87.891 FORMER SMOKER: ICD-10-CM

## 2024-03-20 LAB
ALBUMIN SERPL-MCNC: 5 G/DL (ref 3.5–5.2)
ALBUMIN/GLOB SERPL: 1.9 G/DL
ALP SERPL-CCNC: 93 U/L (ref 39–117)
ALT SERPL W P-5'-P-CCNC: 29 U/L (ref 1–41)
ANION GAP SERPL CALCULATED.3IONS-SCNC: 10 MMOL/L (ref 5–15)
AST SERPL-CCNC: 26 U/L (ref 1–40)
BASOPHILS # BLD AUTO: 0.03 10*3/MM3 (ref 0–0.2)
BASOPHILS NFR BLD AUTO: 0.4 % (ref 0–1.5)
BILIRUB SERPL-MCNC: 1.2 MG/DL (ref 0–1.2)
BUN SERPL-MCNC: 21 MG/DL (ref 8–23)
BUN/CREAT SERPL: 14.1 (ref 7–25)
CALCIUM SPEC-SCNC: 9.5 MG/DL (ref 8.6–10.5)
CHLORIDE SERPL-SCNC: 101 MMOL/L (ref 98–107)
CO2 SERPL-SCNC: 28 MMOL/L (ref 22–29)
CREAT SERPL-MCNC: 1.49 MG/DL (ref 0.76–1.27)
DEPRECATED RDW RBC AUTO: 43.6 FL (ref 37–54)
EGFRCR SERPLBLD CKD-EPI 2021: 49.2 ML/MIN/1.73
EOSINOPHIL # BLD AUTO: 0.11 10*3/MM3 (ref 0–0.4)
EOSINOPHIL NFR BLD AUTO: 1.6 % (ref 0.3–6.2)
ERYTHROCYTE [DISTWIDTH] IN BLOOD BY AUTOMATED COUNT: 12.5 % (ref 12.3–15.4)
GLOBULIN UR ELPH-MCNC: 2.6 GM/DL
GLUCOSE SERPL-MCNC: 131 MG/DL (ref 65–99)
HCT VFR BLD AUTO: 43.7 % (ref 37.5–51)
HGB BLD-MCNC: 14.3 G/DL (ref 13–17.7)
IMM GRANULOCYTES # BLD AUTO: 0.03 10*3/MM3 (ref 0–0.05)
IMM GRANULOCYTES NFR BLD AUTO: 0.4 % (ref 0–0.5)
LYMPHOCYTES # BLD AUTO: 1.19 10*3/MM3 (ref 0.7–3.1)
LYMPHOCYTES NFR BLD AUTO: 17 % (ref 19.6–45.3)
MCH RBC QN AUTO: 31.1 PG (ref 26.6–33)
MCHC RBC AUTO-ENTMCNC: 32.7 G/DL (ref 31.5–35.7)
MCV RBC AUTO: 95 FL (ref 79–97)
MONOCYTES # BLD AUTO: 0.58 10*3/MM3 (ref 0.1–0.9)
MONOCYTES NFR BLD AUTO: 8.3 % (ref 5–12)
NEUTROPHILS NFR BLD AUTO: 5.08 10*3/MM3 (ref 1.7–7)
NEUTROPHILS NFR BLD AUTO: 72.3 % (ref 42.7–76)
NRBC BLD AUTO-RTO: 0 /100 WBC (ref 0–0.2)
PLATELET # BLD AUTO: 142 10*3/MM3 (ref 140–450)
PMV BLD AUTO: 11.5 FL (ref 6–12)
POTASSIUM SERPL-SCNC: 4.9 MMOL/L (ref 3.5–5.2)
PROT SERPL-MCNC: 7.6 G/DL (ref 6–8.5)
RBC # BLD AUTO: 4.6 10*6/MM3 (ref 4.14–5.8)
SODIUM SERPL-SCNC: 139 MMOL/L (ref 136–145)
WBC NRBC COR # BLD AUTO: 7.02 10*3/MM3 (ref 3.4–10.8)

## 2024-03-20 PROCEDURE — 85025 COMPLETE CBC W/AUTO DIFF WBC: CPT

## 2024-03-20 PROCEDURE — 36415 COLL VENOUS BLD VENIPUNCTURE: CPT

## 2024-03-20 PROCEDURE — 80053 COMPREHEN METABOLIC PANEL: CPT

## 2024-03-20 PROCEDURE — 71250 CT THORAX DX C-: CPT

## 2024-03-20 NOTE — PROGRESS NOTES
"  PULMONARY  NOTE    Chief Complaint     Extensive stage small cell lung cancer, former smoker, mitral valve disease    History of Present Illness     74-year-old male returns today for follow-up  I last saw him 9/14/2023    He has a history of tobacco abuse resolved in 2012    He has a history of extensive stage small cell lung cancer with cerebral metastasis  He has received systemic therapy and has had no evidence of recurrence to date  Repeat CT scan of the chest was done today as noted below    He has a history of COPD and has tried inhalers in the past but really has not found the need to use anything on a regular basis.  He has had albuterol to use as needed    Patient Active Problem List   Diagnosis    Type 2 diabetes mellitus without complication    Hyperlipidemia    Essential hypertension    Erectile dysfunction    History of prostate cancer    Osteoarthritis of multiple joints    Coronary artery disease involving native heart without angina pectoris    Colon polyps    Glaucoma    Low back pain    Streptococcal bacteremia    H/O subacute bacterial endocarditis    Pulmonary nodule (2.6cm RLL)    Severe mitral regurgitation    Former smoker (Stopped 2012)    COPD    Small cell lung cancer, right lower lobe    CKD stage G3a/A2, GFR 45-59 and albumin creatinine ratio  mg/g      Allergies   Allergen Reactions    Xarelto [Rivaroxaban] Other (See Comments)     MADE ME FEEL LIKE \" I WAS HAVING A HEART ATTACK.\"       Current Outpatient Medications:     albuterol sulfate  (90 Base) MCG/ACT inhaler, Inhale 2 puffs Every 4 (Four) Hours As Needed for Wheezing., Disp: 18 g, Rfl: 11    aspirin 81 MG EC tablet, Take 1 tablet by mouth Every Night., Disp: , Rfl:     atorvastatin (LIPITOR) 40 MG tablet, Take 1 tablet by mouth Every Night., Disp: , Rfl:     calcium carbonate-cholecalciferol 500-400 MG-UNIT tablet tablet, Take 1 tablet by mouth Daily., Disp: , Rfl:     cholecalciferol (VITAMIN D3) 1000 units " tablet, Take 1 tablet by mouth Daily., Disp: , Rfl:     ezetimibe (ZETIA) 10 MG tablet, Take 1 tablet by mouth Daily., Disp: , Rfl:     HYDROcodone-acetaminophen (NORCO) 5-325 MG per tablet, Take 1 tablet by mouth Every 6 (Six) Hours As Needed for Mild Pain., Disp: , Rfl:     metFORMIN (GLUCOPHAGE) 500 MG tablet, TAKE ONE TABLET BY MOUTH TWICE A DAY WITH MEALS, Disp: 180 tablet, Rfl: 1    nitroglycerin (NITROSTAT) 0.4 MG SL tablet, Place 1 tablet under the tongue Every 5 (Five) Minutes As Needed for Chest Pain. Take no more than 3 doses in 15 minutes., Disp: , Rfl:     Omega-3 Fatty Acids (FISH OIL) 1200 MG capsule capsule, Take 1 capsule by mouth Daily., Disp: , Rfl:     sertraline (ZOLOFT) 50 MG tablet, TAKE ONE TABLET BY MOUTH DAILY, Disp: 90 tablet, Rfl: 1    vitamin B-12 (CYANOCOBALAMIN) 1000 MCG tablet, Take 1 tablet by mouth Daily., Disp: , Rfl:   MEDICATION LIST AND ALLERGIES REVIEWED.    Family History   Problem Relation Age of Onset    Hypertension Mother     Atrial fibrillation Mother     Alcohol abuse Father     Heart attack Father     Diabetes Father     No Known Problems Sister     Prostate cancer Brother      Social History     Tobacco Use    Smoking status: Former     Current packs/day: 0.00     Average packs/day: 1.5 packs/day for 36.0 years (54.0 ttl pk-yrs)     Types: Cigarettes     Start date: 1975     Quit date: 2011     Years since quittin.9     Passive exposure: Never    Smokeless tobacco: Former     Types: Chew     Quit date:     Tobacco comments:     quit smoking for 12 years then started again but then quit a final time    Vaping Use    Vaping status: Never Used   Substance Use Topics    Alcohol use: Not Currently    Drug use: Yes     Types: Marijuana     Comment: a month ago     Social History     Social History Narrative    :     to Sabina x 35yrs    2 children from previous marriage    2 gk.    Retired - / [employer] -[employer] exposure     "Exercise:10acre farm - vegetables.    Dental: utd    Eye: utd    Previously smoked up to 2 packs of cigarettes per day, starting at age 16.    Stop smoking for good in April 2012    No history of IVDU     FAMILY AND SOCIAL HISTORY REVIEWED.    Review of Systems  IF PRESENT REFER TO SCANNED ROS SHEET FROM SAME DATE  OTHERWISE ROS OBTAINED AND NON-CONTRIBUTORY OVER HPI.    /82   Pulse 68   Temp 98 °F (36.7 °C)   Resp 16   Ht 165.1 cm (65\")   Wt 79.2 kg (174 lb 8 oz)   SpO2 99% Comment: room air at rest  BMI 29.04 kg/m²   Physical Exam  Vitals and nursing note reviewed.   Constitutional:       General: He is not in acute distress.     Appearance: He is well-developed. He is not diaphoretic.   HENT:      Head: Normocephalic and atraumatic.   Neck:      Thyroid: No thyromegaly.   Cardiovascular:      Rate and Rhythm: Normal rate and regular rhythm.      Heart sounds: Murmur heard.   Pulmonary:      Effort: Pulmonary effort is normal.      Breath sounds: Normal breath sounds. No stridor.   Lymphadenopathy:      Cervical: No cervical adenopathy.      Upper Body:      Right upper body: No supraclavicular or epitrochlear adenopathy.      Left upper body: No supraclavicular or epitrochlear adenopathy.   Skin:     General: Skin is warm and dry.   Neurological:      Mental Status: He is alert and oriented to person, place, and time.   Psychiatric:         Behavior: Behavior normal.         Results     CT scan of the chest done today reviewed on PACS  Posttherapy changes in the right hilar region with no new or progressive findings    Immunization History   Administered Date(s) Administered    COVID-19 (MODERNA) 1st,2nd,3rd Dose Monovalent 04/28/2021, 05/26/2021, 11/29/2021    FLUAD TRI 65YR+ 09/16/2019    Flu Vaccine Quad PF >36MO 11/25/2015, 12/12/2016, 10/24/2017    Fluad Quad 65+ 09/17/2020    Fluzone High Dose =>65 Years ([employer] ONLY) 11/01/2017, 09/12/2018, 09/16/2019    Fluzone High-Dose 65+yrs 10/01/2021, " 10/19/2022, 10/04/2023    Hepatitis A 05/31/2019    Influenza, Unspecified 09/12/2018    PEDS-Pneumococcal Conjugate (PCV7) 06/03/2015    Pneumococcal Conjugate 13-Valent (PCV13) 06/03/2015, 10/02/2020    Pneumococcal Conjugate 20-Valent (PCV20) 10/04/2022    Pneumococcal Conjugate Unspecified 06/03/2015    Pneumococcal Polysaccharide (PPSV23) 06/03/2013    Shingrix 10/02/2020     Problem List       ICD-10-CM ICD-9-CM   1. Small cell lung cancer, right lower lobe  C34.31 162.5   2. Severe mitral regurgitation  I34.0 424.0   3. Former smoker (Stopped 2012)  Z87.891 V15.82   4. COPD  J44.9 496       Discussion     We reviewed his chest imaging which appears stable with nothing new or progressive    He has had no recent exacerbation of respiratory symptoms  He has had albuterol to use but has not used it on a regular basis    I will plan to see him back in 6 months or earlier if there are any problems in the meantime    Moderate level of Medical Decision Making complexity based on 2 or more chronic stable illnesses and an independent review of test results and/or prescription drug management.    Clint Thompson MD  Note electronically signed    CC: Silvestre Coleman, DO

## 2024-03-26 ENCOUNTER — OFFICE VISIT (OUTPATIENT)
Dept: ONCOLOGY | Facility: CLINIC | Age: 74
End: 2024-03-26
Payer: MEDICARE

## 2024-03-26 VITALS
HEART RATE: 60 BPM | RESPIRATION RATE: 16 BRPM | OXYGEN SATURATION: 94 % | HEIGHT: 65 IN | WEIGHT: 176 LBS | BODY MASS INDEX: 29.32 KG/M2 | DIASTOLIC BLOOD PRESSURE: 81 MMHG | SYSTOLIC BLOOD PRESSURE: 126 MMHG | TEMPERATURE: 97.3 F

## 2024-03-26 DIAGNOSIS — C34.31 SMALL CELL LUNG CANCER, RIGHT LOWER LOBE: Primary | ICD-10-CM

## 2024-03-26 PROCEDURE — 3074F SYST BP LT 130 MM HG: CPT | Performed by: INTERNAL MEDICINE

## 2024-03-26 PROCEDURE — 3079F DIAST BP 80-89 MM HG: CPT | Performed by: INTERNAL MEDICINE

## 2024-03-26 PROCEDURE — 1126F AMNT PAIN NOTED NONE PRSNT: CPT | Performed by: INTERNAL MEDICINE

## 2024-03-26 PROCEDURE — 99214 OFFICE O/P EST MOD 30 MIN: CPT | Performed by: INTERNAL MEDICINE

## 2024-03-26 NOTE — PROGRESS NOTES
DATE OF VISIT: 3/26/2024    REASON FOR VISIT: Followup for right lower lobe small cell lung cancer     PROBLEM LIST:  1. Limited stage small cell lung cancer [id number] stage IIIa:  A.  Presented with abnormal screening CT chest  B.  Diagnosed after bronchoscopy with biopsy done by  November 19, 2020 + for small cell from level 7 level 4R and level 10 R lymph nodes.  C.  Whole-body PET scan did not reveal any other sites of disease.  D.  Started definitive concurrent chemotherapy with radiation using cisplatin etoposide December 2020 1 status post 4 cycles completed February 23, 2021  2.  Isolated 6 mm right cerebellar lesion:  A.  Evident MRI brain done on December 1, 2020  B.  Completed prophylactic whole brain radiation 4/23/2021.   3.  Chemotherapy-induced nausea  4. Insomnia   5.  Treatment induced anemia  6.  Chronic kidney disease  7.  Type 2 diabetes      HISTORY OF PRESENT ILLNESS: The patient is a very pleasant 74 y.o. male  with past medical history significant for right lower lobe lung cancer diagnosed April 19, 2020.  Patient status post definitive concurrent chemotherapy with radiation completed February 23, 2021. The patient is here today for scheduled follow-up visit.    SUBJECTIVE: Lalito is here today with his wife.  He denies any fever or chills no night sweats.  He is anxious about the scan result.  His dizziness has resolved.    Past History:  Medical History: has a past medical history of Cancer, CKD stage G3a/A2, GFR 45-59 and albumin creatinine ratio  mg/g (6/21/2021), COPD (chronic obstructive pulmonary disease), Coronary artery disease, DDD (degenerative disc disease), cervical, Depression, Diabetes mellitus, Erectile dysfunction, Glaucoma, Headache, History of radiation therapy (02/23/2021), History of radiation therapy (04/23/2021), Hyperlipidemia, Hypertension, Impingement syndrome of left shoulder, Infection, bacterial, Lung cancer, MI, old, Mitral valve vegetation,  Osteoarthritis, Prostate cancer, SOBOE (shortness of breath on exertion), Varicose veins of lower limb, Wears glasses, and Wears partial dentures.   Surgical History: has a past surgical history that includes Prostatectomy; Colonoscopy w/ polypectomy; Knee surgery; Knee Arthroscopy; Lumbar epidural injection; Hernia repair; Tonsillectomy; Coronary angioplasty; Tendon transfer elbow; Trabeculectomy; pr rt/lt heart catheters (N/A, 12/27/2016); Cardiac catheterization (N/A, 12/20/2016); Coronary angioplasty with stent; Cataract extraction; Eye surgery; Colonoscopy; and Bronchoscopy (N/A, 11/19/2020).   Family History: family history includes Alcohol abuse in his father; Atrial fibrillation in his mother; Diabetes in his father; Heart attack in his father; Hypertension in his mother; No Known Problems in his sister; Prostate cancer in his brother.   Social History: reports that he quit smoking about 12 years ago. His smoking use included cigarettes. He started smoking about 48 years ago. He has a 54 pack-year smoking history. He has never been exposed to tobacco smoke. He quit smokeless tobacco use about 13 years ago.  His smokeless tobacco use included chew. He reports that he does not currently use alcohol. He reports current drug use. Drug: Marijuana.    (Not in a hospital admission)     Allergies: Xarelto [rivaroxaban]     Review of Systems   Constitutional:  Positive for fatigue.   Respiratory: Negative.     Psychiatric/Behavioral:  The patient is nervous/anxious.        PHYSICAL EXAMINATION:   There were no vitals taken for this visit.   There were no vitals filed for this visit.              ECOG Performance Status: 1 - Symptomatic but completely ambulatory  General Appearance:  alert, cooperative, no apparent distress and appears stated age   Lungs:   Clear to auscultation bilaterally; respirations regular, even, and unlabored bilaterally   Heart:  Regular rate and rhythm, no murmurs appreciated   Abdomen:    Soft, non-tender, non-distended, and no organomegaly     Lab on 03/20/2024   Component Date Value Ref Range Status    Glucose 03/20/2024 131 (H)  65 - 99 mg/dL Final    BUN 03/20/2024 21  8 - 23 mg/dL Final    Creatinine 03/20/2024 1.49 (H)  0.76 - 1.27 mg/dL Final    Sodium 03/20/2024 139  136 - 145 mmol/L Final    Potassium 03/20/2024 4.9  3.5 - 5.2 mmol/L Final    Chloride 03/20/2024 101  98 - 107 mmol/L Final    CO2 03/20/2024 28.0  22.0 - 29.0 mmol/L Final    Calcium 03/20/2024 9.5  8.6 - 10.5 mg/dL Final    Total Protein 03/20/2024 7.6  6.0 - 8.5 g/dL Final    Albumin 03/20/2024 5.0  3.5 - 5.2 g/dL Final    ALT (SGPT) 03/20/2024 29  1 - 41 U/L Final    AST (SGOT) 03/20/2024 26  1 - 40 U/L Final    Alkaline Phosphatase 03/20/2024 93  39 - 117 U/L Final    Total Bilirubin 03/20/2024 1.2  0.0 - 1.2 mg/dL Final    Globulin 03/20/2024 2.6  gm/dL Final    Calculated Result    A/G Ratio 03/20/2024 1.9  g/dL Final    BUN/Creatinine Ratio 03/20/2024 14.1  7.0 - 25.0 Final    Anion Gap 03/20/2024 10.0  5.0 - 15.0 mmol/L Final    eGFR 03/20/2024 49.2 (L)  >60.0 mL/min/1.73 Final    WBC 03/20/2024 7.02  3.40 - 10.80 10*3/mm3 Final    RBC 03/20/2024 4.60  4.14 - 5.80 10*6/mm3 Final    Hemoglobin 03/20/2024 14.3  13.0 - 17.7 g/dL Final    Hematocrit 03/20/2024 43.7  37.5 - 51.0 % Final    MCV 03/20/2024 95.0  79.0 - 97.0 fL Final    MCH 03/20/2024 31.1  26.6 - 33.0 pg Final    MCHC 03/20/2024 32.7  31.5 - 35.7 g/dL Final    RDW 03/20/2024 12.5  12.3 - 15.4 % Final    RDW-SD 03/20/2024 43.6  37.0 - 54.0 fl Final    MPV 03/20/2024 11.5  6.0 - 12.0 fL Final    Platelets 03/20/2024 142  140 - 450 10*3/mm3 Final    Neutrophil % 03/20/2024 72.3  42.7 - 76.0 % Final    Lymphocyte % 03/20/2024 17.0 (L)  19.6 - 45.3 % Final    Monocyte % 03/20/2024 8.3  5.0 - 12.0 % Final    Eosinophil % 03/20/2024 1.6  0.3 - 6.2 % Final    Basophil % 03/20/2024 0.4  0.0 - 1.5 % Final    Immature Grans % 03/20/2024 0.4  0.0 - 0.5 % Final     Neutrophils, Absolute 03/20/2024 5.08  1.70 - 7.00 10*3/mm3 Final    Lymphocytes, Absolute 03/20/2024 1.19  0.70 - 3.10 10*3/mm3 Final    Monocytes, Absolute 03/20/2024 0.58  0.10 - 0.90 10*3/mm3 Final    Eosinophils, Absolute 03/20/2024 0.11  0.00 - 0.40 10*3/mm3 Final    Basophils, Absolute 03/20/2024 0.03  0.00 - 0.20 10*3/mm3 Final    Immature Grans, Absolute 03/20/2024 0.03  0.00 - 0.05 10*3/mm3 Final    nRBC 03/20/2024 0.0  0.0 - 0.2 /100 WBC Final        CT Chest Without Contrast Diagnostic    Result Date: 3/20/2024  Narrative: CT CHEST WO CONTRAST DIAGNOSTIC Date of Exam: 3/20/2024 10:20 AM EDT Indication: restaging small cell lung cancer restaging small cell lung cancer. Comparison: 9/14/2023. Technique: Axial CT images were obtained of the chest without contrast administration.  Reconstructed coronal and sagittal images were also obtained. Automated exposure control and iterative construction methods were used. Findings: There are chronic infiltrates of the right lung with soft tissue thickening of the right hilum which is stable. There is stable mild scar of the left upper lobe. A 2 mm nodule in the left lower lobe laterally seen on image #67 of series 4 is stable. There are no new nodules or masses. There are no new infiltrates. There are no enlarged mediastinal nodes. There are coronary calcifications. The heart size is normal. There are no pleural effusions. There are no adrenal masses. There is cholelithiasis without acute cholecystitis.     Impression: Impression: Chronic findings of the right lung which presumably represents posttreatment changes. No new abnormality. Cholelithiasis without acute cholecystitis. Electronically Signed: Kerline Gooden MD  3/20/2024 10:39 AM EDT  Workstation ID: [id number]       ASSESSMENT: The patient is a very pleasant 74 y.o. male  with right small cell lung cancer      PLAN:    1.  Right lower lobe small cell lung cancer:  A.  I did go over the scan results with the  patient from March 20, 2024 and reassured him it looked essentially negative.  Posttreatment changes no evidence of new or progressive disease.  B.  I did go over the blood work results with the patient from today and reassured him it looked normal platelets up to 142.    2.  Isolated brain lesion:  A.  The patient completed whole radiation April 2021.  B.  The last MRI brain done August 1, 2022 showed no evidence of metastatic disease.  C.  He has been released from care by Dr. Urias and less he has evidence of progressive disease.    3.  Depression:  A.  The patient will continue Zoloft 50 mg daily.    4.  Type 2 diabetes:  A.  He will continue Metformin 5 mg twice a day    5.  Chronic kidney disease stage IIIa:  A.  I will follow-up on his creatinine from today.  His last creatinine done June 2023 was elevated some to 1.76.  B.  He will continue to follow-up with his nephrologist, Dr. Webber, at [facility].  C. We will order his next CT scan without contrast to help limit impact on his kidneys from contrast dye.     FOLLOW UP: 6-month repeated scans and labs.    Daniel Cartagena MD  3/26/2024

## 2024-08-05 NOTE — TELEPHONE ENCOUNTER
Rx Refill Note  Requested Prescriptions     Pending Prescriptions Disp Refills    sertraline (ZOLOFT) 50 MG tablet [Pharmacy Med Name: SERTRALINE HCL 50 MG TABLET] 90 tablet 1     Sig: TAKE 1 TABLET BY MOUTH DAILY      Last office visit with prescribing clinician: 12/6/2023   Last telemedicine visit with prescribing clinician: Visit date not found   Next office visit with prescribing clinician: 12/9/2024                         Would you like a call back once the refill request has been completed: [] Yes [] No    If the office needs to give you a call back, can they leave a voicemail: [] Yes [] No    Minerva Reina MA  08/05/24, 10:48 EDT

## 2024-09-23 ENCOUNTER — HOSPITAL ENCOUNTER (OUTPATIENT)
Dept: CT IMAGING | Facility: HOSPITAL | Age: 74
Discharge: HOME | End: 2024-09-23
Payer: MEDICARE

## 2024-09-23 ENCOUNTER — LAB (OUTPATIENT)
Dept: LAB | Facility: HOSPITAL | Age: 74
End: 2024-09-23
Payer: MEDICARE

## 2024-09-23 DIAGNOSIS — C34.31 SMALL CELL LUNG CANCER, RIGHT LOWER LOBE: ICD-10-CM

## 2024-09-23 LAB
ALBUMIN SERPL-MCNC: 4.7 G/DL (ref 3.5–5.2)
ALBUMIN/GLOB SERPL: 2 G/DL
ALP SERPL-CCNC: 103 U/L (ref 39–117)
ALT SERPL W P-5'-P-CCNC: 31 U/L (ref 1–41)
ANION GAP SERPL CALCULATED.3IONS-SCNC: 12 MMOL/L (ref 5–15)
AST SERPL-CCNC: 25 U/L (ref 1–40)
BASOPHILS # BLD AUTO: 0.04 10*3/MM3 (ref 0–0.2)
BASOPHILS NFR BLD AUTO: 0.6 % (ref 0–1.5)
BILIRUB SERPL-MCNC: 1.1 MG/DL (ref 0–1.2)
BUN SERPL-MCNC: 21 MG/DL (ref 8–23)
BUN/CREAT SERPL: 14.5 (ref 7–25)
CALCIUM SPEC-SCNC: 9.7 MG/DL (ref 8.6–10.5)
CHLORIDE SERPL-SCNC: 102 MMOL/L (ref 98–107)
CO2 SERPL-SCNC: 26 MMOL/L (ref 22–29)
CREAT SERPL-MCNC: 1.45 MG/DL (ref 0.76–1.27)
DEPRECATED RDW RBC AUTO: 44.3 FL (ref 37–54)
EGFRCR SERPLBLD CKD-EPI 2021: 50.9 ML/MIN/1.73
EOSINOPHIL # BLD AUTO: 0.11 10*3/MM3 (ref 0–0.4)
EOSINOPHIL NFR BLD AUTO: 1.6 % (ref 0.3–6.2)
ERYTHROCYTE [DISTWIDTH] IN BLOOD BY AUTOMATED COUNT: 12.9 % (ref 12.3–15.4)
GLOBULIN UR ELPH-MCNC: 2.4 GM/DL
GLUCOSE SERPL-MCNC: 119 MG/DL (ref 65–99)
HCT VFR BLD AUTO: 44.1 % (ref 37.5–51)
HGB BLD-MCNC: 14.4 G/DL (ref 13–17.7)
IMM GRANULOCYTES # BLD AUTO: 0.03 10*3/MM3 (ref 0–0.05)
IMM GRANULOCYTES NFR BLD AUTO: 0.4 % (ref 0–0.5)
LYMPHOCYTES # BLD AUTO: 1.19 10*3/MM3 (ref 0.7–3.1)
LYMPHOCYTES NFR BLD AUTO: 17.8 % (ref 19.6–45.3)
MCH RBC QN AUTO: 30.8 PG (ref 26.6–33)
MCHC RBC AUTO-ENTMCNC: 32.7 G/DL (ref 31.5–35.7)
MCV RBC AUTO: 94.2 FL (ref 79–97)
MONOCYTES # BLD AUTO: 0.55 10*3/MM3 (ref 0.1–0.9)
MONOCYTES NFR BLD AUTO: 8.2 % (ref 5–12)
NEUTROPHILS NFR BLD AUTO: 4.77 10*3/MM3 (ref 1.7–7)
NEUTROPHILS NFR BLD AUTO: 71.4 % (ref 42.7–76)
NRBC BLD AUTO-RTO: 0 /100 WBC (ref 0–0.2)
PLATELET # BLD AUTO: 123 10*3/MM3 (ref 140–450)
PMV BLD AUTO: 11.4 FL (ref 6–12)
POTASSIUM SERPL-SCNC: 4.4 MMOL/L (ref 3.5–5.2)
PROT SERPL-MCNC: 7.1 G/DL (ref 6–8.5)
RBC # BLD AUTO: 4.68 10*6/MM3 (ref 4.14–5.8)
SODIUM SERPL-SCNC: 140 MMOL/L (ref 136–145)
WBC NRBC COR # BLD AUTO: 6.69 10*3/MM3 (ref 3.4–10.8)

## 2024-09-23 PROCEDURE — 85025 COMPLETE CBC W/AUTO DIFF WBC: CPT

## 2024-09-23 PROCEDURE — 71250 CT THORAX DX C-: CPT

## 2024-09-23 PROCEDURE — 36415 COLL VENOUS BLD VENIPUNCTURE: CPT

## 2024-09-23 PROCEDURE — 80053 COMPREHEN METABOLIC PANEL: CPT

## 2024-09-24 ENCOUNTER — OFFICE VISIT (OUTPATIENT)
Dept: PULMONOLOGY | Facility: CLINIC | Age: 74
End: 2024-09-24
Payer: MEDICARE

## 2024-09-24 ENCOUNTER — DOCUMENTATION (OUTPATIENT)
Dept: PULMONOLOGY | Facility: CLINIC | Age: 74
End: 2024-09-24

## 2024-09-24 ENCOUNTER — OFFICE VISIT (OUTPATIENT)
Dept: ONCOLOGY | Facility: CLINIC | Age: 74
End: 2024-09-24
Payer: MEDICARE

## 2024-09-24 VITALS
HEIGHT: 65 IN | BODY MASS INDEX: 27.82 KG/M2 | SYSTOLIC BLOOD PRESSURE: 133 MMHG | DIASTOLIC BLOOD PRESSURE: 83 MMHG | HEART RATE: 62 BPM | WEIGHT: 167 LBS | OXYGEN SATURATION: 97 % | TEMPERATURE: 98 F | RESPIRATION RATE: 18 BRPM

## 2024-09-24 VITALS
WEIGHT: 168.5 LBS | SYSTOLIC BLOOD PRESSURE: 120 MMHG | HEART RATE: 75 BPM | HEIGHT: 65 IN | TEMPERATURE: 98 F | OXYGEN SATURATION: 95 % | DIASTOLIC BLOOD PRESSURE: 68 MMHG | RESPIRATION RATE: 18 BRPM | BODY MASS INDEX: 28.07 KG/M2

## 2024-09-24 DIAGNOSIS — Z87.891 FORMER SMOKER: ICD-10-CM

## 2024-09-24 DIAGNOSIS — C34.31 SMALL CELL LUNG CANCER, RIGHT LOWER LOBE: Primary | ICD-10-CM

## 2024-09-24 DIAGNOSIS — I34.0 NONRHEUMATIC MITRAL VALVE REGURGITATION: ICD-10-CM

## 2024-09-24 DIAGNOSIS — C34.31 SMALL CELL LUNG CANCER, RIGHT LOWER LOBE: ICD-10-CM

## 2024-09-24 DIAGNOSIS — J44.9 COPD MIXED TYPE: Primary | ICD-10-CM

## 2024-09-24 PROCEDURE — 1126F AMNT PAIN NOTED NONE PRSNT: CPT | Performed by: INTERNAL MEDICINE

## 2024-09-24 PROCEDURE — 3074F SYST BP LT 130 MM HG: CPT | Performed by: INTERNAL MEDICINE

## 2024-09-24 PROCEDURE — 99214 OFFICE O/P EST MOD 30 MIN: CPT | Performed by: INTERNAL MEDICINE

## 2024-09-24 PROCEDURE — 3078F DIAST BP <80 MM HG: CPT | Performed by: INTERNAL MEDICINE

## 2024-09-24 PROCEDURE — 3075F SYST BP GE 130 - 139MM HG: CPT | Performed by: INTERNAL MEDICINE

## 2024-09-24 PROCEDURE — 3079F DIAST BP 80-89 MM HG: CPT | Performed by: INTERNAL MEDICINE

## 2024-09-24 RX ORDER — SYRINGE AND NEEDLE,INSULIN,1ML 29 G X1/2"
SYRINGE, EMPTY DISPOSABLE MISCELLANEOUS
COMMUNITY
Start: 2024-07-26

## 2024-11-04 DIAGNOSIS — E11.9 TYPE 2 DIABETES MELLITUS WITHOUT COMPLICATION, WITHOUT LONG-TERM CURRENT USE OF INSULIN: ICD-10-CM

## 2024-11-04 NOTE — TELEPHONE ENCOUNTER
Rx Refill Note  Requested Prescriptions     Pending Prescriptions Disp Refills    metFORMIN (GLUCOPHAGE) 500 MG tablet [Pharmacy Med Name: METFORMIN  MG TABLET] 180 tablet 1     Sig: TAKE 1 TABLET BY MOUTH TWICE A DAY WITH A MEAL      Last office visit with prescribing clinician: 12/6/2023   Last telemedicine visit with prescribing clinician: Visit date not found   Next office visit with prescribing clinician: 12/9/2024                         Would you like a call back once the refill request has been completed: [] Yes [] No    If the office needs to give you a call back, can they leave a voicemail: [] Yes [] No    Minerva Reina MA  11/04/24, 09:34 EST

## 2024-12-09 ENCOUNTER — HOSPITAL ENCOUNTER (OUTPATIENT)
Dept: GENERAL RADIOLOGY | Facility: HOSPITAL | Age: 74
Discharge: HOME | End: 2024-12-09
Payer: MEDICARE

## 2024-12-09 ENCOUNTER — OFFICE VISIT (OUTPATIENT)
Dept: FAMILY MEDICINE CLINIC | Facility: CLINIC | Age: 74
End: 2024-12-09
Payer: MEDICARE

## 2024-12-09 ENCOUNTER — LAB (OUTPATIENT)
Dept: LAB | Facility: HOSPITAL | Age: 74
End: 2024-12-09
Payer: MEDICARE

## 2024-12-09 VITALS
HEART RATE: 57 BPM | DIASTOLIC BLOOD PRESSURE: 78 MMHG | WEIGHT: 166.8 LBS | BODY MASS INDEX: 27.79 KG/M2 | HEIGHT: 65 IN | OXYGEN SATURATION: 98 % | SYSTOLIC BLOOD PRESSURE: 120 MMHG

## 2024-12-09 DIAGNOSIS — M54.50 LOW BACK PAIN WITHOUT SCIATICA, UNSPECIFIED BACK PAIN LATERALITY, UNSPECIFIED CHRONICITY: ICD-10-CM

## 2024-12-09 DIAGNOSIS — Z12.5 ENCOUNTER FOR PROSTATE CANCER SCREENING: ICD-10-CM

## 2024-12-09 DIAGNOSIS — I10 PRIMARY HYPERTENSION: ICD-10-CM

## 2024-12-09 DIAGNOSIS — E11.9 TYPE 2 DIABETES MELLITUS WITHOUT COMPLICATION, WITHOUT LONG-TERM CURRENT USE OF INSULIN: ICD-10-CM

## 2024-12-09 DIAGNOSIS — R42 DIZZINESS: ICD-10-CM

## 2024-12-09 DIAGNOSIS — E78.5 HYPERLIPIDEMIA, UNSPECIFIED HYPERLIPIDEMIA TYPE: ICD-10-CM

## 2024-12-09 DIAGNOSIS — G89.29 CHRONIC RIGHT SHOULDER PAIN: ICD-10-CM

## 2024-12-09 DIAGNOSIS — M25.552 LEFT HIP PAIN: ICD-10-CM

## 2024-12-09 DIAGNOSIS — C34.31 SMALL CELL LUNG CANCER, RIGHT LOWER LOBE: ICD-10-CM

## 2024-12-09 DIAGNOSIS — E55.9 VITAMIN D DEFICIENCY: ICD-10-CM

## 2024-12-09 DIAGNOSIS — N18.31 CKD STAGE G3A/A2, GFR 45-59 AND ALBUMIN CREATININE RATIO 30-299 MG/G: ICD-10-CM

## 2024-12-09 DIAGNOSIS — M25.511 CHRONIC RIGHT SHOULDER PAIN: ICD-10-CM

## 2024-12-09 DIAGNOSIS — Z00.00 MEDICARE ANNUAL WELLNESS VISIT, SUBSEQUENT: Primary | ICD-10-CM

## 2024-12-09 LAB
25(OH)D3 SERPL-MCNC: 79.8 NG/ML (ref 30–100)
ALBUMIN SERPL-MCNC: 4.9 G/DL (ref 3.5–5.2)
ALBUMIN/GLOB SERPL: 1.8 G/DL
ALP SERPL-CCNC: 103 U/L (ref 39–117)
ALT SERPL W P-5'-P-CCNC: 37 U/L (ref 1–41)
ANION GAP SERPL CALCULATED.3IONS-SCNC: 11 MMOL/L (ref 5–15)
AST SERPL-CCNC: 32 U/L (ref 1–40)
BASOPHILS # BLD AUTO: 0.02 10*3/MM3 (ref 0–0.2)
BASOPHILS NFR BLD AUTO: 0.3 % (ref 0–1.5)
BILIRUB SERPL-MCNC: 1.2 MG/DL (ref 0–1.2)
BILIRUB UR QL STRIP: NEGATIVE
BUN SERPL-MCNC: 15 MG/DL (ref 8–23)
BUN/CREAT SERPL: 10.1 (ref 7–25)
CALCIUM SPEC-SCNC: 9.6 MG/DL (ref 8.6–10.5)
CHLORIDE SERPL-SCNC: 101 MMOL/L (ref 98–107)
CHOLEST SERPL-MCNC: 116 MG/DL (ref 0–200)
CLARITY UR: CLEAR
CO2 SERPL-SCNC: 28 MMOL/L (ref 22–29)
COLOR UR: YELLOW
CREAT SERPL-MCNC: 1.49 MG/DL (ref 0.76–1.27)
DEPRECATED RDW RBC AUTO: 44 FL (ref 37–54)
EGFRCR SERPLBLD CKD-EPI 2021: 49.2 ML/MIN/1.73
EOSINOPHIL # BLD AUTO: 0.1 10*3/MM3 (ref 0–0.4)
EOSINOPHIL NFR BLD AUTO: 1.5 % (ref 0.3–6.2)
ERYTHROCYTE [DISTWIDTH] IN BLOOD BY AUTOMATED COUNT: 12.7 % (ref 12.3–15.4)
GLOBULIN UR ELPH-MCNC: 2.7 GM/DL
GLUCOSE SERPL-MCNC: 112 MG/DL (ref 65–99)
GLUCOSE UR STRIP-MCNC: NEGATIVE MG/DL
HBA1C MFR BLD: 5.8 % (ref 4.8–5.6)
HCT VFR BLD AUTO: 43.9 % (ref 37.5–51)
HDLC SERPL-MCNC: 44 MG/DL (ref 40–60)
HGB BLD-MCNC: 14.5 G/DL (ref 13–17.7)
HGB UR QL STRIP.AUTO: NEGATIVE
HOLD SPECIMEN: NORMAL
IMM GRANULOCYTES # BLD AUTO: 0.01 10*3/MM3 (ref 0–0.05)
IMM GRANULOCYTES NFR BLD AUTO: 0.1 % (ref 0–0.5)
KETONES UR QL STRIP: NEGATIVE
LDLC SERPL CALC-MCNC: 46 MG/DL (ref 0–100)
LDLC/HDLC SERPL: 0.92 {RATIO}
LEUKOCYTE ESTERASE UR QL STRIP.AUTO: NEGATIVE
LYMPHOCYTES # BLD AUTO: 1.2 10*3/MM3 (ref 0.7–3.1)
LYMPHOCYTES NFR BLD AUTO: 17.7 % (ref 19.6–45.3)
MCH RBC QN AUTO: 31.3 PG (ref 26.6–33)
MCHC RBC AUTO-ENTMCNC: 33 G/DL (ref 31.5–35.7)
MCV RBC AUTO: 94.8 FL (ref 79–97)
MONOCYTES # BLD AUTO: 0.49 10*3/MM3 (ref 0.1–0.9)
MONOCYTES NFR BLD AUTO: 7.2 % (ref 5–12)
NEUTROPHILS NFR BLD AUTO: 4.95 10*3/MM3 (ref 1.7–7)
NEUTROPHILS NFR BLD AUTO: 73.2 % (ref 42.7–76)
NITRITE UR QL STRIP: NEGATIVE
NRBC BLD AUTO-RTO: 0 /100 WBC (ref 0–0.2)
PH UR STRIP.AUTO: 7 [PH] (ref 5–8)
PLATELET # BLD AUTO: 141 10*3/MM3 (ref 140–450)
PMV BLD AUTO: 11.5 FL (ref 6–12)
POTASSIUM SERPL-SCNC: 4.8 MMOL/L (ref 3.5–5.2)
PROT SERPL-MCNC: 7.6 G/DL (ref 6–8.5)
PROT UR QL STRIP: NEGATIVE
PSA SERPL-MCNC: <0.014 NG/ML (ref 0–4)
RBC # BLD AUTO: 4.63 10*6/MM3 (ref 4.14–5.8)
SODIUM SERPL-SCNC: 140 MMOL/L (ref 136–145)
SP GR UR STRIP: 1.01 (ref 1–1.03)
T4 FREE SERPL-MCNC: 1.05 NG/DL (ref 0.92–1.68)
TRIGL SERPL-MCNC: 157 MG/DL (ref 0–150)
TSH SERPL DL<=0.05 MIU/L-ACNC: 1.13 UIU/ML (ref 0.27–4.2)
UROBILINOGEN UR QL STRIP: NORMAL
VLDLC SERPL-MCNC: 26 MG/DL (ref 5–40)
WBC NRBC COR # BLD AUTO: 6.77 10*3/MM3 (ref 3.4–10.8)

## 2024-12-09 PROCEDURE — 3078F DIAST BP <80 MM HG: CPT | Performed by: INTERNAL MEDICINE

## 2024-12-09 PROCEDURE — 3074F SYST BP LT 130 MM HG: CPT | Performed by: INTERNAL MEDICINE

## 2024-12-09 PROCEDURE — 84443 ASSAY THYROID STIM HORMONE: CPT

## 2024-12-09 PROCEDURE — 81003 URINALYSIS AUTO W/O SCOPE: CPT

## 2024-12-09 PROCEDURE — 1159F MED LIST DOCD IN RCRD: CPT | Performed by: INTERNAL MEDICINE

## 2024-12-09 PROCEDURE — 80061 LIPID PANEL: CPT

## 2024-12-09 PROCEDURE — 83036 HEMOGLOBIN GLYCOSYLATED A1C: CPT

## 2024-12-09 PROCEDURE — 1160F RVW MEDS BY RX/DR IN RCRD: CPT | Performed by: INTERNAL MEDICINE

## 2024-12-09 PROCEDURE — 1126F AMNT PAIN NOTED NONE PRSNT: CPT | Performed by: INTERNAL MEDICINE

## 2024-12-09 PROCEDURE — 73502 X-RAY EXAM HIP UNI 2-3 VIEWS: CPT

## 2024-12-09 PROCEDURE — 36415 COLL VENOUS BLD VENIPUNCTURE: CPT

## 2024-12-09 PROCEDURE — 85025 COMPLETE CBC W/AUTO DIFF WBC: CPT

## 2024-12-09 PROCEDURE — G0103 PSA SCREENING: HCPCS

## 2024-12-09 PROCEDURE — 80053 COMPREHEN METABOLIC PANEL: CPT

## 2024-12-09 PROCEDURE — 84439 ASSAY OF FREE THYROXINE: CPT

## 2024-12-09 PROCEDURE — 82306 VITAMIN D 25 HYDROXY: CPT

## 2024-12-09 PROCEDURE — G0439 PPPS, SUBSEQ VISIT: HCPCS | Performed by: INTERNAL MEDICINE

## 2024-12-09 PROCEDURE — 73030 X-RAY EXAM OF SHOULDER: CPT

## 2024-12-09 PROCEDURE — 72100 X-RAY EXAM L-S SPINE 2/3 VWS: CPT

## 2024-12-09 PROCEDURE — 1170F FXNL STATUS ASSESSED: CPT | Performed by: INTERNAL MEDICINE

## 2024-12-09 RX ORDER — MECLIZINE HYDROCHLORIDE 25 MG/1
25 TABLET ORAL DAILY PRN
Qty: 30 TABLET | Refills: 5 | Status: SHIPPED | OUTPATIENT
Start: 2024-12-09

## 2024-12-09 NOTE — PROGRESS NOTES
The ABCs of the Annual Wellness Visit  Subsequent Medicare Wellness Visit    Subjective      Luis Elliott is a 74 y.o. male who presents for a Subsequent Medicare Wellness Visit.  He would like to discuss dizziness ongoing for the past 4 years since starting radiation.    The following portions of the patient's history were reviewed and   updated as appropriate: allergies, current medications, past family history, past medical history, past social history, past surgical history, and problem list.    Compared to one year ago, the patient feels his physical   health is the same.    Compared to one year ago, the patient feels his mental   health is the same.    Recent Hospitalizations:  He was not admitted to the hospital during the last year.       Current Medical Providers:  Patient Care Team:  Silvestre Coleman DO as PCP - General (Internal Medicine)  Kan Blackwell MD as Consulting Physician (Cardiology)  Mark Camacho MD as Consulting Physician (Ophthalmology)  Daniel Cartagena MD as Referring Physician (Hematology and Oncology)  Jay Jay Urias MD as Consulting Physician (Radiation Oncology)  Clint Thompson MD as Emergency Attending (Pulmonary Disease)  Tom Webber MD as Consulting Physician (Nephrology)  Gaby Pandya APRN as Nurse Practitioner (Hematology and Oncology)  Hannah Chairez APRN as Nurse Practitioner (Pulmonary Disease)    Outpatient Medications Prior to Visit   Medication Sig Dispense Refill    aspirin 81 MG EC tablet Take 1 tablet by mouth Every Night.      atorvastatin (LIPITOR) 40 MG tablet Take 1 tablet by mouth Every Night.      calcium carbonate-cholecalciferol 500-400 MG-UNIT tablet tablet Take 1 tablet by mouth Daily.      cholecalciferol (VITAMIN D3) 1000 units tablet Take 1 tablet by mouth Daily.      ezetimibe (ZETIA) 10 MG tablet Take 1 tablet by mouth Daily.      metFORMIN (GLUCOPHAGE) 500 MG tablet TAKE 1 TABLET BY MOUTH TWICE A DAY WITH A  "MEAL 180 tablet 1    nitroglycerin (NITROSTAT) 0.4 MG SL tablet Place 1 tablet under the tongue Every 5 (Five) Minutes As Needed for Chest Pain. Take no more than 3 doses in 15 minutes.      Omega-3 Fatty Acids (FISH OIL) 1200 MG capsule capsule Take 1 capsule by mouth Daily.      PAPAV-PHENTOLAMINE-ALPROSTADIL IC INJECT 25 units AS DIRECTED BY YOUR DOCTOR      sertraline (ZOLOFT) 50 MG tablet TAKE 1 TABLET BY MOUTH DAILY 90 tablet 1    SURE COMFORT INS SYR 1CC/29G 29G X 1/2\" 1 ML misc USE AS DIRECTED WITH TRIMIX.      vitamin B-12 (CYANOCOBALAMIN) 1000 MCG tablet Take 1 tablet by mouth Daily.       No facility-administered medications prior to visit.       No opioid medication identified on active medication list. I have reviewed chart for other potential  high risk medication/s and harmful drug interactions in the elderly.        Aspirin is on active medication list. Aspirin use is indicated based on review of current medical condition/s. Pros and cons of this therapy have been discussed today. Benefits of this medication outweigh potential harm.  Patient has been encouraged to continue taking this medication.  .      Patient Active Problem List   Diagnosis    Type 2 diabetes mellitus without complication    Hyperlipidemia    Essential hypertension    Erectile dysfunction    History of prostate cancer    Osteoarthritis of multiple joints    Coronary artery disease involving native heart without angina pectoris    Colon polyps    Glaucoma    Low back pain    Streptococcal bacteremia    H/O subacute bacterial endocarditis    Pulmonary nodule (2.6cm RLL)    Severe mitral regurgitation    Former smoker (Stopped 2012)    COPD    Small cell lung cancer, right lower lobe    CKD stage G3a/A2, GFR 45-59 and albumin creatinine ratio  mg/g     Advance Care Planning   Advance Care Planning     Advance Directive is not on file.  ACP discussion was held with the patient during this visit. Patient does not have an advance " "directive, information provided.     Objective    Vitals:    24 1216   BP: 120/78   Pulse: 57   SpO2: 98%   Weight: 75.7 kg (166 lb 12.8 oz)   Height: 165.1 cm (65\")   PainSc: 0-No pain     Estimated body mass index is 27.76 kg/m² as calculated from the following:    Height as of this encounter: 165.1 cm (65\").    Weight as of this encounter: 75.7 kg (166 lb 12.8 oz).           Does the patient have evidence of cognitive impairment?   No            HEALTH RISK ASSESSMENT    Smoking Status:  Social History     Tobacco Use   Smoking Status Former    Current packs/day: 0.00    Average packs/day: 1.5 packs/day for 36.0 years (54.0 ttl pk-yrs)    Types: Cigarettes    Start date: 1975    Quit date: 2011    Years since quittin.6    Passive exposure: Never   Smokeless Tobacco Former    Types: Chew    Quit date:    Tobacco Comments    quit smoking for 12 years then started again but then quit a final time      Alcohol Consumption:  Social History     Substance and Sexual Activity   Alcohol Use Not Currently     Fall Risk Screen:    BLANCAADI Fall Risk Assessment was completed, and patient is at HIGH risk for falls. Assessment completed on:2024    Depression Screenin/9/2024    12:17 PM   PHQ-2/PHQ-9 Depression Screening   Little interest or pleasure in doing things Not at all   Feeling down, depressed, or hopeless Not at all   How difficult have these problems made it for you to do your work, take care of things at home, or get along with other people? Not difficult at all       Health Habits and Functional and Cognitive Screenin/9/2024    12:25 PM   Functional & Cognitive Status   Do you have difficulty preparing food and eating? No   Do you have difficulty bathing yourself, getting dressed or grooming yourself? No   Do you have difficulty using the toilet? No   Do you have difficulty moving around from place to place? No   Do you have trouble with steps or getting out of a bed " or a chair? No   Current Diet Well Balanced Diet   Dental Exam Up to date   Eye Exam Up to date   Exercise (times per week) 1 times per week   Current Exercises Include Other   Do you need help using the phone?  No   Are you deaf or do you have serious difficulty hearing?  Yes   Do you need help to go to places out of walking distance? No   Do you need help shopping? No   Do you need help preparing meals?  No   Do you need help with housework?  No   Do you need help with laundry? No   Do you need help taking your medications? No   Do you need help managing money? No   Do you ever drive or ride in a car without wearing a seat belt? No   Have you felt unusual stress, anger or loneliness in the last month? No   Who do you live with? Spouse   If you need help, do you have trouble finding someone available to you? No   Have you been bothered in the last four weeks by sexual problems? No   Do you have difficulty concentrating, remembering or making decisions? No       Age-appropriate Screening Schedule:  Refer to the list below for future screening recommendations based on patient's age, sex and/or medical conditions. Orders for these recommended tests are listed in the plan section. The patient has been provided with a written plan.    Health Maintenance   Topic Date Due    DIABETIC EYE EXAM  03/09/2021    TDAP/TD VACCINES (2 - Td or Tdap) 01/01/2023    HEMOGLOBIN A1C  06/13/2024    ANNUAL WELLNESS VISIT  12/06/2024    URINE MICROALBUMIN  12/13/2024    LIPID PANEL  07/01/2025    BMI FOLLOWUP  12/09/2025    COLORECTAL CANCER SCREENING  11/04/2026    HEPATITIS C SCREENING  Completed    COVID-19 Vaccine  Completed    INFLUENZA VACCINE  Completed    Pneumococcal Vaccine 65+  Completed    AAA SCREEN (ONE-TIME)  Completed    ZOSTER VACCINE  Completed    LUNG CANCER SCREENING  Discontinued                Physical Exam  Gen Appearance: NAD  HEENT: Normocephalic, PERRLA, no thyromegaly, trache midline  Heart: RRR, normal S1 and  S2, no murmur  Lungs: CTA b/l, no wheezing, no crackles  Abdomen: Soft, non-tender, non-distended, no guarding and BSx4  MSK: Moves all extremities well, normal gait, no peripheral edema  Pulses: Palpable and equal b/l  Lymph nodes: No palpable lymphadenopathy   Neuro: No focal deficits     CMS Preventative Services Quick Reference  Risk Factors Identified During Encounter:    None Identified    The above risks/problems have been discussed with the patient.  Pertinent information has been shared with the patient in the After Visit Summary.    Diagnoses and all orders for this visit:    1. Medicare annual wellness visit, subsequent (Primary)  Counseled on healthy weight, nutrition, physical activity, cancer screening, and immunizations.    2. Dizziness  -     meclizine (ANTIVERT) 25 MG tablet; Take 1 tablet by mouth Daily As Needed for Dizziness.  Dispense: 30 tablet; Refill: 5  -     CBC & Differential; Future  -     Comprehensive Metabolic Panel; Future  -     Hemoglobin A1c; Future  -     Lipid Panel; Future  -     TSH+Free T4; Future  -     Urinalysis With Culture If Indicated - Urine, Clean Catch; Future  -     Vitamin D,25-Hydroxy; Future  -     PSA Screen; Future  -     Ambulatory Referral to ENT (Otolaryngology)    3. Type 2 diabetes mellitus without complication, without long-term current use of insulin  -     CBC & Differential; Future  -     Comprehensive Metabolic Panel; Future  -     Hemoglobin A1c; Future  -     Lipid Panel; Future  -     TSH+Free T4; Future  -     Urinalysis With Culture If Indicated - Urine, Clean Catch; Future  -     Vitamin D,25-Hydroxy; Future  -     PSA Screen; Future    4. Primary hypertension  -     CBC & Differential; Future  -     Comprehensive Metabolic Panel; Future  -     Hemoglobin A1c; Future  -     Lipid Panel; Future  -     TSH+Free T4; Future  -     Urinalysis With Culture If Indicated - Urine, Clean Catch; Future  -     Vitamin D,25-Hydroxy; Future  -     PSA Screen;  Future    5. CKD stage G3a/A2, GFR 45-59 and albumin creatinine ratio  mg/g  -     CBC & Differential; Future  -     Comprehensive Metabolic Panel; Future  -     Hemoglobin A1c; Future  -     Lipid Panel; Future  -     TSH+Free T4; Future  -     Urinalysis With Culture If Indicated - Urine, Clean Catch; Future  -     Vitamin D,25-Hydroxy; Future  -     PSA Screen; Future    6. Hyperlipidemia, unspecified hyperlipidemia type  -     CBC & Differential; Future  -     Comprehensive Metabolic Panel; Future  -     Hemoglobin A1c; Future  -     Lipid Panel; Future  -     TSH+Free T4; Future  -     Urinalysis With Culture If Indicated - Urine, Clean Catch; Future  -     Vitamin D,25-Hydroxy; Future  -     PSA Screen; Future    7. Vitamin D deficiency  -     CBC & Differential; Future  -     Comprehensive Metabolic Panel; Future  -     Hemoglobin A1c; Future  -     Lipid Panel; Future  -     TSH+Free T4; Future  -     Urinalysis With Culture If Indicated - Urine, Clean Catch; Future  -     Vitamin D,25-Hydroxy; Future  -     PSA Screen; Future    8. Encounter for prostate cancer screening  -     CBC & Differential; Future  -     Comprehensive Metabolic Panel; Future  -     Hemoglobin A1c; Future  -     Lipid Panel; Future  -     TSH+Free T4; Future  -     Urinalysis With Culture If Indicated - Urine, Clean Catch; Future  -     Vitamin D,25-Hydroxy; Future  -     PSA Screen; Future    9. Chronic right shoulder pain  -     XR Shoulder 2+ View Right; Future    10. Left hip pain  -     XR Hip With or Without Pelvis 2 - 3 View Left; Future    11. Low back pain without sciatica, unspecified back pain laterality, unspecified chronicity  -     XR Spine Lumbar 2 or 3 View; Future        Follow Up:   Next Medicare Wellness visit to be scheduled in 1 year.      An After Visit Summary and PPPS were made available to the patient.

## 2024-12-10 ENCOUNTER — TELEPHONE (OUTPATIENT)
Dept: FAMILY MEDICINE CLINIC | Facility: CLINIC | Age: 74
End: 2024-12-10
Payer: MEDICARE

## 2024-12-10 NOTE — TELEPHONE ENCOUNTER
Caller: Sabina Elliott    Relationship: Emergency Contact    Best call back number: [phone]     What does billing need from the patient: PATIENT STATES THAT THEY WERE INFORMED BEFORE BLOOD WORK WAS DONE ON 12/9/24 THAT THE PSA SCREEN WOULD NOT BE COVERED BY INSURANCE SINCE IT WASN'T OVER A YEAR YET. THEY WERE TOLD TO RESCHEDULE NEXT WEEK FOR IT, 12/16-20. LOOKING AT THE RESULTS FROM YESTERDAY, THE PSA SCREEN WAS RAN WHEN IT WASN'T TIME YET. PLEASE CALL AND DISCUSS

## 2024-12-20 ENCOUNTER — TELEPHONE (OUTPATIENT)
Dept: FAMILY MEDICINE CLINIC | Facility: CLINIC | Age: 74
End: 2024-12-20
Payer: MEDICARE

## 2024-12-20 DIAGNOSIS — M25.552 LEFT HIP PAIN: Primary | ICD-10-CM

## 2024-12-20 DIAGNOSIS — G89.29 CHRONIC RIGHT SHOULDER PAIN: ICD-10-CM

## 2024-12-20 DIAGNOSIS — R42 DIZZINESS: ICD-10-CM

## 2024-12-20 DIAGNOSIS — M25.511 CHRONIC RIGHT SHOULDER PAIN: ICD-10-CM

## 2024-12-20 DIAGNOSIS — M54.50 LOW BACK PAIN WITHOUT SCIATICA, UNSPECIFIED BACK PAIN LATERALITY, UNSPECIFIED CHRONICITY: ICD-10-CM

## 2024-12-20 NOTE — TELEPHONE ENCOUNTER
Patient's wife is asking about PT and ENT referrals. I let her about the ENT referral. She should be receiving a call to schedule, but I do see a PT referral on file. Can you send one in, if needed?

## 2024-12-20 NOTE — TELEPHONE ENCOUNTER
Spoke with Sabina again and let her know we need to wait to see if Medicare pays for the PSA or not. She will call me back if she receives a bill.

## 2025-01-21 ENCOUNTER — TRANSCRIBE ORDERS (OUTPATIENT)
Dept: ADMINISTRATIVE | Facility: HOSPITAL | Age: 75
End: 2025-01-21
Payer: MEDICARE

## 2025-01-21 DIAGNOSIS — R42 DIZZINESS AND GIDDINESS: Primary | ICD-10-CM

## 2025-02-07 ENCOUNTER — HOSPITAL ENCOUNTER (OUTPATIENT)
Dept: MRI IMAGING | Facility: HOSPITAL | Age: 75
Discharge: HOME | End: 2025-02-07
Payer: MEDICARE

## 2025-02-07 DIAGNOSIS — R42 DIZZINESS AND GIDDINESS: ICD-10-CM

## 2025-02-07 PROCEDURE — 70551 MRI BRAIN STEM W/O DYE: CPT

## 2025-02-10 ENCOUNTER — TREATMENT (OUTPATIENT)
Dept: PHYSICAL THERAPY | Facility: CLINIC | Age: 75
End: 2025-02-10
Payer: MEDICARE

## 2025-02-10 DIAGNOSIS — G89.29 CHRONIC LEFT HIP PAIN: Primary | ICD-10-CM

## 2025-02-10 DIAGNOSIS — M25.552 CHRONIC LEFT HIP PAIN: Primary | ICD-10-CM

## 2025-02-10 PROCEDURE — 97161 PT EVAL LOW COMPLEX 20 MIN: CPT | Performed by: PHYSICAL THERAPIST

## 2025-02-10 PROCEDURE — 97530 THERAPEUTIC ACTIVITIES: CPT | Performed by: PHYSICAL THERAPIST

## 2025-02-10 PROCEDURE — 97110 THERAPEUTIC EXERCISES: CPT | Performed by: PHYSICAL THERAPIST

## 2025-02-10 NOTE — PROGRESS NOTES
Physical Therapy Initial Evaluation and Plan of Care  [address] [facility] [address]    [place] [address]  [phone]    Patient: Luis Elliott   : 1950  Diagnosis/ICD-10 Code:  Chronic left hip pain [M25.552, G89.29]  Referring practitioner: Silvestre Coleman DO  Date of Initial Visit: 2/10/2025  Today's Date: 2/10/2025  Patient seen for 1 session         Visit Diagnoses:    ICD-10-CM ICD-9-CM   1. Chronic left hip pain  M25.552 719.45    G89.29 338.29         Subjective Questionnaire: LEFS: 63/80      Subjective Evaluation    History of Present Illness  Mechanism of injury: L lateral hip pn that extends to the lateral knee. Does not radiate beyond the knee, denies groin/back pn, (-) mechanical symptoms, and (-) N/T. Occurs almost exclusively when laying on his L side at night to sleep. Will wake him from sleep. Subsides if he repositions. Able to tolerate sitting, standing, walking rising from a chair, driving, stairs w/o issue.     PLOF: independent w/ ADL/IADLs, drives  Hx L TKA 2012  MI , 3 stents    Radiation due to lung CA [id number], developed dizziness afterward.  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    Diagnostic Tests  Abnormal x-ray: DDD, facet arthropathy L4-5, L5-S1, Gr I anterolisthesis L4on5; mod degen joint space narrrowing L hip.    Patient Goals  Patient goals for therapy: decreased pain  Patient goal: sleep w/o disruption         Treatment  See Exercise, Manual, and Modality Logs for complete treatment.     Access Code: [email or web address]  URL:  [email or web address]  Date: 02/10/2025  Prepared by: Aidee Wooten    Exercises  - Supine Single Knee to Chest Stretch  - 2 x daily - 3 reps - 15 sec hold  - Supine Piriformis Stretch with Leg Straight  - 2 x daily - 3 reps - 15 sec hold  - Modified Jay Jay Stretch  - 2 x daily - 3 reps - 15 sec hold  - Supine Bridge  - 2 x daily - 2-3 sets - 10 reps    Objective          Observations     Additional Hip Observation Details  Gait non antalgic  Slight knee valgus w/ STS    Tenderness     Additional Tenderness Details  Min TTP posterior aspect L greater trochanter, glut med tendon  (-) TTP Lspine, ant hip    Lumbar Screen  Lumbar range of motion within normal limits with the following exceptions:Trunk ROM WNL all planes w/o pn    Neurological Testing     Sensation     Hip   Left Hip   Intact: light touch    Right Hip   Intact: light touch    Reflexes   Left   Patellar (L4): normal (2+)  Achilles (S1): normal (2+)    Right   Patellar (L4): normal (2+)  Achilles (S1): normal (2+)    Active Range of Motion   Left Hip   Flexion: 120 degrees   Extension: 8 degrees   Abduction: 45 degrees   External rotation (90/90): 40 degrees   Internal rotation (90/90): 40 degrees     Right Hip   Flexion: 120 degrees   Extension: 8 degrees   Abduction: 45 degrees   External rotation (90/90): 45 degrees   Internal rotation (90/90): 32 degrees     Strength/Myotome Testing     Left Hip   Planes of Motion   Flexion: 5  Extension: 5  Abduction: 4+  External rotation: 4+    Left Knee   Flexion: 5  Extension: 5    Left Ankle/Foot   Dorsiflexion: 5  Plantar flexion: 4  Great toe extension: 5    Additional Strength Details  Slight lateral hip pn w/ resisted hip flxn    Tests     Left Hip   Positive MAURILIO.   Negative FADIR, modified Elba, piriformis and scour.   SLR: Negative.     Additional Tests Details  (-) quadrant  (-) femoral n stretch  (-) external de rotation stretch  (-) pn w/ SLS, unstable  (-) reproduction w/ PA gliding lumbar  spine    Left Hip Flexibility Comments:   Hamstring: no impairment  Quad: mild impairment L  Hip Flexor: no impairment  Piriformis: no impairment  IT Band: no impairment            Assessment & Plan       Assessment  Impairments: abnormal or restricted ROM, activity intolerance, impaired physical strength, lacks appropriate home exercise program and pain with function   Functional limitations: carrying objects, lifting, sleeping, walking, pulling, pushing, uncomfortable because of pain, moving in bed, sitting, standing, stooping and unable to perform repetitive tasks   Assessment details: Pt is a 74 YOM w/ DM II, dizziness, HTN, CKD, hx lung and prostate cancer s/p radiation therapy, hx MI s/p stenting who presents to PT w/ complaint of chronic L hip pn.  He endorses posterolateral left greater trochanteric pain that occurs almost exclusively with laying on his left side at night when sleeping.  Pain can radiate along the lateral thigh to the level of the knee.  Patient denies groin/back pain, N/T, leg weakness.  He demonstrates full and pain-free trunk/hip mobility in all planes.  He exhibits mild TTP around the greater trochanter and glut med tendon, L quad tightness.  Strength grossly intact.  (+) MAURILIO.  (-) SLR/slump, femoral nerve stretch, FADIR, SLS, external derotation test.  Provided HEP with light hip stretching and strengthening exercises.  Educated patient on avoidance of positions that contribute to compression of the glut med tendon, and encouraged sleeping on his back or right side with pillow between his knees. Pt would benefit from skilled PT services to address deficits, reduce pn, and improve function and quality of life  Barriers to therapy: comorbidities  Prognosis: good    Goals  Plan Goals: LTG 6wks  1) Pt to be independent w/ long term HEP and self mgmt.  2) Pt to report 80% or better improvement in L hip symptoms.  3) Pt to report no TTP of the L trochanteric/gluteal region.  4) Pt to report  no sleep disruption resulting from L hip pn.      Plan  Therapy options: will be seen for skilled therapy services  Planned modality interventions: TENS, thermotherapy (hydrocollator packs), traction, ultrasound, cryotherapy and dry needling  Planned therapy interventions: abdominal trunk stabilization, ADL retraining, body mechanics training, flexibility, functional ROM exercises, home exercise program, joint mobilization, manual therapy, neuromuscular re-education, postural training, soft tissue mobilization, spinal/joint mobilization, strengthening, stretching and therapeutic activities  Frequency: 1x week  Duration in weeks: 12  Treatment plan discussed with: patient  Plan details: PT POC to emphasize L hip stretching/mobility, L glut strengthening, core stab, modalities as indicated for pn control        History # of Personal Factors and/or Comorbidities: MODERATE (1-2)  Examination of Body System(s): # of elements: LOW (1-2)  Clinical Presentation: EVOLVING  Clinical Decision Making: LOW       Timed:         Manual Therapy:    0     mins  28342;     Therapeutic Exercise:    15     mins  48126;     Neuromuscular Eva:    0    mins  72299;    Therapeutic Activity:     10     mins  95949;     Gait Trainin     mins  41836;     Ultrasound:     0     mins  60443;    Ionto                               0    mins   81068  Self Care                       0     mins   84479  Work Conditioning 1-2 hours    0    mins 48599  Work Conditioning ea add'l hour    0   mins 36067      Un-Timed:  Electrical Stimulation:    0     mins  81818 ( );  Dry Needling     0     mins self-pay  Traction     0     mins 08895  Low Eval     30     Mins 76055  Mod Eval     0     Mins 10216  High Eval                       0     Mins 31127  Re-Eval     0     Mins 14733  Canalith Repos    0     mins 16340      Timed Treatment:   25   mins   Total Treatment:     55   mins          PT: Aidee Wooten, PT     License Number:  [id number]  Electronically signed by Aidee Wooten, PT, 02/10/25, 10:54 AM EST    Certification Period: 2/10/2025 thru 5/10/2025  I certify that the therapy services are furnished while this patient is under my care.  The services outlined above are required by this patient, and will be reviewed every 90 days.         Physician Signature:__________________________________________________    PHYSICIAN: Silvestre Coleman DO  NPI: [id number]                                      DATE:

## 2025-02-17 ENCOUNTER — TELEPHONE (OUTPATIENT)
Dept: PHYSICAL THERAPY | Facility: OTHER | Age: 75
End: 2025-02-17
Payer: MEDICARE

## 2025-02-17 NOTE — TELEPHONE ENCOUNTER
"  Caller: Luis Elliott \"Lalito\"    Relationship: Self    What was the call regarding: PATIENT CANCELLED APPT TODAY DUE TO NOT FEELING WELL      "

## 2025-03-24 ENCOUNTER — HOSPITAL ENCOUNTER (OUTPATIENT)
Dept: CT IMAGING | Facility: HOSPITAL | Age: 75
Discharge: HOME | End: 2025-03-24
Admitting: INTERNAL MEDICINE
Payer: MEDICARE

## 2025-03-24 DIAGNOSIS — C34.31 SMALL CELL LUNG CANCER, RIGHT LOWER LOBE: ICD-10-CM

## 2025-03-24 PROCEDURE — 71250 CT THORAX DX C-: CPT

## 2025-03-25 ENCOUNTER — OFFICE VISIT (OUTPATIENT)
Dept: ONCOLOGY | Facility: CLINIC | Age: 75
End: 2025-03-25
Payer: MEDICARE

## 2025-03-25 ENCOUNTER — LAB (OUTPATIENT)
Dept: LAB | Facility: HOSPITAL | Age: 75
End: 2025-03-25
Payer: MEDICARE

## 2025-03-25 VITALS
OXYGEN SATURATION: 96 % | HEART RATE: 45 BPM | WEIGHT: 168 LBS | RESPIRATION RATE: 16 BRPM | HEIGHT: 65 IN | DIASTOLIC BLOOD PRESSURE: 79 MMHG | TEMPERATURE: 98.3 F | BODY MASS INDEX: 27.99 KG/M2 | SYSTOLIC BLOOD PRESSURE: 141 MMHG

## 2025-03-25 DIAGNOSIS — C34.31 SMALL CELL LUNG CANCER, RIGHT LOWER LOBE: Primary | ICD-10-CM

## 2025-03-25 DIAGNOSIS — C34.31 SMALL CELL LUNG CANCER, RIGHT LOWER LOBE: ICD-10-CM

## 2025-03-25 LAB
ALBUMIN SERPL-MCNC: 4.8 G/DL (ref 3.5–5.2)
ALBUMIN/GLOB SERPL: 1.8 G/DL
ALP SERPL-CCNC: 99 U/L (ref 39–117)
ALT SERPL W P-5'-P-CCNC: 30 U/L (ref 1–41)
ANION GAP SERPL CALCULATED.3IONS-SCNC: 12 MMOL/L (ref 5–15)
AST SERPL-CCNC: 24 U/L (ref 1–40)
BASOPHILS # BLD AUTO: 0.02 10*3/MM3 (ref 0–0.2)
BASOPHILS NFR BLD AUTO: 0.3 % (ref 0–1.5)
BILIRUB SERPL-MCNC: 1.7 MG/DL (ref 0–1.2)
BUN SERPL-MCNC: 23 MG/DL (ref 8–23)
BUN/CREAT SERPL: 15.2 (ref 7–25)
CALCIUM SPEC-SCNC: 10 MG/DL (ref 8.6–10.5)
CHLORIDE SERPL-SCNC: 101 MMOL/L (ref 98–107)
CO2 SERPL-SCNC: 25 MMOL/L (ref 22–29)
CREAT SERPL-MCNC: 1.51 MG/DL (ref 0.76–1.27)
DEPRECATED RDW RBC AUTO: 42.8 FL (ref 37–54)
EGFRCR SERPLBLD CKD-EPI 2021: 48.2 ML/MIN/1.73
EOSINOPHIL # BLD AUTO: 0.09 10*3/MM3 (ref 0–0.4)
EOSINOPHIL NFR BLD AUTO: 1.2 % (ref 0.3–6.2)
ERYTHROCYTE [DISTWIDTH] IN BLOOD BY AUTOMATED COUNT: 12.4 % (ref 12.3–15.4)
GLOBULIN UR ELPH-MCNC: 2.6 GM/DL
GLUCOSE SERPL-MCNC: 127 MG/DL (ref 65–99)
HCT VFR BLD AUTO: 42 % (ref 37.5–51)
HGB BLD-MCNC: 14.4 G/DL (ref 13–17.7)
IMM GRANULOCYTES # BLD AUTO: 0.01 10*3/MM3 (ref 0–0.05)
IMM GRANULOCYTES NFR BLD AUTO: 0.1 % (ref 0–0.5)
LYMPHOCYTES # BLD AUTO: 1.31 10*3/MM3 (ref 0.7–3.1)
LYMPHOCYTES NFR BLD AUTO: 17.6 % (ref 19.6–45.3)
MCH RBC QN AUTO: 31.7 PG (ref 26.6–33)
MCHC RBC AUTO-ENTMCNC: 34.3 G/DL (ref 31.5–35.7)
MCV RBC AUTO: 92.5 FL (ref 79–97)
MONOCYTES # BLD AUTO: 0.61 10*3/MM3 (ref 0.1–0.9)
MONOCYTES NFR BLD AUTO: 8.2 % (ref 5–12)
NEUTROPHILS NFR BLD AUTO: 5.39 10*3/MM3 (ref 1.7–7)
NEUTROPHILS NFR BLD AUTO: 72.6 % (ref 42.7–76)
PLATELET # BLD AUTO: 133 10*3/MM3 (ref 140–450)
PMV BLD AUTO: 10.1 FL (ref 6–12)
POTASSIUM SERPL-SCNC: 5.3 MMOL/L (ref 3.5–5.2)
PROT SERPL-MCNC: 7.4 G/DL (ref 6–8.5)
RBC # BLD AUTO: 4.54 10*6/MM3 (ref 4.14–5.8)
SODIUM SERPL-SCNC: 138 MMOL/L (ref 136–145)
WBC NRBC COR # BLD AUTO: 7.43 10*3/MM3 (ref 3.4–10.8)

## 2025-03-25 PROCEDURE — 80053 COMPREHEN METABOLIC PANEL: CPT

## 2025-03-25 PROCEDURE — 3077F SYST BP >= 140 MM HG: CPT | Performed by: NURSE PRACTITIONER

## 2025-03-25 PROCEDURE — 85025 COMPLETE CBC W/AUTO DIFF WBC: CPT

## 2025-03-25 PROCEDURE — 1159F MED LIST DOCD IN RCRD: CPT | Performed by: NURSE PRACTITIONER

## 2025-03-25 PROCEDURE — 99214 OFFICE O/P EST MOD 30 MIN: CPT | Performed by: NURSE PRACTITIONER

## 2025-03-25 PROCEDURE — 1126F AMNT PAIN NOTED NONE PRSNT: CPT | Performed by: NURSE PRACTITIONER

## 2025-03-25 PROCEDURE — 3078F DIAST BP <80 MM HG: CPT | Performed by: NURSE PRACTITIONER

## 2025-03-25 PROCEDURE — 36415 COLL VENOUS BLD VENIPUNCTURE: CPT

## 2025-03-25 PROCEDURE — 1160F RVW MEDS BY RX/DR IN RCRD: CPT | Performed by: NURSE PRACTITIONER

## 2025-03-25 NOTE — PROGRESS NOTES
DATE OF VISIT: 3/25/2025    REASON FOR VISIT: Followup for right lower lobe small cell lung cancer     PROBLEM LIST:  1. Limited stage small cell lung cancer [id number] stage IIIa:  A.  Presented with abnormal screening CT chest  B.  Diagnosed after bronchoscopy with biopsy done by  November 19, 2020 + for small cell from level 7 level 4R and level 10 R lymph nodes.  C.  Whole-body PET scan did not reveal any other sites of disease.  D.  Started definitive concurrent chemotherapy with radiation using cisplatin etoposide December 2020 1 status post 4 cycles completed February 23, 2021  2.  Isolated 6 mm right cerebellar lesion:  A.  Evident MRI brain done on December 1, 2020  B.  Completed prophylactic whole brain radiation 4/23/2021.   3.  Chemotherapy-induced nausea  4. Insomnia   5.  Treatment induced anemia  6.  Chronic kidney disease  7.  Type 2 diabetes      HISTORY OF PRESENT ILLNESS: The patient is a very pleasant 75 y.o. male  with past medical history significant for right lower lobe lung cancer diagnosed April 19, 2020.  Patient status post definitive concurrent chemotherapy with radiation completed February 23, 2021. The patient is here today for scheduled follow-up visit.    SUBJECTIVE: The patient is here today with his wife. He has been doing fairly well since his last visit. He had some episodes of feeling off balance and dizzy and this prompted an MRI brain that was done last month. He still has occasional episodes, but has had no falls. His breathing has been stable. He complains of episodes of anxiety that lead to mood lability and difficulty sleeping. He has not tried anything for this yet.     Past History:  Medical History: has a past medical history of Cancer, CKD stage G3a/A2, GFR 45-59 and albumin creatinine ratio  mg/g (6/21/2021), COPD (chronic obstructive pulmonary disease), Coronary artery disease, DDD (degenerative disc disease), cervical, Depression, Diabetes mellitus,  "Erectile dysfunction, Glaucoma, Headache, History of radiation therapy (02/23/2021), History of radiation therapy (04/23/2021), Hyperlipidemia, Hypertension, Impingement syndrome of left shoulder, Infection, bacterial, Lung cancer, MI, old, Mitral valve vegetation, Osteoarthritis, Prostate cancer, SOBOE (shortness of breath on exertion), Varicose veins of lower limb, Wears glasses, and Wears partial dentures.   Surgical History: has a past surgical history that includes Prostatectomy; Colonoscopy w/ polypectomy; Knee surgery; Knee Arthroscopy; Lumbar epidural injection; Hernia repair; Tonsillectomy; Coronary angioplasty; Tendon transfer elbow; Trabeculectomy; pr rt/lt heart catheters (N/A, 12/27/2016); Cardiac catheterization (N/A, 12/20/2016); Coronary angioplasty with stent; Cataract extraction; Eye surgery; Colonoscopy; and Bronchoscopy (N/A, 11/19/2020).   Family History: family history includes Alcohol abuse in his father; Atrial fibrillation in his mother; Diabetes in his father; Heart attack in his father; Hypertension in his mother; No Known Problems in his sister; Prostate cancer in his brother.   Social History: reports that he quit smoking about 13 years ago. His smoking use included cigarettes. He started smoking about 49 years ago. He has a 54 pack-year smoking history. He has never been exposed to tobacco smoke. He quit smokeless tobacco use about 14 years ago.  His smokeless tobacco use included chew. He reports that he does not currently use alcohol. He reports current drug use. Drug: Marijuana.    (Not in a hospital admission)     Allergies: Xarelto [rivaroxaban]     Review of Systems   Constitutional:  Positive for fatigue.   Neurological:  Positive for dizziness.   Psychiatric/Behavioral:  Positive for sleep disturbance. The patient is nervous/anxious.        PHYSICAL EXAMINATION:   /79   Pulse (!) 45   Temp 98.3 °F (36.8 °C)   Resp 16   Ht 165.1 cm (65\")   Wt 76.2 kg (168 lb)   SpO2 " 96%   BMI 27.96 kg/m²    Pain Score    03/25/25 1044   PainSc: 0-No pain          ECOG score: 1        ECOG Performance Status: 1 - Symptomatic but completely ambulatory  General Appearance:  alert, cooperative, no apparent distress and appears stated age   Lungs:   Clear to auscultation bilaterally; respirations regular, even, and unlabored bilaterally   Heart:  Bradycardic, regular rhythm, no murmurs appreciated   Abdomen:   Soft, non-tender, non-distended, and no organomegaly     Lab on 03/25/2025   Component Date Value Ref Range Status    WBC 03/25/2025 7.43  3.40 - 10.80 10*3/mm3 Final    RBC 03/25/2025 4.54  4.14 - 5.80 10*6/mm3 Final    Hemoglobin 03/25/2025 14.4  13.0 - 17.7 g/dL Final    Hematocrit 03/25/2025 42.0  37.5 - 51.0 % Final    MCV 03/25/2025 92.5  79.0 - 97.0 fL Final    MCH 03/25/2025 31.7  26.6 - 33.0 pg Final    MCHC 03/25/2025 34.3  31.5 - 35.7 g/dL Final    RDW 03/25/2025 12.4  12.3 - 15.4 % Final    RDW-SD 03/25/2025 42.8  37.0 - 54.0 fl Final    MPV 03/25/2025 10.1  6.0 - 12.0 fL Final    Platelets 03/25/2025 133 (L)  140 - 450 10*3/mm3 Final    Neutrophil % 03/25/2025 72.6  42.7 - 76.0 % Final    Lymphocyte % 03/25/2025 17.6 (L)  19.6 - 45.3 % Final    Monocyte % 03/25/2025 8.2  5.0 - 12.0 % Final    Eosinophil % 03/25/2025 1.2  0.3 - 6.2 % Final    Basophil % 03/25/2025 0.3  0.0 - 1.5 % Final    Immature Grans % 03/25/2025 0.1  0.0 - 0.5 % Final    Neutrophils, Absolute 03/25/2025 5.39  1.70 - 7.00 10*3/mm3 Final    Lymphocytes, Absolute 03/25/2025 1.31  0.70 - 3.10 10*3/mm3 Final    Monocytes, Absolute 03/25/2025 0.61  0.10 - 0.90 10*3/mm3 Final    Eosinophils, Absolute 03/25/2025 0.09  0.00 - 0.40 10*3/mm3 Final    Basophils, Absolute 03/25/2025 0.02  0.00 - 0.20 10*3/mm3 Final    Immature Grans, Absolute 03/25/2025 0.01  0.00 - 0.05 10*3/mm3 Final        No results found.      ASSESSMENT: The patient is a very pleasant 75 y.o. male  with right small cell lung cancer      PLAN:    1.   Right lower lobe small cell lung cancer:  A.  I reviewed the CAT scan results from 3/24/25 with the patient. I reviewed the images myself. Unfortunately the radiology report is not yet available, however compared to his last scan results done 9/23/24 I do not appreciate any obvious evidence of progressive disease. We will follow up on the official radiology report and notify him of any significant findings.  B. I reviewed the lab results from today with the patient. I reassured him that his WBC and hemoglobin are normal. His platelet count is stable at 133,000. We will follow up on the CMP results.   C.  We will continue watchful waiting.  We will repeat his CAT scan of chest as well as labs including CBC and CMP prior to return. If his next scan is stable, we will switch him to annual follow ups at that time.     2.  Isolated brain lesion:  A.  The patient completed whole radiation April 2021.  B.  The patient had MRI done 2/7/25 secondary to dizziness that failed to show any evidence of new or progressive brain metastasis. There were no acute abnormalities.   C.  I will repeat MRI brain on as-needed basis at this point.    3.  Depression:  A.  The patient will continue Zoloft 50 mg daily.    4.  Type 2 diabetes:  A.  He will continue Metformin 500 mg twice a day    5.  Chronic kidney disease stage IIIa:  A.  I discussed with patient his blood results from 12/9/24 with creatinine stable at 1.49.   B.  He will continue to follow-up with his nephrologist, Dr. Webber, at [facility].  C. We will continue to order his CT scans without contrast to help limit impact on his kidneys from contrast dye.     6. Anxiety:  A. He is going to discuss this further with his PCP regarding medication management.     7. Bradycardia:  A. His heart rate was 45 today. I advised they monitor this and record it for two weeks at home, and if he is having episodes of bradycardia he needs to let Dr. Blackwell know and follow up sooner  than July. We discussed this could contribute to his dizziness and fatigue.     FOLLOW UP: 6-month repeated scans and labs.    Gaby Pandya, APRN  3/25/2025

## 2025-03-26 ENCOUNTER — OFFICE VISIT (OUTPATIENT)
Dept: FAMILY MEDICINE CLINIC | Facility: CLINIC | Age: 75
End: 2025-03-26
Payer: MEDICARE

## 2025-03-26 VITALS
HEIGHT: 65 IN | WEIGHT: 167.8 LBS | OXYGEN SATURATION: 98 % | BODY MASS INDEX: 27.96 KG/M2 | SYSTOLIC BLOOD PRESSURE: 128 MMHG | HEART RATE: 56 BPM | DIASTOLIC BLOOD PRESSURE: 74 MMHG

## 2025-03-26 DIAGNOSIS — F41.1 GENERALIZED ANXIETY DISORDER: Primary | ICD-10-CM

## 2025-03-26 DIAGNOSIS — G47.00 INSOMNIA, UNSPECIFIED TYPE: ICD-10-CM

## 2025-03-26 RX ORDER — MIRTAZAPINE 15 MG/1
15 TABLET, FILM COATED ORAL NIGHTLY
Qty: 90 TABLET | Refills: 1 | Status: SHIPPED | OUTPATIENT
Start: 2025-03-26

## 2025-03-26 RX ORDER — SERTRALINE HYDROCHLORIDE 100 MG/1
100 TABLET, FILM COATED ORAL DAILY
Qty: 90 TABLET | Refills: 1 | Status: SHIPPED | OUTPATIENT
Start: 2025-03-26

## 2025-03-26 NOTE — PROGRESS NOTES
Chief Complaint   Patient presents with    Anxiety     Anxiety has been getting worse lately, unable to sleep at night.        HPI:  Luis Elliott is a 75 y.o. male who presents today for worsening anxiety.  He is having difficulty sleeping as well.    ROS:  Constitutional: no fevers, night sweats or unexplained weight loss  Eyes: no vision changes  ENT: no runny nose, ear pain, sore throat  Cardio: no chest pain, palpitations  Pulm: no shortness of breath, wheezing, or cough  GI: no abdominal pain or changes in bowel movements  : no difficulty urinating  MSK: no difficulty ambulating, no joint pain  Neuro: no weakness, dizziness or headache  Psych: no trouble sleeping  Endo: no change in appetite      Past Medical History:   Diagnosis Date    Allergic     xarelto    Cancer     PROSTATE    Cataract     CKD stage G3a/A2, GFR 45-59 and albumin creatinine ratio  mg/g 06/21/2021    COPD (chronic obstructive pulmonary disease)     dr valente thinks pt has this     Coronary artery disease     DDD (degenerative disc disease), cervical     Depression     Diabetes mellitus     let  check sugar     Erectile dysfunction     Glaucoma     Headache     History of radiation therapy 02/23/2021    RUL/mediastinum    History of radiation therapy 04/23/2021    PCI brain    Hyperlipidemia     Hypertension     Impingement syndrome of left shoulder     Infection, bacterial     sees ID -  42 days of antibiotics     Lung cancer     Lung nodule     MI, old     89    Mitral valve vegetation     Osteoarthritis     Prostate cancer     2003    SOBOE (shortness of breath on exertion)     Varicose veins of lower limb     RIGHT    Wears glasses     Wears partial dentures     bottom       Family History   Problem Relation Age of Onset    Hypertension Mother     Atrial fibrillation Mother     Cancer Mother     Alcohol abuse Father     Heart attack Father     Diabetes Father     No Known Problems Sister     Prostate cancer Brother      "  Social History     Socioeconomic History    Marital status:      Spouse name: Sabina    Number of children: 2   Tobacco Use    Smoking status: Former     Current packs/day: 0.00     Average packs/day: 1.5 packs/day for 36.0 years (54.0 ttl pk-yrs)     Types: Cigarettes     Start date: 1975     Quit date: 2011     Years since quittin.9     Passive exposure: Never    Smokeless tobacco: Former     Types: Chew     Quit date:     Tobacco comments:     quit smoking for 12 years then started again but then quit a final time    Vaping Use    Vaping status: Never Used   Substance and Sexual Activity    Alcohol use: Not Currently    Drug use: Yes     Types: Marijuana     Comment: a month ago    Sexual activity: Defer      Allergies   Allergen Reactions    Xarelto [Rivaroxaban] Other (See Comments)     MADE ME FEEL LIKE \" I WAS HAVING A HEART ATTACK.\"      Immunization History   Administered Date(s) Administered    ABRYSVO (RSV, 60+ or pregnant women 32-36 wks) 2024    COVID-19 (MODERNA) 1st,2nd,3rd Dose Monovalent 2021, 2021, 2021    COVID-19 (PFIZER) 12YRS+ (COMIRNATY) 10/01/2024    FLUAD TRI 65YR+ 2019    Flu Vaccine Quad PF >36MO 2015, 2016, 10/24/2017    Fluad Quad 65+ 2020    Fluzone High-Dose 65+YRS 2017, 2018, 2019, 10/01/2024    Fluzone High-Dose 65+yrs 10/01/2021, 10/19/2022, 10/04/2023    Hepatitis A 2019    Influenza, Unspecified 2018    PEDS-Pneumococcal Conjugate (PCV7) 2015    Pneumococcal Conjugate 13-Valent (PCV13) 2015, 10/02/2020    Pneumococcal Conjugate 20-Valent (PCV20) 10/04/2022    Pneumococcal Conjugate Unspecified 2015    Pneumococcal Polysaccharide (PPSV23) 2013    Shingrix 10/02/2020        PE:  Vitals:    25 1246   BP: 128/74   Pulse: 56   SpO2: 98%      Body mass index is 27.92 kg/m².    Gen Appearance: NAD  HEENT: Normocephalic, PERRLA, no thyromegaly, trache " midline  Heart: RRR, normal S1 and S2, no murmur  Lungs: CTA b/l, no wheezing, no crackles  Abdomen: Soft, non-tender, non-distended, no guarding and BSx4  MSK: Moves all extremities well, normal gait, no peripheral edema  Pulses: Palpable and equal b/l  Lymph nodes: No palpable lymphadenopathy   Neuro: No focal deficits      Current Outpatient Medications   Medication Sig Dispense Refill    aspirin 81 MG EC tablet Take 1 tablet by mouth Every Night.      atorvastatin (LIPITOR) 40 MG tablet Take 1 tablet by mouth Every Night.      calcium carbonate-cholecalciferol 500-400 MG-UNIT tablet tablet Take 1 tablet by mouth Daily.      cholecalciferol (VITAMIN D3) 1000 units tablet Take 1 tablet by mouth Daily.      ezetimibe (ZETIA) 10 MG tablet Take 1 tablet by mouth Daily.      meclizine (ANTIVERT) 25 MG tablet Take 1 tablet by mouth Daily As Needed for Dizziness. 30 tablet 5    metFORMIN (GLUCOPHAGE) 500 MG tablet TAKE 1 TABLET BY MOUTH TWICE A DAY WITH A MEAL 180 tablet 1    nitroglycerin (NITROSTAT) 0.4 MG SL tablet Place 1 tablet under the tongue Every 5 (Five) Minutes As Needed for Chest Pain. Take no more than 3 doses in 15 minutes.      Omega-3 Fatty Acids (FISH OIL) 1200 MG capsule capsule Take 1 capsule by mouth Daily.      PAPAV-PHENTOLAMINE-ALPROSTADIL IC INJECT 25 units AS DIRECTED BY YOUR DOCTOR      vitamin B-12 (CYANOCOBALAMIN) 1000 MCG tablet Take 1 tablet by mouth Daily.      mirtazapine (Remeron) 15 MG tablet Take 1 tablet by mouth Every Night. 90 tablet 1    sertraline (Zoloft) 100 MG tablet Take 1 tablet by mouth Daily. 90 tablet 1     No current facility-administered medications for this visit.      Increase Zoloft to 100 mg.  Add on Remeron nightly for insomnia.  Follow-up in 4 weeks for reassessment.    Diagnoses and all orders for this visit:    1. Generalized anxiety disorder (Primary)  -     sertraline (Zoloft) 100 MG tablet; Take 1 tablet by mouth Daily.  Dispense: 90 tablet; Refill: 1    2.  Insomnia, unspecified type  -     mirtazapine (Remeron) 15 MG tablet; Take 1 tablet by mouth Every Night.  Dispense: 90 tablet; Refill: 1         Return in about 4 weeks (around 4/23/2025) for anxiety.     Dictated Utilizing Dragon Dictation    Please note that portions of this note were completed with a voice recognition program.    Part of this note may be an electronic transcription/translation of spoken language to printed text using the Dragon Dictation System.

## 2025-03-28 ENCOUNTER — DOCUMENTATION (OUTPATIENT)
Dept: PULMONOLOGY | Facility: CLINIC | Age: 75
End: 2025-03-28
Payer: MEDICARE

## 2025-03-28 NOTE — PROGRESS NOTES
The patient underwent a CT scan of the chest which revealed posttreatment changes with no new or progressive intrathoracic findings  Will discuss these results with the patient when he comes into the office for his visit next week

## 2025-03-31 ENCOUNTER — OFFICE VISIT (OUTPATIENT)
Dept: PULMONOLOGY | Facility: CLINIC | Age: 75
End: 2025-03-31
Payer: MEDICARE

## 2025-03-31 VITALS
OXYGEN SATURATION: 95 % | TEMPERATURE: 98 F | SYSTOLIC BLOOD PRESSURE: 120 MMHG | WEIGHT: 167 LBS | DIASTOLIC BLOOD PRESSURE: 82 MMHG | HEART RATE: 53 BPM | HEIGHT: 65 IN | BODY MASS INDEX: 27.82 KG/M2

## 2025-03-31 DIAGNOSIS — J44.9 COPD MIXED TYPE: Primary | ICD-10-CM

## 2025-03-31 DIAGNOSIS — R91.1 PULMONARY NODULE: ICD-10-CM

## 2025-03-31 DIAGNOSIS — Z87.891 FORMER SMOKER: ICD-10-CM

## 2025-03-31 PROCEDURE — 99213 OFFICE O/P EST LOW 20 MIN: CPT | Performed by: NURSE PRACTITIONER

## 2025-03-31 PROCEDURE — 3074F SYST BP LT 130 MM HG: CPT | Performed by: NURSE PRACTITIONER

## 2025-03-31 PROCEDURE — 3079F DIAST BP 80-89 MM HG: CPT | Performed by: NURSE PRACTITIONER

## 2025-03-31 PROCEDURE — 1160F RVW MEDS BY RX/DR IN RCRD: CPT | Performed by: NURSE PRACTITIONER

## 2025-03-31 PROCEDURE — 1159F MED LIST DOCD IN RCRD: CPT | Performed by: NURSE PRACTITIONER

## 2025-03-31 NOTE — PROGRESS NOTES
"[facility] Pulmonary Follow up      Chief Complaint  COPD    Subjective          Luis Elliott presents to [facility] for routine follow-up on his COPD.    He has not had any recent acute issues or exacerbations.  He denies any significant cough or sputum production.  He does not currently use any inhalers.  He does have a history of smoking but quit 13 years ago.  His last PFTs showed no obstruction or restriction.    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  He had chemotherapy with radiation in December 2020.  He also had an isolated brain lesion radiated in April 2021.  There is been no sign of reoccurrence since then.  He continues to follow-up with oncology.          Objective     Vital Signs:   /82   Pulse 53   Temp 98 °F (36.7 °C)   Ht 165.1 cm (65\")   Wt 75.8 kg (167 lb)   SpO2 95% Comment: room air at rest  BMI 27.79 kg/m²       Immunization History   Administered Date(s) Administered    ABRYSVO (RSV, 60+ or pregnant women 32-36 wks) 03/22/2024    COVID-19 (MODERNA) 1st,2nd,3rd Dose Monovalent 04/28/2021, 05/26/2021, 11/29/2021    COVID-19 (PFIZER) 12YRS+ (COMIRNATY) 10/01/2024    FLUAD TRI 65YR+ 09/16/2019    Flu Vaccine Quad PF >36MO 11/25/2015, 12/12/2016, 10/24/2017    Fluad Quad 65+ 09/17/2020    Fluzone High-Dose 65+YRS 11/01/2017, 09/12/2018, 09/16/2019, 10/01/2024    Fluzone High-Dose 65+yrs 10/01/2021, 10/19/2022, 10/04/2023    Hepatitis A 05/31/2019    Influenza, Unspecified 09/12/2018    PEDS-Pneumococcal Conjugate (PCV7) 06/03/2015    Pneumococcal Conjugate 13-Valent (PCV13) 06/03/2015, 10/02/2020    Pneumococcal Conjugate 20-Valent (PCV20) 10/04/2022    Pneumococcal Conjugate Unspecified 06/03/2015    Pneumococcal Polysaccharide (PPSV23) 06/03/2013    Shingrix 10/02/2020       Physical Exam  Vitals reviewed.   Constitutional:       Appearance: He is well-developed.   HENT:      Head: " Normocephalic and atraumatic.   Eyes:      Pupils: Pupils are equal, round, and reactive to light.   Cardiovascular:      Rate and Rhythm: Normal rate and regular rhythm.      Heart sounds: No murmur heard.  Pulmonary:      Effort: Pulmonary effort is normal. No respiratory distress.      Breath sounds: Normal breath sounds. No wheezing or rales.   Abdominal:      General: Bowel sounds are normal. There is no distension.      Palpations: Abdomen is soft.   Musculoskeletal:         General: Normal range of motion.      Cervical back: Normal range of motion and neck supple.   Skin:     General: Skin is warm and dry.      Findings: No erythema.   Neurological:      Mental Status: He is alert and oriented to person, place, and time.   Psychiatric:         Behavior: Behavior normal.          Result Review :       Data reviewed : Radiologic studies CT of the chest 3/24/2025      IMPRESSION:  Impression:  Stable right hilar/right upper lobe post-treatment changes.   No CT findings of recurrent or metastatic disease in the chest.                 Assessment and Plan    Problem List Items Addressed This Visit          Pulmonary and Pneumonias    Pulmonary nodule (2.6cm RLL)    COPD - Primary       Tobacco    Former smoker (Stopped 2012)     Mr. Calles follow-up in office for history of smoking and emphysema but is largely asymptomatic.  His PFTs did not have any significant obstruction back in 2023.  He does not use any inhalers.    He was also found to have a right lower lobe nodule and underwent radiation and chemotherapy.  His follow-up CT scans have shown postradiation changes and no new these.  His next follow-up is in September, he will also follow-up with Dr. Cartagena.    Follow-up with  in 6 months as well.          Follow Up     Return in about 6 months (around 9/30/2025).  Patient was given instructions and counseling regarding his condition or for health maintenance advice. Please see specific information  pulled into the AVS if appropriate.   .    RONNY Anguiano, ACNP  [facility]  Electronically signed

## 2025-04-23 ENCOUNTER — OFFICE VISIT (OUTPATIENT)
Dept: FAMILY MEDICINE CLINIC | Facility: CLINIC | Age: 75
End: 2025-04-23
Payer: MEDICARE

## 2025-04-23 VITALS
WEIGHT: 170.2 LBS | DIASTOLIC BLOOD PRESSURE: 66 MMHG | HEART RATE: 52 BPM | HEIGHT: 65 IN | BODY MASS INDEX: 28.36 KG/M2 | SYSTOLIC BLOOD PRESSURE: 134 MMHG

## 2025-04-23 DIAGNOSIS — G47.00 INSOMNIA, UNSPECIFIED TYPE: ICD-10-CM

## 2025-04-23 DIAGNOSIS — F41.1 GENERALIZED ANXIETY DISORDER: Primary | ICD-10-CM

## 2025-04-23 NOTE — PROGRESS NOTES
Chief Complaint   Patient presents with    Anxiety     Follow up        HPI:  Luis Elliott is a 75 y.o. male who presents today for follow-up anxiety and insomnia.  Patient reports Remeron is working well.    ROS:  Constitutional: no fevers, night sweats or unexplained weight loss  Eyes: no vision changes  ENT: no runny nose, ear pain, sore throat  Cardio: no chest pain, palpitations  Pulm: no shortness of breath, wheezing, or cough  GI: no abdominal pain or changes in bowel movements  : no difficulty urinating  MSK: no difficulty ambulating, no joint pain  Neuro: no weakness, dizziness or headache  Psych: no trouble sleeping  Endo: no change in appetite      Past Medical History:   Diagnosis Date    Allergic     xarelto    Cancer     PROSTATE    Cataract     CKD stage G3a/A2, GFR 45-59 and albumin creatinine ratio  mg/g 06/21/2021    COPD (chronic obstructive pulmonary disease)     dr valente thinks pt has this     Coronary artery disease     DDD (degenerative disc disease), cervical     Depression     Diabetes mellitus     let  check sugar     Erectile dysfunction     Glaucoma     Headache     History of radiation therapy 02/23/2021    RUL/mediastinum    History of radiation therapy 04/23/2021    PCI brain    Hyperlipidemia     Hypertension     Impingement syndrome of left shoulder     Infection, bacterial     sees ID -  42 days of antibiotics     Lung cancer     Lung nodule     MI, old     89    Mitral valve vegetation     Osteoarthritis     Prostate cancer     2003    SOBOE (shortness of breath on exertion)     Varicose veins of lower limb     RIGHT    Wears glasses     Wears partial dentures     bottom       Family History   Problem Relation Age of Onset    Hypertension Mother     Atrial fibrillation Mother     Cancer Mother     Alcohol abuse Father     Heart attack Father     Diabetes Father     No Known Problems Sister     Prostate cancer Brother       Social History     Socioeconomic History     "Marital status:      Spouse name: Sabina    Number of children: 2   Tobacco Use    Smoking status: Former     Current packs/day: 0.00     Average packs/day: 1.5 packs/day for 36.0 years (54.0 ttl pk-yrs)     Types: Cigarettes     Start date: 1975     Quit date: 2011     Years since quittin.0     Passive exposure: Never    Smokeless tobacco: Former     Types: Chew     Quit date:     Tobacco comments:     quit smoking for 12 years then started again but then quit a final time    Vaping Use    Vaping status: Never Used   Substance and Sexual Activity    Alcohol use: Not Currently    Drug use: Yes     Types: Marijuana     Comment: a month ago    Sexual activity: Defer      Allergies   Allergen Reactions    Xarelto [Rivaroxaban] Other (See Comments)     MADE ME FEEL LIKE \" I WAS HAVING A HEART ATTACK.\"      Immunization History   Administered Date(s) Administered    ABRYSVO (RSV, 60+ or pregnant women 32-36 wks) 2024    COVID-19 (MODERNA) 1st,2nd,3rd Dose Monovalent 2021, 2021, 2021    COVID-19 (PFIZER) 12YRS+ (COMIRNATY) 10/01/2024    FLUAD TRI 65YR+ 2019    Flu Vaccine Quad PF >36MO 2015, 2016, 10/24/2017    Fluad Quad 65+ 2020    Fluzone High-Dose 65+YRS 2017, 2018, 2019, 10/01/2024    Fluzone High-Dose 65+yrs 10/01/2021, 10/19/2022, 10/04/2023    Hepatitis A 2019    Influenza, Unspecified 2018    PEDS-Pneumococcal Conjugate (PCV7) 2015    Pneumococcal Conjugate 13-Valent (PCV13) 2015, 10/02/2020    Pneumococcal Conjugate 20-Valent (PCV20) 10/04/2022    Pneumococcal Conjugate Unspecified 2015    Pneumococcal Polysaccharide (PPSV23) 2013    Shingrix 10/02/2020        PE:  Vitals:    25 1336   BP: 134/66   Pulse: 52      Body mass index is 28.32 kg/m².    Gen Appearance: NAD  HEENT: Normocephalic, PERRLA, no thyromegaly, trache midline  Heart: RRR, normal S1 and S2, no murmur  Lungs: CTA " b/l, no wheezing, no crackles  Abdomen: Soft, non-tender, non-distended, no guarding and BSx4  MSK: Moves all extremities well, normal gait, no peripheral edema  Pulses: Palpable and equal b/l  Lymph nodes: No palpable lymphadenopathy   Neuro: No focal deficits      Current Outpatient Medications   Medication Sig Dispense Refill    aspirin 81 MG EC tablet Take 1 tablet by mouth Every Night.      atorvastatin (LIPITOR) 40 MG tablet Take 1 tablet by mouth Every Night.      calcium carbonate-cholecalciferol 500-400 MG-UNIT tablet tablet Take 1 tablet by mouth Daily.      cholecalciferol (VITAMIN D3) 1000 units tablet Take 1 tablet by mouth Daily.      ezetimibe (ZETIA) 10 MG tablet Take 1 tablet by mouth Daily.      meclizine (ANTIVERT) 25 MG tablet Take 1 tablet by mouth Daily As Needed for Dizziness. 30 tablet 5    metFORMIN (GLUCOPHAGE) 500 MG tablet TAKE 1 TABLET BY MOUTH TWICE A DAY WITH A MEAL 180 tablet 1    mirtazapine (Remeron) 15 MG tablet Take 1 tablet by mouth Every Night. 90 tablet 1    nitroglycerin (NITROSTAT) 0.4 MG SL tablet Place 1 tablet under the tongue Every 5 (Five) Minutes As Needed for Chest Pain. Take no more than 3 doses in 15 minutes.      Omega-3 Fatty Acids (FISH OIL) 1200 MG capsule capsule Take 1 capsule by mouth Daily.      PAPAV-PHENTOLAMINE-ALPROSTADIL IC INJECT 25 units AS DIRECTED BY YOUR DOCTOR      sertraline (Zoloft) 100 MG tablet Take 1 tablet by mouth Daily. 90 tablet 1    vitamin B-12 (CYANOCOBALAMIN) 1000 MCG tablet Take 1 tablet by mouth Daily.       No current facility-administered medications for this visit.      Medications working well.  Continue Remeron.  He would like to reduce Zoloft back to prior dose.  Will trial for 30 days.    Diagnoses and all orders for this visit:    1. Generalized anxiety disorder (Primary)    2. Insomnia, unspecified type         No follow-ups on file.     Dictated Utilizing Dragon Dictation    Please note that portions of this note were  completed with a voice recognition program.    Part of this note may be an electronic transcription/translation of spoken language to printed text using the Dragon Dictation System.

## (undated) DEVICE — COL SPUTUM SYS 50ML

## (undated) DEVICE — BOWL UTIL STRL 32OZ

## (undated) DEVICE — BRUSH CYTO BRONCOSCOPE

## (undated) DEVICE — SINGLE USE ASPIRATION NEEDLE: Brand: SINGLE USE ASPIRATION NEEDLE

## (undated) DEVICE — SINGLE USE BIOPSY VALVE MAJ-210: Brand: SINGLE USE BIOPSY VALVE (STERILE)

## (undated) DEVICE — LUER-LOK 360°: Brand: CONNECTA, LUER-LOK

## (undated) DEVICE — TRAP,MUCUS SPECIMEN,40CC: Brand: MEDLINE

## (undated) DEVICE — ST EXT MICROBORE FIX M LL 38IN

## (undated) DEVICE — SINGLE USE SUCTION VALVE MAJ-209: Brand: SINGLE USE SUCTION VALVE (STERILE)

## (undated) DEVICE — Device: Brand: BALLOON

## (undated) DEVICE — Device: Brand: SINGLE USE ASPIRATION NEEDLE NA-U401SX

## (undated) DEVICE — ADAPT SWVL FIBROPTIC BRONCH